# Patient Record
Sex: MALE | Race: WHITE | Employment: OTHER | URBAN - METROPOLITAN AREA
[De-identification: names, ages, dates, MRNs, and addresses within clinical notes are randomized per-mention and may not be internally consistent; named-entity substitution may affect disease eponyms.]

---

## 2017-01-04 ENCOUNTER — OFFICE VISIT (OUTPATIENT)
Dept: CARDIOLOGY CLINIC | Age: 67
End: 2017-01-04

## 2017-01-04 VITALS
HEART RATE: 93 BPM | SYSTOLIC BLOOD PRESSURE: 128 MMHG | HEIGHT: 72 IN | BODY MASS INDEX: 31.4 KG/M2 | WEIGHT: 231.8 LBS | RESPIRATION RATE: 18 BRPM | OXYGEN SATURATION: 98 % | DIASTOLIC BLOOD PRESSURE: 74 MMHG

## 2017-01-04 DIAGNOSIS — I10 ESSENTIAL HYPERTENSION: Primary | ICD-10-CM

## 2017-01-04 DIAGNOSIS — I34.1 MITRAL VALVE PROLAPSE: ICD-10-CM

## 2017-01-04 RX ORDER — BISMUTH SUBSALICYLATE 262 MG
1 TABLET,CHEWABLE ORAL DAILY
COMMUNITY

## 2017-01-04 RX ORDER — LOSARTAN POTASSIUM AND HYDROCHLOROTHIAZIDE 12.5; 5 MG/1; MG/1
TABLET ORAL DAILY
COMMUNITY
Start: 2016-12-22 | End: 2017-01-04 | Stop reason: SDUPTHER

## 2017-01-04 RX ORDER — LOSARTAN POTASSIUM AND HYDROCHLOROTHIAZIDE 12.5; 5 MG/1; MG/1
1 TABLET ORAL DAILY
Qty: 90 TAB | Refills: 3 | Status: SHIPPED | OUTPATIENT
Start: 2017-01-04 | End: 2018-02-06 | Stop reason: SDUPTHER

## 2017-01-04 RX ORDER — ASPIRIN 81 MG/1
TABLET ORAL DAILY
COMMUNITY
End: 2018-05-08

## 2017-01-04 NOTE — MR AVS SNAPSHOT
Visit Information Date & Time Provider Department Dept. Phone Encounter #  
 1/4/2017  2:40 PM Imelda Mckinley MD [facility] [phone] [id number] Follow-up Instructions Return in about 6 weeks (around 2/15/2017). Upcoming Health Maintenance Date Due Hepatitis C Screening 1950 DTaP/Tdap/Td series (1 - Tdap) 6/25/1970 FOBT Q 1 YEAR AGE 50-75 6/25/1999 ZOSTER VACCINE AGE 60> 6/25/2009 GLAUCOMA SCREENING Q2Y 6/25/2014 Pneumococcal 65+ Low/Medium Risk (1 of 2 - PCV13) 6/25/2014 MEDICARE YEARLY EXAM 6/25/2014 Allergies as of 1/4/2017  Review Complete On: 1/4/2017 By: Imelda Mckinley MD  
 No Known Allergies Current Immunizations  Never Reviewed No immunizations on file. Not reviewed this visit You Were Diagnosed With   
  
 Codes Comments Essential hypertension    -  Primary ICD-10-CM: I10 
ICD-9-CM: 401.9 Vitals BP Pulse Resp Height(growth percentile) Weight(growth percentile) SpO2  
 128/74 (BP 1 Location: Right arm, BP Patient Position: Sitting) 93 18 6' (1.829 m) 231 lb 12.8 oz (105.1 kg) 98% BMI Smoking Status 31.44 kg/m2 Former Smoker Vitals History BMI and BSA Data Body Mass Index Body Surface Area  
 31.44 kg/m 2 2.31 m 2 Preferred Pharmacy Pharmacy Name Phone [address] [phone] Your Updated Medication List  
  
   
This list is accurate as of: 1/4/17  3:15 PM.  Always use your most recent med list.  
  
  
  
  
 aspirin delayed-release 81 mg tablet Take  by mouth daily. B COMPLEX PO Take  by mouth daily. FISH OIL PO Take  by mouth two (2) times a day. GLUCOSAMINE PO Take  by mouth two (2) times a day. losartan-hydroCHLOROthiazide 50-12.5 mg per tablet Commonly known as:  HYZAAR  
daily. multivitamin tablet Commonly known as:  ONE A DAY Take 1 Tab by mouth daily. VITAMIN D3 PO Take  by mouth daily. Follow-up Instructions Return in about 6 weeks (around 2/15/2017). Introducing [facility]! Dwayne Le introduces [employer] patient portal. Now you can access parts of your medical record, email your doctor's office, and request medication refills online. 1. In your internet browser, go to [email or web address] 2. Click on the First Time User? Click Here link in the Sign In box. You will see the New Member Sign Up page. 3. Enter your [employer] Access Code exactly as it appears below. You will not need to use this code after youve completed the sign-up process. If you do not sign up before the expiration date, you must request a new code. · [employer] Access Code: [email or web address] Expires: 4/4/2017  2:26 PM 
 
4. Enter the last four digits of your Social Security Number (xxxx) and Date of Birth (mm/dd/yyyy) as indicated and click Submit. You will be taken to the next sign-up page. 5. Create a [employer] ID. This will be your [employer] login ID and cannot be changed, so think of one that is secure and easy to remember. 6. Create a [employer] password. You can change your password at any time. 7. Enter your Password Reset Question and Answer. This can be used at a later time if you forget your password. 8. Enter your e-mail address. You will receive e-mail notification when new information is available in [address] 9. Click Sign Up. You can now view and download portions of your medical record. 10. Click the Download Summary menu link to download a portable copy of your medical information. If you have questions, please visit the Frequently Asked Questions section of the [employer] website. Remember, [employer] is NOT to be used for urgent needs. For medical emergencies, dial 911. Now available from your iPhone and Android! Please provide this summary of care documentation to your next provider. If you have any questions after today's visit, please call [phone].

## 2017-01-04 NOTE — PROGRESS NOTES
LAST OFFICE VISIT : Visit date not found        ICD-10-CM ICD-9-CM   1. Essential hypertension I10 401.9      Leandroy Abhijit is a 78 y.o. male with HTN and mitral valve prolapse presents to the clinic to establish care. Cardiac risk factors: male gender, hypertension, family history (Father with CAD)  I have personally obtained the history from the patient. HISTORY OF PRESENTING ILLNESS     Mr. Aldo Hanna moved from [place] in August of last year. He seeks cardiac care for hypertension, and is on Losartan. He has mitral valve prolapse. He has had an US, stress test and Cardiac MRI conducted in the past that were all negative. His cholesterol were checked by his previous cardiologist, but he does not have records with him today. He is smoke free for 20 years now. He admits that he has gained weight from the holiday season. He remains active with golfing and going to the [employer] couple times a week. The patient denies chest pain/ shortness of breath, orthopnea, PND, LE edema, palpitations, syncope, presyncope or fatigue. His surgical history includes shoulder surgery in his 25s, and hernia repair. He does not have any other significant medical history. ACTIVE PROBLEM LIST     There are no active problems to display for this patient. PAST MEDICAL HISTORY     Past Medical History   Diagnosis Date    Essential hypertension            PAST SURGICAL HISTORY     Past Surgical History   Procedure Laterality Date    Hx other surgical       Repair of the right shoulder.  1977    Hx hernia repair       times 2. 1980's    Hx tonsillectomy       childhood          ALLERGIES     No Known Allergies       FAMILY HISTORY     Family History   Problem Relation Age of Onset    Sudden Death Mother      Car accident    Heart Attack Father 48    Thyroid Disease Sister     negative for cardiac disease       SOCIAL HISTORY     Social History     Social History    Marital status:      Spouse name: N/A   Kyrie Navarro Number of children: N/A    Years of education: N/A     Social History Main Topics    Smoking status: Former Smoker     Packs/day: 1.00     Years: 30.00     Quit date: 2/1/1997    Smokeless tobacco: Never Used    Alcohol use Yes      Comment: occassionally    Drug use: No    Sexual activity: Not Asked     Other Topics Concern    None     Social History Narrative    None         MEDICATIONS     Current Outpatient Prescriptions   Medication Sig    losartan-hydroCHLOROthiazide (HYZAAR) 50-12.5 mg per tablet daily.  multivitamin (ONE A DAY) tablet Take 1 Tab by mouth daily.  VITAMIN B COMPLEX (B COMPLEX PO) Take  by mouth daily.  DOCOSAHEXANOIC ACID/EPA (FISH OIL PO) Take  by mouth two (2) times a day.  GLUCOSAMINE SULFATE (GLUCOSAMINE PO) Take  by mouth two (2) times a day.  CHOLECALCIFEROL, VITAMIN D3, (VITAMIN D3 PO) Take  by mouth daily.  aspirin delayed-release 81 mg tablet Take  by mouth daily. No current facility-administered medications for this visit. I have reviewed the nurses notes, vitals, problem list, allergy list, medical history, family, social history and medications. REVIEW OF SYMPTOMS      General: Pt denies excessive weight gain or loss. Pt is able to conduct ADL's  HEENT: Denies blurred vision, headaches, hearing loss, epistaxis and difficulty swallowing. Respiratory: Denies cough, congestion, shortness of breath, OCONNOR, wheezing or stridor. Cardiovascular: Denies precordial pain, palpitations, edema or PND  Gastrointestinal: Denies poor appetite, indigestion, abdominal pain or blood in stool  Genitourinary: Denies hematuria, dysuria, increased urinary frequency  Musculoskeletal: Denies joint pain or swelling from muscles or joints  Neurologic: Denies tremor, paresthesias, headache, or sensory motor disturbance  Psychiatric: Denies confusion, insomnia, depression  Integumentray: Denies rash, itching or ulcers.   Hematologic: Denies easy bruising, bleeding     PHYSICAL EXAMINATION      Vitals:    01/04/17 1434   BP: 128/74   Pulse: 93   Resp: 18   SpO2: 98%   Weight: 231 lb 12.8 oz (105.1 kg)   Height: 6' (1.829 m)     General: Well developed, in no acute distress. HEENT: No jaundice, oral mucosa moist, no oral ulcers  Neck: Supple, no stiffness, no lymphadenopathy, supple  Heart:  Normal S1/S2 negative S3 or S4. Regular, no murmur, gallop or rub, no jugular venous distention  Respiratory: Clear bilaterally x 4, no wheezing or rales  Abdomen:   Soft, non-tender, bowel sounds are active.   Extremities:  No edema, normal cap refill, no cyanosis. Musculoskeletal: No clubbing, no deformities  Neuro: A&Ox3, speech clear, gait stable, cooperative, no focal neurologic deficits  Skin: Skin color is normal. No rashes or lesions. Non diaphoretic, moist.  Vascular: 2+ pulses symmetric in all extremities      EKG: SR with frequent PVCs. DIAGNOSTIC DATA          LABORATORY DATA      No results found for: WBC, HGBPOC, HGB, HGBP, HCTPOC, HCT, PHCT, RBCH, PLT, MCV, HGBEXT, HCTEXT, PLTEXT   No results found for: NA, K, CL, CO2, AGAP, GLU, BUN, CREA, BUCR, GFRAA, GFRNA, CA, TBIL, TBILI, GPT, SGOT, AP, TP, ALB, GLOB, AGRAT, ALT        ASSESSMENT/RECOMMENDATIONS:.      1. HTN   BP is under good control under current dose of HCTZ    2. Mitral valve prolapse   -Will check echo from cardiologist in [place]. And determine when the next one should be dong. I discussed with him risks of arrythmia. Will get a 24 hour holter monitor for further evaluation. 3. Unknown cholesterol profile  -Will check a fasting lipid profile. Orders Placed This Encounter    AMB POC EKG ROUTINE W/ 12 LEADS, INTER & REP     Order Specific Question:   Reason for Exam:     Answer:   routine    losartan-hydroCHLOROthiazide (HYZAAR) 50-12.5 mg per tablet     Sig: daily.     DISCONTD: FLUZONE HIGH-DOSE 2016-17, PF, syrg injection     Sig: ADM 0.5ML IM UTD     Refill:  0    multivitamin (ONE A DAY) tablet     Sig: Take 1 Tab by mouth daily.  VITAMIN B COMPLEX (B COMPLEX PO)     Sig: Take  by mouth daily.  DOCOSAHEXANOIC ACID/EPA (FISH OIL PO)     Sig: Take  by mouth two (2) times a day.  GLUCOSAMINE SULFATE (GLUCOSAMINE PO)     Sig: Take  by mouth two (2) times a day.  CHOLECALCIFEROL, VITAMIN D3, (VITAMIN D3 PO)     Sig: Take  by mouth daily.  aspirin delayed-release 81 mg tablet     Sig: Take  by mouth daily. Follow-up Disposition: Not on File      I have discussed the diagnosis with  Fe Goetz and the intended plan as seen in the above orders. Questions were answered concerning future plans. I have discussed medication side effects and warnings with the patient as well. Thank you,  No primary care provider on file. for involving me in the care of  Fe Goetz. Please do not hesitate to contact me for further questions/concerns. Written by Rinu Orvel Galeazzi, as dictated by Salena Curling, MD.    Dilip Nina MD, [facility] [address] [place] [address], [address] [place] [address] Drive      [phone] / [phone] Fax

## 2017-01-09 ENCOUNTER — HOSPITAL ENCOUNTER (OUTPATIENT)
Dept: LAB | Age: 67
Discharge: HOME | End: 2017-01-09
Payer: MEDICARE

## 2017-01-09 PROCEDURE — 84443 ASSAY THYROID STIM HORMONE: CPT

## 2017-01-09 PROCEDURE — 36415 COLL VENOUS BLD VENIPUNCTURE: CPT

## 2017-01-09 PROCEDURE — 80061 LIPID PANEL: CPT

## 2017-01-10 LAB
CHOLEST SERPL-MCNC: 201 MG/DL (ref 100–199)
HDLC SERPL-MCNC: 43 MG/DL
INTERPRETATION, 910389: NORMAL
LDLC SERPL CALC-MCNC: 140 MG/DL (ref 0–99)
TRIGL SERPL-MCNC: 91 MG/DL (ref 0–149)
TSH SERPL DL<=0.005 MIU/L-ACNC: 3.25 UIU/ML (ref 0.45–4.5)
VLDLC SERPL CALC-MCNC: 18 MG/DL (ref 5–40)

## 2017-01-17 DIAGNOSIS — E78.00 HYPERCHOLESTEREMIA: Primary | ICD-10-CM

## 2017-01-17 RX ORDER — SIMVASTATIN 10 MG/1
10 TABLET, FILM COATED ORAL
Qty: 30 TAB | Refills: 6 | Status: SHIPPED | OUTPATIENT
Start: 2017-01-17 | End: 2017-01-19

## 2017-01-17 RX ORDER — SIMVASTATIN 10 MG/1
TABLET, FILM COATED ORAL
COMMUNITY
End: 2017-01-17 | Stop reason: SDUPTHER

## 2017-01-19 ENCOUNTER — CLINICAL SUPPORT (OUTPATIENT)
Dept: CARDIOLOGY CLINIC | Age: 67
End: 2017-01-19

## 2017-01-19 ENCOUNTER — OFFICE VISIT (OUTPATIENT)
Dept: INTERNAL MEDICINE CLINIC | Age: 67
End: 2017-01-19

## 2017-01-19 ENCOUNTER — HOSPITAL ENCOUNTER (OUTPATIENT)
Dept: LAB | Age: 67
Discharge: HOME | End: 2017-01-19
Payer: MEDICARE

## 2017-01-19 VITALS
SYSTOLIC BLOOD PRESSURE: 123 MMHG | DIASTOLIC BLOOD PRESSURE: 84 MMHG | RESPIRATION RATE: 16 BRPM | BODY MASS INDEX: 30.96 KG/M2 | WEIGHT: 228.6 LBS | OXYGEN SATURATION: 98 % | HEART RATE: 75 BPM | TEMPERATURE: 98.3 F | HEIGHT: 72 IN

## 2017-01-19 DIAGNOSIS — I49.9 CARDIAC ARRHYTHMIA, UNSPECIFIED CARDIAC ARRHYTHMIA TYPE: ICD-10-CM

## 2017-01-19 DIAGNOSIS — L98.9 SKIN LESION: ICD-10-CM

## 2017-01-19 DIAGNOSIS — I34.1 MVP (MITRAL VALVE PROLAPSE): Primary | ICD-10-CM

## 2017-01-19 DIAGNOSIS — I10 ESSENTIAL HYPERTENSION: Primary | ICD-10-CM

## 2017-01-19 DIAGNOSIS — Z13.9 SCREENING: ICD-10-CM

## 2017-01-19 DIAGNOSIS — K63.5 COLON POLYPS: ICD-10-CM

## 2017-01-19 DIAGNOSIS — Z23 ENCOUNTER FOR IMMUNIZATION: ICD-10-CM

## 2017-01-19 PROCEDURE — 84153 ASSAY OF PSA TOTAL: CPT

## 2017-01-19 PROCEDURE — 85025 COMPLETE CBC W/AUTO DIFF WBC: CPT

## 2017-01-19 PROCEDURE — 36415 COLL VENOUS BLD VENIPUNCTURE: CPT

## 2017-01-19 PROCEDURE — 80053 COMPREHEN METABOLIC PANEL: CPT

## 2017-01-19 PROCEDURE — 81003 URINALYSIS AUTO W/O SCOPE: CPT

## 2017-01-19 NOTE — MR AVS SNAPSHOT
Visit Information Date & Time Provider Department Dept. Phone Encounter #  
 1/19/2017 11:00 AM Jl Beck MD [facility] [address] [id number] Your Appointments 1/19/2017  1:00 PM  
HOLTER MONITOR with HOLTER, STFRANCIS  
[facility] (TAMMY SCHEDULING) Appt Note: Holter- Dr. Linda Mo pt.  
 [address] [facility] [place] [address] [facility]  
[phone]  
  
   
 [address] [facility] [place] [address] [place] [phone]  
  
    
 2/16/2017  9:20 AM  
ESTABLISHED PATIENT with Lorena Yi MD  
[facility] ([address]) Appt Note: [address] [facility] [place] [address] [facility]  
[address] [facility] [place] [address] [address] Upcoming Health Maintenance Date Due Hepatitis C Screening 1950 DTaP/Tdap/Td series (1 - Tdap) 6/25/1970 FOBT Q 1 YEAR AGE 50-75 6/25/1999 ZOSTER VACCINE AGE 60> 6/25/2009 GLAUCOMA SCREENING Q2Y 6/25/2014 Pneumococcal 65+ Low/Medium Risk (1 of 2 - PCV13) 6/25/2014 MEDICARE YEARLY EXAM 6/25/2014 Allergies as of 1/19/2017  Review Complete On: 1/19/2017 By: lJ Beck MD  
 No Known Allergies Current Immunizations  Reviewed on 1/19/2017 Name Date Pneumococcal Polysaccharide (PPSV-23)  Incomplete Reviewed by Jl Beck MD on 1/19/2017 at 11:23 AM  
You Were Diagnosed With   
  
 Codes Comments Essential hypertension    -  Primary ICD-10-CM: I10 
ICD-9-CM: 401.9 Screening     ICD-10-CM: Z13.9 ICD-9-CM: V82.9 Colon polyps     ICD-10-CM: K63.5 ICD-9-CM: 211.3 Encounter for immunization     ICD-10-CM: M28 ICD-9-CM: V03.89 Skin lesion     ICD-10-CM: L98.9 ICD-9-CM: 709.9 Vitals BP Pulse Temp Resp Height(growth percentile) Weight(growth percentile) 123/84 (BP 1 Location: Left arm, BP Patient Position: Sitting) 75 98.3 °F (36.8 °C) (Oral) 16 6' (1.829 m) 228 lb 9.6 oz (103.7 kg) SpO2 BMI Smoking Status 98% 31 kg/m2 Former Smoker Vitals History BMI and BSA Data Body Mass Index Body Surface Area  
 31 kg/m 2 2.3 m 2 Preferred Pharmacy Pharmacy Name Phone [facility] [address] [place] [address] [phone] Your Updated Medication List  
  
   
This list is accurate as of: 1/19/17 11:39 AM.  Always use your most recent med list.  
  
  
  
  
 aspirin delayed-release 81 mg tablet Take  by mouth daily. B COMPLEX PO Take  by mouth daily. FISH OIL PO Take  by mouth two (2) times a day. GLUCOSAMINE PO Take  by mouth two (2) times a day. losartan-hydroCHLOROthiazide 50-12.5 mg per tablet Commonly known as:  HYZAAR Take 1 Tab by mouth daily. Indications: Hypertension  
  
 multivitamin tablet Commonly known as:  ONE A DAY Take 1 Tab by mouth daily. VITAMIN D3 PO Take  by mouth daily. We Performed the Following CBC WITH AUTOMATED DIFF [32663 CPT(R)] METABOLIC PANEL, COMPREHENSIVE [75165 CPT(R)] PNEUMOCOCCAL POLYSACCHARIDE VACCINE, 23-VALENT, ADULT OR IMMUNOSUPPRESSED PT DOSE, [87651 CPT(R)] PSA DIAGNOSTIC (PROSTATIC SPECIFIC AG) [id number] CPT(R)] URINALYSIS W/ RFLX MICROSCOPIC [61040 CPT(R)] Introducing [facility]! Precious Donovan introduces [employer] patient portal. Now you can access parts of your medical record, email your doctor's office, and request medication refills online. 1. In your internet browser, go to [email or web address] 2. Click on the First Time User? Click Here link in the Sign In box. You will see the New Member Sign Up page. 3. Enter your [employer] Access Code exactly as it appears below.  You will not need to use this code after youve completed the sign-up process. If you do not sign up before the expiration date, you must request a new code. · [employer] Access Code: [email or web address] Expires: 4/4/2017  2:26 PM 
 
4. Enter the last four digits of your Social Security Number (xxxx) and Date of Birth (mm/dd/yyyy) as indicated and click Submit. You will be taken to the next sign-up page. 5. Create a [employer] ID. This will be your [employer] login ID and cannot be changed, so think of one that is secure and easy to remember. 6. Create a [employer] password. You can change your password at any time. 7. Enter your Password Reset Question and Answer. This can be used at a later time if you forget your password. 8. Enter your e-mail address. You will receive e-mail notification when new information is available in [address] 9. Click Sign Up. You can now view and download portions of your medical record. 10. Click the Download Summary menu link to download a portable copy of your medical information. If you have questions, please visit the Frequently Asked Questions section of the [employer] website. Remember, [employer] is NOT to be used for urgent needs. For medical emergencies, dial 911. Now available from your iPhone and Android! Please provide this summary of care documentation to your next provider. Your primary care clinician is listed as Claude 68. If you have any questions after today's visit, please call [phone].

## 2017-01-19 NOTE — PROGRESS NOTES
Patient has been scheduled to see David Little, dermatologist, for [facility] January 25th at 8:15 for follow up on his skin lesion. Patient notified & voiced understanding.

## 2017-01-19 NOTE — PROGRESS NOTES
HISTORY OF PRESENT ILLNESS  Claudio Huggins is a 78 y.o. male. HPI  New to me and this practice. Issues:  1. Colon polyps resected he believes three years ago. Due for follow up colonoscopy this year. 2. Hypertension, stable on medication. No recent cardiac symptoms. Has seen Dr. Rosaline Meehan and had lipids ordered. 3. Lesion on left side of his temple, present for at least a year and bleeds intermittently. Also a lesion on his right thigh that has been scaly and present for years. 4. History of trigger finger of left third finger. Social History:  . One daughter works as a cellist in [place]. One daughter is in [place]. One stepson. No tobacco, occasional alcohol. 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 Family History:  Parents both  at young age, mother in an automobile accident. Review of Systems   Constitutional: Negative for chills, fever and weight loss. Respiratory: Negative for cough, shortness of breath and wheezing. Cardiovascular: Negative for chest pain, palpitations, orthopnea, leg swelling and PND. Gastrointestinal: Negative for abdominal pain, constipation, diarrhea, heartburn and nausea. Musculoskeletal: Positive for joint pain (left hand third finger). Negative for myalgias. Skin: Positive for rash. Negative for itching. Neurological: Negative for dizziness and headaches. Psychiatric/Behavioral: Negative for depression. All other systems reviewed and are negative. Physical Exam   Constitutional: He is oriented to person, place, and time. He appears well-developed and well-nourished. HENT:   Head: Normocephalic and atraumatic. Right Ear: External ear normal.   Left Ear: External ear normal.   Mouth/Throat: Oropharynx is clear and moist.   Eyes: Pupils are equal, round, and reactive to light. Neck: Normal range of motion. Neck supple. Carotid bruit is not present. No thyromegaly present.    Cardiovascular: Normal rate, regular rhythm, normal heart sounds and intact distal pulses. Pulmonary/Chest: Effort normal and breath sounds normal. No respiratory distress. He has no wheezes. He has no rales. Abdominal: Soft. Bowel sounds are normal. He exhibits no mass. There is no tenderness. Musculoskeletal: He exhibits no edema. Lymphadenopathy:     He has no cervical adenopathy. Neurological: He is alert and oriented to person, place, and time. Skin: Skin is warm and dry. No rash noted. Raised dime sized lesion shiny on left temple  Right thigh with scaly flat dime sized lesion   Psychiatric: He has a normal mood and affect. His behavior is normal.   Nursing note and vitals reviewed. ASSESSMENT and Sandi Pang was seen today for establish care.     Diagnoses and all orders for this visit:    Essential hypertension  -     CBC WITH AUTOMATED DIFF  -     METABOLIC PANEL, COMPREHENSIVE    Screening  -     PROSTATE SPECIFIC AG  -     URINALYSIS W/ RFLX MICROSCOPIC    Colon polyps  -     CBC WITH AUTOMATED DIFF  -     REFERRAL FOR COLONOSCOPY dr pate    Encounter for immunization  -     Pneumococcal polysaccharide vaccine, 23-valent, adult or immunosuppressed pt dose    Skin lesion-suspicious for skin ca-needs derm appt asap-we will assist

## 2017-01-19 NOTE — PROGRESS NOTES
[facility]  Timeout Progress Note    Chart reviewed for allergies/reaction to any medications. TIMEOUT initiated prior to start of procedure:       Yes No: yes Patient identified by name and date of birth     Yes No: yes Informed consent obtained     Yes No: yes Procedure site marked and verified     Yes No: yes Procedure to be performed verified, patient confirms understanding     Yes No: yes Pain assessment pre-procedure - Pain 0/10     Yes No: yes Pain assessment post-procedure - Pain 0/10     Yes No: yes Patient education provided     Yes No: yes Post-procedure instructions provided to patient     Consent form signed and verified. Patient tolerated procedure well.       Ben Almanza LPN

## 2017-01-19 NOTE — Clinical Note
Can you get h im a derm appt and also give him dr Donnelly Cleverly number so he can schedule for colonoscopy follow up on polyps thanks

## 2017-01-20 LAB
ALBUMIN SERPL-MCNC: 4.4 G/DL (ref 3.6–4.8)
ALBUMIN/GLOB SERPL: 2 {RATIO} (ref 1.1–2.5)
ALP SERPL-CCNC: 71 IU/L (ref 39–117)
ALT SERPL-CCNC: 33 IU/L (ref 0–44)
APPEARANCE UR: CLEAR
AST SERPL-CCNC: 25 IU/L (ref 0–40)
BASOPHILS # BLD AUTO: 0.1 X10E3/UL (ref 0–0.2)
BASOPHILS NFR BLD AUTO: 1 %
BILIRUB SERPL-MCNC: 0.6 MG/DL (ref 0–1.2)
BILIRUB UR QL STRIP: NEGATIVE
BUN SERPL-MCNC: 20 MG/DL (ref 8–27)
BUN/CREAT SERPL: 20 (ref 10–22)
CALCIUM SERPL-MCNC: 9.5 MG/DL (ref 8.6–10.2)
CHLORIDE SERPL-SCNC: 99 MMOL/L (ref 96–106)
CO2 SERPL-SCNC: 26 MMOL/L (ref 18–29)
COLOR UR: YELLOW
CREAT SERPL-MCNC: 0.99 MG/DL (ref 0.76–1.27)
EOSINOPHIL # BLD AUTO: 0.3 X10E3/UL (ref 0–0.4)
EOSINOPHIL NFR BLD AUTO: 4 %
ERYTHROCYTE [DISTWIDTH] IN BLOOD BY AUTOMATED COUNT: 13.3 % (ref 12.3–15.4)
GLOBULIN SER CALC-MCNC: 2.2 G/DL (ref 1.5–4.5)
GLUCOSE SERPL-MCNC: 85 MG/DL (ref 65–99)
GLUCOSE UR QL: NEGATIVE
HCT VFR BLD AUTO: 42 % (ref 37.5–51)
HGB BLD-MCNC: 14.2 G/DL (ref 12.6–17.7)
HGB UR QL STRIP: NEGATIVE
IMM GRANULOCYTES # BLD: 0 X10E3/UL (ref 0–0.1)
IMM GRANULOCYTES NFR BLD: 0 %
KETONES UR QL STRIP: NEGATIVE
LEUKOCYTE ESTERASE UR QL STRIP: NEGATIVE
LYMPHOCYTES # BLD AUTO: 1.8 X10E3/UL (ref 0.7–3.1)
LYMPHOCYTES NFR BLD AUTO: 31 %
MCH RBC QN AUTO: 30 PG (ref 26.6–33)
MCHC RBC AUTO-ENTMCNC: 33.8 G/DL (ref 31.5–35.7)
MCV RBC AUTO: 89 FL (ref 79–97)
MICRO URNS: NORMAL
MONOCYTES # BLD AUTO: 0.7 X10E3/UL (ref 0.1–0.9)
MONOCYTES NFR BLD AUTO: 12 %
NEUTROPHILS # BLD AUTO: 3 X10E3/UL (ref 1.4–7)
NEUTROPHILS NFR BLD AUTO: 52 %
NITRITE UR QL STRIP: NEGATIVE
PH UR STRIP: 6 [PH] (ref 5–7.5)
PLATELET # BLD AUTO: 227 X10E3/UL (ref 150–379)
POTASSIUM SERPL-SCNC: 4.4 MMOL/L (ref 3.5–5.2)
PROT SERPL-MCNC: 6.6 G/DL (ref 6–8.5)
PROT UR QL STRIP: NEGATIVE
PSA SERPL-MCNC: 1.2 NG/ML (ref 0–4)
RBC # BLD AUTO: 4.73 X10E6/UL (ref 4.14–5.8)
SODIUM SERPL-SCNC: 140 MMOL/L (ref 134–144)
SP GR UR: 1.02 (ref 1–1.03)
UROBILINOGEN UR STRIP-MCNC: 0.2 MG/DL (ref 0.2–1)
WBC # BLD AUTO: 5.8 X10E3/UL (ref 3.4–10.8)

## 2017-01-25 RX ORDER — SIMVASTATIN 10 MG/1
TABLET, FILM COATED ORAL
COMMUNITY
End: 2017-01-25 | Stop reason: SDUPTHER

## 2017-01-25 RX ORDER — SIMVASTATIN 10 MG/1
10 TABLET, FILM COATED ORAL
Qty: 90 TAB | Refills: 0 | Status: SHIPPED | OUTPATIENT
Start: 2017-01-25 | End: 2018-01-31 | Stop reason: SDUPTHER

## 2017-02-16 ENCOUNTER — OFFICE VISIT (OUTPATIENT)
Dept: CARDIOLOGY CLINIC | Age: 67
End: 2017-02-16

## 2017-02-16 VITALS
DIASTOLIC BLOOD PRESSURE: 88 MMHG | SYSTOLIC BLOOD PRESSURE: 118 MMHG | WEIGHT: 233.3 LBS | HEIGHT: 72 IN | BODY MASS INDEX: 31.6 KG/M2 | HEART RATE: 56 BPM | OXYGEN SATURATION: 96 %

## 2017-02-16 DIAGNOSIS — I49.3 FREQUENT PVCS: Primary | ICD-10-CM

## 2017-02-16 DIAGNOSIS — I34.1 MITRAL VALVE PROLAPSE: ICD-10-CM

## 2017-02-16 DIAGNOSIS — I10 ESSENTIAL HYPERTENSION: Primary | ICD-10-CM

## 2017-02-16 NOTE — PROGRESS NOTES
LAST OFFICE VISIT : 1/19/2017      ICD-10-CM ICD-9-CM   1. Essential hypertension I10 401.9   2. Mitral valve prolapse I34.1 424.0     Linda Talavera is a 78 y.o. male with HTN and mitral valve prolapse referred for 6 month follow up. Cardiac risk factors: family history (father with CAD), male gender, hypertension   I have personally obtained the history from the patient. HISTORY OF PRESENTING ILLNESS      Mr. Damion Powell was not successful in obtaining echocardiogram results from his previous cardiologist, Dr. Agustina Lr located in [place]. His last stress test was performed many years ago, unsure of results. He denies any sensation of irregularities in his rhythm. His blood pressure seems to be under good control on medication and diet. He started on Zocor 10mg as directed in his last office visit with me. The patient denies chest pain/ shortness of breath, orthopnea, PND, LE edema, palpitations, syncope, presyncope or fatigue. ACTIVE PROBLEM LIST     Patient Active Problem List    Diagnosis Date Noted    Colon polyps 01/19/2017    Essential hypertension 01/19/2017           PAST MEDICAL HISTORY     Past Medical History   Diagnosis Date    Essential hypertension            PAST SURGICAL HISTORY     Past Surgical History   Procedure Laterality Date    Hx other surgical       Repair of the right shoulder.  0    Hx tonsillectomy       childhood    Hx orthopaedic       right shoulder surgery in the 76s    Hx hernia repair       times 2. 1980's inguinal bilateral          ALLERGIES     No Known Allergies       FAMILY HISTORY     Family History   Problem Relation Age of Onset    Sudden Death Mother      Car accident    Heart Attack Father 48    Thyroid Disease Sister     negative for cardiac disease       SOCIAL HISTORY     Social History     Social History    Marital status:      Spouse name: N/A    Number of children: N/A    Years of education: N/A     Social History Main Topics  Smoking status: Former Smoker     Packs/day: 1.00     Years: 30.00     Quit date: 2/1/1997    Smokeless tobacco: Never Used    Alcohol use Yes      Comment: occassionally    Drug use: No    Sexual activity: Not on file     Other Topics Concern    Not on file     Social History Narrative         MEDICATIONS     Current Outpatient Prescriptions   Medication Sig    simvastatin (ZOCOR) 10 mg tablet Take 1 Tab by mouth nightly.  multivitamin (ONE A DAY) tablet Take 1 Tab by mouth daily.  VITAMIN B COMPLEX (B COMPLEX PO) Take  by mouth daily.  DOCOSAHEXANOIC ACID/EPA (FISH OIL PO) Take  by mouth two (2) times a day.  GLUCOSAMINE SULFATE (GLUCOSAMINE PO) Take  by mouth two (2) times a day.  CHOLECALCIFEROL, VITAMIN D3, (VITAMIN D3 PO) Take  by mouth daily.  aspirin delayed-release 81 mg tablet Take  by mouth daily.  losartan-hydroCHLOROthiazide (HYZAAR) 50-12.5 mg per tablet Take 1 Tab by mouth daily. Indications: Hypertension     No current facility-administered medications for this visit. I have reviewed the nurses notes, vitals, problem list, allergy list, medical history, family, social history and medications. REVIEW OF SYMPTOMS      General: Pt denies excessive weight gain or loss. Pt is able to conduct ADL's  HEENT: Denies blurred vision, headaches, hearing loss, epistaxis and difficulty swallowing. Respiratory: Denies cough, congestion, shortness of breath, OCONNOR, wheezing or stridor. Cardiovascular: Denies precordial pain, palpitations, edema or PND  Gastrointestinal: Denies poor appetite, indigestion, abdominal pain or blood in stool  Genitourinary: Denies hematuria, dysuria, increased urinary frequency  Musculoskeletal: Denies joint pain or swelling from muscles or joints  Neurologic: Denies tremor, paresthesias, headache, or sensory motor disturbance  Psychiatric: Denies confusion, insomnia, depression  Integumentray: Denies rash, itching or ulcers.   Hematologic: Denies easy bruising, bleeding     PHYSICAL EXAMINATION      Vitals:    02/16/17 0943   BP: 118/88   Pulse: (!) 56   SpO2: 96%   Weight: 233 lb 4.8 oz (105.8 kg)   Height: 6' (1.829 m)     General: Well developed, in no acute distress. HEENT: No jaundice, oral mucosa moist, no oral ulcers  Neck: Supple, no stiffness, no lymphadenopathy, supple  Heart:  Normal ;with intermittent irregularity. Respiratory: Clear bilaterally x 4, no wheezing or rales  Extremities:  No edema, normal cap refill, no cyanosis. Musculoskeletal: No clubbing, no deformities  Neuro: A&Ox3, speech clear, gait stable, cooperative, no focal neurologic deficits  Skin: Skin color is normal. No rashes or lesions. Non diaphoretic, moist.  Vascular: 2+ pulses symmetric in all extremities       DIAGNOSTIC DATA     1. Lipids  1/9/17- , HDL 43, , TG 91    2. Holter  (1/19/17): sinus rhtyhm with PVCs there were frequent PVCs, rare couplets, triplets and bigeminy with rare episodes of non-sustained VT with rates up to 134. LABORATORY DATA     Lab Results   Component Value Date/Time    WBC 5.8 01/19/2017 12:06 PM    HGB 14.2 01/19/2017 12:06 PM    HCT 42.0 01/19/2017 12:06 PM    PLATELET 852 88/23/9220 12:06 PM    MCV 89 01/19/2017 12:06 PM      Lab Results   Component Value Date/Time    Sodium 140 01/19/2017 12:06 PM    Potassium 4.4 01/19/2017 12:06 PM    Chloride 99 01/19/2017 12:06 PM    CO2 26 01/19/2017 12:06 PM    Glucose 85 01/19/2017 12:06 PM    BUN 20 01/19/2017 12:06 PM    Creatinine 0.99 01/19/2017 12:06 PM    BUN/Creatinine ratio 20 01/19/2017 12:06 PM    GFR est AA 91 01/19/2017 12:06 PM    GFR est non-AA 78 01/19/2017 12:06 PM    Calcium 9.5 01/19/2017 12:06 PM    Bilirubin, total 0.6 01/19/2017 12:06 PM    AST (SGOT) 25 01/19/2017 12:06 PM    Alk.  phosphatase 71 01/19/2017 12:06 PM    Protein, total 6.6 01/19/2017 12:06 PM    Albumin 4.4 01/19/2017 12:06 PM    A-G Ratio 2.0 01/19/2017 12:06 PM    ALT (SGPT) 33 01/19/2017 12:06 PM           ASSESSMENT/RECOMMENDATIONS:.     1. HTN  -BP under good control. Recheck BP was 112/60.      2. Mitral valve prolapse   -We obtained a Holter monitor. He had 13.9% PVCs in a 24 hour period. He had rare ventricular triplets, couplets, occasional bigemine along with rare episodes of non-sustained VT up to 134.    - Still have not received the documentation from [place]. In light of frequent PVCs, I favor doing a cardiac stress MRI to look for ischemia or any scar formation. 3. Unknown cholesterol profile  -lipids checked in 6-8 weeks. He was started on Zocor 10mg. 4. Return in 2 months or PRN     No orders of the defined types were placed in this encounter. Follow-up Disposition: Not on File      I have discussed the diagnosis with  Nikky Gregory and the intended plan as seen in the above orders. Questions were answered concerning future plans. I have discussed medication side effects and warnings with the patient as well. Thank you,  Jl Beck MD for involving me in the care of  Nikky Gregory. Please do not hesitate to contact me for further questions/concerns. Written by José Macario, as dictated by Lorena Yi MD.    Javier Nina MD, [address] [place] [address] Rd[address]      [facility], [address] [place] [address]     Kat Bocanegralouie      [phone] / [phone] Fax

## 2017-02-16 NOTE — MR AVS SNAPSHOT
Visit Information Date & Time Provider Department Dept. Phone Encounter #  
 2/16/2017  9:20 AM Donna Abraham MD [facility] [phone] [id number] Follow-up Instructions Return in about 2 months (around 4/16/2017). Follow-up and Disposition History Your Appointments 3/22/2017  1:40 PM  
New Patient with Suzy Bardales MD  
[facility] ([address]) Appt Note: per dr Kvng pelayo [facility] [place] [address] [address] [facility]  
[address] [place] [address] [facility] [place] 5/18/2017  8:40 AM  
ESTABLISHED PATIENT with Donna Abraham MD  
[facility] Appt Note: 2 mo fup  
 [address] [facility] [place] [address] [facility]  
[address] [place] [address] [facility] [place] Upcoming Health Maintenance Date Due Hepatitis C Screening 1950 DTaP/Tdap/Td series (1 - Tdap) 6/25/1970 FOBT Q 1 YEAR AGE 50-75 6/25/1999 ZOSTER VACCINE AGE 60> 6/25/2009 GLAUCOMA SCREENING Q2Y 6/25/2014 MEDICARE YEARLY EXAM 6/25/2014 Pneumococcal 65+ Low/Medium Risk (2 of 2 - PCV13) 1/19/2018 Allergies as of 2/16/2017  Review Complete On: 2/16/2017 By: Donna Abraham MD  
 No Known Allergies Current Immunizations  Reviewed on 1/19/2017 Name Date Pneumococcal Polysaccharide (PPSV-23) 1/19/2017 Not reviewed this visit You Were Diagnosed With   
  
 Codes Comments Essential hypertension    -  Primary ICD-10-CM: I10 
ICD-9-CM: 401.9 Mitral valve prolapse     ICD-10-CM: I34.1 ICD-9-CM: 424.0 Vitals BP Pulse Height(growth percentile) Weight(growth percentile) SpO2 BMI  
 118/88 (BP 1 Location: Left arm) (!) 56 6' (1.829 m) 233 lb 4.8 oz (105.8 kg) 96% 31.64 kg/m2 Smoking Status Former Smoker Vitals History BMI and BSA Data Body Mass Index Body Surface Area  
 31.64 kg/m 2 2.32 m 2 Preferred Pharmacy Pharmacy Name Phone [facility] [address] [place], [place] [address] [address] [phone] Your Updated Medication List  
  
   
This list is accurate as of: 2/16/17  9:59 AM.  Always use your most recent med list.  
  
  
  
  
 aspirin delayed-release 81 mg tablet Take  by mouth daily. B COMPLEX PO Take  by mouth daily. FISH OIL PO Take  by mouth two (2) times a day. GLUCOSAMINE PO Take  by mouth two (2) times a day. losartan-hydroCHLOROthiazide 50-12.5 mg per tablet Commonly known as:  HYZAAR Take 1 Tab by mouth daily. Indications: Hypertension  
  
 multivitamin tablet Commonly known as:  ONE A DAY Take 1 Tab by mouth daily. simvastatin 10 mg tablet Commonly known as:  ZOCOR Take 1 Tab by mouth nightly. VITAMIN D3 PO Take  by mouth daily. Follow-up Instructions Return in about 2 months (around 4/16/2017). Introducing [facility] & HEALTH [facility]! Dear Jhon Biggs: 
Thank you for requesting a iFollo account. Our records indicate that you already have an active iFollo account. You can access your account anytime at [email or web address] Did you know that you can access your hospital and ER discharge instructions at any time in iFollo? You can also review all of your test results from your hospital stay or ER visit. Additional Information If you have questions, please visit the Frequently Asked Questions section of the iFollo website at [email or web address] Remember, iFollo is NOT to be used for urgent needs. For medical emergencies, dial 911. Now available from your iPhone and Android! Please provide this summary of care documentation to your next provider. Your primary care clinician is listed as Claude Liu. If you have any questions after today's visit, please call [phone].

## 2017-03-20 ENCOUNTER — HOSPITAL ENCOUNTER (OUTPATIENT)
Dept: MRI IMAGING | Age: 67
Discharge: HOME | End: 2017-03-20
Attending: SPECIALIST
Payer: MEDICARE

## 2017-03-20 VITALS — BODY MASS INDEX: 31.19 KG/M2 | WEIGHT: 230 LBS

## 2017-03-20 DIAGNOSIS — I49.3 FREQUENT PVCS: ICD-10-CM

## 2017-03-20 PROCEDURE — 75561 CARDIAC MRI FOR MORPH W/DYE: CPT

## 2017-03-20 PROCEDURE — A9579 GAD-BASE MR CONTRAST NOS,1ML: HCPCS | Performed by: INTERNAL MEDICINE

## 2017-03-20 PROCEDURE — 74011636320 HC RX REV CODE- 636/320: Performed by: INTERNAL MEDICINE

## 2017-03-20 RX ADMIN — GADOPENTETATE DIMEGLUMINE 40 ML: 469.01 INJECTION INTRAVENOUS at 13:04

## 2017-03-22 ENCOUNTER — OFFICE VISIT (OUTPATIENT)
Dept: CARDIOLOGY CLINIC | Age: 67
End: 2017-03-22

## 2017-03-22 VITALS
BODY MASS INDEX: 32.01 KG/M2 | DIASTOLIC BLOOD PRESSURE: 70 MMHG | HEART RATE: 83 BPM | WEIGHT: 236 LBS | OXYGEN SATURATION: 99 % | SYSTOLIC BLOOD PRESSURE: 110 MMHG

## 2017-03-22 DIAGNOSIS — I34.1 MITRAL VALVE PROLAPSE: Primary | ICD-10-CM

## 2017-03-22 DIAGNOSIS — I10 ESSENTIAL HYPERTENSION: ICD-10-CM

## 2017-03-22 NOTE — MR AVS SNAPSHOT
Visit Information Date & Time Provider Department Dept. Phone Encounter #  
 3/22/2017  1:40 PM Grzegorz Vu MD [facility] [phone] [id number] Your Appointments 5/18/2017  8:40 AM  
ESTABLISHED PATIENT with Kelly Garza MD  
[facility] ([facility]) Appt Note: 2 mo fup  
 [address] [facility] [address] [address] [address] Upcoming Health Maintenance Date Due Hepatitis C Screening 1950 DTaP/Tdap/Td series (1 - Tdap) 6/25/1970 FOBT Q 1 YEAR AGE 50-75 6/25/1999 ZOSTER VACCINE AGE 60> 6/25/2009 GLAUCOMA SCREENING Q2Y 6/25/2014 MEDICARE YEARLY EXAM 6/25/2014 Pneumococcal 65+ Low/Medium Risk (2 of 2 - PCV13) 1/19/2018 Allergies as of 3/22/2017  Review Complete On: 3/22/2017 By: Grzegorz Vu MD  
 No Known Allergies Current Immunizations  Reviewed on 1/19/2017 Name Date Pneumococcal Polysaccharide (PPSV-23) 1/19/2017 Not reviewed this visit You Were Diagnosed With   
  
 Codes Comments Mitral valve prolapse    -  Primary ICD-10-CM: I34.1 ICD-9-CM: 424.0 Essential hypertension     ICD-10-CM: I10 
ICD-9-CM: 401.9 Vitals BP Pulse Weight(growth percentile) SpO2 BMI Smoking Status 110/70 (BP 1 Location: Left arm, BP Patient Position: Sitting) 83 236 lb (107 kg) 99% 32.01 kg/m2 Former Smoker Vitals History BMI and BSA Data Body Mass Index Body Surface Area 32.01 kg/m 2 2.33 m 2 Preferred Pharmacy Pharmacy Name Phone [address] 4[address] [facility] [address] [place] [address] [phone] Your Updated Medication List  
  
   
This list is accurate as of: 3/22/17  2:12 PM.  Always use your most recent med list.  
  
  
  
  
 aspirin delayed-release 81 mg tablet Take  by mouth daily.   
  
 B COMPLEX PO  
 Take  by mouth daily. FISH OIL PO Take  by mouth two (2) times a day. GLUCOSAMINE PO Take  by mouth two (2) times a day. losartan-hydroCHLOROthiazide 50-12.5 mg per tablet Commonly known as:  HYZAAR Take 1 Tab by mouth daily. Indications: Hypertension  
  
 multivitamin tablet Commonly known as:  ONE A DAY Take 1 Tab by mouth daily. simvastatin 10 mg tablet Commonly known as:  ZOCOR Take 1 Tab by mouth nightly. VITAMIN D3 PO Take  by mouth daily. We Performed the Following AMB POC EKG ROUTINE W/ 12 LEADS, INTER & REP [38596 CPT(R)] Patient Instructions Follow up in 6 months with a holter monitor and echocardiogram 1 week prior. Holter Monitoring: About This Test 
What is it? A Holter monitor is a small machine that records the electrical activity of your heart. You wear it for 24 to 48 hours while you do all your normal activities. The monitor has wires that attach to small electrode pads. These pads are taped to your chest. 
This kind of machine has many different names. It is sometimes called an ambulatory monitor, an ambulatory electrocardiogram, or an ambulatory EKG. It can also be called a 24-hour EKG or a cardiac event monitor. Why is this test done? You may have this test to find out if you have a problem with your heart. Many heart problems can only be noticed when you are doing something. They may happen when you exercise, eat, have sex, or sleep. Or they may happen when you have a bowel movement or you feel stressed. Your Holter monitor will record the way your heart beats during all of these activities. Holter monitoring also will: 
· Look for what may cause chest pain, dizziness, or fainting. · Check to see if treatment for an irregular heartbeat is working. How can you prepare for the test? 
· Before the test, talk to your doctor about all your health problems. Tell him or her about all the medicines and vitamins you take. · Take a shower or bath before the pads are put onto your chest. You must not get the pads wet during the test. 
· Wear a loose blouse or shirt. · Do not wear jewelry or clothes with metal buttons or tiffani. · If you are a woman, do not wear an underwire bra. What happens before the test? 
· Areas of your chest may be shaved and cleaned. · The electrode pads are attached to your chest with a paste or gel. · You wear the monitor on a strap over your shoulder or around your waist. It uses batteries and does not weigh much. What happens during the test? 
· You need to record your activities and symptoms. You will write down the time your symptoms started. And you will write down what type of activity you were doing. · You can use the clock on the monitor to help you keep track of the time your symptoms started. · When you sleep, try to stay on your back with the monitor at your side. This will prevent the pads from coming off. What else should you know about the test? 
· When you wear the monitor, try to stay away from magnets, metal detectors, high-voltage areas, garage door openers, microwave ovens, and electric blankets. · Do not use an electric toothbrush or shaver. · The pads may make your skin itch a little. And your skin may look or feel irritated after the pads are removed. How long does the test take? · You usually wear the monitor for 24 to 48 hours. What happens after the test? 
· You may return to the doctor's office or hospital to have the pads removed. Or you may be able to take them off yourself. · You will return the Holter monitor to your doctor's office or hospital. 
· You can go back to your usual activities right away. Where can you learn more? Go to http://adina-elisabet.info/. Enter G238 in the search box to learn more about \"Holter Monitoring: About This Test.\" Current as of: January 27, 2016 Content Version: 11.1 © 5204-2203 Tab Solutions. Care instructions adapted under license by [employer] (which disclaims liability or warranty for this information). If you have questions about a medical condition or this instruction, always ask your healthcare professional. [address] any warranty or liability for your use of this information. Transthoracic Echocardiogram: About This Test 
What is it? An echocardiogram (also called an echo) uses sound waves to make an image of your heart. A device called a transducer sends sound waves that echo off your heart and back to the transducer. These echoes are turned into moving pictures of your heart that can be seen on a video screen. In a transthoracic echocardiogram (TTE), the transducer is moved across your chest or belly. A TTE is the most common type of echocardiogram. 
Why is this test done? This test is done to check your heart health. It's used for many reasons. Your doctor may do an echocardiogram to: · Check a heart murmur. · Look for the cause of shortness of breath or unexplained chest pain or pressure. · Check how well your heart is pumping blood. · Check to see how well your heart valves are working. · Look for blood clots inside your heart. What happens during the test? 
· You will remove your clothes above your waist. You may be given a cloth or paper covering to use during the test. 
· You will lie on your back or on your left side on a bed or table. · You may receive medicine through a vein (intravenously, or IV). The IV can be used to give you a contrast material, which helps your doctor get good views of your heart. · Small pads or patches (electrodes) will be taped to your arms and legs to record your heart rate during the test. 
· A small amount of gel will be rubbed on the side of your chest to help  the sound waves. · The transducer will be pressed firmly against your chest and moved slowly back and forth. It is usually moved to different areas on your chest or belly to get specific views of your heart. · You will be asked to do several things, such as hold very still, breathe in and out very slowly, hold your breath, or lie on your left side. This test usually takes 30 to 60 minutes. What else should you know about the test? 
· You will not have any pain from an echocardiogram. You may have a brief, sharp pain if an intravenous (IV) needle is placed in a vein in your arm. · No electricity passes through your body during the test. There is no danger of getting an electrical shock. · You do not receive any radiation. What happens after the test? 
· You will probably be able to go home right away. · You can go back to your usual activities right away. Follow-up care is a key part of your treatment and safety. Be sure to make and go to all appointments, and call your doctor if you are having problems. It's also a good idea to keep a list of the medicines you take. Ask your doctor when you can expect to have your test results. Where can you learn more? Go to http://adina-elisabet.info/. Enter E130 in the search box to learn more about \"Transthoracic Echocardiogram: About This Test.\" Current as of: January 27, 2016 Content Version: 11.1 © 2604-0822 Healthwise, Incorporated. Care instructions adapted under license by [employer] (which disclaims liability or warranty for this information). If you have questions about a medical condition or this instruction, always ask your healthcare professional. [place] [address] any warranty or liability for your use of this information. Introducing [facility]! Dear Romy Manuel: 
Thank you for requesting a HCI account. Our records indicate that you already have an active HCI account.   You can access your account anytime at [email or web address]," Did you know that you can access your hospital and ER discharge instructions at any time in Housebites? You can also review all of your test results from your hospital stay or ER visit. Additional Information If you have questions, please visit the Frequently Asked Questions section of the Housebites website at [email or web address]/. Remember, Housebites is NOT to be used for urgent needs. For medical emergencies, dial 911. Now available from your iPhone and Android! Please provide this summary of care documentation to your next provider. Your primary care clinician is listed as Claude 68. If you have any questions after today's visit, please call [phone].

## 2017-03-22 NOTE — PROGRESS NOTES
Visit Vitals    /70 (BP 1 Location: Left arm, BP Patient Position: Sitting)    Pulse 83    Wt 236 lb (107 kg)    SpO2 99%    BMI 32.01 kg/m2

## 2017-03-22 NOTE — PROGRESS NOTES
HISTORY OF PRESENTING ILLNESS      Bibi Azul is a 78 y.o. male with HTN, MVP and frequent PVCs with a recent holter demonstrating a PVC burden of 13%. He is asymptomatic and cMRI demonstrated normal LV/RV function without scar. ACTIVE PROBLEM LIST     Patient Active Problem List    Diagnosis Date Noted    Mitral valve prolapse 02/16/2017    Colon polyps 01/19/2017    Essential hypertension 01/19/2017           PAST MEDICAL HISTORY     Past Medical History:   Diagnosis Date    Essential hypertension            PAST SURGICAL HISTORY     Past Surgical History:   Procedure Laterality Date    HX HERNIA REPAIR      times 2. 1980's inguinal bilateral    HX ORTHOPAEDIC      right shoulder surgery in the 76s    HX OTHER SURGICAL      Repair of the right shoulder. 1977    HX TONSILLECTOMY      childhood          ALLERGIES     No Known Allergies       FAMILY HISTORY     Family History   Problem Relation Age of Onset   Orma Damme Sudden Death Mother      Car accident    Heart Attack Father 48    Thyroid Disease Sister     negative for cardiac disease       SOCIAL HISTORY     Social History     Social History    Marital status:      Spouse name: N/A    Number of children: N/A    Years of education: N/A     Social History Main Topics    Smoking status: Former Smoker     Packs/day: 1.00     Years: 30.00     Quit date: 2/1/1997    Smokeless tobacco: Never Used    Alcohol use Yes      Comment: occassionally    Drug use: No    Sexual activity: Not Asked     Other Topics Concern    None     Social History Narrative         MEDICATIONS     Current Outpatient Prescriptions   Medication Sig    simvastatin (ZOCOR) 10 mg tablet Take 1 Tab by mouth nightly.  multivitamin (ONE A DAY) tablet Take 1 Tab by mouth daily.  VITAMIN B COMPLEX (B COMPLEX PO) Take  by mouth daily.  DOCOSAHEXANOIC ACID/EPA (FISH OIL PO) Take  by mouth two (2) times a day.     GLUCOSAMINE SULFATE (GLUCOSAMINE PO) Take by mouth two (2) times a day.  CHOLECALCIFEROL, VITAMIN D3, (VITAMIN D3 PO) Take  by mouth daily.  aspirin delayed-release 81 mg tablet Take  by mouth daily.  losartan-hydroCHLOROthiazide (HYZAAR) 50-12.5 mg per tablet Take 1 Tab by mouth daily. Indications: Hypertension     No current facility-administered medications for this visit. I have reviewed the nurses notes, vitals, problem list, allergy list, medical history, family, social history and medications. REVIEW OF SYMPTOMS      General: Pt denies excessive weight gain or loss. Pt is able to conduct ADL's  HEENT: Denies blurred vision, headaches, hearing loss, epistaxis and difficulty swallowing. Respiratory: Denies cough, congestion, shortness of breath, OCONNOR, wheezing or stridor. Cardiovascular: Denies precordial pain, palpitations, edema or PND  Gastrointestinal: Denies poor appetite, indigestion, abdominal pain or blood in stool  Genitourinary: Denies hematuria, dysuria, increased urinary frequency  Musculoskeletal: Denies joint pain or swelling from muscles or joints  Neurologic: Denies tremor, paresthesias, headache, or sensory motor disturbance  Psychiatric: Denies confusion, insomnia, depression  Integumentray: Denies rash, itching or ulcers. Hematologic: Denies easy bruising, bleeding     PHYSICAL EXAMINATION      Vitals:    03/22/17 1333   BP: 110/70   Pulse: 83   SpO2: 99%   Weight: 236 lb (107 kg)     General: Well developed, in no acute distress. HEENT: No jaundice, oral mucosa moist, no oral ulcers  Neck: Supple, no stiffness, no lymphadenopathy, supple  Heart:  Normal S1/S2 negative S3 or S4. Regular, no murmur, gallop or rub, no jugular venous distention  Respiratory: Clear bilaterally x 4, no wheezing or rales  Abdomen:   Soft, non-tender, bowel sounds are active.   Extremities:  No edema, normal cap refill, no cyanosis.   Musculoskeletal: No clubbing, no deformities  Neuro: A&Ox3, speech clear, gait stable, cooperative, no focal neurologic deficits  Skin: Skin color is normal. No rashes or lesions. Non diaphoretic, moist.  Vascular: 2+ pulses symmetric in all extremities       DIAGNOSTIC DATA      EKG: Sinus rhythm with PVCs       LABORATORY DATA      Lab Results   Component Value Date/Time    WBC 5.8 01/19/2017 12:06 PM    HGB 14.2 01/19/2017 12:06 PM    HCT 42.0 01/19/2017 12:06 PM    PLATELET 300 71/40/0307 12:06 PM    MCV 89 01/19/2017 12:06 PM      Lab Results   Component Value Date/Time    Sodium 140 01/19/2017 12:06 PM    Potassium 4.4 01/19/2017 12:06 PM    Chloride 99 01/19/2017 12:06 PM    CO2 26 01/19/2017 12:06 PM    Glucose 85 01/19/2017 12:06 PM    BUN 20 01/19/2017 12:06 PM    Creatinine 0.99 01/19/2017 12:06 PM    BUN/Creatinine ratio 20 01/19/2017 12:06 PM    GFR est AA 91 01/19/2017 12:06 PM    GFR est non-AA 78 01/19/2017 12:06 PM    Calcium 9.5 01/19/2017 12:06 PM    Bilirubin, total 0.6 01/19/2017 12:06 PM    AST (SGOT) 25 01/19/2017 12:06 PM    Alk. phosphatase 71 01/19/2017 12:06 PM    Protein, total 6.6 01/19/2017 12:06 PM    Albumin 4.4 01/19/2017 12:06 PM    A-G Ratio 2.0 01/19/2017 12:06 PM    ALT (SGPT) 33 01/19/2017 12:06 PM           ASSESSMENT      1. PVCs   A. Asymptomatic  2. Hypertension  3. Mitral valve proplapse       PLAN     Repeat echocardiogram and Holter in 6 months. Monitor for signs/symptoms of heart failure. FOLLOW-UP     After testing      Thank you,  Chelle Plaza MD and Dr. Solomon Jimenez for involving me in the care of this extraordinarily pleasant male. Please do not hesitate to contact me for further questions/concerns.          Jazmyn Kilgore MD  Cardiac Electrophysiology / Cardiology    [address] [facility] [address] [facility], [facility], [address] [facility]  [place]  [phone] / [phone] Fax   [phone] / [phone] Fax

## 2017-03-22 NOTE — PATIENT INSTRUCTIONS
Follow up in 6 months with a holter monitor and echocardiogram 1 week prior. Holter Monitoring: About This Test  What is it? A Holter monitor is a small machine that records the electrical activity of your heart. You wear it for 24 to 48 hours while you do all your normal activities. The monitor has wires that attach to small electrode pads. These pads are taped to your chest.  This kind of machine has many different names. It is sometimes called an ambulatory monitor, an ambulatory electrocardiogram, or an ambulatory EKG. It can also be called a 24-hour EKG or a cardiac event monitor. Why is this test done? You may have this test to find out if you have a problem with your heart. Many heart problems can only be noticed when you are doing something. They may happen when you exercise, eat, have sex, or sleep. Or they may happen when you have a bowel movement or you feel stressed. Your Holter monitor will record the way your heart beats during all of these activities. Holter monitoring also will:  · Look for what may cause chest pain, dizziness, or fainting. · Check to see if treatment for an irregular heartbeat is working. How can you prepare for the test?  · Before the test, talk to your doctor about all your health problems. Tell him or her about all the medicines and vitamins you take. · Take a shower or bath before the pads are put onto your chest. You must not get the pads wet during the test.  · Wear a loose blouse or shirt. · Do not wear jewelry or clothes with metal buttons or tiffani. · If you are a woman, do not wear an underwire bra. What happens before the test?  · Areas of your chest may be shaved and cleaned. · The electrode pads are attached to your chest with a paste or gel. · You wear the monitor on a strap over your shoulder or around your waist. It uses batteries and does not weigh much. What happens during the test?  · You need to record your activities and symptoms.  You will write down the time your symptoms started. And you will write down what type of activity you were doing. · You can use the clock on the monitor to help you keep track of the time your symptoms started. · When you sleep, try to stay on your back with the monitor at your side. This will prevent the pads from coming off. What else should you know about the test?  · When you wear the monitor, try to stay away from magnets, metal detectors, high-voltage areas, garage door openers, microwave ovens, and electric blankets. · Do not use an electric toothbrush or shaver. · The pads may make your skin itch a little. And your skin may look or feel irritated after the pads are removed. How long does the test take? · You usually wear the monitor for 24 to 48 hours. What happens after the test?  · You may return to the doctor's office or hospital to have the pads removed. Or you may be able to take them off yourself. · You will return the Holter monitor to your doctor's office or hospital.  · You can go back to your usual activities right away. Where can you learn more? Go to http://adinaVenturepaxelisabet.info/. Enter U430 in the search box to learn more about \"Holter Monitoring: About This Test.\"  Current as of: January 27, 2016  Content Version: 11.1  © 5805-6603 Healthwise, Incorporated. Care instructions adapted under license by [employer] (which disclaims liability or warranty for this information). If you have questions about a medical condition or this instruction, always ask your healthcare professional. [place] [address] any warranty or liability for your use of this information. Transthoracic Echocardiogram: About This Test  What is it? An echocardiogram (also called an echo) uses sound waves to make an image of your heart. A device called a transducer sends sound waves that echo off your heart and back to the transducer.  These echoes are turned into moving pictures of your heart that can be seen on a video screen. In a transthoracic echocardiogram (TTE), the transducer is moved across your chest or belly. A TTE is the most common type of echocardiogram.  Why is this test done? This test is done to check your heart health. It's used for many reasons. Your doctor may do an echocardiogram to:  · Check a heart murmur. · Look for the cause of shortness of breath or unexplained chest pain or pressure. · Check how well your heart is pumping blood. · Check to see how well your heart valves are working. · Look for blood clots inside your heart. What happens during the test?  · You will remove your clothes above your waist. You may be given a cloth or paper covering to use during the test.  · You will lie on your back or on your left side on a bed or table. · You may receive medicine through a vein (intravenously, or IV). The IV can be used to give you a contrast material, which helps your doctor get good views of your heart. · Small pads or patches (electrodes) will be taped to your arms and legs to record your heart rate during the test.  · A small amount of gel will be rubbed on the side of your chest to help  the sound waves. · The transducer will be pressed firmly against your chest and moved slowly back and forth. It is usually moved to different areas on your chest or belly to get specific views of your heart. · You will be asked to do several things, such as hold very still, breathe in and out very slowly, hold your breath, or lie on your left side. This test usually takes 30 to 60 minutes. What else should you know about the test?  · You will not have any pain from an echocardiogram. You may have a brief, sharp pain if an intravenous (IV) needle is placed in a vein in your arm. · No electricity passes through your body during the test. There is no danger of getting an electrical shock. · You do not receive any radiation.   What happens after the test?  · You will probably be able to go home right away. · You can go back to your usual activities right away. Follow-up care is a key part of your treatment and safety. Be sure to make and go to all appointments, and call your doctor if you are having problems. It's also a good idea to keep a list of the medicines you take. Ask your doctor when you can expect to have your test results. Where can you learn more? Go to http://adina-elisabet.info/. Enter E130 in the search box to learn more about \"Transthoracic Echocardiogram: About This Test.\"  Current as of: January 27, 2016  Content Version: 11.1  © 0141-7125 [employer]. Care instructions adapted under license by [employer] (which disclaims liability or warranty for this information). If you have questions about a medical condition or this instruction, always ask your healthcare professional. [address] any warranty or liability for your use of this information.

## 2017-04-25 ENCOUNTER — HOSPITAL ENCOUNTER (OUTPATIENT)
Dept: GENERAL RADIOLOGY | Age: 67
Discharge: HOME | End: 2017-04-25
Payer: MEDICARE

## 2017-04-25 ENCOUNTER — OFFICE VISIT (OUTPATIENT)
Dept: INTERNAL MEDICINE CLINIC | Age: 67
End: 2017-04-25

## 2017-04-25 VITALS
RESPIRATION RATE: 18 BRPM | TEMPERATURE: 98.5 F | HEART RATE: 60 BPM | BODY MASS INDEX: 31.15 KG/M2 | WEIGHT: 230 LBS | SYSTOLIC BLOOD PRESSURE: 106 MMHG | OXYGEN SATURATION: 98 % | HEIGHT: 72 IN | DIASTOLIC BLOOD PRESSURE: 65 MMHG

## 2017-04-25 DIAGNOSIS — R05.9 COUGH: ICD-10-CM

## 2017-04-25 DIAGNOSIS — R05.9 COUGH: Primary | ICD-10-CM

## 2017-04-25 PROCEDURE — 71020 XR CHEST PA LAT: CPT

## 2017-04-25 RX ORDER — AZITHROMYCIN 250 MG/1
TABLET, FILM COATED ORAL
Qty: 6 TAB | Refills: 0 | Status: SHIPPED | OUTPATIENT
Start: 2017-04-25 | End: 2017-05-18 | Stop reason: ALTCHOICE

## 2017-04-25 RX ORDER — BUDESONIDE AND FORMOTEROL FUMARATE DIHYDRATE 160; 4.5 UG/1; UG/1
2 AEROSOL RESPIRATORY (INHALATION) 2 TIMES DAILY
Qty: 1 INHALER | Refills: 0 | Status: SHIPPED | OUTPATIENT
Start: 2017-04-25 | End: 2017-05-19 | Stop reason: ALTCHOICE

## 2017-04-25 NOTE — MR AVS SNAPSHOT
Visit Information Date & Time Provider Department Dept. Phone Encounter #  
 4/25/2017  2:15 PM Alberto Browne MD [facility] [address] [id number] Your Appointments 5/18/2017  8:40 AM  
ESTABLISHED PATIENT with Soham Velasquez MD  
[facility] (TAMMY SCHEDULING) Appt Note: 2 mo fup  
 [address] [place] [address] [address]  
[phone]  
  
   
 [address] [place] [address] [address]  
  
    
 9/28/2017 10:00 AM  
ECHO CARDIOGRAMS 2D with ECHO, [facility]  
[facility] ([facility]) Appt Note: echo at 10am per dr renetta oscarter at [address] [place] [address] [facility]  
[phone]  
  
   
 [address] [place] [address] [facility] [address] [address]  
  
    
 9/28/2017 11:00 AM  
HOLTER MONITOR with HOLTER, [facility]  
[facility] ([facility]) Appt Note: echo at 10am per dr shams holter at [address] [place] [address] [facility] [address]  
[phone]  
  
   
 [address] [place] [address] [address]  
  
    
 10/6/2017  9:20 AM  
ESTABLISHED PATIENT with Yan Lockett MD  
[facility] ([facility]) Appt Note: 6 mo fu w/ test result [address] [place] [address] [facility]  
[address] [place] [address] [address] [facility] Upcoming Health Maintenance Date Due Hepatitis C Screening 1950 DTaP/Tdap/Td series (1 - Tdap) 6/25/1970 FOBT Q 1 YEAR AGE 50-75 6/25/1999 ZOSTER VACCINE AGE 60> 6/25/2009 GLAUCOMA SCREENING Q2Y 6/25/2014 MEDICARE YEARLY EXAM 6/25/2014 Pneumococcal 65+ Low/Medium Risk (2 of 2 - PCV13) 1/19/2018 Allergies as of 4/25/2017  Review Complete On: 4/25/2017 By: Alberto Browne MD  
 No Known Allergies Current Immunizations  Reviewed on 1/19/2017 Name Date Pneumococcal Polysaccharide (PPSV-23) 1/19/2017 Not reviewed this visit You Were Diagnosed With   
  
 Codes Comments Cough    -  Primary ICD-10-CM: E19 ICD-9-CM: 534. 2 Vitals BP Pulse Temp Resp Height(growth percentile) Weight(growth percentile) 106/65 (BP 1 Location: Left arm, BP Patient Position: Sitting) 60 98.5 °F (36.9 °C) (Oral) 18 6' (1.829 m) 230 lb (104.3 kg) SpO2 BMI Smoking Status 98% 31.19 kg/m2 Former Smoker Vitals History BMI and BSA Data Body Mass Index Body Surface Area  
 31.19 kg/m 2 2.3 m 2 Preferred Pharmacy Pharmacy Name Phone [facility] [address] [place] [address] [address] [place] [address] [address] [place] [address] [phone] Your Updated Medication List  
  
   
This list is accurate as of: 4/25/17  2:18 PM.  Always use your most recent med list.  
  
  
  
  
 aspirin delayed-release 81 mg tablet Take  by mouth daily. azithromycin 250 mg tablet Commonly known as:  Michael Knoxbs Per pack B COMPLEX PO Take  by mouth daily. budesonide-formoterol 160-4.5 mcg/actuation HFA inhaler Commonly known as:  SYMBICORT Take 2 Puffs by inhalation two (2) times a day. FISH OIL PO Take  by mouth two (2) times a day. GLUCOSAMINE PO Take  by mouth two (2) times a day. losartan-hydroCHLOROthiazide 50-12.5 mg per tablet Commonly known as:  HYZAAR Take 1 Tab by mouth daily. Indications: Hypertension  
  
 multivitamin tablet Commonly known as:  ONE A DAY Take 1 Tab by mouth daily. simvastatin 10 mg tablet Commonly known as:  ZOCOR Take 1 Tab by mouth nightly. VITAMIN D3 PO Take  by mouth daily. Prescriptions Printed Refills  
 azithromycin (ZITHROMAX) 250 mg tablet 0 Sig: Per pack  Class: Print  
 budesonide-formoterol (SYMBICORT) 160-4.5 mcg/actuation HFA inhaler 0  
 Sig: Take 2 Puffs by inhalation two (2) times a day. Class: Print Route: Inhalation To-Do List   
 04/25/2017 Imaging:  XR CHEST PA LAT Introducing [facility] SERVICES! Dear Grace Said: 
Thank you for requesting a Inktd account. Our records indicate that you already have an active Inktd account. You can access your account anytime at [email or web address] Did you know that you can access your hospital and ER discharge instructions at any time in Inktd? You can also review all of your test results from your hospital stay or ER visit. Additional Information If you have questions, please visit the Frequently Asked Questions section of the Inktd website at [email or web address] Remember, Inktd is NOT to be used for urgent needs. For medical emergencies, dial 911. Now available from your iPhone and Android! Please provide this summary of care documentation to your next provider. Your primary care clinician is listed as Claude Liu. If you have any questions after today's visit, please call [phone].

## 2017-04-25 NOTE — PROGRESS NOTES
HISTORY OF PRESENT ILLNESS  Raza Bermeo is a 78 y.o. male. HPI  He notes about a month of a dry cough. 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 He's not had fevers, chills or purulent phlegm. He's not had dyspnea on exertion. He has seen cardiology and had an echocardiogram and Holter and will have another Holter in six months. He's not having syncopal or presyncopal spells. Review of Systems   Constitutional: Negative. Negative for chills, diaphoresis, fever and malaise/fatigue. HENT: Positive for congestion. Negative for ear discharge, ear pain, nosebleeds and sore throat. Eyes: Negative for pain and discharge. Respiratory: Positive for cough. Negative for hemoptysis, sputum production, shortness of breath and wheezing. Cardiovascular: Positive for chest pain. Negative for leg swelling. Neurological: Negative for headaches. Physical Exam   Constitutional: He is oriented to person, place, and time. He appears well-developed and well-nourished. HENT:   Head: Normocephalic and atraumatic. Right Ear: External ear normal.   Left Ear: External ear normal.   Mouth/Throat: Oropharynx is clear and moist.   Eyes: Conjunctivae are normal. Pupils are equal, round, and reactive to light. Neck: Normal range of motion. Neck supple. Carotid bruit is not present. No thyromegaly present. Cardiovascular: Normal rate, regular rhythm, normal heart sounds and intact distal pulses. Frequent extrasystoles are present. Pulmonary/Chest: Effort normal. No respiratory distress. He has wheezes (few). He has no rales. Musculoskeletal: He exhibits no edema. Lymphadenopathy:     He has no cervical adenopathy. Neurological: He is alert and oriented to person, place, and time.    Psychiatric: He has a normal mood and affect. His behavior is normal.   Nursing note and vitals reviewed. ASSESSMENT and PLAN  Jose Miguel Kemp was seen today for cough and chest pain. Diagnoses and all orders for this visit:    Cough-with chest tightness presumably from rad-if not improved in 2 weeks rto  If chest sxs change seek care sooner  Cont flonase and claritin  -     XR CHEST PA LAT; Future  -     azithromycin (ZITHROMAX) 250 mg tablet; Per pack  -     budesonide-formoterol (SYMBICORT) 160-4.5 mcg/actuation HFA inhaler; Take 2 Puffs by inhalation two (2) times a day.

## 2017-05-18 ENCOUNTER — OFFICE VISIT (OUTPATIENT)
Dept: CARDIOLOGY CLINIC | Age: 67
End: 2017-05-18

## 2017-05-18 VITALS
HEART RATE: 60 BPM | WEIGHT: 232.8 LBS | DIASTOLIC BLOOD PRESSURE: 80 MMHG | BODY MASS INDEX: 31.53 KG/M2 | HEIGHT: 72 IN | SYSTOLIC BLOOD PRESSURE: 110 MMHG | OXYGEN SATURATION: 99 %

## 2017-05-18 DIAGNOSIS — I10 ESSENTIAL HYPERTENSION: Primary | ICD-10-CM

## 2017-05-18 DIAGNOSIS — R07.89 CHEST TIGHTNESS: ICD-10-CM

## 2017-05-18 DIAGNOSIS — I34.1 MITRAL VALVE PROLAPSE: ICD-10-CM

## 2017-05-18 NOTE — PROGRESS NOTES
LAST OFFICE VISIT : 3/22/2017        ICD-10-CM ICD-9-CM   1. Essential hypertension I10 401.9   2. Mitral valve prolapse I34.1 424.0            Jeison Martinez is a 78 y.o. male with HTN and mitral valve prolapse referred for 3 month follow up. Cardiac risk factors: family history (father with CAD), male gender, hypertension  I have personally obtained the history from the patient. HISTORY OF PRESENTING ILLNESS      Mr. Toyin Cui is doing well with no interval issues. We are still awaiting previous cardiology reports from Dr. Naida Herrera in [place]. He hasn't been complaint in getting his cholesterol profile. Denies any anginal chest pain. He reports dry cough over the past 2 months which he suspects may be due to allergies or Lisinopril. He denies any associated chest pain or dyspnea. He reports few spells of dyspnea particularly after eating with associated dysphagia. BP is in the 870-976M systolic range. Adherent with BP medications. Denies any lightheadedness or dizziness. Recent dermatological surgeries has kept him inactive, but he plans to pursue daily exercise at the  in the near future. he patient denies chest pain/ shortness of breath, orthopnea, PND, LE edema, palpitations, syncope, presyncope or fatigue. ACTIVE PROBLEM LIST     Patient Active Problem List    Diagnosis Date Noted    Mitral valve prolapse 02/16/2017    Colon polyps 01/19/2017    Essential hypertension 01/19/2017           PAST MEDICAL HISTORY     Past Medical History:   Diagnosis Date    Essential hypertension            PAST SURGICAL HISTORY     Past Surgical History:   Procedure Laterality Date    HX HERNIA REPAIR      times 2. 1980's inguinal bilateral    HX ORTHOPAEDIC      right shoulder surgery in the 76s    HX OTHER SURGICAL      Repair of the right shoulder.  1977    HX TONSILLECTOMY      childhood          ALLERGIES     No Known Allergies       FAMILY HISTORY     Family History   Problem Relation Age of Onset   Brand Allouez Sudden Death Mother      Car accident    Heart Attack Father 48    Thyroid Disease Sister     negative for cardiac disease       SOCIAL HISTORY     Social History     Social History    Marital status:      Spouse name: N/A    Number of children: N/A    Years of education: N/A     Social History Main Topics    Smoking status: Former Smoker     Packs/day: 1.00     Years: 30.00     Quit date: 2/1/1997    Smokeless tobacco: Never Used    Alcohol use Yes      Comment: occassionally    Drug use: No    Sexual activity: Not Asked     Other Topics Concern    None     Social History Narrative         MEDICATIONS     Current Outpatient Prescriptions   Medication Sig    budesonide-formoterol (SYMBICORT) 160-4.5 mcg/actuation HFA inhaler Take 2 Puffs by inhalation two (2) times a day.  simvastatin (ZOCOR) 10 mg tablet Take 1 Tab by mouth nightly.  multivitamin (ONE A DAY) tablet Take 1 Tab by mouth daily.  VITAMIN B COMPLEX (B COMPLEX PO) Take  by mouth daily.  DOCOSAHEXANOIC ACID/EPA (FISH OIL PO) Take  by mouth two (2) times a day.  GLUCOSAMINE SULFATE (GLUCOSAMINE PO) Take  by mouth two (2) times a day.  CHOLECALCIFEROL, VITAMIN D3, (VITAMIN D3 PO) Take  by mouth daily.  aspirin delayed-release 81 mg tablet Take  by mouth daily.  losartan-hydroCHLOROthiazide (HYZAAR) 50-12.5 mg per tablet Take 1 Tab by mouth daily. Indications: Hypertension     No current facility-administered medications for this visit. I have reviewed the nurses notes, vitals, problem list, allergy list, medical history, family, social history and medications. REVIEW OF SYMPTOMS      General: Pt denies excessive weight gain or loss. Pt is able to conduct ADL's  HEENT: Denies blurred vision, headaches, hearing loss, epistaxis and difficulty swallowing. Respiratory: Denies  congestion, shortness of breath, OCONNOR, wheezing or stridor. Positive for cough.    Cardiovascular: Denies precordial pain, palpitations, edema or PND  Gastrointestinal: Denies poor appetite, indigestion, abdominal pain or blood in stool. Positive for dysphagia. Genitourinary: Denies hematuria, dysuria, increased urinary frequency  Musculoskeletal: Denies joint pain or swelling from muscles or joints  Neurologic: Denies tremor, paresthesias, headache, or sensory motor disturbance  Psychiatric: Denies confusion, insomnia, depression  Integumentray: Denies rash, itching or ulcers. Hematologic: Denies easy bruising, bleeding     PHYSICAL EXAMINATION      Vitals:    05/18/17 0849   SpO2: 99%   Weight: 232 lb 12.8 oz (105.6 kg)   Height: 6' (1.829 m)     General: Well developed, in no acute distress. HEENT: No jaundice, oral mucosa moist, no oral ulcers  Neck: Supple, no stiffness, no lymphadenopathy, supple  Heart: +skipped beats   Respiratory: Clear bilaterally x 4, no wheezing or rales  Extremities:  +varicose veins in right and left foot   Musculoskeletal: No clubbing, no deformities  Neuro: A&Ox3, speech clear, gait stable, cooperative, no focal neurologic deficits  Skin: Skin color is normal. No rashes or lesions. Non diaphoretic, moist.  Vascular: 2+ pulses symmetric in all extremities     DIAGNOSTIC DATA   1. Lipids  1/9/17- , HDL 43, , TG 91    2.  Holter  (1/19/17): sinus rhtyhm with PVCs there were frequent PVCs, rare couplets, triplets and bigeminy with rare episodes of non-sustained VT with rates up to 134.    3. Echo  4/19/16- EF 30-44%, MR mild/mod, AI mild, LAE     LABORATORY DATA            Lab Results   Component Value Date/Time    WBC 5.8 01/19/2017 12:06 PM    HGB 14.2 01/19/2017 12:06 PM    HCT 42.0 01/19/2017 12:06 PM    PLATELET 039 98/06/6408 12:06 PM    MCV 89 01/19/2017 12:06 PM      Lab Results   Component Value Date/Time    Sodium 140 01/19/2017 12:06 PM    Potassium 4.4 01/19/2017 12:06 PM    Chloride 99 01/19/2017 12:06 PM    CO2 26 01/19/2017 12:06 PM    Glucose 85 01/19/2017 12:06 PM    BUN 20 01/19/2017 12:06 PM    Creatinine 0.99 01/19/2017 12:06 PM    BUN/Creatinine ratio 20 01/19/2017 12:06 PM    GFR est AA 91 01/19/2017 12:06 PM    GFR est non-AA 78 01/19/2017 12:06 PM    Calcium 9.5 01/19/2017 12:06 PM    Bilirubin, total 0.6 01/19/2017 12:06 PM    AST (SGOT) 25 01/19/2017 12:06 PM    Alk. phosphatase 71 01/19/2017 12:06 PM    Protein, total 6.6 01/19/2017 12:06 PM    Albumin 4.4 01/19/2017 12:06 PM    A-G Ratio 2.0 01/19/2017 12:06 PM    ALT (SGPT) 33 01/19/2017 12:06 PM         ASSESSMENT/RECOMMENDATIONS:.      1. Cardiomyopathy  - Will perform an exercise cardiolite to determine if he has any ischemia secondary to chest discomfort. I believe this is related to esophageal stricture. Of note, his EF appears normal on his cardiac MRI at 55%. 2. PVCs  -He had an MRI that showed no scar. He was seen by Dr. Flaco De Paz who suggested that we continue to monitor this at this time. Holter monitor in the past showed 13.9% PVCs. 3. HTN  -BP under good control today on current medical regimen. No adjustments to antihypertensives. 4. Mitral valve prolapse    5. Unknown cholesterol profile  -Will  restart him Zocor 10mg. We discontinued this previously due to cough, but I believe his cough is related to Lisinopril.    -Will obtain a fasting lipid profile for an updated profile. 6. Possible esophageal stricture   -Recommend that he pursue GI evaluation for further treatment and diagnosis. 4. Return in 6 months or PRN. 5. Skipped best today and will check ECG; ECG today trigeminy    No orders of the defined types were placed in this encounter. Follow-up Disposition: Not on File      I have discussed the diagnosis with  Raza Bermeo and the intended plan as seen in the above orders. Questions were answered concerning future plans. I have discussed medication side effects and warnings with the patient as well.     Thank you,  Pineda Rangel MD for involving me in the care of  eMenu Smith. Please do not hesitate to contact me for further questions/concerns. Written by José Pablo, as dictated by El Corrales MD.    Lolly Nina MD, [facility] [address]      [facility], [address] [place] [address] Drive      [phone] / [phone] Fax

## 2017-05-18 NOTE — LETTER
5/18/2017 9:13 AM 
 
Patient:  Jeison Martinez YOB: 1950 Date of Visit: 5/18/2017 Dear Tabitha Weinberg MD 
[address] [facility] [address] [place] [address] VIA In Basket 
 : Thank you for referring Mr. Jeison Martinez to me for evaluation/treatment. Below are the relevant portions of my assessment and plan of care. Hi Dr. Ashli Hansen, 
 
I had the opportunity of seeing Mr. Toyin Cui in the office today. He is doing well from a cardiac standpoint. He had 13% PVC's on Holter. He was seen by Dr. Saroj Brown who felt we could just do watchful waiting. His cardiac MRI revealed normal EF and no evidence of MVP. He today describes difficulty swallowing food and will regurgitate to obtain relief. Today he will restart his Zocor 1 mg and recheck FLP in 8 weeks. I have ordered a exercise cardiolite to look for any myocardial ischemia since he describes chest discomfort but may be related to esophageal stricture. All the best, 
 
Timbo Pointer If you have questions, please do not hesitate to call me. I look forward to following Mr. Toyin Cui along with you.  
 
 
 
Sincerely, 
 
 
Lucille Pinzon MD

## 2017-05-18 NOTE — COMMUNICATION BODY
Hi Dr. Robb Barboza,    I had the opportunity of seeing Mr. Miriam Landry in the office today. He is doing well from a cardiac standpoint. He had 13% PVC's on Holter. He was seen by Dr. Napoleon Bernal who felt we could just do watchful waiting. His cardiac MRI revealed normal EF and no evidence of MVP. He today describes difficulty swallowing food and will regurgitate to obtain relief. Today he will restart his Zocor 1 mg and recheck FLP in 8 weeks. I have ordered a exercise cardiolite to look for any myocardial ischemia since he describes chest discomfort but may be related to esophageal stricture.     All the best,    Yolanda Poisson

## 2017-05-18 NOTE — MR AVS SNAPSHOT
Visit Information Date & Time Provider Department Dept. Phone Encounter #  
 5/18/2017  8:40 AM Douglas Eason MD [facility] Menezes [phone] [id number] Follow-up Instructions Return in about 6 months (around 11/18/2017). Your Appointments 5/19/2017 10:30 AM  
ROUTINE CARE with Mary Tian MD  
[facility]) Appt Note: MyChart-  
 [address]  
  
   
 [place] [address] [id number]  
  
    
 9/28/2017 10:00 AM  
ECHO CARDIOGRAMS 2D with ECHO, STFRANCIS  
[facility] Appt Note: echo at 10am per dr shams holter at [facility] [address] [facility]  
[phone]  
  
   
 [address] [address]  
  
    
 9/28/2017 11:00 AM  
HOLTER MONITOR with HOLTER, STFRANCIS  
[facility] ([facility]) Appt Note: echo at 10am per dr shams holter at [facility] [place] [address] [facility]  
[phone]  
  
   
 320 [facility] [place] [address] [place] [address]  
  
    
 10/6/2017  9:20 AM  
ESTABLISHED PATIENT with Alfredo Hurtado MD  
[facility] ([facility]) Appt Note: 6 mo fu w/ test result [address] [facility] [place] [address] [facility]  
[address] [place] [address] [address] Upcoming Health Maintenance Date Due Hepatitis C Screening 1950 DTaP/Tdap/Td series (1 - Tdap) 6/25/1970 FOBT Q 1 YEAR AGE 50-75 6/25/1999 ZOSTER VACCINE AGE 60> 6/25/2009 GLAUCOMA SCREENING Q2Y 6/25/2014 MEDICARE YEARLY EXAM 6/25/2014 INFLUENZA AGE 9 TO ADULT 8/1/2017 Pneumococcal 65+ Low/Medium Risk (2 of 2 - PCV13) 1/19/2018 Allergies as of 5/18/2017  Review Complete On: 5/18/2017 By: Xena Guzman MD  
 No Known Allergies Current Immunizations  Reviewed on 1/19/2017 Name Date Pneumococcal Polysaccharide (PPSV-23) 1/19/2017 Not reviewed this visit You Were Diagnosed With   
  
 Codes Comments Essential hypertension    -  Primary ICD-10-CM: I10 
ICD-9-CM: 401.9 Mitral valve prolapse     ICD-10-CM: I34.1 ICD-9-CM: 424.0 Chest tightness     ICD-10-CM: R07.89 ICD-9-CM: 786.59 Vitals BP Pulse Height(growth percentile) Weight(growth percentile) SpO2 BMI  
 110/80 (BP 1 Location: Left arm, BP Patient Position: Sitting) 60 6' (1.829 m) 232 lb 12.8 oz (105.6 kg) 99% 31.57 kg/m2 Smoking Status Former Smoker Vitals History BMI and BSA Data Body Mass Index Body Surface Area  
 31.57 kg/m 2 2.32 m 2 Preferred Pharmacy Pharmacy Name Phone [place] [address] [facility] [address] [phone] Your Updated Medication List  
  
   
This list is accurate as of: 5/18/17  9:17 AM.  Always use your most recent med list.  
  
  
  
  
 aspirin delayed-release 81 mg tablet Take  by mouth daily. B COMPLEX PO Take  by mouth daily. budesonide-formoterol 160-4.5 mcg/actuation HFA inhaler Commonly known as:  SYMBICORT Take 2 Puffs by inhalation two (2) times a day. FISH OIL PO Take  by mouth two (2) times a day. GLUCOSAMINE PO Take  by mouth two (2) times a day. losartan-hydroCHLOROthiazide 50-12.5 mg per tablet Commonly known as:  HYZAAR Take 1 Tab by mouth daily. Indications: Hypertension  
  
 multivitamin tablet Commonly known as:  ONE A DAY Take 1 Tab by mouth daily. simvastatin 10 mg tablet Commonly known as:  ZOCOR Take 1 Tab by mouth nightly. VITAMIN D3 PO Take  by mouth daily. We Performed the Following AMB POC EKG ROUTINE W/ 12 LEADS, INTER & REP [17864 CPT(R)] HEPATIC FUNCTION PANEL [07498 CPT(R)] LIPID PANEL [05557 CPT(R)] Follow-up Instructions Return in about 6 months (around 11/18/2017). To-Do List   
 05/31/2017 ECG:  STRESS TEST LEXISCAN/CARDIOLITE Introducing [facility]! Dear Adrien Clayton: 
Thank you for requesting a [email or web address] account. Our records indicate that you already have an active [email or web address] account. You can access your account anytime at [email or web address] Did you know that you can access your hospital and ER discharge instructions at any time in [email or web address]? You can also review all of your test results from your hospital stay or ER visit. Additional Information If you have questions, please visit the Frequently Asked Questions section of the [email or web address] website at [email or web address] Remember, [email or web address] is NOT to be used for urgent needs. For medical emergencies, dial 911. Now available from your iPhone and Android! Please provide this summary of care documentation to your next provider. Your primary care clinician is listed as Claude 68. If you have any questions after today's visit, please call [phone].

## 2017-05-18 NOTE — PROGRESS NOTES
Chest tightness behind chest bone  Cough  STOP SIMVASTATIN FOR THE TIME BEING.     Visit Vitals    /80 (BP 1 Location: Left arm, BP Patient Position: Sitting)    Pulse 60    Ht 6' (1.829 m)    Wt 232 lb 12.8 oz (105.6 kg)    SpO2 99%    BMI 31.57 kg/m2

## 2017-05-19 ENCOUNTER — OFFICE VISIT (OUTPATIENT)
Dept: INTERNAL MEDICINE CLINIC | Age: 67
End: 2017-05-19

## 2017-05-19 VITALS
HEIGHT: 72 IN | RESPIRATION RATE: 18 BRPM | DIASTOLIC BLOOD PRESSURE: 74 MMHG | OXYGEN SATURATION: 98 % | TEMPERATURE: 99.2 F | HEART RATE: 68 BPM | WEIGHT: 231 LBS | BODY MASS INDEX: 31.29 KG/M2 | SYSTOLIC BLOOD PRESSURE: 124 MMHG

## 2017-05-19 DIAGNOSIS — I10 ESSENTIAL HYPERTENSION: ICD-10-CM

## 2017-05-19 DIAGNOSIS — M25.562 LEFT LATERAL KNEE PAIN: ICD-10-CM

## 2017-05-19 DIAGNOSIS — K21.9 GASTROESOPHAGEAL REFLUX DISEASE WITHOUT ESOPHAGITIS: Primary | ICD-10-CM

## 2017-05-19 DIAGNOSIS — J30.1 SEASONAL ALLERGIC RHINITIS DUE TO POLLEN: ICD-10-CM

## 2017-05-19 NOTE — PROGRESS NOTES
HISTORY OF PRESENT ILLNESS  Jatinder Hayes is a 78 y.o. male. HPI  Seen at follow up. Issues:  1. Cough and congestion. Chest xray was clear. Symbicort did not make much difference in his cough. He has seen cardiology. He is now saying he's also having some trouble with food getting stuck and the question of esophageal spasm and stricture has been brought up and I'm going to send him to GI.    2. He uses Aleve periodically for arthritis. He notes in [place] he had pain in his left knee. No recent injury, no falls. 3. He's not currently using anything for drainage and dose note chronic mucus and postnasal drainage. Review of Systems   Constitutional: Negative for chills, fever, malaise/fatigue and weight loss. Respiratory: Positive for cough. Negative for shortness of breath and wheezing. Cardiovascular: Negative for chest pain, palpitations, orthopnea, leg swelling and PND. Gastrointestinal: Positive for heartburn. Negative for nausea. Musculoskeletal: Positive for joint pain. Negative for falls and myalgias. Neurological: Negative for dizziness and headaches. Endo/Heme/Allergies: Positive for environmental allergies. Physical Exam   Constitutional: He is oriented to person, place, and time. He appears well-developed and well-nourished. HENT:   Head: Normocephalic and atraumatic. Neck: Normal range of motion. Neck supple. Carotid bruit is not present. No thyromegaly present. Cardiovascular: Normal rate, regular rhythm, normal heart sounds and intact distal pulses. Pulmonary/Chest: Effort normal and breath sounds normal. No respiratory distress. He has no wheezes. He has no rales. Musculoskeletal: Normal range of motion. He exhibits no edema. No knee effusion some  Mild discomfort left lateral knee   Neurological: He is alert and oriented to person, place, and time. Psychiatric: He has a normal mood and affect.  His behavior is normal.   Nursing note and vitals reviewed. ASSESSMENT and Alves Anderson was seen today for epigastric pain and knee pain. Diagnoses and all orders for this visit:    Gastroesophageal reflux disease without esophagitis-refer to gi for ?  Stricture  Elevate head of bed and avoid late eating    Seasonal allergic rhinitis due to pollen-claritin 10 mg every day advised  And stop symbicort as no help    Essential hypertension  Cont meds  Left lateral knee pain-refer to ortho

## 2017-05-19 NOTE — MR AVS SNAPSHOT
Visit Information Date & Time Provider Department Dept. Phone Encounter #  
 5/19/2017 10:30 AM Rasheed Smith MD [facility] [address] [id number] Your Appointments 6/13/2017 11:00 AM  
NUCLEAR MEDICINE with NUCLEAR, GHAZAL  
[facility] ([facility]) Appt Note: lexiscan card dx chest tightness ht 6 wt 232 per dr Yovany Harris [address] [place] [address] [facility]  
[phone]  
  
   
 [address] [place] [address] [address]  
  
    
 9/28/2017 10:00 AM  
ECHO CARDIOGRAMS 2D with ECHO [facility]  
[facility] ([facility]) Appt Note: echo at 10am per dr shams holter at [facility] [place] [address] [facility]  
[phone]  
  
   
 [address] [place] [address] [address]  
  
    
 9/28/2017 11:00 AM  
HOLTER MONITOR with HOLNICHOLE [facility]  
[facility] ([facility]) Appt Note: echo at 10am per dr shams holter at [facility] [place] [address] [facility]  
[phone]  
  
   
 [address] [place] [address] [address]  
  
    
 10/6/2017  9:20 AM  
ESTABLISHED PATIENT with Sarah Mooney MD  
[facility] ([facility]) Appt Note: 6 mo fu w/ test result [address] [place] [address] [facility] [address]  
[phone]  
  
   
 [address] [place] [address] [address]  
  
    
 11/16/2017  8:40 AM  
ESTABLISHED PATIENT with Jacki Javier MD  
[facility] ([place] [facility]EDUL[facility]) Appt Note: 6 mo fup  
 [address] [place] [address] [facility]  
[address] [place] [address] [address] Upcoming Health Maintenance Date Due Hepatitis C Screening 1950 DTaP/Tdap/Td series (1 - Tdap) 6/25/1970 FOBT Q 1 YEAR AGE 50-75 6/25/1999 ZOSTER VACCINE AGE 60> 6/25/2009 GLAUCOMA SCREENING Q2Y 6/25/2014 MEDICARE YEARLY EXAM 6/25/2014 INFLUENZA AGE 9 TO ADULT 8/1/2017 Pneumococcal 65+ Low/Medium Risk (2 of 2 - PCV13) 1/19/2018 Allergies as of 5/19/2017  Review Complete On: 5/19/2017 By: Naa Shahid MD  
 No Known Allergies Current Immunizations  Reviewed on 1/19/2017 Name Date Pneumococcal Polysaccharide (PPSV-23) 1/19/2017 Not reviewed this visit You Were Diagnosed With   
  
 Codes Comments Gastroesophageal reflux disease without esophagitis    -  Primary ICD-10-CM: K21.9 ICD-9-CM: 530.81 Seasonal allergic rhinitis due to pollen     ICD-10-CM: J30.1 ICD-9-CM: 477.0 Essential hypertension     ICD-10-CM: I10 
ICD-9-CM: 401.9 Left lateral knee pain     ICD-10-CM: M25.562 ICD-9-CM: 719.46 Vitals BP Pulse Temp Resp Height(growth percentile) Weight(growth percentile) 124/74 (BP 1 Location: Left arm, BP Patient Position: Sitting) 68 99.2 °F (37.3 °C) (Oral) 18 6' (1.829 m) 231 lb (104.8 kg) SpO2 BMI Smoking Status 98% 31.33 kg/m2 Former Smoker Vitals History BMI and BSA Data Body Mass Index Body Surface Area  
 31.33 kg/m 2 2.31 m 2 Preferred Pharmacy Pharmacy Name Phone [facility] [address] [place] [address], [address] [place] [address] [address] [phone] Your Updated Medication List  
  
   
This list is accurate as of: 5/19/17 11:11 AM.  Always use your most recent med list.  
  
  
  
  
 aspirin delayed-release 81 mg tablet Take  by mouth daily. B COMPLEX PO Take  by mouth daily. FISH OIL PO Take  by mouth two (2) times a day. GLUCOSAMINE PO Take  by mouth two (2) times a day. losartan-hydroCHLOROthiazide 50-12.5 mg per tablet Commonly known as:  HYZAAR Take 1 Tab by mouth daily. Indications: Hypertension multivitamin tablet Commonly known as:  ONE A DAY Take 1 Tab by mouth daily. simvastatin 10 mg tablet Commonly known as:  ZOCOR Take 1 Tab by mouth nightly. VITAMIN D3 PO Take  by mouth daily. Introducing [facility]! Dear Rambo Goldstein: 
Thank you for requesting a O[employer] account. Our records indicate that you already have an active O[employer]ica account. You can access your account anytime at [email or web address] Did you know that you can access your hospital and ER discharge instructions at any time in Opathica? You can also review all of your test results from your hospital stay or ER visit. Additional Information If you have questions, please visit the Frequently Asked Questions section of the Opathica website at [email or web address] Remember, Opathica is NOT to be used for urgent needs. For medical emergencies, dial 911. Now available from your iPhone and Android! Please provide this summary of care documentation to your next provider. Your primary care clinician is listed as Claude Liu. If you have any questions after today's visit, please call [phone].

## 2017-06-02 ENCOUNTER — ANESTHESIA EVENT (OUTPATIENT)
Dept: ENDOSCOPY | Age: 67
End: 2017-06-02
Payer: MEDICARE

## 2017-06-02 ENCOUNTER — ANESTHESIA (OUTPATIENT)
Dept: ENDOSCOPY | Age: 67
End: 2017-06-02
Payer: MEDICARE

## 2017-06-02 ENCOUNTER — HOSPITAL ENCOUNTER (OUTPATIENT)
Age: 67
Setting detail: OUTPATIENT SURGERY
Discharge: HOME | End: 2017-06-02
Attending: INTERNAL MEDICINE | Admitting: INTERNAL MEDICINE
Payer: MEDICARE

## 2017-06-02 VITALS
WEIGHT: 231 LBS | BODY MASS INDEX: 30.62 KG/M2 | OXYGEN SATURATION: 99 % | HEIGHT: 73 IN | HEART RATE: 64 BPM | TEMPERATURE: 97.9 F | SYSTOLIC BLOOD PRESSURE: 114 MMHG | DIASTOLIC BLOOD PRESSURE: 67 MMHG | RESPIRATION RATE: 17 BRPM

## 2017-06-02 LAB
H PYLORI FROM TISSUE: NEGATIVE
KIT LOT NO., HCLOLOT: NORMAL
NEGATIVE CONTROL: NEGATIVE
POSITIVE CONTROL: POSITIVE

## 2017-06-02 PROCEDURE — 74011250636 HC RX REV CODE- 250/636

## 2017-06-02 PROCEDURE — 87077 CULTURE AEROBIC IDENTIFY: CPT | Performed by: INTERNAL MEDICINE

## 2017-06-02 PROCEDURE — 74011250636 HC RX REV CODE- 250/636: Performed by: INTERNAL MEDICINE

## 2017-06-02 PROCEDURE — 74011000250 HC RX REV CODE- 250

## 2017-06-02 PROCEDURE — 76060000032 HC ANESTHESIA 0.5 TO 1 HR: Performed by: INTERNAL MEDICINE

## 2017-06-02 PROCEDURE — 76040000007: Performed by: INTERNAL MEDICINE

## 2017-06-02 PROCEDURE — 77030009426 HC FCPS BIOP ENDOSC BSC -B: Performed by: INTERNAL MEDICINE

## 2017-06-02 PROCEDURE — 77030013992 HC SNR POLYP ENDOSC BSC -B: Performed by: INTERNAL MEDICINE

## 2017-06-02 PROCEDURE — 77030011640 HC PAD GRND REM COVD -A: Performed by: INTERNAL MEDICINE

## 2017-06-02 PROCEDURE — 88305 TISSUE EXAM BY PATHOLOGIST: CPT | Performed by: INTERNAL MEDICINE

## 2017-06-02 RX ORDER — GLYCOPYRROLATE 0.2 MG/ML
INJECTION INTRAMUSCULAR; INTRAVENOUS AS NEEDED
Status: DISCONTINUED | OUTPATIENT
Start: 2017-06-02 | End: 2017-06-02 | Stop reason: HOSPADM

## 2017-06-02 RX ORDER — PROPOFOL 10 MG/ML
INJECTION, EMULSION INTRAVENOUS
Status: DISCONTINUED | OUTPATIENT
Start: 2017-06-02 | End: 2017-06-02 | Stop reason: HOSPADM

## 2017-06-02 RX ORDER — SUCRALFATE 1 G/10ML
1 SUSPENSION ORAL 4 TIMES DAILY
Qty: 560 ML | Refills: 0 | Status: SHIPPED | OUTPATIENT
Start: 2017-06-02 | End: 2017-06-16

## 2017-06-02 RX ORDER — EPINEPHRINE 0.1 MG/ML
1 INJECTION INTRACARDIAC; INTRAVENOUS
Status: ACTIVE | OUTPATIENT
Start: 2017-06-02 | End: 2017-06-02

## 2017-06-02 RX ORDER — ATROPINE SULFATE 0.1 MG/ML
0.5 INJECTION INTRAVENOUS
Status: ACTIVE | OUTPATIENT
Start: 2017-06-02 | End: 2017-06-02

## 2017-06-02 RX ORDER — LIDOCAINE HYDROCHLORIDE 20 MG/ML
INJECTION, SOLUTION EPIDURAL; INFILTRATION; INTRACAUDAL; PERINEURAL AS NEEDED
Status: DISCONTINUED | OUTPATIENT
Start: 2017-06-02 | End: 2017-06-02 | Stop reason: HOSPADM

## 2017-06-02 RX ORDER — FLUMAZENIL 0.1 MG/ML
0.2 INJECTION INTRAVENOUS
Status: ACTIVE | OUTPATIENT
Start: 2017-06-02 | End: 2017-06-02

## 2017-06-02 RX ORDER — PANTOPRAZOLE SODIUM 40 MG/1
40 TABLET, DELAYED RELEASE ORAL DAILY
Qty: 30 TAB | Refills: 2 | Status: SHIPPED | OUTPATIENT
Start: 2017-06-02 | End: 2017-08-31

## 2017-06-02 RX ORDER — PROPOFOL 10 MG/ML
INJECTION, EMULSION INTRAVENOUS AS NEEDED
Status: DISCONTINUED | OUTPATIENT
Start: 2017-06-02 | End: 2017-06-02 | Stop reason: HOSPADM

## 2017-06-02 RX ORDER — NALOXONE HYDROCHLORIDE 0.4 MG/ML
0.4 INJECTION, SOLUTION INTRAMUSCULAR; INTRAVENOUS; SUBCUTANEOUS
Status: ACTIVE | OUTPATIENT
Start: 2017-06-02 | End: 2017-06-02

## 2017-06-02 RX ORDER — MIDAZOLAM HYDROCHLORIDE 1 MG/ML
.25-5 INJECTION, SOLUTION INTRAMUSCULAR; INTRAVENOUS
Status: ACTIVE | OUTPATIENT
Start: 2017-06-02 | End: 2017-06-02

## 2017-06-02 RX ORDER — SODIUM CHLORIDE 9 MG/ML
50 INJECTION, SOLUTION INTRAVENOUS CONTINUOUS
Status: DISPENSED | OUTPATIENT
Start: 2017-06-02 | End: 2017-06-02

## 2017-06-02 RX ORDER — DEXTROMETHORPHAN/PSEUDOEPHED 2.5-7.5/.8
1.2 DROPS ORAL
Status: DISCONTINUED | OUTPATIENT
Start: 2017-06-02 | End: 2017-06-06 | Stop reason: HOSPADM

## 2017-06-02 RX ADMIN — SODIUM CHLORIDE: 900 INJECTION, SOLUTION INTRAVENOUS at 12:52

## 2017-06-02 RX ADMIN — LIDOCAINE HYDROCHLORIDE 40 MG: 20 INJECTION, SOLUTION EPIDURAL; INFILTRATION; INTRACAUDAL; PERINEURAL at 13:12

## 2017-06-02 RX ADMIN — PROPOFOL 20 MG: 10 INJECTION, EMULSION INTRAVENOUS at 13:20

## 2017-06-02 RX ADMIN — PROPOFOL 120 MCG/KG/MIN: 10 INJECTION, EMULSION INTRAVENOUS at 13:12

## 2017-06-02 RX ADMIN — PROPOFOL 100 MG: 10 INJECTION, EMULSION INTRAVENOUS at 13:12

## 2017-06-02 RX ADMIN — GLYCOPYRROLATE 0.1 MG: 0.2 INJECTION INTRAMUSCULAR; INTRAVENOUS at 13:06

## 2017-06-02 NOTE — PROCEDURES
[address] MD  [phone]           2017                EGD Operative Report  Francia Alfaro  :  1950  [employer] Medical Record Number:  [id number]      Indication:  Dyphagia/odynophagia, GERD     : Yi Lisa MD    Referring Provider:  Nasra Ga MD      Anesthesia/Sedation:  MAC anesthesia Propofol    Airway assessment: No airway problems anticipated    Pre-Procedural Exam:      Airway: clear, no airway problems anticipated  Heart: RRR, without gallops or rubs  Lungs: clear bilaterally without wheezes, crackles, or rhonchi  Abdomen: soft, nontender, nondistended, bowel sounds present  Mental Status: awake, alert and oriented to person, place and time       Procedure Details     After infomed consent was obtained for the procedure, with all risks and benefits of procedure explained the patient was taken to the endoscopy suite and placed in the left lateral decubitus position. Following sequential administration of sedation as per above, the endoscope was inserted into the mouth and advanced under direct vision to second portion of the duodenum. A careful inspection was made as the gastroscope was withdrawn, including a retroflexed view of the proximal stomach; findings and interventions are described below. Findings:   Esophagus: LA Grade  Esophagitis B. Non obstructive schatzk'is ring  Stomach: normal mucosa. Small sliding hiatal hernia. Janet test performed  Duodenum/jejunum: normal    Therapies:  biopsy of esophagus  biopsy of stomach antrum, for JANET  biopsy of duodenal random    Specimens: as above           Complications:   None; patient tolerated the procedure well. EBL:  None. Impression:    esophagitis, hiatal hernia    Recommendations:    -Acid suppression with a proton pump inhibitor. ,   -Await JANET test result and treat for Helicobacter pylori if positive. ,   -GERD diet: avoid fried and fatty foods. peppermint, chocolate, alcohol, coffee, citrus fruits and juices, tomoato products; avoid lying down for 2 to 3 hours after eating.,   -No NSAIDS  -Repeat endoscopy in 6 weeks    Walter Tenorio MD

## 2017-06-02 NOTE — ROUTINE PROCESS
[facility]  1950  [id number]    Situation:  Verbal report received from: Liseth Graff RN  Procedure: Procedure(s):  COLONOSCOPY  ESOPHAGOGASTRODUODENOSCOPY (EGD)  ESOPHAGOGASTRODUODENAL (EGD) BIOPSY  ENDOSCOPIC POLYPECTOMY    Background:    Preoperative diagnosis: ABD PAIN, HX COLON POLYPS  Postoperative diagnosis: esophagitis, hiatal hernia, polyp and hemorrhoids    :  Dr. Preston Hamilton  Assistant(s): Endoscopy Technician-1: Jamie Farah  Endoscopy RN-1: Sara Miranda RN    Specimens:   ID Type Source Tests Collected by Time Destination   1 : biopsy GE junction Preservative   Gisella Mora MD 6/2/2017 1319 Pathology   2 : polyp Preservative Colon, Transverse  Gisella Mora MD 6/2/2017 1335 Pathology     H. Pylori  no    Assessment:  Intra-procedure medications   Anesthesia gave intra-procedure sedation and medications, see anesthesia flow sheet yes    Intravenous fluids: NS@ KVO     Vital signs stable     Abdominal assessment: round and soft     Recommendation:  Discharge patient per MD order.   Family or Friend   Permission to share finding with family or friend yes

## 2017-06-02 NOTE — ANESTHESIA POSTPROCEDURE EVALUATION
Post-Anesthesia Evaluation and Assessment    Patient: Cecil King MRN: [id number]  SSN: [id number]    YOB: 1950  Age: 78 y.o. Sex: male       Cardiovascular Function/Vital Signs  Visit Vitals    BP (!) 83/51    Pulse 93    Temp 36.7 °C (98 °F)    Resp 15    Ht 6' 1\" (1.854 m)    Wt 104.8 kg (231 lb)    SpO2 97%    BMI 30.48 kg/m2       Patient is status post MAC anesthesia for Procedure(s):  COLONOSCOPY  ESOPHAGOGASTRODUODENOSCOPY (EGD)  ESOPHAGOGASTRODUODENAL (EGD) BIOPSY  ENDOSCOPIC POLYPECTOMY. Nausea/Vomiting: None    Postoperative hydration reviewed and adequate. Pain:  Pain Scale 1: Numeric (0 - 10) (06/02/17 1226)  Pain Intensity 1: 0 (06/02/17 1226)   Managed    Neurological Status: At baseline    Mental Status and Level of Consciousness: Arousable    Pulmonary Status:   O2 Device: Nasal cannula (06/02/17 1340)   Adequate oxygenation and airway patent    Complications related to anesthesia: None    Post-anesthesia assessment completed.  No concerns    Signed By: Jeovanny Garza DO     June 2, 2017

## 2017-06-02 NOTE — H&P
[address]  [address] [facility], [address] [place] [address]  [phone]                 History and Physical     NAME: Meenu Smith   :  1950   MRN:  [id number]     HPI:  78year old male who presents with a complaint of Choking. The patients presents for consultation at the request of Dr. Agustin Chou). Note for \"Choking\": Pt is a 77 yo male w/ a hx of GERD at night, HTN, hypercholesterolemia, and presents to the office w/ dysphagia and choking sxs that have gradually worsened over the last year.  Aggravated by solids.  HIs chronic nightitme GERD was relieved by elevating his bed x6-8 months.  Denies any black stools, hematemesis, bloody stools, weight loss. Past Surgical History:   Procedure Laterality Date    HX HERNIA REPAIR      times 2.  inguinal bilateral    HX ORTHOPAEDIC      right shoulder surgery in the s    HX OTHER SURGICAL      Repair of the right shoulder.     HX TONSILLECTOMY      childhood     Past Medical History:   Diagnosis Date    Essential hypertension      Social History   Substance Use Topics    Smoking status: Former Smoker     Packs/day: 1.00     Years: 30.00     Quit date: 1997    Smokeless tobacco: Never Used    Alcohol use Yes      Comment: occassionally     No Known Allergies  Family History   Problem Relation Age of Onset    Sudden Death Mother      Car accident    Heart Attack Father 48    Thyroid Disease Sister      No current facility-administered medications for this encounter. Current Outpatient Prescriptions   Medication Sig    simvastatin (ZOCOR) 10 mg tablet Take 1 Tab by mouth nightly.  multivitamin (ONE A DAY) tablet Take 1 Tab by mouth daily.  VITAMIN B COMPLEX (B COMPLEX PO) Take  by mouth daily.  DOCOSAHEXANOIC ACID/EPA (FISH OIL PO) Take  by mouth two (2) times a day.  GLUCOSAMINE SULFATE (GLUCOSAMINE PO) Take  by mouth two (2) times a day.     CHOLECALCIFEROL, VITAMIN D3, (VITAMIN D3 PO) Take  by mouth daily.  aspirin delayed-release 81 mg tablet Take  by mouth daily.  losartan-hydroCHLOROthiazide (HYZAAR) 50-12.5 mg per tablet Take 1 Tab by mouth daily. Indications: Hypertension         PHYSICAL EXAM:  General: WD, WN. Alert, cooperative, no acute distress    HEENT: NC, Atraumatic. PERRLA, EOMI. Anicteric sclerae. Lungs:  CTA Bilaterally. No Wheezing/Rhonchi/Rales. Heart:  Regular  rhythm,  No murmur, No Rubs, No Gallops  Abdomen: Soft, Non distended, Non tender.  +Bowel sounds, no HSM  Extremities: No c/c/e  Neurologic:  CN 2-12 gi, Alert and oriented X 3. No acute neurological distress   Psych:   Good insight. Not anxious nor agitated. Assessment:   I have reviewed with the patient +/- family alternatives,benefits and risks for the procedure, as well as potential complications(with emphasis on, but not limited to, bleeding, perforation, cardiovascular/cerebrovascular/pulmonary events, reactions to the medications, infection, risk of missing a lesions/a cancer, and the imponderables including death), alternative options, and patient/family voices understanding.       Plan:   · Endoscopic procedure  · Conscious sedation or MAC

## 2017-06-02 NOTE — ANESTHESIA PREPROCEDURE EVALUATION
Anesthetic History   No history of anesthetic complications            Review of Systems / Medical History  Patient summary reviewed and nursing notes reviewed    Pulmonary  Within defined limits                 Neuro/Psych   Within defined limits           Cardiovascular    Hypertension: well controlled                   GI/Hepatic/Renal     GERD          Comments: Dysphagia Endo/Other  Within defined limits           Other Findings              Physical Exam    Airway             Cardiovascular    Rhythm: regular  Rate: normal         Dental    Dentition: Caps/crowns and Implants     Pulmonary  Breath sounds clear to auscultation               Abdominal         Other Findings            Anesthetic Plan    ASA: 2  Anesthesia type: MAC          Induction: Intravenous  Anesthetic plan and risks discussed with: Patient      Informed consent obtained.

## 2017-06-02 NOTE — DISCHARGE INSTRUCTIONS
[address] [facility]. Jennifer David M.D.  [phone]                 COLON and EGD DISCHARGE INSTRUCTIONS    2017    Francia Alfaro  :  1950  [facility] Medical Record Number:  [id number]      Discomfort:  Sore throat- throat lozenges or warm salt water gargle  Redness at IV site- apply warm compress to area; if redness or soreness persist- contact your physician  There may be a slight amount of blood passed from the rectum  Gaseous discomfort- walking, belching will help relieve any discomfort  You may not operate a vehicle for 12 hours  You may not engage in an occupation involving machinery or appliances for rest of today  You may not drink alcoholic beverages for at least 12 hours  Avoid making any critical decisions for at least 24 hour  DIET:   High fiber diet. - however -  remember your colon is empty and a heavy meal will produce gas. Avoid these foods:  vegetables, fried / greasy foods, carbonated drinks for today     ACTIVITY:  You may  resume your normal daily activities it is recommended that you spend the remainder of the day resting -  avoid any strenuous activity and driving. CALL M.D. ANY SIGN OF:   Increasing pain, nausea, vomiting  Abdominal distension (swelling)  New increased bleeding (oral or rectal)  Fever (chills)  Pain in chest area  Bloody discharge from nose or mouth  Shortness of breath        Francia Angelina  [id number]  1950      Follow-up Instructions:   Call Dr. Jailene Wagner if any questions at [phone]. Results of procedure / biopsy in 7 to 10 days, we will call you with these results.   Your endoscopy showed Grade B esophagitis and hiatal hernia   Your colonoscopy showed small colon polyp, diverticulosis and hemorrhoids      DISCHARGE SUMMARY from Nurse    The following personal items collected during your admission are returned to you:   Dental Appliance: Dental Appliances: None  Vision: Visual Aid: Glasses  Hearing Aid:    Kalyan Cox: Clothing:    Other Valuables:    Valuables sent to safe:      PATIENT INSTRUCTIONS:    Take Home Medications:

## 2017-06-02 NOTE — IP AVS SNAPSHOT
[place] 
 
 
 [address] 
[phone] Patient: Ivana Araiza MRN: [id number] AAJ:[id number]/[id number] You are allergic to the following No active allergies Recent Documentation Height Weight BMI Smoking Status 1.854 m 104.8 kg 30.48 kg/m2 Former Smoker Emergency Contacts Name Discharge Info Relation Home Work Mobile Van,June DISCHARGE CAREGIVER [3] Spouse [3] [phone] About your hospitalization You were admitted on:  June 2, 2017 You last received care in the:  [facility] ENDOSCOPY You were discharged on:  June 2, 2017 Unit phone number:  [phone] Why you were hospitalized Your primary diagnosis was:  Not on File Providers Seen During Your Hospitalizations Provider Role Specialty Primary office phone Cameron Goodman MD Attending Provider Gastroenterology [phone] Your Primary Care Physician (PCP) Primary Care Physician Office Phone Office Fax Kaila Hare [phone] Follow-up Information None Your Appointments Thursday Es 15, 2017 10:00 AM EDT  
NUCLEAR MEDICINE with NUCLEARGHAZAL  
[facility] (TAMMY SCHEDULING) [address] [place] [address]  
[phone] Current Discharge Medication List  
  
START taking these medications Dose & Instructions Dispensing Information Comments Morning Noon Evening Bedtime  
 pantoprazole 40 mg tablet Commonly known as:  PROTONIX Your last dose was: Your next dose is:    
   
   
 Dose:  40 mg Take 1 Tab by mouth daily for 90 days. Indications: EROSIVE ESOPHAGITIS Quantity:  30 Tab Refills:  2  
     
   
   
   
  
 sucralfate 100 mg/mL suspension Commonly known as:  Oralia Ling Your last dose was: Your next dose is:    
   
   
 Dose:  1 g Take 10 mL by mouth four (4) times daily for 14 days. Quantity:  560 mL Refills:  0 CONTINUE these medications which have NOT CHANGED Dose & Instructions Dispensing Information Comments Morning Noon Evening Bedtime  
 aspirin delayed-release 81 mg tablet Your last dose was: Your next dose is: Take  by mouth daily. Refills:  0  
     
   
   
   
  
 B COMPLEX PO Your last dose was: Your next dose is: Take  by mouth daily. Refills:  0  
     
   
   
   
  
 FISH OIL PO Your last dose was: Your next dose is: Take  by mouth two (2) times a day. Refills:  0 GLUCOSAMINE PO Your last dose was: Your next dose is: Take  by mouth two (2) times a day. Refills:  0  
     
   
   
   
  
 losartan-hydroCHLOROthiazide 50-12.5 mg per tablet Commonly known as:  HYZAAR Your last dose was: Your next dose is:    
   
   
 Dose:  1 Tab Take 1 Tab by mouth daily. Indications: Hypertension Quantity:  90 Tab Refills:  3  
     
   
   
   
  
 multivitamin tablet Commonly known as:  ONE A DAY Your last dose was: Your next dose is:    
   
   
 Dose:  1 Tab Take 1 Tab by mouth daily. Refills:  0  
     
   
   
   
  
 simvastatin 10 mg tablet Commonly known as:  ZOCOR Your last dose was: Your next dose is:    
   
   
 Dose:  10 mg Take 1 Tab by mouth nightly. Quantity:  90 Tab Refills:  0  
     
   
   
   
  
 VITAMIN D3 PO Your last dose was: Your next dose is: Take  by mouth daily. Refills:  0 Where to Get Your Medications Information on where to get these meds will be given to you by the nurse or doctor. ! Ask your nurse or doctor about these medications pantoprazole 40 mg tablet  
 sucralfate 100 mg/mL suspension Discharge Instructions [facility]. [facility] Jose Miguel Caldwell M.D. 
[phone] COLON and EGD DISCHARGE INSTRUCTIONS 
 
2017 Raza Abebelarry :  1950 [place] Medical Record Number:  [id number] Discomfort: 
Sore throat- throat lozenges or warm salt water gargle Redness at IV site- apply warm compress to area; if redness or soreness persist- contact your physician There may be a slight amount of blood passed from the rectum Gaseous discomfort- walking, belching will help relieve any discomfort You may not operate a vehicle for 12 hours You may not engage in an occupation involving machinery or appliances for rest of today You may not drink alcoholic beverages for at least 12 hours Avoid making any critical decisions for at least 24 hour DIET: 
 High fiber diet.  however -  remember your colon is empty and a heavy meal will produce gas. Avoid these foods:  vegetables, fried / greasy foods, carbonated drinks for today ACTIVITY: 
You may  resume your normal daily activities it is recommended that you spend the remainder of the day resting -  avoid any strenuous activity and driving. CALL M.D. ANY SIGN OF: Increasing pain, nausea, vomiting Abdominal distension (swelling) New increased bleeding (oral or rectal) Fever (chills) Pain in chest area Bloody discharge from nose or mouth Shortness of breath Raza Bermeo [id number] 
1950 Follow-up Instructions: 
 Call Dr. Mitzy Bruce if any questions at [phone]. Results of procedure / biopsy in 7 to 10 days, we will call you with these results. Your endoscopy showed Grade B esophagitis and hiatal hernia Your colonoscopy showed small colon polyp, diverticulosis and hemorrhoids DISCHARGE SUMMARY from Nurse The following personal items collected during your admission are returned to you:  
Dental Appliance: Dental Appliances: None Vision: Visual Aid: Glasses Hearing Aid:   
Jewelry:   
Clothing:   
Other Valuables:   
Valuables sent to safe:   
 
PATIENT INSTRUCTIONS: 
 
Take Home Medications: 
 
 
 
Discharge Orders None Introducing [facility]! Dear Fozia Piedra: 
Thank you for requesting a [employer] account. Our records indicate that you already have an active [employer] account. You can access your account anytime at [email or web address] Did you know that you can access your hospital and ER discharge instructions at any time in [employer]? You can also review all of your test results from your hospital stay or ER visit. Additional Information If you have questions, please visit the Frequently Asked Questions section of the [employer] website at [email or web address] Remember, [employer] is NOT to be used for urgent needs. For medical emergencies, dial 911. Now available from your iPhone and Android! General Information Please provide this summary of care documentation to your next provider. Patient Signature:  ____________________________________________________________ Date:  ____________________________________________________________  
  
[facility] Provider Signature:  ____________________________________________________________ Date:  ____________________________________________________________

## 2017-06-02 NOTE — PROCEDURES
[address] [facility] [address]  [address] [facility], [address] [facility]  [phone]               Colonoscopy Operative Report      Indications:    Family history of coloretal adenoma  (screening only)     :  Joby Douglas MD    Referring Provider: Michelle Peñaloza MD    Sedation:  MAC anesthesia Propofol    Procedure Details:  After informed consent was obtained with all risks and benefits of procedure explained and preoperative exam completed, the patient was taken to the endoscopy suite and placed in the left lateral decubitus position. Upon sequential sedation as per above, a digital rectal exam was performed  And was normal.  The Olympus videocolonoscope  was inserted in the rectum and carefully advanced to the cecum, which was identified by the ileocecal valve and appendiceal orifice. The quality of preparation was good. The colonoscope was slowly withdrawn with careful evaluation between folds. Retroflexion in the rectum was performed and was normal..     Findings:   Rectum: Grade 1 internal hemorrhoid(s); Sigmoid:     - Diverticulosis  Descending Colon: normal  Transverse Colon: 1  Sessile polyp(s), the largest 6 mm in size;  Ascending Colon: normal  Cecum: normal  Terminal Ileum: normal    Interventions:  1 complete polypectomy were performed using cold biopsy forceps and the polyps were  retrieved    Specimen Removed:  specimen #1, 4 mm in size, located in the transverse colon removed by cold biopsy and sent for pathology    Complications: None. EBL:  None. Recommendations:     -Await pathology. -Repeat colonoscopy in 5 years.  -High fiber diet. -GERD diet: avoid fried and fatty foods. peppermint, chocolate, alcohol, coffee, citrus fruits and juices, tomoato products; avoid lying down for 2 to 3 hours after eating. Resume normal medication(s). Discharge Disposition:  Home in the company of a  when able to ambulate.     Joby Douglas MD  6/2/2017 1:47 PM

## 2017-06-14 ENCOUNTER — TELEPHONE (OUTPATIENT)
Dept: CARDIOLOGY CLINIC | Age: 67
End: 2017-06-14

## 2017-06-14 NOTE — TELEPHONE ENCOUNTER
Two pt identifiers used. Confirmed apt for 10am on 6/15/17 Nuclear stress test. Informed pt no caffiene after 10pm on 6/14/17, NPO after 8am on 6/15/17. Encouraged pt to wear comfortable clothes. Tests last 4-5 hours. Pt verbalized understanding.

## 2017-06-15 ENCOUNTER — CLINICAL SUPPORT (OUTPATIENT)
Dept: CARDIOLOGY CLINIC | Age: 67
End: 2017-06-15

## 2017-06-15 DIAGNOSIS — R07.89 CHEST TIGHTNESS: Primary | ICD-10-CM

## 2017-06-15 DIAGNOSIS — I34.1 MITRAL VALVE PROLAPSE: ICD-10-CM

## 2017-06-15 DIAGNOSIS — I10 ESSENTIAL HYPERTENSION: ICD-10-CM

## 2017-06-15 DIAGNOSIS — R53.83 FATIGUE, UNSPECIFIED TYPE: ICD-10-CM

## 2017-06-15 DIAGNOSIS — R06.02 SOB (SHORTNESS OF BREATH): ICD-10-CM

## 2017-06-15 RX ORDER — METOPROLOL SUCCINATE 25 MG/1
12.5 TABLET, EXTENDED RELEASE ORAL DAILY
COMMUNITY
End: 2017-06-15 | Stop reason: SDUPTHER

## 2017-06-15 RX ORDER — METOPROLOL SUCCINATE 25 MG/1
12.5 TABLET, EXTENDED RELEASE ORAL DAILY
Qty: 30 TAB | Refills: 2 | Status: SHIPPED | OUTPATIENT
Start: 2017-06-15 | End: 2018-02-12 | Stop reason: SDUPTHER

## 2017-06-15 NOTE — PROGRESS NOTES
Explained procedure to patient, Obtaining IV access, radiation exposure, risks and discomforts (for lexiscan stress test), waiting between injections and obtaining images. All concerns and questions addressed, prior to obtaining consent. See scanned report. Dr. Bailee Maya ordered and Dr. Rafa Angeles read study. ID verified per protocol. Pt reported no symptoms after completion of protocol.

## 2017-06-27 ENCOUNTER — CLINICAL SUPPORT (OUTPATIENT)
Dept: CARDIOLOGY CLINIC | Age: 67
End: 2017-06-27

## 2017-06-27 VITALS — HEART RATE: 62 BPM | SYSTOLIC BLOOD PRESSURE: 132 MMHG | DIASTOLIC BLOOD PRESSURE: 79 MMHG

## 2017-06-27 DIAGNOSIS — I10 ESSENTIAL HYPERTENSION: Primary | ICD-10-CM

## 2017-07-13 NOTE — PERIOP NOTES
[address]  Endoscopy Preprocedure Instructions      1. On the day of your surgery, please report to registration located on the 2nd floor of the  [facility]. yes    2. You must have a responsible adult to drive you to the hospital, stay at the hospital during your procedure and drive you home. If they leave your procedure will not be started (no exceptions). yes    3. Do not have anything to eat or drink (including water, gum, mints, coffee, and juice) after midnight. This does not apply to the medications you were instructed to take by your physician. yesIf you are currently taking Plavix, Coumadin, Aspirin, or other blood-thinning agents, contact your physician for special instructions. yes,ASA    4. If you are having a procedure that requires bowel prep: We recommend that you have only clear liquids the day before your procedure and begin your bowel prep by 5:00 pm.  You may continue to drink clear liquids until midnight. If for any reason you are not able to complete your prep please notify your physician immediately. yes    5. Have a list of all current medications, including vitamins, herbal supplements and any other over the counter medications. yes    6. If you wear glasses, contacts, dentures and/or hearing aids, they may be removed prior to procedure, please bring a case to store them in. yes    7. You should understand that if you do not follow these instructions your procedure may be cancelled. If your physical condition changes (I.e. fever, cold or flu) please contact your doctor as soon as possible. 8. It is important that you be on time.   If for any reason you are unable to keep your appointment please call your physicians office prior to your scheduled procedure

## 2017-07-14 ENCOUNTER — ANESTHESIA EVENT (OUTPATIENT)
Dept: ENDOSCOPY | Age: 67
End: 2017-07-14
Payer: MEDICARE

## 2017-07-14 ENCOUNTER — HOSPITAL ENCOUNTER (OUTPATIENT)
Age: 67
Setting detail: OUTPATIENT SURGERY
Discharge: HOME | End: 2017-07-14
Attending: INTERNAL MEDICINE | Admitting: INTERNAL MEDICINE
Payer: MEDICARE

## 2017-07-14 ENCOUNTER — ANESTHESIA (OUTPATIENT)
Dept: ENDOSCOPY | Age: 67
End: 2017-07-14
Payer: MEDICARE

## 2017-07-14 VITALS
SYSTOLIC BLOOD PRESSURE: 121 MMHG | OXYGEN SATURATION: 97 % | BODY MASS INDEX: 30.48 KG/M2 | HEART RATE: 54 BPM | RESPIRATION RATE: 20 BRPM | DIASTOLIC BLOOD PRESSURE: 76 MMHG | WEIGHT: 230 LBS | HEIGHT: 73 IN | TEMPERATURE: 98 F

## 2017-07-14 PROCEDURE — 76040000019: Performed by: INTERNAL MEDICINE

## 2017-07-14 PROCEDURE — 77030018712 HC DEV BLLN INFL BSC -B: Performed by: INTERNAL MEDICINE

## 2017-07-14 PROCEDURE — 77030009426 HC FCPS BIOP ENDOSC BSC -B: Performed by: INTERNAL MEDICINE

## 2017-07-14 PROCEDURE — 74011250636 HC RX REV CODE- 250/636

## 2017-07-14 PROCEDURE — 76060000031 HC ANESTHESIA FIRST 0.5 HR: Performed by: INTERNAL MEDICINE

## 2017-07-14 PROCEDURE — 74011000250 HC RX REV CODE- 250

## 2017-07-14 PROCEDURE — 88305 TISSUE EXAM BY PATHOLOGIST: CPT | Performed by: INTERNAL MEDICINE

## 2017-07-14 PROCEDURE — C1726 CATH, BAL DIL, NON-VASCULAR: HCPCS | Performed by: INTERNAL MEDICINE

## 2017-07-14 RX ORDER — SODIUM CHLORIDE 9 MG/ML
50 INJECTION, SOLUTION INTRAVENOUS CONTINUOUS
Status: CANCELLED | OUTPATIENT
Start: 2017-07-14 | End: 2017-07-14

## 2017-07-14 RX ORDER — DEXTROMETHORPHAN/PSEUDOEPHED 2.5-7.5/.8
1.2 DROPS ORAL
Status: DISCONTINUED | OUTPATIENT
Start: 2017-07-14 | End: 2017-07-14 | Stop reason: HOSPADM

## 2017-07-14 RX ORDER — FLUMAZENIL 0.1 MG/ML
0.2 INJECTION INTRAVENOUS
Status: DISCONTINUED | OUTPATIENT
Start: 2017-07-14 | End: 2017-07-14 | Stop reason: HOSPADM

## 2017-07-14 RX ORDER — ATROPINE SULFATE 0.1 MG/ML
0.5 INJECTION INTRAVENOUS
Status: DISCONTINUED | OUTPATIENT
Start: 2017-07-14 | End: 2017-07-14 | Stop reason: HOSPADM

## 2017-07-14 RX ORDER — NALOXONE HYDROCHLORIDE 0.4 MG/ML
0.4 INJECTION, SOLUTION INTRAMUSCULAR; INTRAVENOUS; SUBCUTANEOUS
Status: DISCONTINUED | OUTPATIENT
Start: 2017-07-14 | End: 2017-07-14 | Stop reason: HOSPADM

## 2017-07-14 RX ORDER — LIDOCAINE HYDROCHLORIDE 20 MG/ML
INJECTION, SOLUTION EPIDURAL; INFILTRATION; INTRACAUDAL; PERINEURAL AS NEEDED
Status: DISCONTINUED | OUTPATIENT
Start: 2017-07-14 | End: 2017-07-14 | Stop reason: HOSPADM

## 2017-07-14 RX ORDER — SODIUM CHLORIDE 9 MG/ML
INJECTION, SOLUTION INTRAVENOUS
Status: DISCONTINUED | OUTPATIENT
Start: 2017-07-14 | End: 2017-07-14 | Stop reason: HOSPADM

## 2017-07-14 RX ORDER — EPINEPHRINE 0.1 MG/ML
1 INJECTION INTRACARDIAC; INTRAVENOUS
Status: DISCONTINUED | OUTPATIENT
Start: 2017-07-14 | End: 2017-07-14 | Stop reason: HOSPADM

## 2017-07-14 RX ORDER — PROPOFOL 10 MG/ML
INJECTION, EMULSION INTRAVENOUS AS NEEDED
Status: DISCONTINUED | OUTPATIENT
Start: 2017-07-14 | End: 2017-07-14 | Stop reason: HOSPADM

## 2017-07-14 RX ORDER — MIDAZOLAM HYDROCHLORIDE 1 MG/ML
.25-5 INJECTION, SOLUTION INTRAMUSCULAR; INTRAVENOUS
Status: DISCONTINUED | OUTPATIENT
Start: 2017-07-14 | End: 2017-07-14 | Stop reason: HOSPADM

## 2017-07-14 RX ADMIN — PROPOFOL 20 MG: 10 INJECTION, EMULSION INTRAVENOUS at 12:37

## 2017-07-14 RX ADMIN — SODIUM CHLORIDE: 9 INJECTION, SOLUTION INTRAVENOUS at 12:21

## 2017-07-14 RX ADMIN — PROPOFOL 10 MG: 10 INJECTION, EMULSION INTRAVENOUS at 12:35

## 2017-07-14 RX ADMIN — LIDOCAINE HYDROCHLORIDE 100 MG: 20 INJECTION, SOLUTION EPIDURAL; INFILTRATION; INTRACAUDAL; PERINEURAL at 12:30

## 2017-07-14 RX ADMIN — PROPOFOL 20 MG: 10 INJECTION, EMULSION INTRAVENOUS at 12:34

## 2017-07-14 RX ADMIN — PROPOFOL 100 MG: 10 INJECTION, EMULSION INTRAVENOUS at 12:30

## 2017-07-14 NOTE — PROCEDURES
[address] MD  [phone]           2017                EGD Operative Report  Luke Ahumada  :  1950  [facility] Medical Record Number:  [id number]      Indication:  Dyphagia/odynophagia, GERD     : Hue Leach MD    Referring Provider:  Michelle Murray MD      Anesthesia/Sedation:  MAC anesthesia Propofol    Airway assessment: No airway problems anticipated    Pre-Procedural Exam:      Airway: clear, no airway problems anticipated  Heart: RRR, without gallops or rubs  Lungs: clear bilaterally without wheezes, crackles, or rhonchi  Abdomen: soft, nontender, nondistended, bowel sounds present  Mental Status: awake, alert and oriented to person, place and time       Procedure Details     After infomed consent was obtained for the procedure, with all risks and benefits of procedure explained the patient was taken to the endoscopy suite and placed in the left lateral decubitus position. Following sequential administration of sedation as per above, the endoscope was inserted into the mouth and advanced under direct vision to second portion of the duodenum. A careful inspection was made as the gastroscope was withdrawn, including a retroflexed view of the proximal stomach; findings and interventions are described below. Findings:   Esophagus: Healed esophagitis with distal ring ( moderate). Dilation performed 15-18 mm with TTS CRE . Superficial mucosal tear noted with dilation. No significant bleeding noted at the end of dilation  Stomach: normal mucosa . 8 mm stomach nodule ( submucosal) in antrum  bx taken for histology                   Moderate size hiatal hernia noted  Duodenum/jejunum: normal    Therapies:  esophageal dilation with 18 mm CRE sized balloon    Specimens: stomach-histology           Complications:   None; patient tolerated the procedure well. EBL:  None.            Impression:   Small submucosal nodule of antrum    Recommendations:    -Continue acid suppression. ,   -GERD diet: avoid fried and fatty foods.  peppermint, chocolate, alcohol, coffee, citrus fruits and juices, tomoato products; avoid lying down for 2 to 3 hours after eating.,   -F/u in 1 yr EGD  -Dilation PRN  -Cont acid suppression for 6 more weeks    Feliciano Bennett MD

## 2017-07-14 NOTE — IP AVS SNAPSHOT
[address] 
[phone] Patient: Ky Blizzard MRN: [id number] NZI:[id number] You are allergic to the following No active allergies Recent Documentation Height Weight BMI Smoking Status 1.854 m 104.3 kg 30.34 kg/m2 Former Smoker Emergency Contacts Name Discharge Info Relation Home Work Mobile Van,June DISCHARGE CAREGIVER [3] Spouse [3] [phone] About your hospitalization You were admitted on:  July 14, 2017 You last received care in the:  [facility] ENDOSCOPY You were discharged on:  July 14, 2017 Unit phone number:  [phone] Why you were hospitalized Your primary diagnosis was:  Not on File Providers Seen During Your Hospitalizations Provider Role Specialty Primary office phone Pari Botello MD Attending Provider Gastroenterology [phone] Your Primary Care Physician (PCP) Primary Care Physician Office Phone Office Fax Monica Nixon [phone] Follow-up Information None Current Discharge Medication List  
  
CONTINUE these medications which have NOT CHANGED Dose & Instructions Dispensing Information Comments Morning Noon Evening Bedtime  
 aspirin delayed-release 81 mg tablet Your last dose was: Your next dose is: Take  by mouth daily. Refills:  0  
     
   
   
   
  
 B COMPLEX PO Your last dose was: Your next dose is: Take  by mouth daily. Refills:  0  
     
   
   
   
  
 FISH OIL PO Your last dose was: Your next dose is: Take  by mouth two (2) times a day. Refills:  0 GLUCOSAMINE PO Your last dose was: Your next dose is: Take  by mouth two (2) times a day. Refills:  0 losartan-hydroCHLOROthiazide 50-12.5 mg per tablet Commonly known as:  HYZAAR Your last dose was: Your next dose is:    
   
   
 Dose:  1 Tab Take 1 Tab by mouth daily. Indications: Hypertension Quantity:  90 Tab Refills:  3  
     
   
   
   
  
 metoprolol succinate 25 mg XL tablet Commonly known as:  TOPROL-XL Your last dose was: Your next dose is:    
   
   
 Dose:  12.5 mg Take 0.5 Tabs by mouth daily. Quantity:  30 Tab Refills:  2  
     
   
   
   
  
 multivitamin tablet Commonly known as:  ONE A DAY Your last dose was: Your next dose is:    
   
   
 Dose:  1 Tab Take 1 Tab by mouth daily. Refills:  0  
     
   
   
   
  
 pantoprazole 40 mg tablet Commonly known as:  PROTONIX Your last dose was: Your next dose is:    
   
   
 Dose:  40 mg Take 1 Tab by mouth daily for 90 days. Indications: EROSIVE ESOPHAGITIS Quantity:  30 Tab Refills:  2  
     
   
   
   
  
 simvastatin 10 mg tablet Commonly known as:  ZOCOR Your last dose was: Your next dose is:    
   
   
 Dose:  10 mg Take 1 Tab by mouth nightly. Quantity:  90 Tab Refills:  0  
     
   
   
   
  
 VITAMIN D3 PO Your last dose was: Your next dose is: Take  by mouth daily. Refills:  0 Discharge Instructions [address] [place] [address] Nw 
[phone] EGD  DISCHARGE INSTRUCTIONS Radha Pineda [id number] 
1950 DISCOMFORT: 
Sore throat- throat lozenges or warm salt water gargle 
redness at IV site- apply warm compress to area; if redness or soreness persist- contact your physician Gaseous discomfort- walking, belching will help relieve any discomfort You may not operate a vehicle for 12 hours You may not engage in an occupation involving machinery or appliances for rest of today You may not drink alcoholic beverages for at least 12 hours Avoid making any critical decisions for at least 24 hour DIET You may eat and drink after you leave. You may resume your regular diet  however -  remember your colon is empty and a heavy meal will produce gas. Avoid these foods:  vegetables, fried / greasy foods, carbonated drinks ACTIVITY You may resume your normal daily activities Spend the remainder of the day resting -  avoid any strenuous activity. CALL M.D. ANY SIGN OF Increasing pain, nausea, vomiting Abdominal distension (swelling) New increased bleeding (oral or rectal) Fever (chills) Pain in chest area Bloody discharge from nose or mouth Shortness of breath Follow-up Instructions: 
 Call Dr. Natasha Martini for any questions or problems. If we took a biopsy please call the office within 2 weeks to discuss your                             pathology results. Telephone # [phone] Continue same medications. ENDOSCOPY FINDINGS: 
 Your endoscopy showed hiatal hernia and esophageal ring Discharge Orders None Introducing hospitals & [facility] SERVICES! Dear Katt Gamble: 
Thank you for requesting a [employer] account. Our records indicate that you already have an active [employer] account. You can access your account anytime at [email or web address] [employer] Did you know that you can access your hospital and ER discharge instructions at any time in [employer]? You can also review all of your test results from your hospital stay or ER visit. Additional Information If you have questions, please visit the Frequently Asked Questions section of the [employer] website at [email or web address] Remember, [employer] is NOT to be used for urgent needs. For medical emergencies, dial 911. Now available from your iPhone and Android! General Information Please provide this summary of care documentation to your next provider. Patient Signature:  ____________________________________________________________ Date:  ____________________________________________________________  
  
Jerral Lansing Provider Signature:  ____________________________________________________________ Date:  ____________________________________________________________

## 2017-07-14 NOTE — ANESTHESIA POSTPROCEDURE EVALUATION
Post-Anesthesia Evaluation and Assessment    Patient: Laura Villalba MRN: [id number]  SSN: [id number]    YOB: 1950  Age: 75 y.o. Sex: male       Cardiovascular Function/Vital Signs  Visit Vitals    /76    Pulse 68    Temp 36.7 °C (98 °F)    Resp 14    Ht 6' 1\" (1.854 m)    Wt 104.3 kg (230 lb)    SpO2 99%    BMI 30.34 kg/m2       Patient is status post MAC anesthesia for Procedure(s):  ESOPHAGOGASTRODUODENOSCOPY (EGD)  ESOPHAGOGASTRODUODENAL (EGD) BIOPSY  ESOPHAGEAL DILATION. Nausea/Vomiting: None    Postoperative hydration reviewed and adequate. Pain:  Pain Scale 1: Numeric (0 - 10) (07/14/17 1154)  Pain Intensity 1: 0 (07/14/17 1154)   Managed    Neurological Status: At baseline    Mental Status and Level of Consciousness: Arousable    Pulmonary Status:   O2 Device: Nasal cannula (07/14/17 1240)   Adequate oxygenation and airway patent    Complications related to anesthesia: None    Post-anesthesia assessment completed.  No concerns    Signed By: Nena Johnson MD     July 14, 2017

## 2017-07-14 NOTE — PERIOP NOTES
CRE balloon dilatation of the esophagus   12 mm Balloon inflated to 3 ATMs and held for 10 seconds. 15 mm Balloon inflated to 8 ATMs and held for 10 seconds. 16.5 mm Balloon inflated to 16.5 ATMs and held for 4.5 seconds. 18 mm Balloon inflated to 7 ATMs and held for 12 sec    No subcutaneous crepitus of the chest or cervical region was noted post dilatation.

## 2017-07-14 NOTE — PERIOP NOTES
Received report from Sanbornben Conner. See anesthesia record. ABD remains soft and non-tender post procedure. Pt has no complaints at this time and tolerated the procedure well.

## 2017-07-14 NOTE — DISCHARGE INSTRUCTIONS
[address] [facility]  [address], [address] [facility]  [phone]                EGD  DISCHARGE INSTRUCTIONS    Jacqui Covington  [id number]  1950    DISCOMFORT:  Sore throat- throat lozenges or warm salt water gargle  redness at IV site- apply warm compress to area; if redness or soreness persist- contact your physician  Gaseous discomfort- walking, belching will help relieve any discomfort  You may not operate a vehicle for 12 hours  You may not engage in an occupation involving machinery or appliances for rest of today  You may not drink alcoholic beverages for at least 12 hours  Avoid making any critical decisions for at least 24 hour  DIET  You may eat and drink after you leave. You may resume your regular diet - however -  remember your colon is empty and a heavy meal will produce gas. Avoid these foods:  vegetables, fried / greasy foods, carbonated drinks        ACTIVITY  You may resume your normal daily activities   Spend the remainder of the day resting -  avoid any strenuous activity. CALL M.D. ANY SIGN OF   Increasing pain, nausea, vomiting  Abdominal distension (swelling)  New increased bleeding (oral or rectal)  Fever (chills)  Pain in chest area  Bloody discharge from nose or mouth  Shortness of breath    Follow-up Instructions:   Call Dr. Jolynn Ellis for any questions or problems. If we took a biopsy please call the office within 2 weeks to discuss your                             pathology results. Telephone # [phone]   Continue same medications.     ENDOSCOPY FINDINGS:   Your endoscopy showed hiatal hernia and esophageal ring

## 2017-07-14 NOTE — H&P
[address] [facility] [address]  [address] [facility], [address] [facility]  [phone]                 History and Physical     NAME: Laura Villalba   :  1950   MRN:  [id number]     HPI:   Pt with h/o dysphagia and recent scope revealing Grade B esophagitis. Small nonobstructive Schatzki's ring noted. Pt is scheduled for repeat endoscopy and possible dilation. Past Surgical History:   Procedure Laterality Date    COLONOSCOPY N/A 2017    COLONOSCOPY performed by Tootie Maradiaga MD at [address]  2017         EGD  2017         HX HERNIA REPAIR      times 2. 's inguinal bilateral    HX ORTHOPAEDIC      right shoulder surgery in the 76s    HX OTHER SURGICAL      Repair of the right shoulder.     HX TONSILLECTOMY      childhood     Past Medical History:   Diagnosis Date    Essential hypertension      Social History   Substance Use Topics    Smoking status: Former Smoker     Packs/day: 1.00     Years: 30.00     Quit date: 1997    Smokeless tobacco: Never Used    Alcohol use Yes      Comment: occassionally 2 beers/week     No Known Allergies  Family History   Problem Relation Age of Onset    Sudden Death Mother      Car accident    Heart Attack Father 48    Thyroid Disease Sister      No current facility-administered medications for this encounter. PHYSICAL EXAM:  General: WD, WN. Alert, cooperative, no acute distress    HEENT: NC, Atraumatic. PERRLA, EOMI. Anicteric sclerae. Lungs:  CTA Bilaterally. No Wheezing/Rhonchi/Rales. Heart:  Regular  rhythm,  No murmur, No Rubs, No Gallops  Abdomen: Soft, Non distended, Non tender.  +Bowel sounds, no HSM  Extremities: No c/c/e  Neurologic:  CN 2-12 gi, Alert and oriented X 3. No acute neurological distress   Psych:   Good insight. Not anxious nor agitated.            Assessment:   I have reviewed with the patient +/- family alternatives,benefits and risks for the procedure, as well as potential complications(with emphasis on, but not limited to, bleeding, perforation, cardiovascular/cerebrovascular/pulmonary events, reactions to the medications, infection, risk of missing a lesions/a cancer, and the imponderables including death), alternative options, and patient/family voices understanding.       Plan:   · Endoscopic procedure  · Conscious sedation or MAC

## 2017-07-14 NOTE — ROUTINE PROCESS
[facility]  1950  [id number]    Situation:  Verbal report received from: Umer Borwn RN  Procedure: Procedure(s):  ESOPHAGOGASTRODUODENOSCOPY (EGD)  ESOPHAGOGASTRODUODENAL (EGD) BIOPSY  ESOPHAGEAL DILATION    Background:    Preoperative diagnosis: ACID REFLUX   DYSPHAGIA   Postoperative diagnosis: * No post-op diagnosis entered *    :  Dr. Kat Aly  Assistant(s): Endoscopy Technician-1: Patricio Orosco  Endoscopy RN-1: Umer Brown RN    Specimens:   ID Type Source Tests Collected by Time Destination   1 : Biopsy Preservative   Munir Chavez MD 7/14/2017 1234 Pathology     H. Pylori  no    Assessment:  Intra-procedure medications   Anesthesia gave intra-procedure sedation and medications, see anesthesia flow sheet yes    Intravenous fluids: NS@ KVO     Vital signs stable     Abdominal assessment: round and soft     Recommendation:  Discharge patient per MD order.   Family or Friend   Permission to share finding with family or friend yes

## 2017-09-28 ENCOUNTER — CLINICAL SUPPORT (OUTPATIENT)
Dept: CARDIOLOGY CLINIC | Age: 67
End: 2017-09-28

## 2017-09-28 DIAGNOSIS — R07.89 CHEST TIGHTNESS: ICD-10-CM

## 2017-09-28 DIAGNOSIS — I10 ESSENTIAL HYPERTENSION: ICD-10-CM

## 2017-09-28 DIAGNOSIS — I34.1 MITRAL VALVE PROLAPSE: Primary | ICD-10-CM

## 2017-09-28 DIAGNOSIS — R07.89 CHEST TIGHTNESS: Primary | ICD-10-CM

## 2017-10-06 ENCOUNTER — OFFICE VISIT (OUTPATIENT)
Dept: CARDIOLOGY CLINIC | Age: 67
End: 2017-10-06

## 2017-10-06 VITALS
HEIGHT: 73 IN | HEART RATE: 71 BPM | BODY MASS INDEX: 31.23 KG/M2 | DIASTOLIC BLOOD PRESSURE: 80 MMHG | OXYGEN SATURATION: 98 % | WEIGHT: 235.6 LBS | RESPIRATION RATE: 18 BRPM | SYSTOLIC BLOOD PRESSURE: 106 MMHG

## 2017-10-06 DIAGNOSIS — I49.3 FREQUENT PVCS: Primary | ICD-10-CM

## 2017-10-06 RX ORDER — PHENOL/SODIUM PHENOLATE
20 AEROSOL, SPRAY (ML) MUCOUS MEMBRANE
COMMUNITY
End: 2019-10-02 | Stop reason: ALTCHOICE

## 2017-10-06 NOTE — PROGRESS NOTES
HISTORY OF PRESENTING ILLNESS      Denisa Turcios is a 75 y.o. male with  HTN, MVP and frequent PVCs with a recent holter demonstrating a PVC burden of 13%. He is asymptomatic and cMRI demonstrated normal LV/RV function without scar. Last visit, we planned for repeat Holter monitor and echocardiogram at 6 month follow up. Echocardiogram demonstrated preserved LV function and Holter demonstrated 13% PVC burden once again. Patient Active Problem List    Diagnosis Date Noted    Seasonal allergic rhinitis due to pollen 05/19/2017    Chest tightness 05/18/2017    Mitral valve prolapse 02/16/2017    Colon polyps 01/19/2017    Essential hypertension 01/19/2017           PAST MEDICAL HISTORY     Past Medical History:   Diagnosis Date    Essential hypertension            PAST SURGICAL HISTORY     Past Surgical History:   Procedure Laterality Date    COLONOSCOPY N/A 6/2/2017    COLONOSCOPY performed by Dominique Dalton MD at [address]  6/2/2017         EGD  6/2/2017         HX HERNIA REPAIR      times 2. 1980's inguinal bilateral    HX ORTHOPAEDIC      right shoulder surgery in the 76s    HX OTHER SURGICAL      Repair of the right shoulder.  1977    HX TONSILLECTOMY      childhood    UPPER GI ENDOSCOPY,DILATN W GUIDE  7/14/2017               ALLERGIES     No Known Allergies       FAMILY HISTORY     Family History   Problem Relation Age of Onset   [de-identified] Sudden Death Mother      Car accident    Heart Attack Father 48    Thyroid Disease Sister     negative for cardiac disease       SOCIAL HISTORY     Social History     Social History    Marital status:      Spouse name: N/A    Number of children: N/A    Years of education: N/A     Social History Main Topics    Smoking status: Former Smoker     Packs/day: 1.00     Years: 30.00     Quit date: 2/1/1997    Smokeless tobacco: Never Used    Alcohol use Yes      Comment: occassionally 2 beers/week    Drug use: No    Sexual activity: Not Asked     Other Topics Concern    None     Social History Narrative         MEDICATIONS     Current Outpatient Prescriptions   Medication Sig    metoprolol succinate (TOPROL-XL) 25 mg XL tablet Take 0.5 Tabs by mouth daily.  simvastatin (ZOCOR) 10 mg tablet Take 1 Tab by mouth nightly.  multivitamin (ONE A DAY) tablet Take 1 Tab by mouth daily.  VITAMIN B COMPLEX (B COMPLEX PO) Take  by mouth daily.  DOCOSAHEXANOIC ACID/EPA (FISH OIL PO) Take  by mouth two (2) times a day.  GLUCOSAMINE SULFATE (GLUCOSAMINE PO) Take  by mouth two (2) times a day.  CHOLECALCIFEROL, VITAMIN D3, (VITAMIN D3 PO) Take  by mouth daily.  aspirin delayed-release 81 mg tablet Take  by mouth daily.  losartan-hydroCHLOROthiazide (HYZAAR) 50-12.5 mg per tablet Take 1 Tab by mouth daily. Indications: Hypertension     No current facility-administered medications for this visit. I have reviewed the nurses notes, vitals, problem list, allergy list, medical history, family, social history and medications. REVIEW OF SYMPTOMS      General: Pt denies excessive weight gain or loss. Pt is able to conduct ADL's  HEENT: Denies blurred vision, headaches, hearing loss, epistaxis and difficulty swallowing. Respiratory: Denies cough, congestion, shortness of breath, OCONNOR, wheezing or stridor. Cardiovascular: Denies precordial pain, palpitations, edema or PND  Gastrointestinal: Denies poor appetite, indigestion, abdominal pain or blood in stool  Genitourinary: Denies hematuria, dysuria, increased urinary frequency  Musculoskeletal: Denies joint pain or swelling from muscles or joints  Neurologic: Denies tremor, paresthesias, headache, or sensory motor disturbance  Psychiatric: Denies confusion, insomnia, depression  Integumentray: Denies rash, itching or ulcers.   Hematologic: Denies easy bruising, bleeding       PHYSICAL EXAMINATION      Vitals:    10/06/17 0912   Resp: 18   Weight: 235 lb 9.6 oz (106.9 kg)   Height: 6' 1\" (1.854 m)     General: Well developed, in no acute distress. HEENT: No jaundice, oral mucosa moist, no oral ulcers  Neck: Supple, no stiffness, no lymphadenopathy, supple  Heart:  Normal S1/S2 negative S3 or S4. Regular, no murmur, gallop or rub, no jugular venous distention  Respiratory: Clear bilaterally x 4, no wheezing or rales  Abdomen:   Soft, non-tender, bowel sounds are active.   Extremities:  No edema, normal cap refill, no cyanosis. Musculoskeletal: No clubbing, no deformities  Neuro: A&Ox3, speech clear, gait stable, cooperative, no focal neurologic deficits  Skin: Skin color is normal. No rashes or lesions. Non diaphoretic, moist.  Vascular: 2+ pulses symmetric in all extremities       DIAGNOSTIC DATA      EKG:        LABORATORY DATA      Lab Results   Component Value Date/Time    WBC 5.8 01/19/2017 12:06 PM    HGB 14.2 01/19/2017 12:06 PM    HCT 42.0 01/19/2017 12:06 PM    PLATELET 931 49/95/7902 12:06 PM    MCV 89 01/19/2017 12:06 PM      Lab Results   Component Value Date/Time    Sodium 140 01/19/2017 12:06 PM    Potassium 4.4 01/19/2017 12:06 PM    Chloride 99 01/19/2017 12:06 PM    CO2 26 01/19/2017 12:06 PM    Glucose 85 01/19/2017 12:06 PM    BUN 20 01/19/2017 12:06 PM    Creatinine 0.99 01/19/2017 12:06 PM    BUN/Creatinine ratio 20 01/19/2017 12:06 PM    GFR est AA 91 01/19/2017 12:06 PM    GFR est non-AA 78 01/19/2017 12:06 PM    Calcium 9.5 01/19/2017 12:06 PM    Bilirubin, total 0.6 01/19/2017 12:06 PM    AST (SGOT) 25 01/19/2017 12:06 PM    Alk. phosphatase 71 01/19/2017 12:06 PM    Protein, total 6.6 01/19/2017 12:06 PM    Albumin 4.4 01/19/2017 12:06 PM    A-G Ratio 2.0 01/19/2017 12:06 PM    ALT (SGPT) 33 01/19/2017 12:06 PM           ASSESSMENT      1. PVCs                        A. Asymptomatic  2. Hypertension  3.  Mitral valve proplapse     PLAN     Continue monitoring for now and avoid further escalation of drug therapy at this time given borderline blood pressure and heart rate. Should patient develop symptoms/signs consistent with deterioration of LV function, will consider alternate options for PVC suppression such as an antiarrhythmic drug or PVC ablation. Recommend yearly echocardiograms to monitor LV function and for LV dilatation. FOLLOW-UP     1 year       Thank you, Lalito Pinedo MD and  [facility] for allowing me to participate in the care of this extraordinarily pleasant male. Please do not hesitate to contact me for further questions/concerns.          Pino Calero MD  Cardiac Electrophysiology / Cardiology    [place] [address].  [address] [place] [address], [address], [facility] [address]  [phone] / [phone] Fax   [phone] / [phone] Fax

## 2017-10-06 NOTE — PROGRESS NOTES
Correction: We previously reported an incorrect PVC burden. The patient's repeat Holter monitor actually demonstrated a PVC burden of 30%. Given this finding we will notify the patient of the correct results and I will recommend flecainide 50 mg twice daily with a repeat Holter monitor in 2 weeks.       Caleen Meigs, MD

## 2017-10-09 ENCOUNTER — TELEPHONE (OUTPATIENT)
Dept: CARDIOLOGY CLINIC | Age: 67
End: 2017-10-09

## 2017-10-09 DIAGNOSIS — I49.3 PVC (PREMATURE VENTRICULAR CONTRACTION): Primary | ICD-10-CM

## 2017-10-09 RX ORDER — FLECAINIDE ACETATE 50 MG/1
50 TABLET ORAL 2 TIMES DAILY
Qty: 180 TAB | Refills: 3 | Status: SHIPPED | OUTPATIENT
Start: 2017-10-09 | End: 2018-11-16 | Stop reason: SDUPTHER

## 2017-11-08 ENCOUNTER — CLINICAL SUPPORT (OUTPATIENT)
Dept: CARDIOLOGY CLINIC | Age: 67
End: 2017-11-08

## 2017-11-08 DIAGNOSIS — I49.3 PVC'S (PREMATURE VENTRICULAR CONTRACTIONS): Primary | ICD-10-CM

## 2017-11-08 NOTE — PROGRESS NOTES
Applied holter monitor for pt per Dr Kamilah Mcfarland  Dx: PVC. Pt has # & is due back on [facility] 11/9/17.

## 2017-11-09 ENCOUNTER — TELEPHONE (OUTPATIENT)
Dept: CARDIOLOGY CLINIC | Age: 67
End: 2017-11-09

## 2017-11-09 NOTE — TELEPHONE ENCOUNTER
Patient is returning your call to reschedule his appointment with dr Leandra Hernandez, I attempted to reschedule but he would like something sooner

## 2017-11-20 ENCOUNTER — TELEPHONE (OUTPATIENT)
Dept: CARDIOLOGY CLINIC | Age: 67
End: 2017-11-20

## 2018-01-31 NOTE — TELEPHONE ENCOUNTER
Refill is per verbal order of Dr. Vladimir Flores.    Requested Prescriptions     Pending Prescriptions Disp Refills    simvastatin (ZOCOR) 10 mg tablet 90 Tab 0     Sig: Take 1 Tab by mouth nightly.

## 2018-02-01 RX ORDER — SIMVASTATIN 10 MG/1
10 TABLET, FILM COATED ORAL
Qty: 90 TAB | Refills: 0 | Status: SHIPPED | OUTPATIENT
Start: 2018-02-01 | End: 2018-04-26 | Stop reason: SDUPTHER

## 2018-02-06 RX ORDER — LOSARTAN POTASSIUM AND HYDROCHLOROTHIAZIDE 12.5; 5 MG/1; MG/1
1 TABLET ORAL DAILY
Qty: 90 TAB | Refills: 3 | Status: SHIPPED | OUTPATIENT
Start: 2018-02-06 | End: 2019-02-13 | Stop reason: SDUPTHER

## 2018-02-06 NOTE — TELEPHONE ENCOUNTER
Verbal order for refill per Dr. James Robbins  Requested Prescriptions     Signed Prescriptions Disp Refills    losartan-hydroCHLOROthiazide (HYZAAR) 50-12.5 mg per tablet 90 Tab 3     Sig: Take 1 Tab by mouth daily.  Indications: hypertension     Authorizing Provider: Chuckie Osorio     Ordering User: Ramos Pittman

## 2018-02-13 RX ORDER — METOPROLOL SUCCINATE 25 MG/1
12.5 TABLET, EXTENDED RELEASE ORAL DAILY
Qty: 45 TAB | Refills: 0 | Status: SHIPPED | OUTPATIENT
Start: 2018-02-13 | End: 2018-04-26 | Stop reason: SDUPTHER

## 2018-03-20 ENCOUNTER — HOSPITAL ENCOUNTER (OUTPATIENT)
Dept: GENERAL RADIOLOGY | Age: 68
Discharge: HOME | End: 2018-03-20
Attending: INTERNAL MEDICINE
Payer: MEDICARE

## 2018-03-20 ENCOUNTER — OFFICE VISIT (OUTPATIENT)
Dept: INTERNAL MEDICINE CLINIC | Age: 68
End: 2018-03-20

## 2018-03-20 VITALS
RESPIRATION RATE: 16 BRPM | TEMPERATURE: 98.7 F | WEIGHT: 243 LBS | HEIGHT: 73 IN | OXYGEN SATURATION: 96 % | BODY MASS INDEX: 32.2 KG/M2 | SYSTOLIC BLOOD PRESSURE: 128 MMHG | DIASTOLIC BLOOD PRESSURE: 83 MMHG | HEART RATE: 62 BPM

## 2018-03-20 DIAGNOSIS — K40.90 LEFT GROIN HERNIA: ICD-10-CM

## 2018-03-20 DIAGNOSIS — G89.29 CHRONIC PAIN OF LEFT KNEE: Primary | ICD-10-CM

## 2018-03-20 DIAGNOSIS — M25.562 CHRONIC PAIN OF LEFT KNEE: ICD-10-CM

## 2018-03-20 DIAGNOSIS — M25.562 CHRONIC PAIN OF LEFT KNEE: Primary | ICD-10-CM

## 2018-03-20 DIAGNOSIS — G89.29 CHRONIC PAIN OF LEFT KNEE: ICD-10-CM

## 2018-03-20 PROCEDURE — 72100 X-RAY EXAM L-S SPINE 2/3 VWS: CPT

## 2018-03-20 PROCEDURE — 73502 X-RAY EXAM HIP UNI 2-3 VIEWS: CPT

## 2018-03-20 RX ORDER — MELOXICAM 15 MG/1
15 TABLET ORAL DAILY
Qty: 30 TAB | Refills: 0 | Status: SHIPPED | OUTPATIENT
Start: 2018-03-20 | End: 2018-04-09 | Stop reason: SDUPTHER

## 2018-03-20 NOTE — MR AVS SNAPSHOT
[address] 
 
 
 [address] [place] [address] [facility] [address] 
[phone] Patient: Sierra Petit MRN: [id number] GENEVIEVE:[id number] Visit Information Date & Time Provider Department Dept. Phone Encounter #  
 3/20/2018  9:00 AM Eder Florentino MD [facility] [address] [id number] Your Appointments 4/12/2018  9:40 AM  
ESTABLISHED PATIENT with Will Montes MD  
[facility] Appt Note: 6 mo fup; 11/10/17 spoke to pt of appt time and day change from 11/16/17  sll; 11/28/17 lm for pt c/b to r/s appt from 12/19/17  sll; 6 mo fup pt r/s from 12/19 to 4/12  
 [address] [facility] [address] [facility] [address]  
[phone]  
  
   
 [address] [facility] [address] [place] [address]  
  
    
 10/17/2018  9:00 AM  
ESTABLISHED PATIENT with Ruben Ohara MD  
[facility] ([address]) Appt Note: annual; 2/20/18 lm for pt of appt day and time change from 10/19/18  sll [address] [facility] [address] [facility] [address]  
54 [facility] [address] [address] Upcoming Health Maintenance Date Due Hepatitis C Screening 1950 DTaP/Tdap/Td series (1 - Tdap) 6/25/1970 FOBT Q 1 YEAR AGE 50-75 6/25/1999 ZOSTER VACCINE AGE 60> 4/25/2009 GLAUCOMA SCREENING Q2Y 6/25/2014 Influenza Age 5 to Adult 8/1/2017 Pneumococcal 65+ Low/Medium Risk (2 of 2 - PCV13) 1/19/2018 MEDICARE YEARLY EXAM 3/14/2018 Allergies as of 3/20/2018  Review Complete On: 3/20/2018 By: Eder Florentino MD  
 No Known Allergies Current Immunizations  Reviewed on 1/19/2017 Name Date Pneumococcal Polysaccharide (PPSV-23) 1/19/2017 Not reviewed this visit You Were Diagnosed With   
  
 Codes Comments  Chronic pain of left knee    -  Primary ICD-10-CM: M25.562, B99.65 
 ICD-9-CM: 719.46, 338.29 Left groin hernia     ICD-10-CM: K40.90 ICD-9-CM: 550.90 Vitals BP Pulse Temp Resp Height(growth percentile) Weight(growth percentile) 128/83 (BP 1 Location: Left arm, BP Patient Position: Sitting) 62 98.7 °F (37.1 °C) (Oral) 16 6' 1\" (1.854 m) 243 lb (110.2 kg) SpO2 BMI Smoking Status 96% 32.06 kg/m2 Former Smoker Vitals History BMI and BSA Data Body Mass Index Body Surface Area 32.06 kg/m 2 2.38 m 2 Preferred Pharmacy Pharmacy Name Phone [address], [address] [phone] Your Updated Medication List  
  
   
This list is accurate as of 3/20/18  9:31 AM.  Always use your most recent med list.  
  
  
  
  
 aspirin delayed-release 81 mg tablet Take  by mouth daily. B COMPLEX PO Take  by mouth daily. CLARINEX PO Take  by mouth. FISH OIL PO Take  by mouth two (2) times a day. flecainide 50 mg tablet Commonly known as:  TAMBOCOR Take 1 Tab by mouth two (2) times a day. GLUCOSAMINE PO Take  by mouth two (2) times a day. losartan-hydroCHLOROthiazide 50-12.5 mg per tablet Commonly known as:  HYZAAR Take 1 Tab by mouth daily. Indications: hypertension  
  
 meloxicam 15 mg tablet Commonly known as:  MOBIC Take 1 Tab by mouth daily. metoprolol succinate 25 mg XL tablet Commonly known as:  TOPROL-XL Take 0.5 Tabs by mouth daily. multivitamin tablet Commonly known as:  ONE A DAY Take 1 Tab by mouth daily. Omeprazole delayed release 20 mg tablet Commonly known as:  PRILOSEC D/R Take 20 mg by mouth daily. simvastatin 10 mg tablet Commonly known as:  ZOCOR Take 1 Tab by mouth nightly. VITAMIN D3 PO Take  by mouth daily. Prescriptions Sent to Pharmacy Refills  
 meloxicam (MOBIC) 15 mg tablet 0 Sig: Take 1 Tab by mouth daily.   
 Class: Normal  
 Pharmacy: [facility] DR SATHISH CASAREZ [address] [place] [address] Drive, [address] [facility] [address]  #: [phone] Route: Oral  
  
We Performed the Following REFERRAL TO SURGERY [CRB047 Custom] To-Do List   
 03/20/2018 Imaging:  XR HIP LT W OR WO PELV 2-3 VWS   
  
 03/20/2018 Imaging:  XR SPINE LUMB 2 OR 3 V Referral Information Referral ID Referred By Referred To  
  
 [id number] Jaimie Willams MD   
   [address] [facility] [address] Phone: [phone] Fax: [phone] Visits Status Start Date End Date 1 New Request 3/20/18 3/20/19 If your referral has a status of pending review or denied, additional information will be sent to support the outcome of this decision. Introducing [facility]! Dear Melissa Franklin: 
Thank you for requesting a Tapit account. Our records indicate that you already have an active Tapit account. You can access your account anytime at [email or web address] Did you know that you can access your hospital and ER discharge instructions at any time in Tapit? You can also review all of your test results from your hospital stay or ER visit. Additional Information If you have questions, please visit the Frequently Asked Questions section of the Tapit website at [email or web address] Remember, Tapit is NOT to be used for urgent needs. For medical emergencies, dial 911. Now available from your iPhone and Android! Please provide this summary of care documentation to your next provider. Your primary care clinician is listed as Claude 68. If you have any questions after today's visit, please call [phone].

## 2018-03-20 NOTE — PROGRESS NOTES
HISTORY OF PRESENT ILLNESS  Tavia Novoa is a 75 y.o. male. HPI  Several concerns:  1. Ongoing intermittent sharp pain in left knee. Has been present for months. No recent injuries. Non radiating. 2. Left hip discomfort, bothers him when he first steps, lasts for about 15 seconds. Wonders if it's coming from his known left groin hernia. It hurts at times when he sits, he feels there is something to adjust and then feels better. He uses Advil with some relief, not long lasting. He denies back pain, weakness or numbness in foot. His wife wonders if he might be having sciatic issues. Review of Systems   Constitutional: Negative for chills, fever and malaise/fatigue. Gastrointestinal: Negative for abdominal pain, constipation, diarrhea, nausea and vomiting. Musculoskeletal: Positive for joint pain. Negative for back pain and falls. Neurological: Negative for sensory change and focal weakness. Physical Exam   Constitutional: He is oriented to person, place, and time. He appears well-developed and well-nourished. HENT:   Head: Normocephalic and atraumatic. Neck: Normal range of motion. Neck supple. Carotid bruit is not present. No thyromegaly present. Cardiovascular: Normal rate, regular rhythm, normal heart sounds and intact distal pulses. Pulmonary/Chest: Effort normal and breath sounds normal. No respiratory distress. He has no wheezes. He has no rales. Abdominal: Soft. Bowel sounds are normal.   Genitourinary:   Genitourinary Comments: Left inguinal bulge non tender   Musculoskeletal: Normal range of motion. He exhibits no edema. No pain in left hip or knee and good rom and slr is neg  No pain low back or vertebral point pain   Neurological: He is alert and oriented to person, place, and time. Skin: No rash noted. Psychiatric: He has a normal mood and affect. His behavior is normal.   Nursing note and vitals reviewed.       ASSESSMENT and PLAN  Diagnoses and all orders for this visit:    1. Chronic pain of left knee-suspect from djd knee and has seen dr Kathleen Sanchez xr hip and back to be sure not radiating  -     meloxicam (MOBIC) 15 mg tablet; Take 1 Tab by mouth daily. -     XR HIP LT W OR WO PELV 2-3 VWS; Future  -     XR SPINE LUMB 2 OR 3 V; Future    2.  Left groin hernia-he wonders if this is adding to his hip sxs-I am not sure and will refer to surgery for opinion  appt here in 1mo on mobic  Side effects possible reviewed in detail    -     REFERRAL TO SURGERY

## 2018-03-22 ENCOUNTER — OFFICE VISIT (OUTPATIENT)
Dept: SURGERY | Age: 68
End: 2018-03-22

## 2018-03-22 VITALS
SYSTOLIC BLOOD PRESSURE: 137 MMHG | HEIGHT: 73 IN | RESPIRATION RATE: 14 BRPM | HEART RATE: 56 BPM | WEIGHT: 246 LBS | TEMPERATURE: 97.9 F | BODY MASS INDEX: 32.6 KG/M2 | DIASTOLIC BLOOD PRESSURE: 78 MMHG | OXYGEN SATURATION: 98 %

## 2018-03-22 DIAGNOSIS — K40.91 RECURRENT LEFT INGUINAL HERNIA: Primary | ICD-10-CM

## 2018-03-22 NOTE — PROGRESS NOTES
1. Have you been to the ER, urgent care clinic since your last visit? Hospitalized since your last visit?no    2. Have you seen or consulted any other health care providers outside of the [address] [place] [address] since your last visit? Include any pap smears or colon screening.  no

## 2018-03-22 NOTE — PROGRESS NOTES
Surgery Consult    Subjective: Alfred Gonzalez is a 75 y.o.  male who was referred for evaluation of  Recurrent left inguinal hernia. Symptoms were first noted a few weeks ago. He complains of pain in his left hip and groin. pain is a dull ache and rated as moderate. Extending his lege makes it better. Lump is reducible. Patient does not have symptoms of chronic constipation, chronic cough, difficulty urinating. Patient has a history of previous left inguinal hernia surgery twice in the past..    Patient Active Problem List    Diagnosis Date Noted    Seasonal allergic rhinitis due to pollen 05/19/2017    Chest tightness 05/18/2017    Mitral valve prolapse 02/16/2017    Colon polyps 01/19/2017    Essential hypertension 01/19/2017     Past Medical History:   Diagnosis Date    Essential hypertension      Past Surgical History:   Procedure Laterality Date    COLONOSCOPY N/A 6/2/2017    COLONOSCOPY performed by Suresh Swan MD at [address]  6/2/2017         EGD  6/2/2017         HX HERNIA REPAIR      times 2. 1980's inguinal bilateral    HX ORTHOPAEDIC      right shoulder surgery in the 76s    HX OTHER SURGICAL      Repair of the right shoulder. 1977    HX TONSILLECTOMY      childhood    UPPER GI ENDOSCOPY,DILATN W GUIDE  7/14/2017           Family History   Problem Relation Age of Onset    Sudden Death Mother      Car accident    Heart Attack Father 48    Thyroid Disease Sister       Social History   Substance Use Topics    Smoking status: Former Smoker     Packs/day: 1.00     Years: 30.00     Quit date: 2/1/1997    Smokeless tobacco: Never Used    Alcohol use Yes      Comment: occassionally 2 beers/week      Current Outpatient Prescriptions   Medication Sig    meloxicam (MOBIC) 15 mg tablet Take 1 Tab by mouth daily.  metoprolol succinate (TOPROL-XL) 25 mg XL tablet Take 0.5 Tabs by mouth daily.     losartan-hydroCHLOROthiazide (HYZAAR) 50-12.5 mg per tablet Take 1 Tab by mouth daily. Indications: hypertension    simvastatin (ZOCOR) 10 mg tablet Take 1 Tab by mouth nightly.  flecainide (TAMBOCOR) 50 mg tablet Take 1 Tab by mouth two (2) times a day.  DESLORATADINE (CLARINEX PO) Take  by mouth.  Omeprazole delayed release (PRILOSEC D/R) 20 mg tablet Take 20 mg by mouth daily.  multivitamin (ONE A DAY) tablet Take 1 Tab by mouth daily.  VITAMIN B COMPLEX (B COMPLEX PO) Take  by mouth daily.  DOCOSAHEXANOIC ACID/EPA (FISH OIL PO) Take  by mouth two (2) times a day.  GLUCOSAMINE SULFATE (GLUCOSAMINE PO) Take  by mouth two (2) times a day.  CHOLECALCIFEROL, VITAMIN D3, (VITAMIN D3 PO) Take  by mouth daily.  aspirin delayed-release 81 mg tablet Take  by mouth daily. No current facility-administered medications for this visit. No Known Allergies     Review of Systems:  A comprehensive review of systems was negative except for that written in the History of Present Illness. Objective:   Blood pressure 137/78, pulse (!) 56, temperature 97.9 °F (36.6 °C), temperature source Oral, resp. rate 14, height 6' 1\" (1.854 m), weight 246 lb (111.6 kg), SpO2 98 %. Physical Exam:  General:  Alert, cooperative, no distress, appears stated age. Eyes:  Conjunctivae/corneas clear. , EOMs intact. Lungs:   Clear to auscultation bilaterally. Heart:  Regular rate and rhythm, S1, S2 normal, no murmur, click, rub or gallop. Abdomen:   Soft, non-tender. Bowel sounds normal. No masses,  No organomegaly. : Well healed left inguinal scar. Reducible LIH   Neurologic: AOX3. Normal strength, sensation  throughout. Assessment:     Second time recurrent left inguinal hernia. Recommend repair. Discussed robotic assisted, laparoscopic repair. Also discussed that hernia repair may not resolve all his symptoms      Plan:     1.  Discussed possibility of incarceration, strangulation, enlargement in size over time, and the risk of emergency surgery in the face of strangulation. Also discussed the risk of surgery including recurrence which can be up to 50% in the case of incisional or complex hernias, use of prosthetic materials (mesh) and the increased risk of infection, postoperative infection and the possible need for reoperation and removal of mesh if used, possibility of postoperative small bowel obstruction or ileus, and the risks of general anesthetic. The patient understands the risks; any and all questions were answered to the patient's satisfaction. 2. Patient has elected to proceed with surgical treatment. Procedure will be scheduled. Signed By: Leah Aguirre MD     March 26, 2018                     Surgery : robotic assisted, laparoscopic repair of recurrent left inguinal hernia, possible right, possible open     Length:  1.5 hours    Diagnosis :     ICD-10-CM ICD-9-CM   1.  Recurrent left inguinal hernia K40.91 550.91       Anesthesia : general anesthesia    Surgery Type: Outpatient    Position:  supine    Hospital : [facility]    Blood thinner NO

## 2018-03-27 ENCOUNTER — HOSPITAL ENCOUNTER (OUTPATIENT)
Dept: CT IMAGING | Age: 68
Discharge: HOME | End: 2018-03-27
Attending: ORTHOPAEDIC SURGERY
Payer: MEDICARE

## 2018-03-27 DIAGNOSIS — M25.562 LEFT KNEE PAIN: ICD-10-CM

## 2018-03-27 PROCEDURE — 72192 CT PELVIS W/O DYE: CPT

## 2018-03-27 PROCEDURE — 73700 CT LOWER EXTREMITY W/O DYE: CPT

## 2018-04-09 DIAGNOSIS — M25.562 CHRONIC PAIN OF LEFT KNEE: ICD-10-CM

## 2018-04-09 DIAGNOSIS — G89.29 CHRONIC PAIN OF LEFT KNEE: ICD-10-CM

## 2018-04-09 RX ORDER — MELOXICAM 15 MG/1
15 TABLET ORAL DAILY
Qty: 30 TAB | Refills: 0 | Status: ON HOLD | OUTPATIENT
Start: 2018-04-09 | End: 2018-09-26

## 2018-04-12 ENCOUNTER — OFFICE VISIT (OUTPATIENT)
Dept: CARDIOLOGY CLINIC | Age: 68
End: 2018-04-12

## 2018-04-12 VITALS
HEIGHT: 73 IN | SYSTOLIC BLOOD PRESSURE: 120 MMHG | BODY MASS INDEX: 33.13 KG/M2 | WEIGHT: 250 LBS | DIASTOLIC BLOOD PRESSURE: 80 MMHG | HEART RATE: 60 BPM

## 2018-04-12 DIAGNOSIS — I34.1 MITRAL VALVE PROLAPSE: Primary | ICD-10-CM

## 2018-04-12 DIAGNOSIS — I10 ESSENTIAL HYPERTENSION: ICD-10-CM

## 2018-04-12 NOTE — PROGRESS NOTES
Visit Vitals    /80    Pulse 60    Ht 6' 1\" (1.854 m)    Wt 250 lb (113.4 kg)    BMI 32.98 kg/m2

## 2018-04-12 NOTE — MR AVS SNAPSHOT
[address] [facility] 600 [address] [facility] 
[phone] Patient: Reynaldo Lisa MRN: [id number] BCB:[birth year] Visit Information Date & Time Provider Department Dept. Phone Encounter #  
 4/12/2018  9:40 AM Margaret Peres MD [facility] Rachna Prather [phone] [id number] Your Appointments 4/24/2018  9:15 AM  
ROUTINE CARE with Jose Sanchez MD  
[facility]) Appt Note: 1 mo f/u  
 [place] [address]  
  
   
 [address] [place] [address]  
  
    
 10/9/2018  1:40 PM  
ESTABLISHED PATIENT with Margaret Peres MD  
[facility] ([facility]) Appt Note: 6 mo fup  
 [address] [facility] [address] [place] [address]  
[phone]  
  
   
 [address] [address]  
  
    
 10/17/2018  9:00 AM  
ESTABLISHED PATIENT with Sarthak Pablo MD  
[facility] [place] ([facility]) Appt Note: annual; 2/20/18 lm for pt of appt day and time change from 10/19/18  sll 320 [facility] 600 [address] [facility]  
[address] [facility] [address] [address] Upcoming Health Maintenance Date Due Hepatitis C Screening 1950 DTaP/Tdap/Td series (1 - Tdap) 6/25/1970 FOBT Q 1 YEAR AGE 50-75 6/25/1999 ZOSTER VACCINE AGE 60> 4/25/2009 GLAUCOMA SCREENING Q2Y 6/25/2014 Influenza Age 5 to Adult 8/1/2017 Pneumococcal 65+ Low/Medium Risk (2 of 2 - PCV13) 1/19/2018 MEDICARE YEARLY EXAM 3/14/2018 Allergies as of 4/12/2018  Review Complete On: 4/12/2018 By: Dutch Fernandez LPN No Known Allergies Current Immunizations  Reviewed on 1/19/2017 Name Date Pneumococcal Polysaccharide (PPSV-23) 1/19/2017 Not reviewed this visit You Were Diagnosed With   
  
 Codes Comments Mitral valve prolapse    -  Primary ICD-10-CM: I34.1 ICD-9-CM: 424.0 Essential hypertension     ICD-10-CM: I10 
ICD-9-CM: 401.9 Vitals BP Pulse Height(growth percentile) Weight(growth percentile) BMI Smoking Status 120/80 60 6' 1\" (1.854 m) 250 lb (113.4 kg) 32.98 kg/m2 Former Smoker Vitals History BMI and BSA Data Body Mass Index Body Surface Area 32.98 kg/m 2 2.42 m 2 Preferred Pharmacy Pharmacy Name Phone [address], [address] [phone] Your Updated Medication List  
  
   
This list is accurate as of 4/12/18 10:24 AM.  Always use your most recent med list.  
  
  
  
  
 aspirin delayed-release 81 mg tablet Take  by mouth daily. B COMPLEX PO Take  by mouth daily. CLARINEX PO Take  by mouth. FISH OIL PO Take  by mouth two (2) times a day. flecainide 50 mg tablet Commonly known as:  TAMBOCOR Take 1 Tab by mouth two (2) times a day. GLUCOSAMINE PO Take  by mouth two (2) times a day. losartan-hydroCHLOROthiazide 50-12.5 mg per tablet Commonly known as:  HYZAAR Take 1 Tab by mouth daily. Indications: hypertension  
  
 meloxicam 15 mg tablet Commonly known as:  MOBIC Take 1 Tab by mouth daily. metoprolol succinate 25 mg XL tablet Commonly known as:  TOPROL-XL Take 0.5 Tabs by mouth daily. multivitamin tablet Commonly known as:  ONE A DAY Take 1 Tab by mouth daily. Omeprazole delayed release 20 mg tablet Commonly known as:  PRILOSEC D/R Take 20 mg by mouth daily. simvastatin 10 mg tablet Commonly known as:  ZOCOR Take 1 Tab by mouth nightly. VITAMIN D3 PO Take  by mouth daily. We Performed the Following AMB POC EKG ROUTINE W/ 12 LEADS, INTER & REP [88810 CPT(R)] HEPATIC FUNCTION PANEL [51245 CPT(R)] LIPID PANEL [69685 CPT(R)] Introducing [facility] [place] [facility]! Dear Pao Benson: 
Thank you for requesting a Pressy account. Our records indicate that you already have an active Pressy account. You can access your account anytime at [email or web address] Did you know that you can access your hospital and ER discharge instructions at any time in Pressy? You can also review all of your test results from your hospital stay or ER visit. Additional Information If you have questions, please visit the Frequently Asked Questions section of the Pressy website at [email or web address] Remember, Pressy is NOT to be used for urgent needs. For medical emergencies, dial 911. Now available from your iPhone and Android! Please provide this summary of care documentation to your next provider. Your primary care clinician is listed as Claude Liu. If you have any questions after today's visit, please call [phone].

## 2018-04-12 NOTE — PROGRESS NOTES
LAST OFFICE VISIT : 11/8/2017        ICD-10-CM ICD-9-CM   1. Mitral valve prolapse I34.1 424.0   2. Essential hypertension I10 401.9        Reynaldo Lisa is a 75 y.o. male with HTN and mitral valve prolapse referred for preoperative cardiac risk stratification.      Cardiac risk factors: family history (father with CAD), male gender, hypertension  I have personally obtained the history from the patient. HISTORY OF PRESENTING ILLNESS      Mr. Cheko Peres is scheduled for hip surgery in the near future. He denies any interval cardiac issues and denies any known palpitations. He has not had any blood work in [facility]. He notes he forgot to take his medication today. The patient denies chest pain/ shortness of breath, orthopnea, PND, LE edema, palpitations, syncope, presyncope or fatigue. ACTIVE PROBLEM LIST     Patient Active Problem List    Diagnosis Date Noted    Seasonal allergic rhinitis due to pollen 05/19/2017    Chest tightness 05/18/2017    Mitral valve prolapse 02/16/2017    Colon polyps 01/19/2017    Essential hypertension 01/19/2017           PAST MEDICAL HISTORY     Past Medical History:   Diagnosis Date    Essential hypertension            PAST SURGICAL HISTORY     Past Surgical History:   Procedure Laterality Date    COLONOSCOPY N/A 6/2/2017    COL[facility] at [facility]  6/2/2017         EGD  6/2/2017         HX HERNIA REPAIR      times 2. 1980's inguinal bilateral    HX ORTHOPAEDIC      right shoulder surgery in the 76s    HX OTHER SURGICAL      Repair of the right shoulder.  1977    HX TONSILLECTOMY      childhood    UPPER GI ENDOSCOPY,DILATN W GUIDE  7/14/2017               ALLERGIES     No Known Allergies       FAMILY HISTORY     Family History   Problem Relation Age of Onset    Sudden Death Mother      Car accident    Heart Attack Father 48    Thyroid Disease Sister     negative for cardiac disease SOCIAL HISTORY     Social History     Social History    Marital status:      Spouse name: N/A    Number of children: N/A    Years of education: N/A     Social History Main Topics    Smoking status: Former Smoker     Packs/day: 1.00     Years: 30.00     Quit date: 2/1/1997    Smokeless tobacco: Never Used    Alcohol use Yes      Comment: occassionally 2 beers/week    Drug use: No    Sexual activity: Not Asked     Other Topics Concern    None     Social History Narrative         MEDICATIONS     Current Outpatient Prescriptions   Medication Sig    meloxicam (MOBIC) 15 mg tablet Take 1 Tab by mouth daily.  metoprolol succinate (TOPROL-XL) 25 mg XL tablet Take 0.5 Tabs by mouth daily.  losartan-hydroCHLOROthiazide (HYZAAR) 50-12.5 mg per tablet Take 1 Tab by mouth daily. Indications: hypertension    simvastatin (ZOCOR) 10 mg tablet Take 1 Tab by mouth nightly.  flecainide (TAMBOCOR) 50 mg tablet Take 1 Tab by mouth two (2) times a day.  DESLORATADINE (CLARINEX PO) Take  by mouth.  Omeprazole delayed release (PRILOSEC D/R) 20 mg tablet Take 20 mg by mouth daily.  multivitamin (ONE A DAY) tablet Take 1 Tab by mouth daily.  VITAMIN B COMPLEX (B COMPLEX PO) Take  by mouth daily.  DOCOSAHEXANOIC ACID/EPA (FISH OIL PO) Take  by mouth two (2) times a day.  GLUCOSAMINE SULFATE (GLUCOSAMINE PO) Take  by mouth two (2) times a day.  CHOLECALCIFEROL, VITAMIN D3, (VITAMIN D3 PO) Take  by mouth daily.  aspirin delayed-release 81 mg tablet Take  by mouth daily. No current facility-administered medications for this visit. I have reviewed the nurses notes, vitals, problem list, allergy list, medical history, family, social history and medications. REVIEW OF SYMPTOMS      General: Pt denies excessive weight gain or loss. Pt is able to conduct ADL's  HEENT: Denies blurred vision, headaches, hearing loss, epistaxis and difficulty swallowing.   Respiratory: Denies cough, congestion, shortness of breath, OCONNOR, wheezing or stridor. Cardiovascular: Denies precordial pain, palpitations, edema or PND  Gastrointestinal: Denies poor appetite, indigestion, abdominal pain or blood in stool  Genitourinary: Denies hematuria, dysuria, increased urinary frequency  Musculoskeletal: Denies joint pain or swelling from muscles or joints  Neurologic: Denies tremor, paresthesias, headache, or sensory motor disturbance  Psychiatric: Denies confusion, insomnia, depression  Integumentray: Denies rash, itching or ulcers. Hematologic: Denies easy bruising, bleeding     PHYSICAL EXAMINATION      Vitals:    04/12/18 0946   BP: 120/80   Pulse: 60   Weight: 250 lb (113.4 kg)   Height: 6' 1\" (1.854 m)     General: Well developed, in no acute distress. HEENT: No jaundice, oral mucosa moist, no oral ulcers  Neck: Supple, no stiffness, no lymphadenopathy, supple  Heart: Regularly irregular  Respiratory: Clear bilaterally x 4, no wheezing or rales  Extremities:  No edema, normal cap refill, no cyanosis. Musculoskeletal: No clubbing, no deformities  Neuro: A&Ox3, speech clear, gait stable, cooperative, no focal neurologic deficits  Skin: Skin color is normal. No rashes or lesions. Non diaphoretic, moist.          EKG: NSR     DIAGNOSTIC DATA     1. Lipids  1/9/17- , HDL 43, , TG 91    2. Holter  (1/19/17): sinus rhtyhm with PVCs there were frequent PVCs, rare couplets, triplets and bigeminy with rare episodes of non-sustained VT with rates up to 134.  9/28/17- SB with PVCs, HR 43-91, PVC burden 30.48%  11/8/17- SR HR , PVC burden 5 %  3. Echo  4/19/16- EF 30-44%, MR mild/mod, AI mild, LAE  9/28/17- EF 50-55%, PI mild/mod    4.  Stress Test  Lexiscan- 6/15/17-no ischemia, EF 39%           LABORATORY DATA            Lab Results   Component Value Date/Time    WBC 5.8 01/19/2017 12:06 PM    HGB 14.2 01/19/2017 12:06 PM    HCT 42.0 01/19/2017 12:06 PM    PLATELET 636 73/32/1549 12:06 PM    MCV 89 01/19/2017 12:06 PM      Lab Results   Component Value Date/Time    Sodium 140 01/19/2017 12:06 PM    Potassium 4.4 01/19/2017 12:06 PM    Chloride 99 01/19/2017 12:06 PM    CO2 26 01/19/2017 12:06 PM    Glucose 85 01/19/2017 12:06 PM    BUN 20 01/19/2017 12:06 PM    Creatinine 0.99 01/19/2017 12:06 PM    BUN/Creatinine ratio 20 01/19/2017 12:06 PM    GFR est AA 91 01/19/2017 12:06 PM    GFR est non-AA 78 01/19/2017 12:06 PM    Calcium 9.5 01/19/2017 12:06 PM    Bilirubin, total 0.6 01/19/2017 12:06 PM    AST (SGOT) 25 01/19/2017 12:06 PM    Alk. phosphatase 71 01/19/2017 12:06 PM    Protein, total 6.6 01/19/2017 12:06 PM    Albumin 4.4 01/19/2017 12:06 PM    A-G Ratio 2.0 01/19/2017 12:06 PM    ALT (SGPT) 33 01/19/2017 12:06 PM           ASSESSMENT/RECOMMENDATIONS:.      1. Preoperative cardiac risk stratification  - he is low operative risk. Cle[place]ne Milder keeping him on all current medicines including ASA as long as okay with surgery. No further cardiac testing needed. 2. Cardiomyopathy  - based on echo there was no evidence of reduced EF and cardiac MRI indicated EF to be 50-55%.      3. PVCs  -Will recheck monitor to see if Tambocor has helped in reducing his ventricular ectopy    4. HTN  -BP under good control today on current medical regimen. No adjustments to antihypertensives.      5. Mitral valve prolapse     6. Dyslipidemia  -he has not rechecked his cholesterol. Last LDL was elevated. Will check and if LDL is not at goal, will increase Zocor to 20 mg     7. Possible esophageal stricture   -Recommend that he pursue GI evaluation for further treatment and diagnosis.       8. Return in 6 months or PRN. Orders Placed This Encounter    AMB POC EKG ROUTINE W/ 12 LEADS, INTER & REP     Order Specific Question:   Reason for Exam:     Answer:   preop        Follow-up Disposition: Not on File      I have discussed the diagnosis with  Lalitha Vo and the intended plan as seen in the above orders.   Questions were answered concerning future plans. I have discussed medication side effects and warnings with the patient as well. Thank you,  Michelle Murray MD for involving me in the care of  Luke Ahumada. Please do not hesitate to contact me for further questions/concerns. Written by Lexie Lui, dictated by Nikhil Lund MD.    Bruce Nina MD, [address], [address] [place] [address], [facility]. Tony Guzman.      [phone] / [phone] Fax

## 2018-04-24 ENCOUNTER — OFFICE VISIT (OUTPATIENT)
Dept: INTERNAL MEDICINE CLINIC | Age: 68
End: 2018-04-24

## 2018-04-24 VITALS
SYSTOLIC BLOOD PRESSURE: 128 MMHG | HEIGHT: 73 IN | OXYGEN SATURATION: 98 % | HEART RATE: 64 BPM | DIASTOLIC BLOOD PRESSURE: 78 MMHG | RESPIRATION RATE: 16 BRPM | TEMPERATURE: 98 F | BODY MASS INDEX: 32.2 KG/M2 | WEIGHT: 243 LBS

## 2018-04-24 DIAGNOSIS — I10 ESSENTIAL HYPERTENSION: ICD-10-CM

## 2018-04-24 DIAGNOSIS — M16.12 PRIMARY OSTEOARTHRITIS OF LEFT HIP: Primary | ICD-10-CM

## 2018-04-24 NOTE — PROGRESS NOTES
HISTORY OF PRESENT ILLNESS  Rambo Oliveira is a 75 y.o. male. HPI  Seen to discuss upcoming left hip surgery. Has has seen Dr. Arthur Rogers and has hip replacement scheduled for May. He is using Mobic, which does seem to help. He is unable to cross legs. He notes he can play golf. He asks about driving to [address] [place] [address] ten days postop and I've encouraged against this because concern about risk of clot. He reports an episode of feeling lightheaded and blood pressure was 90/60 at home. He's on half of a Toprol and I suggested that if he continues to have hypotension he could stop the Toprol. He has recently seen cardiology and has cardiac clearance for upcoming surgery. Review of Systems   Constitutional: Negative for chills, fever and weight loss. Respiratory: Negative for cough, shortness of breath and wheezing. Cardiovascular: Negative for chest pain, palpitations, orthopnea, leg swelling and PND. Gastrointestinal: Negative for abdominal pain, heartburn, nausea and vomiting. Musculoskeletal: Positive for joint pain. Negative for myalgias. Neurological: Positive for dizziness. Negative for headaches. Physical Exam   Constitutional: He is oriented to person, place, and time. He appears well-developed and well-nourished. HENT:   Head: Normocephalic and atraumatic. Neck: Normal range of motion. Neck supple. Carotid bruit is not present. No thyromegaly present. Cardiovascular: Normal rate, regular rhythm, normal heart sounds and intact distal pulses. Pulmonary/Chest: Effort normal and breath sounds normal. No respiratory distress. He has no wheezes. He has no rales. Musculoskeletal: He exhibits no edema. Neurological: He is alert and oriented to person, place, and time. Psychiatric: He has a normal mood and affect. His behavior is normal.   Nursing note and vitals reviewed. ASSESSMENT and PLAN  Diagnoses and all orders for this visit:    1.  Primary osteoarthritis of left hip-discussed plans for thr and I would advise vs travel 10 days post op  Cont mobic for now    2.  Essential hypertension-bp good here but if cont to have low bp at home could hold the toprol

## 2018-04-26 RX ORDER — SIMVASTATIN 10 MG/1
10 TABLET, FILM COATED ORAL
Qty: 90 TAB | Refills: 3 | Status: SHIPPED | OUTPATIENT
Start: 2018-04-26 | End: 2018-05-08 | Stop reason: SDUPTHER

## 2018-04-26 RX ORDER — METOPROLOL SUCCINATE 25 MG/1
12.5 TABLET, EXTENDED RELEASE ORAL DAILY
Qty: 45 TAB | Refills: 3 | Status: SHIPPED | OUTPATIENT
Start: 2018-04-26 | End: 2018-05-08 | Stop reason: SDUPTHER

## 2018-04-30 ENCOUNTER — HOSPITAL ENCOUNTER (OUTPATIENT)
Dept: CT IMAGING | Age: 68
Discharge: HOME | End: 2018-04-30
Attending: ORTHOPAEDIC SURGERY
Payer: MEDICARE

## 2018-04-30 DIAGNOSIS — M25.552 LEFT HIP PAIN: ICD-10-CM

## 2018-04-30 PROCEDURE — 73700 CT LOWER EXTREMITY W/O DYE: CPT

## 2018-04-30 NOTE — PERIOP NOTES
Patient called in to [facility] to review meds prior to PAT appt on Wednesday, 5/2/18. Instructed to stop OTC vitamins and herbal meds, and Mobic. To call cardiologist regarding ASA 81 mg. To continue all other meds and will review at [facility] appt.

## 2018-05-02 ENCOUNTER — HOSPITAL ENCOUNTER (OUTPATIENT)
Dept: PREADMISSION TESTING | Age: 68
Discharge: HOME | End: 2018-05-02
Payer: MEDICARE

## 2018-05-02 VITALS
OXYGEN SATURATION: 94 % | RESPIRATION RATE: 20 BRPM | SYSTOLIC BLOOD PRESSURE: 129 MMHG | HEIGHT: 73 IN | HEART RATE: 66 BPM | WEIGHT: 246.69 LBS | BODY MASS INDEX: 32.7 KG/M2 | TEMPERATURE: 97.6 F | DIASTOLIC BLOOD PRESSURE: 79 MMHG

## 2018-05-02 LAB
ABO + RH BLD: NORMAL
ALBUMIN SERPL-MCNC: 4 G/DL (ref 3.5–5)
ALBUMIN/GLOB SERPL: 1.3 {RATIO} (ref 1.1–2.2)
ALP SERPL-CCNC: 74 U/L (ref 45–117)
ALT SERPL-CCNC: 51 U/L (ref 12–78)
ANION GAP SERPL CALC-SCNC: 11 MMOL/L (ref 5–15)
APPEARANCE UR: CLEAR
AST SERPL-CCNC: 21 U/L (ref 15–37)
BACTERIA URNS QL MICRO: NEGATIVE /HPF
BASOPHILS # BLD: 0 K/UL (ref 0–0.1)
BASOPHILS NFR BLD: 1 % (ref 0–1)
BILIRUB SERPL-MCNC: 0.4 MG/DL (ref 0.2–1)
BILIRUB UR QL: NEGATIVE
BLOOD GROUP ANTIBODIES SERPL: NORMAL
BUN SERPL-MCNC: 25 MG/DL (ref 6–20)
BUN/CREAT SERPL: 20 (ref 12–20)
CALCIUM SERPL-MCNC: 9 MG/DL (ref 8.5–10.1)
CHLORIDE SERPL-SCNC: 105 MMOL/L (ref 97–108)
CO2 SERPL-SCNC: 26 MMOL/L (ref 21–32)
COLOR UR: ABNORMAL
CREAT SERPL-MCNC: 1.28 MG/DL (ref 0.7–1.3)
CRP SERPL-MCNC: <0.29 MG/DL
DIFFERENTIAL METHOD BLD: ABNORMAL
EOSINOPHIL # BLD: 0.4 K/UL (ref 0–0.4)
EOSINOPHIL NFR BLD: 6 % (ref 0–7)
EPITH CASTS URNS QL MICRO: ABNORMAL /LPF
ERYTHROCYTE [DISTWIDTH] IN BLOOD BY AUTOMATED COUNT: 12 % (ref 11.5–14.5)
ERYTHROCYTE [SEDIMENTATION RATE] IN BLOOD: 9 MM/HR (ref 0–20)
EST. AVERAGE GLUCOSE BLD GHB EST-MCNC: 108 MG/DL
GLOBULIN SER CALC-MCNC: 3.1 G/DL (ref 2–4)
GLUCOSE SERPL-MCNC: 106 MG/DL (ref 65–100)
GLUCOSE UR STRIP.AUTO-MCNC: NEGATIVE MG/DL
HBA1C MFR BLD: 5.4 % (ref 4.2–6.3)
HCT VFR BLD AUTO: 42 % (ref 36.6–50.3)
HGB BLD-MCNC: 14.1 G/DL (ref 12.1–17)
HGB UR QL STRIP: ABNORMAL
IMM GRANULOCYTES # BLD: 0 K/UL (ref 0–0.04)
IMM GRANULOCYTES NFR BLD AUTO: 1 % (ref 0–0.5)
KETONES UR QL STRIP.AUTO: NEGATIVE MG/DL
LEUKOCYTE ESTERASE UR QL STRIP.AUTO: NEGATIVE
LYMPHOCYTES # BLD: 1.4 K/UL (ref 0.8–3.5)
LYMPHOCYTES NFR BLD: 23 % (ref 12–49)
MCH RBC QN AUTO: 30.5 PG (ref 26–34)
MCHC RBC AUTO-ENTMCNC: 33.6 G/DL (ref 30–36.5)
MCV RBC AUTO: 90.7 FL (ref 80–99)
MONOCYTES # BLD: 0.6 K/UL (ref 0–1)
MONOCYTES NFR BLD: 10 % (ref 5–13)
NEUTS SEG # BLD: 3.6 K/UL (ref 1.8–8)
NEUTS SEG NFR BLD: 60 % (ref 32–75)
NITRITE UR QL STRIP.AUTO: NEGATIVE
NRBC # BLD: 0 K/UL (ref 0–0.01)
NRBC BLD-RTO: 0 PER 100 WBC
PH UR STRIP: 5.5 [PH] (ref 5–8)
PLATELET # BLD AUTO: 209 K/UL (ref 150–400)
PMV BLD AUTO: 9.3 FL (ref 8.9–12.9)
POTASSIUM SERPL-SCNC: 4.5 MMOL/L (ref 3.5–5.1)
PROT SERPL-MCNC: 7.1 G/DL (ref 6.4–8.2)
PROT UR STRIP-MCNC: NEGATIVE MG/DL
RBC # BLD AUTO: 4.63 M/UL (ref 4.1–5.7)
RBC #/AREA URNS HPF: ABNORMAL /HPF (ref 0–5)
SODIUM SERPL-SCNC: 142 MMOL/L (ref 136–145)
SP GR UR REFRACTOMETRY: 1.01 (ref 1–1.03)
SPECIMEN EXP DATE BLD: NORMAL
UA: UC IF INDICATED,UAUC: ABNORMAL
UROBILINOGEN UR QL STRIP.AUTO: 0.2 EU/DL (ref 0.2–1)
WBC # BLD AUTO: 6 K/UL (ref 4.1–11.1)
WBC URNS QL MICRO: ABNORMAL /HPF (ref 0–4)

## 2018-05-02 PROCEDURE — 84466 ASSAY OF TRANSFERRIN: CPT | Performed by: ORTHOPAEDIC SURGERY

## 2018-05-02 PROCEDURE — 86140 C-REACTIVE PROTEIN: CPT | Performed by: ORTHOPAEDIC SURGERY

## 2018-05-02 PROCEDURE — 86900 BLOOD TYPING SEROLOGIC ABO: CPT | Performed by: ORTHOPAEDIC SURGERY

## 2018-05-02 PROCEDURE — 85652 RBC SED RATE AUTOMATED: CPT | Performed by: ORTHOPAEDIC SURGERY

## 2018-05-02 PROCEDURE — 81001 URINALYSIS AUTO W/SCOPE: CPT | Performed by: ORTHOPAEDIC SURGERY

## 2018-05-02 PROCEDURE — 36415 COLL VENOUS BLD VENIPUNCTURE: CPT | Performed by: ORTHOPAEDIC SURGERY

## 2018-05-02 PROCEDURE — 83036 HEMOGLOBIN GLYCOSYLATED A1C: CPT | Performed by: ORTHOPAEDIC SURGERY

## 2018-05-02 PROCEDURE — 80053 COMPREHEN METABOLIC PANEL: CPT | Performed by: ORTHOPAEDIC SURGERY

## 2018-05-02 PROCEDURE — 85025 COMPLETE CBC W/AUTO DIFF WBC: CPT | Performed by: ORTHOPAEDIC SURGERY

## 2018-05-02 NOTE — H&P
PAT Pre-Op History & Physical    Patient: Ervin Mnedez                  MRN: [id number]          SSN: [id number]  YOB: 1950          Age: 75 y.o. Sex: male                Subjective:   Patient is a 75 y.o.  male who presents with history of chronic left hip pain that he states began 4-5 months ago. Rates his pain as high as 8/10 at times. Describes his left hip pain as an intermittent aching pain that can be sharp at times. Getting in/out of a car or a chair exacerbates his hip pain. States he cannot sleep lying on his left side. Has failed NSAIDs, Tylenol, and application of heat. The patient was evaluated in the surgeon's office and it was determined that the most appropriate plan of care is to proceed with surgical intervention. Patient's PCP Kain Rockwell MD            Past Medical History:   Diagnosis Date    Arthritis     Dyslipidemia     Essential hypertension     Frequent PVCs     GERD (gastroesophageal reflux disease)     H/O seasonal allergies     Ill-defined condition 05/02/2018    Obesity  BMI= 32.6    Mitral valve prolapse     pt states Dr. Juan French is not sure if this is the correct diagnosis      Past Surgical History:   Procedure Laterality Date    COLONOSCOPY N/A 6/2/2017    [facility]  6/2/2017         EGD  6/2/2017         HX HERNIA REPAIR      times 2. 1980's inguinal bilateral    HX OTHER SURGICAL  1977    Repair of the right shoulder.  HX TONSILLECTOMY      childhood    UPPER GI ENDOSCOPY,PABLO W GUIDE  7/14/2017           Prior to Admission medications    Medication Sig Start Date End Date Taking? Authorizing Provider   simvastatin (ZOCOR) 10 mg tablet Take 1 Tab by mouth nightly. 4/26/18  Yes Hortencia Gordon MD   metoprolol succinate (TOPROL-XL) 25 mg XL tablet Take 0.5 Tabs by mouth daily.   Patient taking differently: Take 12.5 mg by mouth nightly. 4/26/18  Yes Gumaro Benz MD   meloxicam (MOBIC) 15 mg tablet Take 1 Tab by mouth daily. 4/9/18  Yes Latosha Gonzalez MD   losartan-hydroCHLOROthiazide [facility]) 50-12.5 mg per tablet Take 1 Tab by mouth daily. Indications: hypertension 2/6/18  Yes Gumaro Benz MD   flecainide (TAMBOCOR) 50 mg tablet Take 1 Tab by mouth two (2) times a day. 10/9/17  Yes Rc Humphrey MD   DESLORATADINE (CLARINEX PO) Take  by mouth. Yes Historical Provider   Omeprazole delayed release (PRILOSEC D/R) 20 mg tablet Take 20 mg by mouth every morning. Yes Historical Provider   multivitamin (ONE A DAY) tablet Take 1 Tab by mouth daily. Yes Historical Provider   VITAMIN B COMPLEX (B COMPLEX PO) Take  by mouth daily. Yes Historical Provider   DOCOSAHEXANOIC ACID/EPA (FISH OIL PO) Take 1,200 mg by mouth two (2) times a day. Yes Historical Provider   GLUCOSAMINE SULFATE (GLUCOSAMINE PO) Take 1,500 mg by mouth two (2) times a day. Yes Historical Provider   CHOLECALCIFEROL, VITAMIN D3, (VITAMIN D3 PO) Take 1 Tab by mouth daily. Yes Historical Provider   aspirin delayed-release 81 mg tablet Take  by mouth daily. Yes Historical Provider     Current Outpatient Prescriptions   Medication Sig    simvastatin (ZOCOR) 10 mg tablet Take 1 Tab by mouth nightly.  metoprolol succinate (TOPROL-XL) 25 mg XL tablet Take 0.5 Tabs by mouth daily. (Patient taking differently: Take 12.5 mg by mouth nightly.)    meloxicam (MOBIC) 15 mg tablet Take 1 Tab by mouth daily.  losartan-hydroCHLOROthiazide (HYZAAR) 50-12.5 mg per tablet Take 1 Tab by mouth daily. Indications: hypertension    flecainide (TAMBOCOR) 50 mg tablet Take 1 Tab by mouth two (2) times a day.  DESLORATADINE (CLARINEX PO) Take  by mouth.  Omeprazole delayed release (PRILOSEC D/R) 20 mg tablet Take 20 mg by mouth every morning.  multivitamin (ONE A DAY) tablet Take 1 Tab by mouth daily.  VITAMIN B COMPLEX (B COMPLEX PO) Take  by mouth daily.  DOCOSAHEXANOIC ACID/EPA (FISH OIL PO) Take 1,200 mg by mouth two (2) times a day.  GLUCOSAMINE SULFATE (GLUCOSAMINE PO) Take 1,500 mg by mouth two (2) times a day.  CHOLECALCIFEROL, VITAMIN D3, (VITAMIN D3 PO) Take 1 Tab by mouth daily.  aspirin delayed-release 81 mg tablet Take  by mouth daily. No current facility-administered medications for this encounter. No Known Allergies   Social History   Substance Use Topics    Smoking status: Former Smoker     Packs/day: 1.00     Years: 30.00     Quit date: 1997    Smokeless tobacco: Never Used    Alcohol use 3.6 oz/week     3 Glasses of wine, 3 Cans of beer per week      History   Drug Use No     Family History   Problem Relation Age of Onset    Sudden Death Mother      Car accident    Heart Attack Father 48    Heart Disease Father     Thyroid Disease Sister     No Known Problems Brother     No Known Problems Sister     No Known Problems Sister     No Known Problems Brother     No Known Problems Brother          Review of Systems    Patient denies difficulty swallowing, mouth sores, or loose teeth. Patient denies any recent dental procedures or any planned prior to surgery. Patient denies chest pain, tightness, pain radiating down left arm, palpitations. Denies dizziness, visual disturbances, or lightheadedness. Patient denies shortness of breath, wheezing, cough, fever, or chills. Patient denies diarrhea, constipation, or abdominal pain. Patient denies urinary problems including dysuria, hesitancy, urgency, or incontinence. Denies skin breakdown, rashes, insect bites or open area. C/o left hip pain. Objective:   Patient Vitals for the past 24 hrs:   Temp Pulse Resp BP SpO2   18 0828 97.6 °F (36.4 °C) 66 20 129/79 94 %     Temp (24hrs), Av.6 °F (36.4 °C), Min:97.6 °F (36.4 °C), Max:97.6 °F (36.4 °C)    Body mass index is 32.55 kg/(m^2).   Wt Readings from Last 1 Encounters:   18 111.9 kg (246 lb 11.1 oz) Physical Exam:     General: Pleasant,  cooperative, no apparent distress, appears stated age. Eyes: Conjunctivae/corneas clear. EOMs intact. Nose: Nares normal.   Mouth/Throat: Lips, mucosa, and tongue normal. Teeth and gums normal.   Neck: Supple, symmetrical, trachea midline. Back: Symmetric   Lungs: Clear to auscultation bilaterally. Heart: Regular rate and rhythm, S1, S2 normal. No murmur, click, rub or gallop. Abdomen: Soft, non-tender. Bowel sounds normal. No distention. Musculoskeletal:  Gait is antalgic. Extremities:  Extremities normal, atraumatic, no cyanosis or edema. Calves                                 supple, non tender to palpation. Pulses: 2+ and symmetric bilateral upper extremities. Cap. refill <2 seconds   Skin: Skin color, texture, turgor normal.  No rashes or lesions. Neurologic: CN II-XII grossly intact. Alert and oriented x3. Labs:   Recent Results (from the past 72 hour(s))   HEPATIC FUNCTION PANEL    Collection Time: 05/02/18  8:45 AM   Result Value Ref Range    Protein, total 6.8 6.0 - 8.5 g/dL    Albumin 4.5 3.6 - 4.8 g/dL    Bilirubin, total 0.4 0.0 - 1.2 mg/dL    Bilirubin, direct 0.11 0.00 - 0.40 mg/dL    Alk.  phosphatase 70 39 - 117 IU/L    AST (SGOT) 20 0 - 40 IU/L    ALT (SGPT) 37 0 - 44 IU/L   LIPID PANEL    Collection Time: 05/02/18  8:45 AM   Result Value Ref Range    Cholesterol, total 144 100 - 199 mg/dL    Triglyceride 113 0 - 149 mg/dL    HDL Cholesterol 37 (L) >39 mg/dL    VLDL, calculated 23 5 - 40 mg/dL    LDL, calculated 84 0 - 99 mg/dL   CVD REPORT    Collection Time: 05/02/18  8:45 AM   Result Value Ref Range    INTERPRETATION Note    CULTURE, MRSA    Collection Time: 05/02/18  8:49 AM   Result Value Ref Range    Special Requests: NO SPECIAL REQUESTS      Culture result: MRSA NOT PRESENT      Culture result:            Screening of patient nares for MRSA is for surveillance purposes and, if positive, to facilitate isolation considerations in high risk settings. It is not intended for automatic decolonization interventions per se as regimens are not sufficiently effective to warrant routine use. TYPE & SCREEN    Collection Time: 05/02/18  9:27 AM   Result Value Ref Range    Crossmatch Expiration 05/10/2018     ABO/Rh(D) A POSITIVE     Antibody screen NEG    C REACTIVE PROTEIN, QT    Collection Time: 05/02/18  9:27 AM   Result Value Ref Range    C-Reactive protein <0.29 <0.60 mg/dL   CBC WITH AUTOMATED DIFF    Collection Time: 05/02/18  9:27 AM   Result Value Ref Range    WBC 6.0 4.1 - 11.1 K/uL    RBC 4.63 4.10 - 5.70 M/uL    HGB 14.1 12.1 - 17.0 g/dL    HCT 42.0 36.6 - 50.3 %    MCV 90.7 80.0 - 99.0 FL    MCH 30.5 26.0 - 34.0 PG    MCHC 33.6 30.0 - 36.5 g/dL    RDW 12.0 11.5 - 14.5 %    PLATELET 891 688 - 772 K/uL    MPV 9.3 8.9 - 12.9 FL    NRBC 0.0 0  WBC    ABSOLUTE NRBC 0.00 0.00 - 0.01 K/uL    NEUTROPHILS 60 32 - 75 %    LYMPHOCYTES 23 12 - 49 %    MONOCYTES 10 5 - 13 %    EOSINOPHILS 6 0 - 7 %    BASOPHILS 1 0 - 1 %    IMMATURE GRANULOCYTES 1 (H) 0.0 - 0.5 %    ABS. NEUTROPHILS 3.6 1.8 - 8.0 K/UL    ABS. LYMPHOCYTES 1.4 0.8 - 3.5 K/UL    ABS. MONOCYTES 0.6 0.0 - 1.0 K/UL    ABS. EOSINOPHILS 0.4 0.0 - 0.4 K/UL    ABS. BASOPHILS 0.0 0.0 - 0.1 K/UL    ABS. IMM. GRANS. 0.0 0.00 - 0.04 K/UL    DF AUTOMATED     METABOLIC PANEL, COMPREHENSIVE    Collection Time: 05/02/18  9:27 AM   Result Value Ref Range    Sodium 142 136 - 145 mmol/L    Potassium 4.5 3.5 - 5.1 mmol/L    Chloride 105 97 - 108 mmol/L    CO2 26 21 - 32 mmol/L    Anion gap 11 5 - 15 mmol/L    Glucose 106 (H) 65 - 100 mg/dL    BUN 25 (H) 6 - 20 MG/DL    Creatinine 1.28 0.70 - 1.30 MG/DL    BUN/Creatinine ratio 20 12 - 20      GFR est AA >60 >60 ml/min/1.73m2    GFR est non-AA 56 (L) >60 ml/min/1.73m2    Calcium 9.0 8.5 - 10.1 MG/DL    Bilirubin, total 0.4 0.2 - 1.0 MG/DL    ALT (SGPT) 51 12 - 78 U/L    AST (SGOT) 21 15 - 37 U/L    Alk.  phosphatase 74 45 - 117 U/L    Protein, total 7.1 6.4 - 8.2 g/dL    Albumin 4.0 3.5 - 5.0 g/dL    Globulin 3.1 2.0 - 4.0 g/dL    A-G Ratio 1.3 1.1 - 2.2     HEMOGLOBIN A1C WITH EAG    Collection Time: 05/02/18  9:27 AM   Result Value Ref Range    Hemoglobin A1c 5.4 4.2 - 6.3 %    Est. average glucose 108 mg/dL   SED RATE (ESR)    Collection Time: 05/02/18  9:27 AM   Result Value Ref Range    Sed rate, automated 9 0 - 20 mm/hr   TRANSFERRIN    Collection Time: 05/02/18  9:27 AM   Result Value Ref Range    Transferrin 285 200 - 370 mg/dL   URINALYSIS W/ REFLEX CULTURE    Collection Time: 05/02/18  9:27 AM   Result Value Ref Range    Color YELLOW/STRAW      Appearance CLEAR CLEAR      Specific gravity 1.012 1.003 - 1.030      pH (UA) 5.5 5.0 - 8.0      Protein NEGATIVE  NEG mg/dL    Glucose NEGATIVE  NEG mg/dL    Ketone NEGATIVE  NEG mg/dL    Bilirubin NEGATIVE  NEG      Blood LARGE (A) NEG      Urobilinogen 0.2 0.2 - 1.0 EU/dL    Nitrites NEGATIVE  NEG      Leukocyte Esterase NEGATIVE  NEG      WBC 0-4 0 - 4 /hpf    RBC 0-5 0 - 5 /hpf    Epithelial cells FEW FEW /lpf    Bacteria NEGATIVE  NEG /hpf    UA:UC IF INDICATED CULTURE NOT INDICATED BY UA RESULT CNI         Assessment:     Primary osteoarthritis of left hip. Plan:     Scheduled for left total hip replacement- direct anterior. Labs done per surgeon's orders. Lab results reviewed- unremarkable except UA showed large amount of blood. Also creatinine= 1.28 and GFR= 56- renal insufficiency present on admission per [facility] joint committee parameters. MRSA negative. EKG and cardiology note/ clearance from Dr. Emerald Sutton dated 04/12/2018 reviewed in [facility]. Attended joint class 05/02/2018.         Ulysses Colorado NP

## 2018-05-02 NOTE — PERIOP NOTES
[facility] [address]    MAIN OR [phone]    MAIN PRE OP [phone]    AMBULATORY PRE OP [phone]    PRE-ADMISSION TESTING [phone]       Surgery Date:   Monday 5/7/18        Is surgery arrival time given by surgeon? NO  If NO, [facility] staff will call you between 3 and 7pm the day before your surgery with your arrival time. (If your surgery is on a Monday, we will call you the Friday before.)    Call [phone] after 7pm Monday-Friday if you did not receive your arrival time.     Answers to Common Questions   When You  Arrive   Arrive at the [address] [facility] on the day of your surgery  Have your insurance card, photo ID, and any copayment (if needed)     Food   and   Drink   NO food or drink after midnight the night before surgery    This means NO water, gum, mints, coffee, juice, etc.  No alcohol (beer, wine, liquor) 24 hours before and after surgery     Medicine to   TAKE   Morning of Surgery   MEDICATIONS TO TAKE THE MORNING OF SURGERY WITH A SIP OF WATER:    Flecainide, omeprazole    DO NOT TAKE losartan-hydrochlorothiazide on the morning of surgery     Medicine  To  STOP   FOR PAIN   NO Aspirin for pain    NO Non-Steroidal Anti-Inflammatory Drugs (NSAIDs:   for example, Ibuprofen (Advil, Motrin), Naproxen (Aleve), mobic,   STOP herbal supplements and vitamins 1 week before surgery, fish oil   You can take Tylenol  follow instructions on the bottle     Blood  Thinners    If you take Aspirin, Plavix, Coumadin, blood-thinning or anti-clot medicine, talk to your surgeon and/or follow the instructions from the doctor who told you to take that medicine - as directed by Dr. Kiara Crowell Wear loose, comfortable clothes   Wear glasses instead of contacts  [employer] money, jewelry and valuables at home   No make-up, particularly mascara, the day of surgery   REMOVE ALL piercings, rings, and jewelry - leave at home   Wear hair loose or down; no pony-tails, buns, or metal hair clips    BATHING   Follow all special bath instructions (for total joint replacement, spine and bowel surgeries.)   If you shower the morning of surgery, please do not apply any lotions, powders, or deodorants afterwards. Do not shave or trim anywhere 24 hours before surgery. Going Home  or  Spending the Night    SAME-DAY SURGERY: You must have a responsible adult drive you home and stay with you 24 hours after surgery   ADMITS: If your doctor is keeping you into the hospital after surgery, leave personal belongings/luggage in your car until you have a hospital room number. Hospital discharge time is 12 noon  Drivers must be here before 12 noon unless you are told differently         Follow all instructions so your surgery wont be cancelled. Please, be on time. If a situation occurs and you are delayed the day of surgery, call [phone] or [phone]. If your physical condition changes (like a fever, cold, flu, etc.) call your surgeon as soon as possible. The Preadmission Testing staff can be reached at [phone]. OTHER SPECIAL INSTRUCTIONS:  16.  Special Instructions:  · Use Chlorhexidine Care Fusion wash and sponges 3 days prior to surgery as instructed. · Incentive spirometer given with instructions to practice at home and bring back to the hospital on the day of surgery. · [facility] will contact you if your Hemoglobin A1C is greater than 7.5. · Ensure/Glucerna  sample, nutritional information, and Ensure/Glucerna coupon given. · Pain pamphlet and Call Don't Fall reminder reviewed with patient.   ·  parking is complimentary Monday - Friday 7 am - 5 pm  · Bring PTA Medication list day of surgery with the last doses taken documented   Do not bring medication bottles the day of surgery    The patient was contacted  in person. He  verbalize  understanding of all instructions and does not  need reinforcement.

## 2018-05-03 LAB
ALBUMIN SERPL-MCNC: 4.5 G/DL (ref 3.6–4.8)
ALP SERPL-CCNC: 70 IU/L (ref 39–117)
ALT SERPL-CCNC: 37 IU/L (ref 0–44)
AST SERPL-CCNC: 20 IU/L (ref 0–40)
BACTERIA SPEC CULT: NORMAL
BACTERIA SPEC CULT: NORMAL
BILIRUB DIRECT SERPL-MCNC: 0.11 MG/DL (ref 0–0.4)
BILIRUB SERPL-MCNC: 0.4 MG/DL (ref 0–1.2)
CHOLEST SERPL-MCNC: 144 MG/DL (ref 100–199)
HDLC SERPL-MCNC: 37 MG/DL
INTERPRETATION, 910389: NORMAL
LDLC SERPL CALC-MCNC: 84 MG/DL (ref 0–99)
PROT SERPL-MCNC: 6.8 G/DL (ref 6–8.5)
SERVICE CMNT-IMP: NORMAL
TRANSFERRIN SERPL-MCNC: 285 MG/DL (ref 200–370)
TRIGL SERPL-MCNC: 113 MG/DL (ref 0–149)
VLDLC SERPL CALC-MCNC: 23 MG/DL (ref 5–40)

## 2018-05-03 NOTE — PROGRESS NOTES
Problem: Patient Education: Go to Patient Education Activity  Goal: Patient/Family Education  Outcome: Resolved/Met Date Met: 05/03/18  The patient attended the pre-operative joint replacement class. The content of the class, using a power-point presentation as well as visual demonstration was specific for patients undergoing total knee and total hip replacements. A pre-operative education booklet was provided to all patients. At the conclusion of class an opportunity was offered for any question or concerns the patient or family may have regarding their upcoming scheduled procedure. Patient and  attended class on 5/2/18.

## 2018-05-04 ENCOUNTER — ANESTHESIA EVENT (OUTPATIENT)
Dept: SURGERY | Age: 68
DRG: 470 | End: 2018-05-04
Payer: MEDICARE

## 2018-05-06 PROBLEM — M16.12 PRIMARY OSTEOARTHRITIS OF LEFT HIP: Status: ACTIVE | Noted: 2018-05-06

## 2018-05-07 ENCOUNTER — HOSPITAL ENCOUNTER (INPATIENT)
Age: 68
LOS: 1 days | Discharge: HOME | DRG: 470 | End: 2018-05-08
Attending: ORTHOPAEDIC SURGERY | Admitting: ORTHOPAEDIC SURGERY
Payer: MEDICARE

## 2018-05-07 ENCOUNTER — APPOINTMENT (OUTPATIENT)
Dept: GENERAL RADIOLOGY | Age: 68
DRG: 470 | End: 2018-05-07
Attending: PHYSICIAN ASSISTANT
Payer: MEDICARE

## 2018-05-07 ENCOUNTER — ANESTHESIA (OUTPATIENT)
Dept: SURGERY | Age: 68
DRG: 470 | End: 2018-05-07
Payer: MEDICARE

## 2018-05-07 DIAGNOSIS — M16.12 PRIMARY OSTEOARTHRITIS OF LEFT HIP: Primary | ICD-10-CM

## 2018-05-07 LAB
GLUCOSE BLD STRIP.AUTO-MCNC: 126 MG/DL (ref 65–100)
GLUCOSE BLD STRIP.AUTO-MCNC: 130 MG/DL (ref 65–100)
SERVICE CMNT-IMP: ABNORMAL
SERVICE CMNT-IMP: ABNORMAL

## 2018-05-07 PROCEDURE — 97161 PT EVAL LOW COMPLEX 20 MIN: CPT

## 2018-05-07 PROCEDURE — 77030020263 HC SOL INJ SOD CL0.9% LFCR 1000ML: Performed by: ORTHOPAEDIC SURGERY

## 2018-05-07 PROCEDURE — 82962 GLUCOSE BLOOD TEST: CPT

## 2018-05-07 PROCEDURE — 74011250636 HC RX REV CODE- 250/636: Performed by: PHYSICIAN ASSISTANT

## 2018-05-07 PROCEDURE — 77030013708 HC HNDPC SUC IRR PULS STRY –B: Performed by: ORTHOPAEDIC SURGERY

## 2018-05-07 PROCEDURE — 77030020782 HC GWN BAIR PAWS FLX 3M -B

## 2018-05-07 PROCEDURE — 0SRB0JZ REPLACEMENT OF LEFT HIP JOINT WITH SYNTHETIC SUBSTITUTE, OPEN APPROACH: ICD-10-PCS | Performed by: ORTHOPAEDIC SURGERY

## 2018-05-07 PROCEDURE — 74011250636 HC RX REV CODE- 250/636

## 2018-05-07 PROCEDURE — C1713 ANCHOR/SCREW BN/BN,TIS/BN: HCPCS | Performed by: ORTHOPAEDIC SURGERY

## 2018-05-07 PROCEDURE — 76210000035 HC AMBSU PH I REC 1 TO 1.5 HR: Performed by: ORTHOPAEDIC SURGERY

## 2018-05-07 PROCEDURE — 77030031139 HC SUT VCRL2 J&J -A: Performed by: ORTHOPAEDIC SURGERY

## 2018-05-07 PROCEDURE — 77030039266 HC ADH SKN EXOFIN S2SG -A: Performed by: ORTHOPAEDIC SURGERY

## 2018-05-07 PROCEDURE — 77030018673: Performed by: ORTHOPAEDIC SURGERY

## 2018-05-07 PROCEDURE — 74011000250 HC RX REV CODE- 250: Performed by: ORTHOPAEDIC SURGERY

## 2018-05-07 PROCEDURE — C1776 JOINT DEVICE (IMPLANTABLE): HCPCS | Performed by: ORTHOPAEDIC SURGERY

## 2018-05-07 PROCEDURE — 77030012935 HC DRSG AQUACEL BMS -B: Performed by: ORTHOPAEDIC SURGERY

## 2018-05-07 PROCEDURE — 74011000250 HC RX REV CODE- 250

## 2018-05-07 PROCEDURE — 74011000272 HC RX REV CODE- 272: Performed by: ORTHOPAEDIC SURGERY

## 2018-05-07 PROCEDURE — 77030032490 HC SLV COMPR SCD KNE COVD -B

## 2018-05-07 PROCEDURE — 74011000258 HC RX REV CODE- 258

## 2018-05-07 PROCEDURE — 74011250636 HC RX REV CODE- 250/636: Performed by: ANESTHESIOLOGY

## 2018-05-07 PROCEDURE — 77030018836 HC SOL IRR NACL ICUM -A: Performed by: ORTHOPAEDIC SURGERY

## 2018-05-07 PROCEDURE — 77030029829 HC TIB INST CKPNT DISP STRY -B: Performed by: ORTHOPAEDIC SURGERY

## 2018-05-07 PROCEDURE — 77030020262 HC SOL INJ SOD CL 0.9% 100ML: Performed by: ORTHOPAEDIC SURGERY

## 2018-05-07 PROCEDURE — 77030020788: Performed by: ORTHOPAEDIC SURGERY

## 2018-05-07 PROCEDURE — 77030002933 HC SUT MCRYL J&J -A: Performed by: ORTHOPAEDIC SURGERY

## 2018-05-07 PROCEDURE — 72170 X-RAY EXAM OF PELVIS: CPT

## 2018-05-07 PROCEDURE — 65270000029 HC RM PRIVATE

## 2018-05-07 PROCEDURE — 77030007866 HC KT SPN ANES BBMI -B

## 2018-05-07 PROCEDURE — 74011250637 HC RX REV CODE- 250/637: Performed by: ANESTHESIOLOGY

## 2018-05-07 PROCEDURE — 76030000021 HC AMB SURG 2 TO 2.5 HR INTENSV-TIER 1: Performed by: ORTHOPAEDIC SURGERY

## 2018-05-07 PROCEDURE — 77030011640 HC PAD GRND REM COVD -A: Performed by: ORTHOPAEDIC SURGERY

## 2018-05-07 PROCEDURE — 76060000064 HC AMB SURG ANES 2 TO 2.5 HR: Performed by: ORTHOPAEDIC SURGERY

## 2018-05-07 PROCEDURE — 77030035236 HC SUT PDS STRATFX BARB J&J -B: Performed by: ORTHOPAEDIC SURGERY

## 2018-05-07 PROCEDURE — 74011250637 HC RX REV CODE- 250/637: Performed by: PHYSICIAN ASSISTANT

## 2018-05-07 PROCEDURE — 77030018883 HC BLD SAW SAG4 STRY -B: Performed by: ORTHOPAEDIC SURGERY

## 2018-05-07 PROCEDURE — 97116 GAIT TRAINING THERAPY: CPT

## 2018-05-07 PROCEDURE — 77030038587 HC LNR BOOT DISP ALLN -B: Performed by: ORTHOPAEDIC SURGERY

## 2018-05-07 PROCEDURE — 77030029821: Performed by: ORTHOPAEDIC SURGERY

## 2018-05-07 DEVICE — HEMISPHERICAL CLUSTER HOLE SHELL
Type: IMPLANTABLE DEVICE | Site: HIP | Status: FUNCTIONAL
Brand: TRITANIUM

## 2018-05-07 DEVICE — CERAMIC V40 FEMORAL HEAD
Type: IMPLANTABLE DEVICE | Site: HIP | Status: FUNCTIONAL
Brand: BIOLOX

## 2018-05-07 DEVICE — 0 DEGREE POLYETHYLENE INSERT
Type: IMPLANTABLE DEVICE | Site: HIP | Status: FUNCTIONAL
Brand: TRIDENT

## 2018-05-07 DEVICE — COMPONENT HIP PRSS FT MTL ON CERM POLYETH X3: Type: IMPLANTABLE DEVICE | Status: FUNCTIONAL

## 2018-05-07 DEVICE — 127 DEGREE NECK ANGLE HIP STEM
Type: IMPLANTABLE DEVICE | Site: HIP | Status: FUNCTIONAL
Brand: ACCOLADE

## 2018-05-07 RX ORDER — FLECAINIDE ACETATE 50 MG/1
50 TABLET ORAL 2 TIMES DAILY
Status: DISCONTINUED | OUTPATIENT
Start: 2018-05-07 | End: 2018-05-08 | Stop reason: HOSPADM

## 2018-05-07 RX ORDER — BUPIVACAINE HYDROCHLORIDE 5 MG/ML
INJECTION, SOLUTION EPIDURAL; INTRACAUDAL AS NEEDED
Status: DISCONTINUED | OUTPATIENT
Start: 2018-05-07 | End: 2018-05-07 | Stop reason: HOSPADM

## 2018-05-07 RX ORDER — PANTOPRAZOLE SODIUM 40 MG/1
40 TABLET, DELAYED RELEASE ORAL
Status: DISCONTINUED | OUTPATIENT
Start: 2018-05-08 | End: 2018-05-08 | Stop reason: HOSPADM

## 2018-05-07 RX ORDER — DIPHENHYDRAMINE HYDROCHLORIDE 50 MG/ML
12.5 INJECTION, SOLUTION INTRAMUSCULAR; INTRAVENOUS
Status: ACTIVE | OUTPATIENT
Start: 2018-05-07 | End: 2018-05-08

## 2018-05-07 RX ORDER — ONDANSETRON 2 MG/ML
4 INJECTION INTRAMUSCULAR; INTRAVENOUS
Status: ACTIVE | OUTPATIENT
Start: 2018-05-07 | End: 2018-05-08

## 2018-05-07 RX ORDER — HYDROMORPHONE HYDROCHLORIDE 2 MG/ML
0.5 INJECTION, SOLUTION INTRAMUSCULAR; INTRAVENOUS; SUBCUTANEOUS
Status: DISCONTINUED | OUTPATIENT
Start: 2018-05-07 | End: 2018-05-07

## 2018-05-07 RX ORDER — FENTANYL CITRATE 50 UG/ML
25 INJECTION, SOLUTION INTRAMUSCULAR; INTRAVENOUS
Status: DISCONTINUED | OUTPATIENT
Start: 2018-05-07 | End: 2018-05-07 | Stop reason: HOSPADM

## 2018-05-07 RX ORDER — AMOXICILLIN 250 MG
1 CAPSULE ORAL 2 TIMES DAILY
Status: DISCONTINUED | OUTPATIENT
Start: 2018-05-07 | End: 2018-05-08 | Stop reason: HOSPADM

## 2018-05-07 RX ORDER — HYDROMORPHONE HYDROCHLORIDE 2 MG/ML
0.5 INJECTION, SOLUTION INTRAMUSCULAR; INTRAVENOUS; SUBCUTANEOUS
Status: ACTIVE | OUTPATIENT
Start: 2018-05-07 | End: 2018-05-08

## 2018-05-07 RX ORDER — PREGABALIN 75 MG/1
75 CAPSULE ORAL ONCE
Status: COMPLETED | OUTPATIENT
Start: 2018-05-07 | End: 2018-05-07

## 2018-05-07 RX ORDER — CELECOXIB 100 MG/1
200 CAPSULE ORAL 2 TIMES DAILY
Status: DISCONTINUED | OUTPATIENT
Start: 2018-05-07 | End: 2018-05-08 | Stop reason: HOSPADM

## 2018-05-07 RX ORDER — OXYCODONE HCL 10 MG/1
10 TABLET, FILM COATED, EXTENDED RELEASE ORAL ONCE
Status: COMPLETED | OUTPATIENT
Start: 2018-05-07 | End: 2018-05-07

## 2018-05-07 RX ORDER — ACETAMINOPHEN 500 MG
500-1000 TABLET ORAL
Qty: 60 TAB | Refills: 0 | Status: SHIPPED | OUTPATIENT
Start: 2018-05-07 | End: 2019-03-26

## 2018-05-07 RX ORDER — OXYCODONE HYDROCHLORIDE 5 MG/1
5-10 TABLET ORAL
Qty: 70 TAB | Refills: 0 | Status: ON HOLD | OUTPATIENT
Start: 2018-05-07 | End: 2018-09-26

## 2018-05-07 RX ORDER — ONDANSETRON 8 MG/1
4 TABLET, ORALLY DISINTEGRATING ORAL
Qty: 30 TAB | Refills: 0 | Status: ON HOLD | OUTPATIENT
Start: 2018-05-07 | End: 2018-09-26

## 2018-05-07 RX ORDER — DIPHENHYDRAMINE HYDROCHLORIDE 50 MG/ML
12.5 INJECTION, SOLUTION INTRAMUSCULAR; INTRAVENOUS AS NEEDED
Status: DISCONTINUED | OUTPATIENT
Start: 2018-05-07 | End: 2018-05-07 | Stop reason: HOSPADM

## 2018-05-07 RX ORDER — NALOXONE HYDROCHLORIDE 0.4 MG/ML
0.4 INJECTION, SOLUTION INTRAMUSCULAR; INTRAVENOUS; SUBCUTANEOUS AS NEEDED
Status: DISCONTINUED | OUTPATIENT
Start: 2018-05-07 | End: 2018-05-08 | Stop reason: HOSPADM

## 2018-05-07 RX ORDER — POVIDONE-IODINE 10 %
SOLUTION, NON-ORAL TOPICAL AS NEEDED
Status: DISCONTINUED | OUTPATIENT
Start: 2018-05-07 | End: 2018-05-07 | Stop reason: HOSPADM

## 2018-05-07 RX ORDER — MIDAZOLAM HYDROCHLORIDE 1 MG/ML
INJECTION, SOLUTION INTRAMUSCULAR; INTRAVENOUS AS NEEDED
Status: DISCONTINUED | OUTPATIENT
Start: 2018-05-07 | End: 2018-05-07 | Stop reason: HOSPADM

## 2018-05-07 RX ORDER — CEFAZOLIN SODIUM/WATER 2 G/20 ML
2 SYRINGE (ML) INTRAVENOUS EVERY 8 HOURS
Status: COMPLETED | OUTPATIENT
Start: 2018-05-07 | End: 2018-05-08

## 2018-05-07 RX ORDER — ASPIRIN 325 MG
325 TABLET, DELAYED RELEASE (ENTERIC COATED) ORAL 2 TIMES DAILY
Status: DISCONTINUED | OUTPATIENT
Start: 2018-05-07 | End: 2018-05-08 | Stop reason: HOSPADM

## 2018-05-07 RX ORDER — SODIUM CHLORIDE 0.9 % (FLUSH) 0.9 %
5-10 SYRINGE (ML) INJECTION EVERY 8 HOURS
Status: DISCONTINUED | OUTPATIENT
Start: 2018-05-08 | End: 2018-05-08 | Stop reason: HOSPADM

## 2018-05-07 RX ORDER — OXYCODONE HYDROCHLORIDE 5 MG/1
5 TABLET ORAL
Status: DISCONTINUED | OUTPATIENT
Start: 2018-05-07 | End: 2018-05-08 | Stop reason: HOSPADM

## 2018-05-07 RX ORDER — DESLORATADINE 5 MG/1
5 TABLET ORAL DAILY PRN
Status: DISCONTINUED | OUTPATIENT
Start: 2018-05-07 | End: 2018-05-08 | Stop reason: HOSPADM

## 2018-05-07 RX ORDER — OXYCODONE HYDROCHLORIDE 5 MG/1
10 TABLET ORAL
Status: DISCONTINUED | OUTPATIENT
Start: 2018-05-07 | End: 2018-05-08 | Stop reason: HOSPADM

## 2018-05-07 RX ORDER — SIMVASTATIN 20 MG/1
10 TABLET, FILM COATED ORAL
Status: DISCONTINUED | OUTPATIENT
Start: 2018-05-07 | End: 2018-05-08 | Stop reason: HOSPADM

## 2018-05-07 RX ORDER — TRAMADOL HYDROCHLORIDE 50 MG/1
50 TABLET ORAL
Qty: 60 TAB | Refills: 0 | Status: SHIPPED | OUTPATIENT
Start: 2018-05-07 | End: 2018-12-10

## 2018-05-07 RX ORDER — MIDAZOLAM HYDROCHLORIDE 1 MG/ML
2 INJECTION, SOLUTION INTRAMUSCULAR; INTRAVENOUS
Status: DISCONTINUED | OUTPATIENT
Start: 2018-05-07 | End: 2018-05-07 | Stop reason: HOSPADM

## 2018-05-07 RX ORDER — FLUMAZENIL 0.1 MG/ML
0.2 INJECTION INTRAVENOUS
Status: DISCONTINUED | OUTPATIENT
Start: 2018-05-07 | End: 2018-05-07 | Stop reason: HOSPADM

## 2018-05-07 RX ORDER — HYDROMORPHONE HYDROCHLORIDE 2 MG/ML
.25-1 INJECTION, SOLUTION INTRAMUSCULAR; INTRAVENOUS; SUBCUTANEOUS
Status: DISCONTINUED | OUTPATIENT
Start: 2018-05-07 | End: 2018-05-07 | Stop reason: HOSPADM

## 2018-05-07 RX ORDER — EPHEDRINE SULFATE 50 MG/ML
INJECTION, SOLUTION INTRAVENOUS AS NEEDED
Status: DISCONTINUED | OUTPATIENT
Start: 2018-05-07 | End: 2018-05-07 | Stop reason: HOSPADM

## 2018-05-07 RX ORDER — SODIUM CHLORIDE, SODIUM LACTATE, POTASSIUM CHLORIDE, CALCIUM CHLORIDE 600; 310; 30; 20 MG/100ML; MG/100ML; MG/100ML; MG/100ML
125 INJECTION, SOLUTION INTRAVENOUS CONTINUOUS
Status: DISCONTINUED | OUTPATIENT
Start: 2018-05-07 | End: 2018-05-07 | Stop reason: HOSPADM

## 2018-05-07 RX ORDER — FENTANYL CITRATE 50 UG/ML
INJECTION, SOLUTION INTRAMUSCULAR; INTRAVENOUS AS NEEDED
Status: DISCONTINUED | OUTPATIENT
Start: 2018-05-07 | End: 2018-05-07 | Stop reason: HOSPADM

## 2018-05-07 RX ORDER — FAMOTIDINE 20 MG/1
20 TABLET, FILM COATED ORAL 2 TIMES DAILY
Status: DISCONTINUED | OUTPATIENT
Start: 2018-05-07 | End: 2018-05-08 | Stop reason: HOSPADM

## 2018-05-07 RX ORDER — FACIAL-BODY WIPES
10 EACH TOPICAL DAILY PRN
Status: DISCONTINUED | OUTPATIENT
Start: 2018-05-09 | End: 2018-05-08 | Stop reason: HOSPADM

## 2018-05-07 RX ORDER — PROPOFOL 10 MG/ML
INJECTION, EMULSION INTRAVENOUS
Status: DISCONTINUED | OUTPATIENT
Start: 2018-05-07 | End: 2018-05-07 | Stop reason: HOSPADM

## 2018-05-07 RX ORDER — LIDOCAINE HYDROCHLORIDE 10 MG/ML
0.1 INJECTION, SOLUTION EPIDURAL; INFILTRATION; INTRACAUDAL; PERINEURAL AS NEEDED
Status: DISCONTINUED | OUTPATIENT
Start: 2018-05-07 | End: 2018-05-07 | Stop reason: HOSPADM

## 2018-05-07 RX ORDER — OXYCODONE HYDROCHLORIDE 5 MG/1
10 TABLET ORAL
Status: DISCONTINUED | OUTPATIENT
Start: 2018-05-07 | End: 2018-05-07 | Stop reason: HOSPADM

## 2018-05-07 RX ORDER — SODIUM CHLORIDE 9 MG/ML
125 INJECTION, SOLUTION INTRAVENOUS CONTINUOUS
Status: DISCONTINUED | OUTPATIENT
Start: 2018-05-07 | End: 2018-05-08 | Stop reason: HOSPADM

## 2018-05-07 RX ORDER — POLYETHYLENE GLYCOL 3350 17 G/17G
17 POWDER, FOR SOLUTION ORAL DAILY
Status: DISCONTINUED | OUTPATIENT
Start: 2018-05-07 | End: 2018-05-08 | Stop reason: HOSPADM

## 2018-05-07 RX ORDER — ASPIRIN 325 MG
325 TABLET, DELAYED RELEASE (ENTERIC COATED) ORAL 2 TIMES DAILY
Qty: 60 TAB | Refills: 0 | Status: SHIPPED | OUTPATIENT
Start: 2018-05-07 | End: 2018-12-10

## 2018-05-07 RX ORDER — DEXAMETHASONE SODIUM PHOSPHATE 4 MG/ML
INJECTION, SOLUTION INTRA-ARTICULAR; INTRALESIONAL; INTRAMUSCULAR; INTRAVENOUS; SOFT TISSUE AS NEEDED
Status: DISCONTINUED | OUTPATIENT
Start: 2018-05-07 | End: 2018-05-07 | Stop reason: HOSPADM

## 2018-05-07 RX ORDER — METOPROLOL SUCCINATE 25 MG/1
12.5 TABLET, EXTENDED RELEASE ORAL
Status: DISCONTINUED | OUTPATIENT
Start: 2018-05-07 | End: 2018-05-08 | Stop reason: HOSPADM

## 2018-05-07 RX ORDER — NALOXONE HYDROCHLORIDE 0.4 MG/ML
0.2 INJECTION, SOLUTION INTRAMUSCULAR; INTRAVENOUS; SUBCUTANEOUS
Status: DISCONTINUED | OUTPATIENT
Start: 2018-05-07 | End: 2018-05-07 | Stop reason: HOSPADM

## 2018-05-07 RX ORDER — SODIUM CHLORIDE 0.9 % (FLUSH) 0.9 %
5-10 SYRINGE (ML) INJECTION AS NEEDED
Status: DISCONTINUED | OUTPATIENT
Start: 2018-05-07 | End: 2018-05-08 | Stop reason: HOSPADM

## 2018-05-07 RX ORDER — ACETAMINOPHEN 325 MG/1
650 TABLET ORAL EVERY 6 HOURS
Status: DISCONTINUED | OUTPATIENT
Start: 2018-05-07 | End: 2018-05-08 | Stop reason: HOSPADM

## 2018-05-07 RX ORDER — CEFAZOLIN SODIUM/WATER 2 G/20 ML
2 SYRINGE (ML) INTRAVENOUS ONCE
Status: COMPLETED | OUTPATIENT
Start: 2018-05-07 | End: 2018-05-07

## 2018-05-07 RX ORDER — ACETAMINOPHEN 325 MG/1
975 TABLET ORAL ONCE
Status: COMPLETED | OUTPATIENT
Start: 2018-05-07 | End: 2018-05-07

## 2018-05-07 RX ADMIN — PREGABALIN 75 MG: 75 CAPSULE ORAL at 06:41

## 2018-05-07 RX ADMIN — FLECAINIDE ACETATE 50 MG: 50 TABLET ORAL at 18:32

## 2018-05-07 RX ADMIN — ASPIRIN 325 MG: 325 TABLET, DELAYED RELEASE ORAL at 18:32

## 2018-05-07 RX ADMIN — BUPIVACAINE HYDROCHLORIDE 1.7 ML: 5 INJECTION, SOLUTION EPIDURAL; INTRACAUDAL at 07:34

## 2018-05-07 RX ADMIN — ACETAMINOPHEN 975 MG: 325 TABLET ORAL at 06:41

## 2018-05-07 RX ADMIN — SIMVASTATIN 10 MG: 20 TABLET, FILM COATED ORAL at 21:33

## 2018-05-07 RX ADMIN — SODIUM CHLORIDE, POTASSIUM CHLORIDE, SODIUM LACTATE AND CALCIUM CHLORIDE: 600; 310; 30; 20 INJECTION, SOLUTION INTRAVENOUS at 08:38

## 2018-05-07 RX ADMIN — MIDAZOLAM HYDROCHLORIDE 2 MG: 1 INJECTION, SOLUTION INTRAMUSCULAR; INTRAVENOUS at 07:28

## 2018-05-07 RX ADMIN — PROPOFOL 50 MCG/KG/MIN: 10 INJECTION, EMULSION INTRAVENOUS at 08:08

## 2018-05-07 RX ADMIN — Medication 2 G: at 21:33

## 2018-05-07 RX ADMIN — SODIUM CHLORIDE 125 ML/HR: 900 INJECTION, SOLUTION INTRAVENOUS at 19:19

## 2018-05-07 RX ADMIN — SODIUM CHLORIDE, POTASSIUM CHLORIDE, SODIUM LACTATE AND CALCIUM CHLORIDE: 600; 310; 30; 20 INJECTION, SOLUTION INTRAVENOUS at 07:15

## 2018-05-07 RX ADMIN — MIDAZOLAM HYDROCHLORIDE 1 MG: 1 INJECTION, SOLUTION INTRAMUSCULAR; INTRAVENOUS at 08:11

## 2018-05-07 RX ADMIN — EPHEDRINE SULFATE 12.5 MG: 50 INJECTION, SOLUTION INTRAVENOUS at 07:39

## 2018-05-07 RX ADMIN — EPHEDRINE SULFATE 12.5 MG: 50 INJECTION, SOLUTION INTRAVENOUS at 07:46

## 2018-05-07 RX ADMIN — OXYCODONE HYDROCHLORIDE 10 MG: 10 TABLET, FILM COATED, EXTENDED RELEASE ORAL at 06:41

## 2018-05-07 RX ADMIN — ACETAMINOPHEN 650 MG: 325 TABLET ORAL at 13:29

## 2018-05-07 RX ADMIN — POLYETHYLENE GLYCOL (3350) 17 G: 17 POWDER, FOR SOLUTION ORAL at 13:29

## 2018-05-07 RX ADMIN — DEXAMETHASONE SODIUM PHOSPHATE 8 MG: 4 INJECTION, SOLUTION INTRA-ARTICULAR; INTRALESIONAL; INTRAMUSCULAR; INTRAVENOUS; SOFT TISSUE at 09:43

## 2018-05-07 RX ADMIN — STANDARDIZED SENNA CONCENTRATE AND DOCUSATE SODIUM 1 TABLET: 8.6; 5 TABLET, FILM COATED ORAL at 13:29

## 2018-05-07 RX ADMIN — STANDARDIZED SENNA CONCENTRATE AND DOCUSATE SODIUM 1 TABLET: 8.6; 5 TABLET, FILM COATED ORAL at 18:36

## 2018-05-07 RX ADMIN — FENTANYL CITRATE 50 MCG: 50 INJECTION, SOLUTION INTRAMUSCULAR; INTRAVENOUS at 07:28

## 2018-05-07 RX ADMIN — FAMOTIDINE 20 MG: 20 TABLET ORAL at 13:29

## 2018-05-07 RX ADMIN — METOPROLOL SUCCINATE 12.5 MG: 25 TABLET, EXTENDED RELEASE ORAL at 21:32

## 2018-05-07 RX ADMIN — EPHEDRINE SULFATE 12.5 MG: 50 INJECTION, SOLUTION INTRAVENOUS at 08:23

## 2018-05-07 RX ADMIN — Medication 2 G: at 08:00

## 2018-05-07 RX ADMIN — Medication 2 G: at 14:30

## 2018-05-07 RX ADMIN — ACETAMINOPHEN 650 MG: 325 TABLET ORAL at 23:35

## 2018-05-07 RX ADMIN — EPHEDRINE SULFATE 12.5 MG: 50 INJECTION, SOLUTION INTRAVENOUS at 07:59

## 2018-05-07 RX ADMIN — Medication 10 ML: at 21:33

## 2018-05-07 RX ADMIN — CELECOXIB 200 MG: 100 CAPSULE ORAL at 13:29

## 2018-05-07 RX ADMIN — ACETAMINOPHEN 650 MG: 325 TABLET ORAL at 18:32

## 2018-05-07 RX ADMIN — FAMOTIDINE 20 MG: 20 TABLET ORAL at 18:32

## 2018-05-07 RX ADMIN — MIDAZOLAM HYDROCHLORIDE 1 MG: 1 INJECTION, SOLUTION INTRAMUSCULAR; INTRAVENOUS at 07:31

## 2018-05-07 RX ADMIN — CELECOXIB 200 MG: 100 CAPSULE ORAL at 21:32

## 2018-05-07 RX ADMIN — SODIUM CHLORIDE 125 ML/HR: 900 INJECTION, SOLUTION INTRAVENOUS at 10:59

## 2018-05-07 RX ADMIN — ASPIRIN 325 MG: 325 TABLET, DELAYED RELEASE ORAL at 13:28

## 2018-05-07 NOTE — DISCHARGE INSTRUCTIONS
Nutrition Recommendations for Discharge:           Increase your protein intake for 30 days to promote healing. Common protein rich foods include: meat, fish, yogurt, milk, cheese, eggs, soy, and nut products. If you are having difficulty consuming adequate protein, you may want to consider a protein supplement. One option for is Ensure Active High Protein for Muscle Health 1-2 bottles per day for 30 days unless otherwise directed by your Primary Care Physician. This product can be purchased at your local grocery store or drug store and online. Kuldip Riddle RD           TOTAL HIP DISCHARGE INSTRUCTIONS    Patient: Alejandro Vasquez MRN: [id number]  SSN: [id number]              Please take the time to review the following instructions before you leave the hospital and use them as guidelines during your recovery from surgery. If you have any questions you may contact my office at [phone]. After hours or during the weekend you may reach me personally at [phone] if there is an emergency. SPECIAL INSTRUCTIONS :   1. Do not bend greater than 90 degrees at the hip for 4 weeks following your discharge  2. Avoid exercises or activities which bring the leg out or away from the mid-line of the body. The surgical repair involves this muscle and it will require 4 weeks to heal. You may disregard these instructions for a direct anterior approach. 3. You may walk as tolerated and are encouraged to work daily on progressing your activities with a walker initially. 4. You may transition to a cane for walking 5-7 days from surgery once you feel safe. You may use a walker for longer periods if you feel unstable. Wound Care/ Dressing Changes:      DRESSING :   Aquacel Dressing : This will be removed in the office 10 days after the date of your surgery. If there is no drainage, then a simple dressing may be used or no dressing at all.  Other dressing options can be purchased over the counter at a local pharmacy or medical supply vendor. A porous adhesive dressing such as pictured above can be purchased at a local [facility] or [employer]. You only need to keep the incision covered for 7 days after showers. A dressing may be used for longer if there are issues with clothing clinging to the incision. Showering/ Bathing: You may shower with the aquacel dressing in place. This is left in place for 7-10 days following discharge from the hospital. If your incision is dry without drainage you may shower following your discharge home. After 7-10 days your aquacel dressing should be removed for showering. It is fine to have water run over the incision. Do not vigorously scrub your incision. Apply a clean, dry dressing after you have dried your incision. Do not take a bath or get into a swimming pool / [employer] until you follow up with Dr. Marjorie Camarillo. Do not soak your incision under water. If there is continued drainage or you are concerned contact Dr Vero Marrero office prior to showering [phone] . Diet:  You may advance to your regular diet as tolerated. Increase your clear liquid intake for the next 2-3 days. Medication:      1. You will be given prescriptions for pain medication when you are discharged from the hospital. The side effects of these medications can be substantial and the narcotic medications are not mandatory. You may substitute these medications with Tylenol or Alleve / Motrin. 2.  Please use the medications as prescribed. Pain medications may cause constipation- Colace twice daily and Miralax one scoop daily while taking the narcotic medication should help prevent constipation. Please discuss with your local pharmacist regarding increasing this dosage if constipation persists. Other possible side effects of pain medication are dizziness, headache, nausea, vomiting, and urinary retention.   Discontinue the pain medication if you develop itching, rash, shortness of breath, or difficulties swallowing. If these symptoms become severe or are not relieved by discontinuing the medication, you should seek immediate medical attention. 3. Refills of pain medication are authorized during office hours only (8 AM- 5 PM  Monday thru Friday). Many of these medication will require you or a family member to pick-up a physical prescription at the office. 4. Medications other than antiinflammatories will not be called into the pharmacy after business hours. 5. You may resume the medication(s) you were taking prior to your surgery. Narcotics may change the effects of some antidepressant medication(s). If you have any questions about possible interactions between your regular medications and the pain medication, you should ask the pharmacist or contact the prescribing physician. 6. If you have constipation which is not improved by oral stool softeners then a Ducolax suppository should be purchased over the counter. 7. Continue the blood thinner (Aspirin or Lovenox) for a total of 30 days following surgery. Follow up appointment:    Please call our office at [phone] for your follow up appointment. This should be scheduled 10 days following the date of surgery. Physical Therapy / Nursing:    Physical Therapy following surgery will be arranged at home along with at home nursing care. They have specific instructions for rehab and wound care. .     Returning to work:    Normal return to work is 3-12 weeks following total hip replacement. Depending on your progression following surgery and specific job duties you may take longer for a full return to work. DRIVING    You should not return to driving until you are off all narcotic pain medications and able to safely and quickly apply the brakes. This is normally 3-6 weeks for left hips and 4-8 weeks for right hip.      Important Signs and Symptoms:    If any of the following signs or symptoms occur, you should contact Dr. Naeem Alanis office. Please be advised if a problem arises which you feel requires immediate medical attention or you are unable to contact Dr. Naeem Alanis office you should seek immediate medical attention at the ER or other health care facility you have access to.    1. A sudden increase in swelling and/or redness or warmth at the area your surgery was performed which isnt relieved by rest, ice, and elevation. 2. Oral temperature greater than 101 degrees for 12 hours or more which isnt relieved by an increase in fluid intake and taking 2 Tylenol every 4-6 hours. 3. Excessive drainage from your incisions, or drainage which hasnt stopped by 72 hours after your surgery. 4. Fever, chills, shortness of breath, chest pain, nausea, vomiting or other signs and symptoms which are of concern to you. frequently asked questions   What should I take for pain?  o In general you will be discharged with three medications for pain (Extra Strength Tylenol, Oxycodone 5mg and Tramadol). This may vary slightly depending on what you were taking in the hospital. USE ICE over the incision consistently to help with swelling. This is the most valuable modality to help with pain control. - 1st Line - Extra Strength Tylenol 1-2 tablets (500-1000mg) every 4-6 hrs.  After 2 days this dose should not exceed 8 tablets per day   This is the first and only medication you need to take. Initially you may need 2 tablets every 4 hours, but as your pain subsides, this will taper to 1 tablet every 6 hours. - 2nd Line - Tramadol 50mg (1 tablet) every 8 hours  - 3rd Line - Oxycodone 1 (5mg) - 2 (10mg) every 4 hours (Or as directed), take these between Percocet doses if your pain is not below 4 / 10. This may be needed only for several days following your discharge.    - 4th Line - Add Alleve 220mg every 12 hours or Motrin 400mg (200mg x 2) every 8 hours   When should I call for advice regarding my pain?  o After 12 hours on the above regimen, if nothing is working call the office ([phone] or [phone]) or call my cell after hours [phone].  Can I get refills?  o Narcotic refills are provided for the first 6 weeks following surgery. - I will generally try to taper down to a single narcotic medication by your two week appointment. o Try Tylenol 650mg along with Alleve 220mg or Motrin 200mg during the majority of the day. - Save the narcotic pain medications for physical therapy (1 hour prior) and before sleeping at night. - Keep in mind you need to discontinue these medications prior to returning to driving.  Is swelling normal?  o Almost everyone has some degree of swelling following surgery. o Following hip and knee replacement surgery, swelling can be normal below the incision for the first 1-2 weeks. - This swelling peaks around 5-7 days after surgery.   - You may have some bruising around the back of the thigh, calf and even into the foot.  What should I do for the swelling?  o Keep the limb elevated. o Apply compression socks (knee high for total knees and up to the mid-thigh for total hips.   o Heat or ice may be applied, choose the modality that makes you the most comfortable.  How long should I remain on blood thinners following surgery?  o Thirty days   When can I drive?  o Once you have stopped using regular narcotic pain medications (Percocet, Lortab, etc.) and can safely apply the brakes without hesitation.  When can I shower? o 72hours following surgery if the incision is dry.  o No submersion of the incision, bathing or swimming for 14 days following surgery or until cleared by Dr Lake Polanco.  What do I do with the dressing when I shower?  o The dressing can be removed. o The incision is sealed with Dermabond (Biologic glue) and except for wounds which are draining should be watertight.  How active should I be following surgery?   o Progress activities in moderation at your own pace.   o Walk each day and set progressive goals with small increments (1st week - ½ block of walking, 2nd week - 1 block, 3rd week - 2 blocks, etc.)    Please do not hesitate to call me at [phone] (cell phone) for questions following surgery - Carlus Duffel, MD Veta Holter, MD  Cell [phone]  Joaquin Aguilera PA-C  Cell [phone]  Medical Staff : Jones Amador  Office : [phone]

## 2018-05-07 NOTE — PROGRESS NOTES
Problem: Mobility Impaired (Adult and Pediatric)  Goal: *Acute Goals and Plan of Care (Insert Text)  Physical Therapy Goals  Initiated 5/7/2018    1. Patient will move from supine to sit and sit to supine  in bed with independence within 4 days. 2. Patient will perform sit to stand with modified independence within 4 days. 3. Patient will ambulate with modified independence for 100 feet with the least restrictive device within 4 days. 4. Patient will ascend/descend 4 stairs with 1 handrail(s) with minimal assistance/contact guard assist within 4 days. 5. Patient will verbalize and demonstrate understanding of anterior precautions per protocol within 4 days. 6. Patient will perform total hip home exercise program per protocol with independence within 4 days. physical Therapy EVALUATION  Patient: Magy Humphrey (76 y.o. male)  Date: 5/7/2018  Primary Diagnosis: DJD LEFT HIP   Primary osteoarthritis of left hip  Procedure(s) (LRB):  LEFT TOTAL HIP ARTHROPLASTY DIRECT ANTERIOR MAKOPLASTY (Left) Day of Surgery   Precautions:   WBAT, Fall, Total hip (anterior approach)    ASSESSMENT :  Based on the objective data described below, the patient presents with decreased ROM and strength to LLE, decreased bed mobility, transfers and functional ambulation following admission for left THR, anterior approach via makoplasty. PT educated patient and his wife regarding anterior hip precautions, a handout provided, and they expressed understanding. Patient was able to do bed exercises, then stood and ambulated 20 feet in room with rolling walker and min assist of 2. Patient is steady on his feet and vitals stable. He lives with his wife in a two story home. He will need a rolling walker and OP PT upon discharge. Anticipate that he will clear for discharge from a PT standpoint tomorrow after AM session. Patient will benefit from skilled intervention to address the above impairments.   Patients rehabilitation potential is considered to be Good  Factors which may influence rehabilitation potential include:   [x]         None noted  []         Mental ability/status  []         Medical condition  []         Home/family situation and support systems  []         Safety awareness  []         Pain tolerance/management  []         Other:      PLAN :  Recommendations and Planned Interventions:  [x]           Bed Mobility Training             []    Neuromuscular Re-Education  [x]           Transfer Training                   []    Orthotic/Prosthetic Training  [x]           Gait Training                         []    Modalities  [x]           Therapeutic Exercises           []    Edema Management/Control  []           Therapeutic Activities            []    Patient and Family Training/Education  []           Other (comment):    Frequency/Duration: Patient will be followed by physical therapy  twice daily to address goals. Discharge Recommendations: Outpatient  Further Equipment Recommendations for Discharge: rolling walker     SUBJECTIVE:   Patient stated I need to get back out on the golf course.     OBJECTIVE DATA SUMMARY:   HISTORY:    Past Medical History:   Diagnosis Date    Arthritis     Dyslipidemia     Essential hypertension     Frequent PVCs     GERD (gastroesophageal reflux disease)     H/O seasonal allergies     Mitral valve prolapse     pt states Dr. Tyrese Bui is not sure if this is the correct diagnosis    Obesity 05/02/2018    Obesity  BMI= 32.6     Past Surgical History:   Procedure Laterality Date    COLONOSCOPY N/A 6/2/2017    COLONOSCOPY performed by Lashawn Paniagua MD at [facility]  6/2/2017         EGD  6/2/2017         HX HERNIA REPAIR      times 2. 1980's inguinal bilateral    HX OTHER SURGICAL  1977    Repair of the right shoulder.      HX TONSILLECTOMY      childhood    UPPER GI ENDOSCOPY,PABLO ROLAND  7/14/2017          Prior Level of Function/Home Situation: independent  Personal factors and/or comorbidities impacting plan of care: none    Home Situation  Home Environment: Private residence  # Steps to Enter: 3  Rails to Enter: Yes  One/Two Story Residence: Two story  # of Interior Steps: 15  Interior Rails: Both  Lift Chair Available: No  Living Alone: No  Support Systems: Spouse/Significant Other/Partner  Patient Expects to be Discharged to[de-identified] Private residence  Current DME Used/Available at Home: Jasiel Fuad, straight, Grab bars, Raised toilet seat, Shower chair    EXAMINATION/PRESENTATION/DECISION MAKING:   Critical Behavior:  Neurologic State: Alert  Orientation Level: Oriented X4  Cognition: Appropriate for age attention/concentration, Appropriate safety awareness, Follows commands  Safety/Judgement: Good awareness of safety precautions  Hearing:     Skin:  Not fully observed    Range Of Motion:  AROM: Within functional limits (except LLE due to surgery)                       Strength:    Strength: Within functional limits (except LLE due to surgery)                    Tone & Sensation:   Tone: Normal              Sensation: Intact                     Functional Mobility:  Bed Mobility:     Supine to Sit: Additional time;Contact guard assistance  Sit to Supine: Additional time;Contact guard assistance  Scooting: Modified independent  Transfers:  Sit to Stand: Assist x2;Minimum assistance  Stand to Sit: Assist x2;Contact guard assistance                       Balance:   Sitting: Intact  Standing: Intact; With support  Ambulation/Gait Training:  Distance (ft): 20 Feet (ft)  Assistive Device: Gait belt;Walker, rolling  Ambulation - Level of Assistance: Assist x2;Contact guard assistance        Gait Abnormalities: Step to gait     Left Side Weight Bearing: As tolerated                                           Therapeutic Exercises:    Ankle pumps, quad sets, heel sets, gluteal sets, heel sides, internal rotation, ab/adduction    Functional Measure:  Barthel Index:    Bathing: 0  Bladder: 10  Bowels: 10  Groomin  Dressin  Feeding: 10  Mobility: 0  Stairs: 0  Toilet Use: 5  Transfer (Bed to Chair and Back): 5  Total: 50       Barthel and G-code impairment scale:  Percentage of impairment CH  0% CI  1-19% CJ  20-39% CK  40-59% CL  60-79% CM  80-99% CN  100%   Barthel Score 0-100 100 99-80 79-60 59-40 20-39 1-19   0   Barthel Score 0-20 20 17-19 13-16 9-12 5-8 1-4 0      The Barthel ADL Index: Guidelines  1. The index should be used as a record of what a patient does, not as a record of what a patient could do. 2. The main aim is to establish degree of independence from any help, physical or verbal, however minor and for whatever reason. 3. The need for supervision renders the patient not independent. 4. A patient's performance should be established using the best available evidence. Asking the patient, friends/relatives and nurses are the usual sources, but direct observation and common sense are also important. However direct testing is not needed. 5. Usually the patient's performance over the preceding 24-48 hours is important, but occasionally longer periods will be relevant. 6. Middle categories imply that the patient supplies over 50 per cent of the effort. 7. Use of aids to be independent is allowed. Chi Quinteros., Barthel, D.W. (0036). Functional evaluation: the Barthel Index. [facility] (14)2. Lizzy Malone, ALYSSA.F, Jonelle Christianson., Charis Apley., Emma, [address] [place] [address] (). Measuring the change indisability after inpatient rehabilitation; comparison of the responsiveness of the Barthel Index and Functional Bradford Measure. Journal of Neurology, Neurosurgery, and Psychiatry, 66(4), 614-816. Leland Lennox, N.J.A, RADHA Esteves, & Heidy Sung M.A. (2004.) Assessment of post-stroke quality of life in cost-effectiveness studies: The usefulness of the Barthel Index and the EuroQoL-5D.  Quality of Life Research, 15, 227-89            Physical Therapy Evaluation Charge Determination   History Examination Presentation Decision-Making   LOW Complexity : Zero comorbidities / personal factors that will impact the outcome / POC LOW Complexity : 1-2 Standardized tests and measures addressing body structure, function, activity limitation and / or participation in recreation  LOW Complexity : Stable, uncomplicated  Other outcome measures Barthel  LOW       Based on the above components, the patient evaluation is determined to be of the following complexity level: LOW     Pain:  Pain Scale 1: Numeric (0 - 10)  Pain Intensity 1: 0  Pain Location 1: Hip  Pain Orientation 1: Left     Activity Tolerance:   good  Please refer to the flowsheet for vital signs taken during this treatment. After treatment:   []         Patient left in no apparent distress sitting up in chair  [x]         Patient left in no apparent distress in bed  [x]         Call bell left within reach  [x]         Nursing notified  [x]         Caregiver present  [x]         Bed alarm activated    COMMUNICATION/EDUCATION:   The patients plan of care was discussed with: Registered Nurse. [x]         Fall prevention education was provided and the patient/caregiver indicated understanding. []         Patient/family have participated as able in goal setting and plan of care. [x]         Patient/family agree to work toward stated goals and plan of care. []         Patient understands intent and goals of therapy, but is neutral about his/her participation. []         Patient is unable to participate in goal setting and plan of care.     Thank you for this referral.  Penny Zavala, PT   Time Calculation: 25 mins

## 2018-05-07 NOTE — ANESTHESIA PREPROCEDURE EVALUATION
Anesthetic History   No history of anesthetic complications            Review of Systems / Medical History  Patient summary reviewed, nursing notes reviewed and pertinent labs reviewed    Pulmonary  Within defined limits                 Neuro/Psych   Within defined limits           Cardiovascular    Hypertension        Dysrhythmias : PVC           GI/Hepatic/Renal  Within defined limits              Endo/Other        Obesity and arthritis     Other Findings              Physical Exam    Airway  Mallampati: II  TM Distance: 4 - 6 cm  Neck ROM: normal range of motion   Mouth opening: Normal     Cardiovascular    Rhythm: regular  Rate: normal         Dental  No notable dental hx       Pulmonary  Breath sounds clear to auscultation               Abdominal         Other Findings            Anesthetic Plan    ASA: 2  Anesthesia type: spinal            Anesthetic plan and risks discussed with: Patient           Anesthetic History   No history of anesthetic complications            Review of Systems / Medical History  Patient summary reviewed, nursing notes reviewed and pertinent labs reviewed    Pulmonary  Within defined limits                 Neuro/Psych   Within defined limits           Cardiovascular  Within defined limits  Hypertension              Exercise tolerance: >4 METS     GI/Hepatic/Renal  Within defined limits              Endo/Other  Within defined limits           Other Findings              Physical Exam    Airway  Mallampati: II    Neck ROM: normal range of motion   Mouth opening: Normal     Cardiovascular  Regular rate and rhythm,  S1 and S2 normal,  no murmur, click, rub, or gallop  Rhythm: regular  Rate: normal         Dental  No notable dental hx       Pulmonary  Breath sounds clear to auscultation               Abdominal  GI exam deferred       Other Findings            Anesthetic Plan    ASA: 2  Anesthesia type: MAC          Induction: Intravenous  Anesthetic plan and risks discussed with: Patient          Anesthetic History   No history of anesthetic complications            Review of Systems / Medical History  Patient summary reviewed, nursing notes reviewed and pertinent labs reviewed    Pulmonary  Within defined limits                 Neuro/Psych   Within defined limits           Cardiovascular  Within defined limits  Hypertension              Exercise tolerance: >4 METS     GI/Hepatic/Renal  Within defined limits              Endo/Other  Within defined limits           Other Findings              Physical Exam    Airway  Mallampati: II    Neck ROM: normal range of motion   Mouth opening: Normal     Cardiovascular  Regular rate and rhythm,  S1 and S2 normal,  no murmur, click, rub, or gallop  Rhythm: regular  Rate: normal         Dental  No notable dental hx       Pulmonary  Breath sounds clear to auscultation               Abdominal  GI exam deferred       Other Findings            Anesthetic Plan    ASA: 2  Anesthesia type: MAC          Induction: Intravenous  Anesthetic plan and risks discussed with: Patient

## 2018-05-07 NOTE — IP AVS SNAPSHOT
[facility] 
 
 
 [address] 
[phone] Patient: Shonda Moncada MRN: [id number] PNX:[id number] A check malathi indicates which time of day the medication should be taken. My Medications START taking these medications Instructions Each Dose to Equal  
 Morning Noon Evening Bedtime  
 acetaminophen 500 mg tablet Commonly known as:  [address] [facility] Your last dose was: Your next dose is: Take 1-2 Tabs by mouth every six (6) hours as needed for Pain. Not to exceed 4,000mg in any 24 hour period  Indications: Pain 500-1000 mg  
    
   
   
   
  
 ondansetron 8 mg disintegrating tablet Commonly known as:  ZOFRAN ODT Your last dose was: Your next dose is: Take 0.5 Tabs by mouth every eight (8) hours as needed for Nausea. 4 mg  
    
   
   
   
  
 oxyCODONE IR 5 mg immediate release tablet Commonly known as:  Buren [place] Your last dose was: Your next dose is: Take 1-2 Tabs by mouth every four (4) hours as needed for Pain. Max Daily Amount: 60 mg. Indications: Pain 5-10 mg  
    
   
   
   
  
 traMADol 50 mg tablet Commonly known as:  ULTRAM  
   
Your last dose was: Your next dose is: Take 1 Tab by mouth every six (6) hours as needed for Pain (Take for breakthrough pain if Oxycodone is not working). Max Daily Amount: 200 mg. Indications: Pain, Post-op Pain, Diagnosis Hip and Knee Arthritis ICD 10 - M16.9  
 50 mg CHANGE how you take these medications Instructions Each Dose to Equal  
 Morning Noon Evening Bedtime  
 aspirin delayed-release 325 mg tablet What changed:   
- medication strength 
- how much to take - when to take this Your last dose was: Your next dose is: Take 1 Tab by mouth two (2) times a day.   
 325 mg  
    
   
   
   
  
 metoprolol succinate 25 mg XL tablet Commonly known as:  TOPROL-XL What changed:  when to take this Your last dose was: Your next dose is: Take 0.5 Tabs by mouth daily. 12.5 mg  
    
   
   
   
  
  
CONTINUE taking these medications Instructions Each Dose to Equal  
 Morning Noon Evening Bedtime B COMPLEX PO Your last dose was: Your next dose is: Take  by mouth daily. CLARINEX PO Your last dose was: Your next dose is: Take  by mouth. FISH OIL PO Your last dose was: Your next dose is: Take 1,200 mg by mouth two (2) times a day. 1200 mg  
    
   
   
   
  
 flecainide 50 mg tablet Commonly known as:  TAMBOCOR Your last dose was: Your next dose is: Take 1 Tab by mouth two (2) times a day. 50 mg  
    
   
   
   
  
 GLUCOSAMINE PO Your last dose was: Your next dose is: Take 1,500 mg by mouth two (2) times a day. 1500 mg  
    
   
   
   
  
 losartan-hydroCHLOROthiazide 50-12.5 mg per tablet Commonly known as:  HYZAAR Your last dose was: Your next dose is: Take 1 Tab by mouth daily. Indications: hypertension 1 Tab  
    
   
   
   
  
 meloxicam 15 mg tablet Commonly known as:  MOBIC Your last dose was: Your next dose is: Take 1 Tab by mouth daily. 15 mg  
    
   
   
   
  
 multivitamin tablet Commonly known as:  ONE A DAY Your last dose was: Your next dose is: Take 1 Tab by mouth daily. 1 Tab Omeprazole delayed release 20 mg tablet Commonly known as:  PRILOSEC D/R Your last dose was: Your next dose is: Take 20 mg by mouth every morning. 20 mg  
    
   
   
   
  
 simvastatin 10 mg tablet Commonly known as:  ZOCOR  
   
 Your last dose was: Your next dose is: Take 1 Tab by mouth nightly. 10 mg  
    
   
   
   
  
 VITAMIN D3 PO Your last dose was: Your next dose is: Take 1 Tab by mouth daily. 1 Tab Where to Get Your Medications Information on where to get these meds will be given to you by the nurse or doctor. ! Ask your nurse or doctor about these medications  
  acetaminophen 500 mg tablet  
 aspirin delayed-release 325 mg tablet  
 ondansetron 8 mg disintegrating tablet  
 oxyCODONE IR 5 mg immediate release tablet  
 traMADol 50 mg tablet

## 2018-05-07 NOTE — PROGRESS NOTES
Problem: Hip Replacement: Day of Surgery/Unit  Goal: *Initiate mobility  Outcome: Progressing Towards Goal  PT consult ordered, patient OOB and beginning ambulation today.   Goal: *Optimal pain control at patient's stated goal  Outcome: Progressing Towards Goal  Tolerating prn analgesia regime, currently pain score = 0.

## 2018-05-07 NOTE — ANESTHESIA POSTPROCEDURE EVALUATION
Post-Anesthesia Evaluation and Assessment    Patient: Babak Carpio MRN: [id number]  SSN: [id number]    YOB: 1950  Age: 75 y.o. Sex: male       Cardiovascular Function/Vital Signs  Visit Vitals    /79    Pulse 66    Temp 36.5 °C (97.7 °F)    Resp 12    Ht 6' 1\" (1.854 m)    Wt 111.1 kg (244 lb 14.9 oz)    SpO2 92%    BMI 32.31 kg/m2       Patient is status post No value filed. anesthesia for Procedure(s):  LEFT TOTAL HIP ARTHROPLASTY DIRECT ANTERIOR MAKOPLASTY. Nausea/Vomiting: None    Postoperative hydration reviewed and adequate. Pain:  Pain Scale 1: Numeric (0 - 10) (05/07/18 1018)  Pain Intensity 1: 0 (05/07/18 1018)   Managed    Neurological Status:   Neuro (WDL): Exceptions to WDL (05/07/18 1018)  Neuro  Neurologic State: Alert (05/07/18 1018)  LUE Motor Response: Purposeful (05/07/18 1018)  LLE Motor Response: Numbness;Weak (05/07/18 1018)  RUE Motor Response: Purposeful (05/07/18 1018)  RLE Motor Response: Numbness;Weak (05/07/18 1018)   At baseline    Mental Status and Level of Consciousness: Arousable    Pulmonary Status:   O2 Device: CO2 nasal cannula (05/07/18 1018)   Adequate oxygenation and airway patent    Complications related to anesthesia: None    Post-anesthesia assessment completed.  No concerns    Signed By: Tessa Crum MD     May 7, 2018

## 2018-05-07 NOTE — PROGRESS NOTES
5/7/2018  3:03 PM  Reason for Admission:   Lt Hip OA/Arthroplasty                   RRAT Score:          7           Plan for utilizing home health:      None; Pt  is set up w/ Outpatient PT at [facility] Friday 5/11/19 at 8:00 AM                    Likelihood of Readmission:  Green/Low                         Transition of Care Plan:       Pt will d/c to home when stable w/ Outpatient PT beginning 5/11/18 [facility]. Pt will have family assistance from wife at home. CM met w/ Pt to begin discharge planning, charted demographics verified, lives w/ wife June (C) [phone] in 2 story home w/ 4 entry steps and 13 steps with railings  to bed/bath . PTA independent w/ ADL's and drives. PPC is MD SESAR Malone notified of admission; Rx prefers [facility] or [employer] short term. Pt has not had HH; DME owns SP cane. Current plan is d/c to home when stable and cleared by PT; Pt will begin Outpatient PT at [facility] 5/11/18 @ 8:00 AM.  Pt';s wife will transport at d/c.   CM will follow and assist .  Christin Ott

## 2018-05-07 NOTE — ROUTINE PROCESS
TRANSFER - OUT REPORT:    Verbal report given to Glenroy Butler RN on Jacelyn Pallas  being transferred to [address] [place] [address] for routine post - op       Report consisted of patients Situation, Background, Assessment and   Recommendations(SBAR). Information from the following report(s) SBAR was reviewed with the receiving nurse. Lines:   Peripheral IV 05/07/18 Left Hand (Active)   Site Assessment Clean, dry, & intact 5/7/2018 11:29 AM   Phlebitis Assessment 0 5/7/2018 11:29 AM   Infiltration Assessment 0 5/7/2018 11:29 AM   Dressing Status Clean, dry, & intact 5/7/2018 11:29 AM   Dressing Type Transparent 5/7/2018 11:29 AM   Hub Color/Line Status Pink; Infusing 5/7/2018 11:29 AM        Opportunity for questions and clarification was provided.       Patient transported with:   Registered Nurse

## 2018-05-07 NOTE — ANESTHESIA PROCEDURE NOTES
Spinal Block    Start time: 5/7/2018 7:28 AM  End time: 5/7/2018 7:34 AM  Performed by: Jose Luis Dhaliwal  Authorized by: Adrianne Rose     Pre-procedure:   Indications: at surgeon's request and primary anesthetic  Preanesthetic Checklist: patient identified, risks and benefits discussed, anesthesia consent and timeout performed    Timeout Time: 07:28          Spinal Block:   Patient Position:  Seated  Prep Region:  Lumbar  Prep: chlorhexidine      Location:  L3-4  Technique:  Single shot        Needle:   Needle Type:  Pencan  Needle Gauge:  25 G  Attempts:  1      Events: CSF confirmed, no blood with aspiration and no paresthesia        Assessment:  Insertion:  Uncomplicated  Patient tolerance:  Patient tolerated the procedure well with no immediate complications

## 2018-05-07 NOTE — IP AVS SNAPSHOT
Jose Casanova 
 
 
 [address] [facility] 
[phone] Patient: Jacelyn Pallas MRN: [id number] UOP:[id number] About your hospitalization You were admitted on: May 7, 2018 You last received care in the:  SF 4M POST SURG ORT 2 You were discharged on: May 8, 2018 Why you were hospitalized Your primary diagnosis was:  Primary Osteoarthritis Of Left Hip Follow-up Information Follow up With Details Comments Contact Info MD Tomy Bowers [facility] [address] [place] [address] 
[phone] Discharge Orders None A check malathi indicates which time of day the medication should be taken. My Medications START taking these medications Instructions Each Dose to Equal  
 Morning Noon Evening Bedtime  
 acetaminophen 500 mg tablet Commonly known as:  Jr Michael Geiger  Your last dose was:  11:45 am  
Your next dose is:  6 pm tonight Take 1-2 Tabs by mouth every six (6) hours as needed for Pain. Not to exceed 4,000mg in any 24 hour period  Indications: Pain 500-1000 mg  
    
   
   
   
  
 ondansetron 8 mg disintegrating tablet Commonly known as:  ZOFRAN ODT Notes to Patient:  As needed Take 0.5 Tabs by mouth every eight (8) hours as needed for Nausea. 4 mg  
    
   
   
   
  
 oxyCODONE IR 5 mg immediate release tablet Commonly known as:  Twdavid Irvin Notes to Patient:  As needed Take 1-2 Tabs by mouth every four (4) hours as needed for Pain. Max Daily Amount: 60 mg. Indications: Pain 5-10 mg  
    
   
   
   
  
 traMADol 50 mg tablet Commonly known as:  ULTRAM  
Notes to Patient:  As needed Take 1 Tab by mouth every six (6) hours as needed for Pain (Take for breakthrough pain if Oxycodone is not working). Max Daily Amount: 200 mg. Indications: Pain, Post-op Pain, Diagnosis Hip and Knee Arthritis ICD 10 - M16.9 50 mg CHANGE how you take these medications Instructions Each Dose to Equal  
 Morning Noon Evening Bedtime  
 aspirin delayed-release 325 mg tablet What changed:   
- medication strength 
- how much to take - when to take this Your last dose was:  11:40 am  
Your next dose is:  6pm  
   
 Take 1 Tab by mouth two (2) times a day. 325 mg  
    
   
   
   
  
 metoprolol succinate 25 mg XL tablet Commonly known as:  TOPROL-XL What changed:  when to take this Your next dose is:  9 pm today Take 0.5 Tabs by mouth daily. 12.5 mg  
    
   
   
   
  
  
CONTINUE taking these medications Instructions Each Dose to Equal  
 Morning Noon Evening Bedtime B COMPLEX PO Notes to Patient:  Your home schedule Take  by mouth daily. CLARINEX PO Notes to Patient:  Your home schedule Take  by mouth. FISH OIL PO Notes to Patient:  Your home schedule Take 1,200 mg by mouth two (2) times a day. 1200 mg  
    
   
   
   
  
 flecainide 50 mg tablet Commonly known as:  TAMBOCOR Take 1 Tab by mouth two (2) times a day. 50 mg  
    
   
   
   
  
 GLUCOSAMINE PO Notes to Patient:  Your home schedule Take 1,500 mg by mouth two (2) times a day. 1500 mg  
    
   
   
   
  
 losartan-hydroCHLOROthiazide 50-12.5 mg per tablet Commonly known as:  HYZAAR Take 1 Tab by mouth daily. Indications: hypertension 1 Tab  
    
   
   
   
  
 meloxicam 15 mg tablet Commonly known as:  MOBIC Notes to Patient:  Your home schedule Take 1 Tab by mouth daily. 15 mg  
    
   
   
   
  
 multivitamin tablet Commonly known as:  ONE A DAY Notes to Patient:  Your home schedule Take 1 Tab by mouth daily. 1 Tab Omeprazole delayed release 20 mg tablet Commonly known as:  PRILOSEC D/R Notes to Patient:  Your home schedule Take 20 mg by mouth every morning. 20 mg  
    
   
   
   
  
 simvastatin 10 mg tablet Commonly known as:  ZOCOR Notes to Patient:  9 pm tonight Take 1 Tab by mouth nightly. 10 mg  
    
   
   
   
  
 VITAMIN D3 PO Take 1 Tab by mouth daily. 1 Tab Where to Get Your Medications Information on where to get these meds will be given to you by the nurse or doctor. ! Ask your nurse or doctor about these medications  
  acetaminophen 500 mg tablet  
 aspirin delayed-release 325 mg tablet  
 ondansetron 8 mg disintegrating tablet  
 oxyCODONE IR 5 mg immediate release tablet  
 traMADol 50 mg tablet Opioid Education Prescription Opioids: What You Need to Know: 
 
Prescription opioids can be used to help relieve moderate-to-severe pain and are often prescribed following a surgery or injury, or for certain health conditions. These medications can be an important part of treatment but also come with serious risks. Opioids are strong pain medicines. Examples include hydrocodone, oxycodone, fentanyl, and morphine. Heroin is an example of an illegal opioid. It is important to work with your health care provider to make sure you are getting the safest, most effective care. WHAT ARE THE RISKS AND SIDE EFFECTS OF OPIOID USE? Prescription opioids carry serious risks of addiction and overdose, especially with prolonged use. An opioid overdose, often marked by slow breathing, can cause sudden death. The use of prescription opioids can have a number of side effects as well, even when taken as directed. · Tolerance-meaning you might need to take more of a medication for the same pain relief · Physical dependence-meaning you have symptoms of withdrawal when the medication is stopped. Withdrawal symptoms can include nausea, sweating, chills, diarrhea, stomach cramps, and muscle aches.   Withdrawal can last up to several weeks, depending on which drug you took and how long you took it. · Increased sensitivity to pain · Constipation · Nausea, vomiting, and dry mouth · Sleepiness and dizziness · Confusion · Depression · Low levels of testosterone that can result in lower sex drive, energy, and strength · Itching and sweating RISKS ARE GREATER WITH:      
· History of drug misuse, substance use disorder, or overdose · Mental health conditions (such as depression or anxiety) · Sleep apnea · Older age (72 years or older) · Pregnancy Avoid alcohol while taking prescription opioids. Also, unless specifically advised by your health care provider, medications to avoid include: · Benzodiazepines (such as Xanax or Valium) · Muscle relaxants (such as Soma or Flexeril) · Hypnotics (such as Ambien or Lunesta) · Other prescription opioids KNOW YOUR OPTIONS Talk to your health care provider about ways to manage your pain that don't involve prescription opioids. Some of these options may actually work better and have fewer risks and side effects. Options may include: 
· Pain relievers such as acetaminophen, ibuprofen, and naproxen · Some medications that are also used for depression or seizures · Physical therapy and exercise · Counseling to help patients learn how to cope better with triggers of pain and stress. · Application of heat or cold compress · Massage therapy · Relaxation techniques Be Informed Make sure you know the name of your medication, how much and how often to take it, and its potential risks & side effects. IF YOU ARE PRESCRIBED OPIOIDS FOR PAIN: 
· Never take opioids in greater amounts or more often than prescribed. Remember the goal is not to be pain-free but to manage your pain at a tolerable level. · Follow up with your primary care provider to: · Work together to create a plan on how to manage your pain. · Talk about ways to help manage your pain that don't involve prescription opioids. · Talk about any and all concerns and side effects. · Help prevent misuse and abuse. · Never sell or share prescription opioids · Help prevent misuse and abuse. · Store prescription opioids in a secure place and out of reach of others (this may include visitors, children, friends, and family). · Safely dispose of unused/unwanted prescription opioids: Find your community drug take-back program or your pharmacy mail-back program, or flush them down the toilet, following guidance from the Food and Drug Administration (www.fda.gov/Drugs/ResourcesForYou). · Visit www.cdc.gov/drugoverdose to learn about the risks of opioid abuse and overdose. · If you believe you may be struggling with addiction, tell your health care provider and ask for guidance or call [employer] at 3-419-659-UBPW. Discharge Instructions Nutrition Recommendations for Discharge:          
Increase your protein intake for 30 days to promote healing. Common protein rich foods include: meat, fish, yogurt, milk, cheese, eggs, soy, and nut products. If you are having difficulty consuming adequate protein, you may want to consider a protein supplement. One option for is Ensure Active High Protein for Muscle Health 1-2 bottles per day for 30 days unless otherwise directed by your Primary Care Physician. This product can be purchased at your local grocery store or drug store and online. Magalie Hahn RD 
  
 
 
 
TOTAL HIP DISCHARGE INSTRUCTIONS Patient: Brigitte Hein MRN: [id number]  SSN: [id number] Please take the time to review the following instructions before you leave the hospital and use them as guidelines during your recovery from surgery. If you have any questions you may contact my office at [phone]. After hours or during the weekend you may reach me personally at [phone] if there is an emergency. SPECIAL INSTRUCTIONS :  
1. Do not bend greater than 90 degrees at the hip for 4 weeks following your discharge 2. Avoid exercises or activities which bring the leg out or away from the mid-line of the body. The surgical repair involves this muscle and it will require 4 weeks to heal. You may disregard these instructions for a direct anterior approach. 3. You may walk as tolerated and are encouraged to work daily on progressing your activities with a walker initially. 4. You may transition to a cane for walking 5-7 days from surgery once you feel safe. You may use a walker for longer periods if you feel unstable. Wound Care/ Dressing Changes: DRESSING :  
Aquacel Dressing : This will be removed in the office 10 days after the date of your surgery. If there is no drainage, then a simple dressing may be used or no dressing at all. Other dressing options can be purchased over the counter at a local pharmacy or medical supply vendor. A porous adhesive dressing such as pictured above can be purchased at a local [employer] or [employer]. You only need to keep the incision covered for 7 days after showers. A dressing may be used for longer if there are issues with clothing clinging to the incision. Showering/ Bathing: You may shower with the aquacel dressing in place. This is left in place for 7-10 days following discharge from the hospital. If your incision is dry without drainage you may shower following your discharge home. After 7-10 days your aquacel dressing should be removed for showering. It is fine to have water run over the incision. Do not vigorously scrub your incision. Apply a clean, dry dressing after you have dried your incision. Do not take a bath or get into a swimming pool / [employer] [place] until you follow up with Dr. Lake Polanco. Do not soak your incision under water. If there is continued drainage or you are concerned contact Dr Kristie May office prior to showering [phone] . Diet: 
You may advance to your regular diet as tolerated. Increase your clear liquid intake for the next 2-3 days. Medication: 
 
 
1. You will be given prescriptions for pain medication when you are discharged from the hospital. The side effects of these medications can be substantial and the narcotic medications are not mandatory. You may substitute these medications with Tylenol or Alleve / Motrin. 2.  Please use the medications as prescribed. Pain medications may cause constipation- Colace twice daily and Miralax one scoop daily while taking the narcotic medication should help prevent constipation. Please discuss with your local pharmacist regarding increasing this dosage if constipation persists. Other possible side effects of pain medication are dizziness, headache, nausea, vomiting, and urinary retention. Discontinue the pain medication if you develop itching, rash, shortness of breath, or difficulties swallowing. If these symptoms become severe or are not relieved by discontinuing the medication, you should seek immediate medical attention. 3. Refills of pain medication are authorized during office hours only (8 AM- 5 PM  Monday thru Friday). Many of these medication will require you or a family member to pick-up a physical prescription at the office. 4. Medications other than antiinflammatories will not be called into the pharmacy after business hours. 5. You may resume the medication(s) you were taking prior to your surgery. Narcotics may change the effects of some antidepressant medication(s).   If you have any questions about possible interactions between your regular medications and the pain medication, you should ask the pharmacist or contact the prescribing physician. 6. If you have constipation which is not improved by oral stool softeners then a Ducolax suppository should be purchased over the counter. 7. Continue the blood thinner (Aspirin or Lovenox) for a total of 30 days following surgery. Follow up appointment: 
 
Please call our office at [phone] for your follow up appointment. This should be scheduled 10 days following the date of surgery. Physical Therapy / Nursing: 
 
Physical Therapy following surgery will be arranged at home along with at home nursing care. They have specific instructions for rehab and wound care. .  
 
Returning to work: 
 
Normal return to work is 3-12 weeks following total hip replacement. Depending on your progression following surgery and specific job duties you may take longer for a full return to work. DRIVING You should not return to driving until you are off all narcotic pain medications and able to safely and quickly apply the brakes. This is normally 3-6 weeks for left hips and 4-8 weeks for right hip. Important Signs and Symptoms: 
 
If any of the following signs or symptoms occur, you should contact Dr. Obed Kilgore office. Please be advised if a problem arises which you feel requires immediate medical attention or you are unable to contact Dr. Obed Kilgore office you should seek immediate medical attention at the ER or other health care facility you have access to. 
 
1. A sudden increase in swelling and/or redness or warmth at the area your surgery was performed which isnt relieved by rest, ice, and elevation. 2. Oral temperature greater than 101 degrees for 12 hours or more which isnt relieved by an increase in fluid intake and taking 2 Tylenol every 4-6 hours. 3. Excessive drainage from your incisions, or drainage which hasnt stopped by 72 hours after your surgery.  
4. Fever, chills, shortness of breath, chest pain, nausea, vomiting or other signs and symptoms which are of concern to you. frequently asked questions ? What should I take for pain? 
o In general you will be discharged with three medications for pain (Extra Strength Tylenol, Oxycodone 5mg and Tramadol). This may vary slightly depending on what you were taking in the hospital. USE ICE over the incision consistently to help with swelling. This is the most valuable modality to help with pain control. ? 1st Line  Extra Strength Tylenol 1-2 tablets (500-1000mg) every 4-6 hrs. 
? After 2 days this dose should not exceed 8 tablets per day ? This is the first and only medication you need to take. Initially you may need 2 tablets every 4 hours, but as your pain subsides, this will taper to 1 tablet every 6 hours. ? 2nd Line - Tramadol 50mg (1 tablet) every 8 hours ? 3rd Line  Oxycodone 1 (5mg) - 2 (10mg) every 4 hours (Or as directed), take these between Percocet doses if your pain is not below 4 / 10. This may be needed only for several days following your discharge. ? 4th Line  Add Alleve 220mg every 12 hours or Motrin 400mg (200mg x 2) every 8 hours ? When should I call for advice regarding my pain? 
o After 12 hours on the above regimen, if nothing is working call the office [phone] or [phone]) or call my cell after hours [phone]. 
? Can I get refills? 
o Narcotic refills are provided for the first 6 weeks following surgery. ? I will generally try to taper down to a single narcotic medication by your two week appointment. o Try Tylenol 650mg along with Alleve 220mg or Motrin 200mg during the majority of the day. ? Save the narcotic pain medications for physical therapy (1 hour prior) and before sleeping at night. ? Keep in mind you need to discontinue these medications prior to returning to driving. ? Is swelling normal? 
o Almost everyone has some degree of swelling following surgery. o Following hip and knee replacement surgery, swelling can be normal below the incision for the first 1-2 weeks. ? This swelling peaks around 5-7 days after surgery. ? You may have some bruising around the back of the thigh, calf and even into the foot. ? What should I do for the swelling? 
o Keep the limb elevated. o Apply compression socks (knee high for total knees and up to the mid-thigh for total hips.  
o Heat or ice may be applied, choose the modality that makes you the most comfortable. ? How long should I remain on blood thinners following surgery? 
o Thirty days ? When can I drive? 
o Once you have stopped using regular narcotic pain medications (Percocet, Lortab, etc.) and can safely apply the brakes without hesitation. ? When can I shower? o 72hours following surgery if the incision is dry. 
o No submersion of the incision, bathing or swimming for 14 days following surgery or until cleared by Dr Troy Sanz. ? What do I do with the dressing when I shower? 
o The dressing can be removed. o The incision is sealed with Dermabond (Biologic glue) and except for wounds which are draining should be watertight. ? How active should I be following surgery? o Progress activities in moderation at your own pace.  
o Walk each day and set progressive goals with small increments (1st week  ½ block of walking, 2nd week  1 block, 3rd week  2 blocks, etc.) Please do not hesitate to call me at [phone] (cell phone) for questions following surgery - MD Geraldo Lazo MD 
Cell [phone] Caryl Pedroza PA-C Cell [phone] Medical Staff : Felipe Hammond Office : [phone] ACO Transitions of Care Introducing [facility] offers a voluntary care coordination program to provide high quality service and care to [facility] fee-for-service beneficiaries. Johnnie Laguna was designed to help you enhance your health and well-being through the following services: ? Transitions of Care  support for individuals who are transitioning from one care setting to another (example: Hospital to home). ? Chronic and Complex Care Coordination  support for individuals and caregivers of those with serious or chronic illnesses or with more than one chronic (ongoing) condition and those who take a number of different medications. If you meet specific medical criteria, a [address] [place] [address] Rd may call you directly to coordinate your care with your primary care physician and your other care providers. For questions about the [facility] programs, please, contact your physicians office. For general questions or additional information about Accountable Care Organizations: 
Please visit www.medicare.gov/acos. html or call 1-800-MEDICARE (0-770.561.3742) TTY users should call 1-612.461.3717. Introducing [facility]! Dear Melida Rangel: 
Thank you for requesting a [employer] account. Our records indicate that you already have an active [employer] account. You can access your account anytime at [email or web address] Did you know that you can access your hospital and ER discharge instructions at any time in [employer]? You can also review all of your test results from your hospital stay or ER visit. Additional Information If you have questions, please visit the Frequently Asked Questions section of the [employer] website at [email or web address] Remember, [employer] is NOT to be used for urgent needs. For medical emergencies, dial 911. Now available from your iPhone and Android! Introducing Blayne Haynes As a Marvindon Hoff patient, I wanted to make you aware of our electronic visit tool called Blayne Haynes. Megan Hoff 24/7 allows you to connect within minutes with a medical provider 24 hours a day, seven days a week via a mobile device or tablet or logging into a secure website from your computer. You can access [employer] from anywhere in the [place]. A virtual visit might be right for you when you have a simple condition and feel like you just dont want to get out of bed, or cant get away from work for an appointment, when your regular [facility] provider is not available (evenings, weekends or holidays), or when youre out of town and need minor care. Electronic visits cost only $49 and if the [employer] 24/7 provider determines a prescription is needed to treat your condition, one can be electronically transmitted to a nearby pharmacy*. Please take a moment to enroll today if you have not already done so. The enrollment process is free and takes just a few minutes. To enroll, please download the [employer] 24/7 berny to your tablet or phone, or visit [email or web address] to enroll on your computer. And, as an [address] [place] [address] patient with a [employer] account, the results of your visits will be scanned into your electronic medical record and your primary care provider will be able to view the scanned results. We urge you to continue to see your regular [facility] provider for your ongoing medical care. And while your primary care provider may not be the one available when you seek a [employer] virtual visit, the peace of mind you get from getting a real diagnosis real time can be priceless. For more information on [employer], view our Frequently Asked Questions (FAQs) at [email or web address]. Sincerely, 
 
Rachna Le MD 
Chief Medical Officer Cristina Duncan *:  certain medications cannot be prescribed via [employer] Providers Seen During Your Hospitalization Provider Specialty Primary office phone David Apodaca MD Orthopedic Surgery [phone] Your Primary Care Physician (PCP) Primary Care Physician Office Phone Office Fax Cass Clancy [phone] You are allergic to the following No active allergies Recent Documentation Height Weight BMI Smoking Status 1.854 m 111.1 kg 32.31 kg/m2 Former Smoker Emergency Contacts Name Discharge Info Relation Home Work Mobile Van,June DISCHARGE CAREGIVER [3] Spouse [3] [phone] Patient Belongings The following personal items are in your possession at time of discharge: 
  Dental Appliances: None  Visual Aid: Glasses, With patient      Home Medications: None   Jewelry: Ring  Clothing: Footwear, Shirt, Pants, Belt, Undergarments    Other Valuables: None Please provide this summary of care documentation to your next provider. Signatures-by signing, you are acknowledging that this After Visit Summary has been reviewed with you and you have received a copy. Patient Signature:  ____________________________________________________________ Date:  ____________________________________________________________  
  
[facility] Provider Signature:  ____________________________________________________________ Date:  ____________________________________________________________

## 2018-05-07 NOTE — OP NOTES
OPERATIVE REPORT     Admit Date: 5/7/2018  Admit Diagnosis: DJD LEFT HIP   Primary osteoarthritis of left hip  Preoperative Diagnosis: DJD LEFT HIP   Postoperative Diagnosis: DJD LEFT HIP     Procedure: Procedure(s):  LEFT TOTAL HIP ARTHROPLASTY DIRECT ANTERIOR MAKOPLASTY  Surgeon: Isabelle Maza MD  Assistant(s): Pat Schmitz PA-C  Anesthesia: Spinal   Estimated Blood Loss: 200cc  Specimens: * No specimens in log *   Complications: None        INDICATIONS:    The patient is a 75 y.o., male who has complained of a long history of left hip pain / groin pain. The patient has failed conservative treatment to include medical management / therapy and presents for definitive operative care. Informed consent was obtained including a discussion of the risks and benefits, which include, but are not limited to, bleeding, infection, neurovascular damage, wound complications, pain and stiffness in the hip, periprosthetic loosening, fracture, dislocation and venous thrombo-embolic disease, the patient consented for the procedure. DESCRIPTION OF PROCEDURE:       The proper side and hip were identified and signed in the preoperative holding area. All questions were answered. After adequate anesthesia, the hip was prepped and draped in sterile fashion. The contralateral tracking array was placed. The patient was positioned supine on the operating room table. A direct anterior approach was used through skin and the tensor fascia. The interval between the Tensor Fascia and Sartorius was used and retractors were placed in an atraumatic fashion. The lateral femoral circumflex artery and vein were identified and coagulated. The proximal and distal capsule was identified and the anterior capsule excised. A tracking array was placed in the proximal greater Trochanter and just superior to the acetabulum. The leg length and offset were verified with the MAKOplasty.  A standard neck cut was made according to the implant geometry. The femoral head was removed. Further soft tissue was debrided. Acetabular exposure was then obtained and the labrum was excised along with the foveal contents. Tracking points were established and acetabular mapping was performed. Once registration was complete and all soft tissues were removed the planned acetabular reamer was used. Adequate bone was resected according to the preop template. The final cup was then impacted in place with haptic guidance. The femur was then exposed, residual soft tissue was removed and the box osteotome was used along with the entry reamer to open the canal. Sequential broaching was started with the zero broach and broached up to the final implant size. The implant to the final implant size was placed and a trial reduction performed. Leg lengths and offset were verified. Fluoroscopy was used briefly to verify cup placement and stem size. The wound was copiously irrigated and the final head placed. Stability and leg lengths were assessed again and verified fluroscopically. The final implants were irrigated. The interval between the tensor and Sartorius was closed with 0-Vicryl, the sub-Q with 2-0 Vicryl, 4-0 monocryl and dermabond. The pin sites were closed with 4-0 Monocryl. A sterile dressing was applied. The patient was awoken from anesthesia and taken to the recovery room in a stable condiition. OPERATIVE FINDINGS : Severe left hip arthritis    IMPLANTS :     Implant Name Type Inv.  Item Serial No.  Lot No. LRB No. Used Action   SHELL HLE DARCI CLSTR F 60MM -- TRITANIUM - SN/A  SHELL HLE DARCI CLSTR F 60MM -- TRITANIUM N/A [employer] HOW [id number] Left 1 Implanted   INSERT ACET 0DEG 36MM F X3 -- TRIDENT - SN/A  INSERT ACET 0DEG 36MM F X3 -- TRIDENT N/A [employer] [facility] [id number] Left 1 Implanted   STEM FEM SZ 7 [id number] -- ACCOLADE II V40 - SN/A  STEM FEM SZ 7 127D [id number] -- ACCOLADE II V40 N/A [employer] HOW [id number] Left 1 Implanted   HEAD FEM DELT V40 +7MM NK 36MM -- V40 BIOLOX - SN/A   HEAD FEM DELT V40 +7MM NK 36MM -- V40 BIOLOX N/A [employer] [facility] [id number] Left 1 Implanted       JUSTIFICATION FOR SURGICAL ASSISTANT:   Surgical Assistant, was requried and necessary in this case, to help with soft tissue retraction, extremity positioning, equiment management, implant management, and wound closure.       Malorie Lee MD

## 2018-05-08 VITALS
WEIGHT: 244.93 LBS | SYSTOLIC BLOOD PRESSURE: 124 MMHG | HEART RATE: 66 BPM | BODY MASS INDEX: 32.46 KG/M2 | TEMPERATURE: 97.8 F | RESPIRATION RATE: 16 BRPM | DIASTOLIC BLOOD PRESSURE: 79 MMHG | OXYGEN SATURATION: 97 % | HEIGHT: 73 IN

## 2018-05-08 LAB
ANION GAP SERPL CALC-SCNC: 8 MMOL/L (ref 5–15)
BUN SERPL-MCNC: 24 MG/DL (ref 6–20)
BUN/CREAT SERPL: 21 (ref 12–20)
CALCIUM SERPL-MCNC: 8.6 MG/DL (ref 8.5–10.1)
CHLORIDE SERPL-SCNC: 105 MMOL/L (ref 97–108)
CO2 SERPL-SCNC: 25 MMOL/L (ref 21–32)
CREAT SERPL-MCNC: 1.12 MG/DL (ref 0.7–1.3)
GLUCOSE SERPL-MCNC: 129 MG/DL (ref 65–100)
HGB BLD-MCNC: 10.7 G/DL (ref 12.1–17)
POTASSIUM SERPL-SCNC: 4.7 MMOL/L (ref 3.5–5.1)
SODIUM SERPL-SCNC: 138 MMOL/L (ref 136–145)

## 2018-05-08 PROCEDURE — 74011250636 HC RX REV CODE- 250/636: Performed by: PHYSICIAN ASSISTANT

## 2018-05-08 PROCEDURE — 97165 OT EVAL LOW COMPLEX 30 MIN: CPT

## 2018-05-08 PROCEDURE — 97530 THERAPEUTIC ACTIVITIES: CPT

## 2018-05-08 PROCEDURE — 85018 HEMOGLOBIN: CPT | Performed by: PHYSICIAN ASSISTANT

## 2018-05-08 PROCEDURE — 80048 BASIC METABOLIC PNL TOTAL CA: CPT | Performed by: PHYSICIAN ASSISTANT

## 2018-05-08 PROCEDURE — 97535 SELF CARE MNGMENT TRAINING: CPT

## 2018-05-08 PROCEDURE — 74011250637 HC RX REV CODE- 250/637: Performed by: PHYSICIAN ASSISTANT

## 2018-05-08 PROCEDURE — 36415 COLL VENOUS BLD VENIPUNCTURE: CPT | Performed by: PHYSICIAN ASSISTANT

## 2018-05-08 PROCEDURE — 97116 GAIT TRAINING THERAPY: CPT

## 2018-05-08 RX ADMIN — FAMOTIDINE 20 MG: 20 TABLET ORAL at 09:41

## 2018-05-08 RX ADMIN — ACETAMINOPHEN 650 MG: 325 TABLET ORAL at 05:53

## 2018-05-08 RX ADMIN — SODIUM CHLORIDE 125 ML/HR: 900 INJECTION, SOLUTION INTRAVENOUS at 06:48

## 2018-05-08 RX ADMIN — ASPIRIN 325 MG: 325 TABLET, DELAYED RELEASE ORAL at 09:41

## 2018-05-08 RX ADMIN — PANTOPRAZOLE SODIUM 40 MG: 40 TABLET, DELAYED RELEASE ORAL at 05:53

## 2018-05-08 RX ADMIN — CELECOXIB 200 MG: 100 CAPSULE ORAL at 09:41

## 2018-05-08 RX ADMIN — FLECAINIDE ACETATE 50 MG: 50 TABLET ORAL at 09:41

## 2018-05-08 RX ADMIN — STANDARDIZED SENNA CONCENTRATE AND DOCUSATE SODIUM 1 TABLET: 8.6; 5 TABLET, FILM COATED ORAL at 09:41

## 2018-05-08 RX ADMIN — POLYETHYLENE GLYCOL (3350) 17 G: 17 POWDER, FOR SOLUTION ORAL at 09:43

## 2018-05-08 RX ADMIN — Medication 10 ML: at 05:54

## 2018-05-08 RX ADMIN — Medication 2 G: at 05:54

## 2018-05-08 RX ADMIN — ACETAMINOPHEN 650 MG: 325 TABLET ORAL at 11:47

## 2018-05-08 NOTE — PROGRESS NOTES
Discussed discharge instructions with patient and his wife. They verbalized understanding. Copy given. Five prescriptions given to patient. JOAQUÍN LABOY'd. Discharged via wheelchair.

## 2018-05-08 NOTE — PROGRESS NOTES
Problem: Falls - Risk of  Goal: *Absence of Falls  Document Yocasta Fall Risk and appropriate interventions in the flowsheet.   Outcome: Progressing Towards Goal  Fall Risk Interventions:  Mobility Interventions: Patient to call before getting OOB         Medication Interventions: Patient to call before getting OOB    Elimination Interventions: Call light in reach

## 2018-05-08 NOTE — PROGRESS NOTES
Nutrition Assessment:    RECOMMENDATIONS/INTERVENTION(S):   1. Continue regular diet, encouraging protein intake and balanced meals. 2. If intake declines, may need to add 1-2 Ensure High Protein per day. 3 RD will continue to monitor patient and be available if needs arise prior to discharge. ASSESSMENT:   5/8: Received MD consult for general nutrition management and supplements in patient s/p left hip arthroplasty. PMHx of GERD, arthritis and dyslipidemia. BG controlled and A1c 5.4. Patient reports good appetite and intake both pre and post surgery. Patient denied any GI distress or weight changes. Nursing also reports a good appetite. At home, patient states he and his wife have chicken, tilapia, rice and vegetables such as broccoli for meals. Author and patient discussed nutrition needs for wound healing, including protein intake, balanced meals, and supplement options. At this time, it appears the patient is able to meet his nutritional needs without the support of supplements. SUBJECTIVE/OBJECTIVE:   Diet Order: Regular  % Eaten:  No data found.     Pertinent Medications: [x] Reviewed-125 ml/hr NS IVF, bowel regimen   Past Medical History:   Diagnosis Date    Arthritis     Dyslipidemia     Essential hypertension     Frequent PVCs     GERD (gastroesophageal reflux disease)     H/O seasonal allergies     Mitral valve prolapse     pt states Dr. Christine Marrufo is not sure if this is the correct diagnosis    Obesity 05/02/2018    Obesity  BMI= 32.6       Chemistries:  Lab Results   Component Value Date/Time    Sodium 138 05/08/2018 02:19 AM    Potassium 4.7 05/08/2018 02:19 AM    Chloride 105 05/08/2018 02:19 AM    CO2 25 05/08/2018 02:19 AM    Anion gap 8 05/08/2018 02:19 AM    Glucose 129 (H) 05/08/2018 02:19 AM    BUN 24 (H) 05/08/2018 02:19 AM    Creatinine 1.12 05/08/2018 02:19 AM    BUN/Creatinine ratio 21 (H) 05/08/2018 02:19 AM    GFR est AA >60 05/08/2018 02:19 AM    GFR est non-AA >60 05/08/2018 02:19 AM    Calcium 8.6 05/08/2018 02:19 AM    AST (SGOT) 21 05/02/2018 09:27 AM    Alk. phosphatase 74 05/02/2018 09:27 AM    Protein, total 7.1 05/02/2018 09:27 AM    Albumin 4.0 05/02/2018 09:27 AM    Globulin 3.1 05/02/2018 09:27 AM    A-G Ratio 1.3 05/02/2018 09:27 AM    ALT (SGPT) 51 05/02/2018 09:27 AM      Anthropometrics: Height: 6' 1\" (185.4 cm) Weight: 111.1 kg (244 lb 14.9 oz)    IBW (%IBW): 83.6 kg (184 lb 4.9 oz) (132.89 %) UBW (%UBW): 111.1 kg (245 lb) (99.97 %)    BMI: Body mass index is 32.31 kg/(m^2). This BMI is indicative of:   [] Underweight    [] Normal    [] Overweight    [x]  Obesity    []  Extreme Obesity (BMI>40)  Estimated Nutrition Needs (Based on): 2516 Kcals/day (BMR 1935 x 1.3 AF) , 111 g (-133g (1.0-1.2g/kg actual BW)) Protein  Carbohydrate: At Least 130 g/day  Fluids: 2500 mL/day (1 ml/kcal)    Last BM: 5/6   [x]Active     []Hyperactive  []Hypoactive       [] Absent   BS  Skin:    [] Intact   [x] Incision-left hip  [] Breakdown   [] DTI   [] Tears/Excoriation/Abrasion  []Edema [] Other:    Wt Readings from Last 30 Encounters:   05/08/18 111.1 kg (244 lb 14.9 oz)   05/02/18 111.9 kg (246 lb 11.1 oz)   04/24/18 110.2 kg (243 lb)   04/12/18 113.4 kg (250 lb)   03/22/18 111.6 kg (246 lb)   03/20/18 110.2 kg (243 lb)   10/06/17 106.9 kg (235 lb 9.6 oz)   07/14/17 104.3 kg (230 lb)   06/02/17 104.8 kg (231 lb)   05/19/17 104.8 kg (231 lb)   05/18/17 105.6 kg (232 lb 12.8 oz)   04/25/17 104.3 kg (230 lb)   03/22/17 107 kg (236 lb)   03/20/17 104.3 kg (230 lb)   02/16/17 105.8 kg (233 lb 4.8 oz)   01/19/17 103.7 kg (228 lb 9.6 oz)   01/04/17 105.1 kg (231 lb 12.8 oz)      NUTRITION DIAGNOSES:   Problem:  Increased nutrient needs     Etiology: related to increased protein needs with wound healing     Signs/Symptoms: as evidenced by impaired skin integrity with left hip incision      NUTRITION INTERVENTIONS:  Meals/Snacks: General/healthful diet, educated on nutrient needs for wound healing                  GOAL:   Patient to continue to consume 1 protein item with meals and >/= 75% of meals and snack in the next 5-7 days    Cultural, Restorationism, or Ethnic Dietary Needs: None     EDUCATION & DISCHARGE NEEDS:    [] None Identified   [x] Identified and Education Provided/Documented   [] Identified and Pt declined/was not appropriate      [x] Interdisciplinary Care Plan Reviewed/Documented    [x] Discharge Needs: See discharge instructions   [] No Nutrition Related Discharge Needs    NUTRITION RISK:   Pt Is At Nutrition Risk  [x]     No Nutrition Risk Identified  []       PT SEEN FOR:    [x]  MD Consult: []Calorie Count      []Diabetic Diet Education        []Diet Education     []Electrolyte Management     [x]General Nutrition Management and Supplements     []Management of Tube Feeding     []TPN Recommendations    []  RN Referral:  []MST score >=2     []Enteral/Parenteral Nutrition PTA     []Pregnant: Gestational DM or Multigestation                 [] Pressure Ulcer    []  Low BMI      []  Length of Stay       [] Dysphagia Diet         [] Ventilator  []  Follow-up     Previous Recommendations:   [] Implemented          [] Not Implemented          [x] Not Applicable    Previous Goal:   [] Met              [] Progressing Towards Goal              [] Not Progressing Towards Goal   [x] Not Applicable             Vicente Reynoso, [address] [place] [address]  Pager [phone]   Office [phone]

## 2018-05-08 NOTE — PROGRESS NOTES
Problem: Hip Replacement: Post Op Day 1  Goal: *Demonstrates progressive activity  Outcome: Progressing Towards Goal  OOBC, ambulating well with Rehab. Will clear PT today. Goal: *Optimal pain control at patient's stated goal  Outcome: Progressing Towards Goal  Tolerating prn analgesic regime, current pain = 2. Goal: *Discharge plan identified  Outcome: Progressing Towards Goal  Plan for discharge to home later today when clears Rehab, pt will have OP PT.

## 2018-05-08 NOTE — PROGRESS NOTES
Occupational Therapy EVALUATION/discharge  Patient: Adonis Mendoza (47 y.o. male)  Date: 5/8/2018  Primary Diagnosis: DJD LEFT HIP   Primary osteoarthritis of left hip  Procedure(s) (LRB):  LEFT TOTAL HIP ARTHROPLASTY DIRECT ANTERIOR MAKOPLASTY (Left) 1 Day Post-Op   Precautions:  Fall, WBAT    ASSESSMENT:   Cleared by RN to see pt for therapy session. Based on the objective data described below, the patient presents with mildly decreased balance and endurance, and decreased LLE ROM and strength following admission for L HAWA (anterior, makoplasty). Pt lives with his wife, previously I with ADLs and functional mobility, enjoys playing sports. Received returning to room after walking with PT, agreeable to participate, wife present. Reviewed anterior hip precautions and pt recalled 3/3 without prompting. Performed functional transfers with supervision using RW, no unsteadiness noted and pt with good understanding of step sequence. Performed LB dressing with supervision-mod A, pt's wife assisting with doffing socks. Educated pt on use of LB AE to assist with dressing tasks and he demonstrated understanding, already has some AE at home. Pt performed toilet hygiene with supervision and standing grooming ADL with supervision and education on safe use of RW during standing ADLs. Discussed car transfers, shower transfers, and appropriate sitting surfaces at home. Pt in chair at end of session with wife present, and they had no further questions or concerns for OT. Wife will be available 24/7 to assist as needed. Further skilled acute occupational therapy is not indicated at this time. Recommend outpatient OT at discharge. Discharge Recommendations: Outpatient  Further Equipment Recommendations for Discharge: none      SUBJECTIVE:   Patient stated I used to ski a lot, that's probably how I wore out my hip .     OBJECTIVE DATA SUMMARY:   HISTORY:   Past Medical History:   Diagnosis Date    Arthritis     Dyslipidemia  Essential hypertension     Frequent PVCs     GERD (gastroesophageal reflux disease)     H/O seasonal allergies     Mitral valve prolapse     pt states Dr. Alee Joseph is not sure if this is the correct diagnosis    Obesity 05/02/2018    Obesity  BMI= 32.6     Past Surgical History:   Procedure Laterality Date    COLONOSCOPY N/A 6/2/2017    [facility]  6/2/2017         EGD  6/2/2017         HX HERNIA REPAIR      times 2. 1980's inguinal bilateral    HX OTHER SURGICAL  1977    Repair of the right shoulder.  HX TONSILLECTOMY      childhood    UPPER GI ENDOSCOPY,PABLO LUND GUIDE  7/14/2017            Prior Level of Function/Environment/Context: Pt lives with his wife, previously I with ADLs and functional mobility, enjoys playing sports. Home Situation  Home Environment: Private residence  # Steps to Enter: 4  Rails to Enter: Yes  Hand Rails : Bilateral  One/Two Story Residence: Two story  # of Interior Steps: 15  Interior Rails: Both  Lift Chair Available: No  Living Alone: No (lives with wife)  Support Systems: Spouse/Significant Other/Partner  Patient Expects to be Discharged to[de-identified] Private residence  Current DME Used/Available at Home: Cane, straight, Grab bars, Raised toilet seat, Shower chair  Tub or Shower Type: Shower  [x]  Right hand dominant   []  Left hand dominant    EXAMINATION OF PERFORMANCE DEFICITS:  Cognitive/Behavioral Status:  Neurologic State: Alert  Orientation Level: Oriented X4  Cognition: Follows commands  Perception: Appears intact  Perseveration: No perseveration noted  Safety/Judgement: Awareness of environment; Fall prevention;Home safety    Hearing:   Auditory  Auditory Impairment: None    Vision/Perceptual:         Acuity: Able to read clock/calendar on wall without difficulty    Corrective Lenses: Glasses    Range of Motion:  AROM: Within functional limits  PROM: Within functional limits Strength:  Strength: Within functional limits    Coordination:  Coordination: Within functional limits  Fine Motor Skills-Upper: Left Intact; Right Intact    Gross Motor Skills-Upper: Left Intact; Right Intact    Tone & Sensation:  Tone: Normal  Sensation: Intact       Balance:  Sitting: Intact  Standing: Intact; With support    Functional Mobility and Transfers for ADLs:  Bed Mobility:   Received in chair and returned to chair. Transfers:  Sit to Stand: Contact guard assistance  Stand to Sit: Contact guard assistance  Toilet Transfer : Supervision; Adaptive equipment; Additional time    ADL Assessment:  Feeding: Independent    Oral Facial Hygiene/Grooming: Independent (supervision in standing)    Bathing: Supervision    Upper Body Dressing: Independent    Lower Body Dressing: Minimum assistance; Adaptive equipment (assistance from wife)    Toileting: Supervision          ADL Intervention and task modifications:    Reviewed anterior hip precautions and pt recalled 3/3 without prompting. Performed LB dressing with supervision-mod A, pt's wife assisting with doffing socks. Educated pt on use of LB AE to assist with dressing tasks and he demonstrated understanding, already has some AE at home. Pt performed toilet hygiene with supervision and standing grooming ADL with supervision and education on safe use of RW during standing ADLs. Discussed car transfers, shower transfers, and appropriate sitting surfaces at home. Grooming  Washing Hands: Supervision/set-up (standing at sink)      Upper Body Dressing Assistance  Pullover Shirt: Independent    Lower Body Dressing Assistance  Protective Undergarmet: Supervision/set-up (using reacher to thread over LEs)  Pants With Elastic Waist: Supervision/set-up (using reacher to thread over LEs)  Socks:  Moderate assistance (wife doffed socks; pt donned with sock aid)  Cues: Verbal cues provided  Adaptive Equipment Used: Reacher;Sock aid    Toileting  Bladder Hygiene: Supervision/set-up    Cognitive Retraining  Safety/Judgement: Awareness of environment; Fall prevention;Home safety      Bathing: Patient instructed when bathing to not submerge wound in water, stand to shower or sponge bathe, cover wound with plastic and tape to ensure no water reaches bandage/wound without cues. Patient indicated understanding. Dressing joint: Patient instructed and demonstrated to don/doff LLE first/last with minimal cues. Patient instructed and demonstrated to don all clothing while sitting prior to standing, doff all clothing to knees while standing, then sit to doff clothing off from knees to feet in order to facilitate fall prevention, pain management, and energy conservation with Supervision. Home safety: Patient instructed on home modifications and safety (raise height of ADL objects, appropriate height of chair surfaces, recliner safety, change of floor surfaces, clear pathways) to increase independence and fall prevention. Patient indicated understanding. Standing: Patient instructed and demonstrated to walk up to sink/counter top/surfaces, step into walker to increase safety of joint and fall prevention with Supervision. Patient educated about hip anatomy verbally and with pictures and educated to avoid internal rotation of LLE. Instructed to apply concept to ADLs within the home (no reaching across body to L side, square off while using objects, slide objects along surfaces). Patient instructed to increase amount of time standing, observe standing position during ADLs in order to increase even weight bearing through bilateral LEs in order to increase independence with ADLs. Goal to be reached 30 days post - op, per orthopedic surgeon or per PT. Patient indicated understanding.     Functional Measure:  Barthel Index:    Bathin  Bladder: 10  Bowels: 10  Groomin  Dressin  Feeding: 10  Mobility: 10  Stairs: 5  Toilet Use: 10  Transfer (Bed to Chair and Back): 10  Total: 75       Barthel and G-code impairment scale:  Percentage of impairment CH  0% CI  1-19% CJ  20-39% CK  40-59% CL  60-79% CM  80-99% CN  100%   Barthel Score 0-100 100 99-80 79-60 59-40 20-39 1-19   0   Barthel Score 0-20 20 17-19 13-16 9-12 5-8 1-4 0      The Barthel ADL Index: Guidelines  1. The index should be used as a record of what a patient does, not as a record of what a patient could do. 2. The main aim is to establish degree of independence from any help, physical or verbal, however minor and for whatever reason. 3. The need for supervision renders the patient not independent. 4. A patient's performance should be established using the best available evidence. Asking the patient, friends/relatives and nurses are the usual sources, but direct observation and common sense are also important. However direct testing is not needed. 5. Usually the patient's performance over the preceding 24-48 hours is important, but occasionally longer periods will be relevant. 6. Middle categories imply that the patient supplies over 50 per cent of the effort. 7. Use of aids to be independent is allowed. Sigrid Godoy., Barthel, D.W. (0536). Functional evaluation: the Barthel Index. [facility] (14)2. ALEXI Vela, Jason Mckeon., Lenord Shone., [address] (1999). Measuring the change indisability after inpatient rehabilitation; comparison of the responsiveness of the Barthel Index and Functional [place] Measure. Journal of Neurology, Neurosurgery, and Psychiatry, 66(4), 212-323. Kathleen Fournier, N.J.A, RADHA Esteves, & Ashly Wiseman MJoseA. (2004.) Assessment of post-stroke quality of life in cost-effectiveness studies: The usefulness of the Barthel Index and the EuroQoL-5D. Quality of Life Research, 13, 494-04       G codes:   In compliance with CMSs Claims Based Outcome Reporting, the following G-code set was chosen for this patient based on their primary functional limitation being treated: The outcome measure chosen to determine the severity of the functional limitation was the Barthel Index with a score of 75/100 which was correlated with the impairment scale. ? Self Care:     - CURRENT STATUS: CJ - 20%-39% impaired, limited or restricted    - GOAL STATUS: CJ - 20%-39% impaired, limited or restricted    - D/C STATUS:  CJ - 20%-39% impaired, limited or restricted     Occupational Therapy Evaluation Charge Determination   History Examination Decision-Making   LOW Complexity : Brief history review  LOW Complexity : 1-3 performance deficits relating to physical, cognitive , or psychosocial skils that result in activity limitations and / or participation restrictions  LOW Complexity : No comorbidities that affect functional and no verbal or physical assistance needed to complete eval tasks       Based on the above components, the patient evaluation is determined to be of the following complexity level: LOW   Pain:  Pain Scale 1: Numeric (0 - 10)  Pain Intensity 1: 2              Activity Tolerance:   Good  Please refer to the flowsheet for vital signs taken during this treatment. After treatment:   [x]  Patient left in no apparent distress sitting up in chair  []  Patient left in no apparent distress in bed  [x]  Call bell left within reach  [x]  Nursing notified  [x]  Caregiver present  []  Bed alarm activated    COMMUNICATION/EDUCATION:   Communication/Collaboration:  [x]      Home safety education was provided and the patient/caregiver indicated understanding. [x]      Patient/family have participated as able and agree with findings and recommendations. []      Patient is unable to participate in plan of care at this time.   Findings and recommendations were discussed with: Physical Therapy Assistant and Registered Nurse    Tracy Hopkins, OT  Time Calculation: 33 mins

## 2018-05-08 NOTE — PROGRESS NOTES
Bedside and Verbal shift change report given to Madeleine Schaefer (oncoming nurse) by Jacob Bruce (offgoing nurse). Report included the following information SBAR, Kardex and Intake/Output.

## 2018-05-08 NOTE — PROGRESS NOTES
Order for rolling walker received. Freedom of choice offered. Pt selected [facility]. RW has been delivered to pt's room. Confirmed with pt his outpatient appointment on 5/11/18 [facility]. Pt's nurse navigator, Darrian Boothe, was notified that pt is being discharged from [facility] today.   Margaret Lindquist LCSW

## 2018-05-08 NOTE — PROGRESS NOTES
Spiritual Care Partner Volunteer visited patient on [address] [facility] on 5/8/18. Documented by:    Óscar Brown M.S.   Spiritual Care Department  If needs arise please call MELISSA ([phone])

## 2018-05-08 NOTE — PROGRESS NOTES
Problem: Mobility Impaired (Adult and Pediatric)  Goal: *Acute Goals and Plan of Care (Insert Text)  Physical Therapy Goals  Initiated 5/7/2018    1. Patient will move from supine to sit and sit to supine  in bed with independence within 4 days. 2. Patient will perform sit to stand with modified independence within 4 days. 3. Patient will ambulate with modified independence for 100 feet with the least restrictive device within 4 days. 4. Patient will ascend/descend 4 stairs with 1 handrail(s) with minimal assistance/contact guard assist within 4 days. 5. Patient will verbalize and demonstrate understanding of anterior precautions per protocol within 4 days. 6. Patient will perform total hip home exercise program per protocol with independence within 4 days. physical Therapy TREATMENT  Patient: Ervin Mendez (69 y.o. male)  Date: 5/8/2018  Diagnosis: DJD LEFT HIP   Primary osteoarthritis of left hip Primary osteoarthritis of left hip  Procedure(s) (LRB):  LEFT TOTAL HIP ARTHROPLASTY DIRECT ANTERIOR MAKOPLASTY (Left) 1 Day Post-Op  Precautions: (P) Fall, WBAT  Chart, physical therapy assessment, plan of care and goals were reviewed. ASSESSMENT:  Pt denies pain, CGA for function gait and tranfers using RW for steadying. Ascend/descend 12 steps using single handrail on R and SPC on L with CGA. Gait training x 120' using RW with CGA. Reviewed hip precautions, HEP and proper car transfers. Wife present throughout session. Pt and spouse verbalized understanding to use RW for all gait and transfers. Pt is cleared for discharge from hospital from PT perspective. RN notified. Progression toward goals:  [x]      Improving appropriately and progressing toward goals  []      Improving slowly and progressing toward goals  []      Not making progress toward goals and plan of care will be adjusted     PLAN:  Patient continues to benefit from skilled intervention to address the above impairments.   Continue treatment per established plan of care. Discharge Recommendations:  Outpatient  Further Equipment Recommendations for Discharge:  rolling walker     SUBJECTIVE:   Patient stated I am doing well, no pain.     OBJECTIVE DATA SUMMARY:   Critical Behavior:  Neurologic State: (P) Alert  Orientation Level: (P) Oriented X4  Cognition: (P) Follows commands  Safety/Judgement: (P) Awareness of environment, Fall prevention, Home safety  Functional Mobility Training:  Bed Mobility:                    Transfers:  Sit to Stand: Contact guard assistance  Stand to Sit: Contact guard assistance                             Balance:  Sitting: Intact  Standing: Intact; With support  Ambulation/Gait Training:  Distance (ft): 120 Feet (ft)  Assistive Device: Walker, rolling;Gait belt                                                  Stairs:  Number of Stairs Trained: 12  Stairs - Level of Assistance: Contact guard assistance  Rail Use: Right  (SPC on L)    Therapeutic Exercises:   SUPINE  EXERCISES   Sets   Reps   Active Active Assist   Passive Self ROM   Comments   Ankle Pumps   [x]                                        []                                        []                                        []                                           Quad Sets   [x]                                        []                                        []                                        []                                           Heel Slides   [x]                                        []                                        []                                        []                                           Hip Abduction   []                                        []                                        []                                        []                                           Glut Sets   []                                        []                                        []                                        [] []                                        []                                        []                                        []                                              []                                        []                                        []                                        []                                             STANDING  EXERCISES   Sets   Reps   Active Active Assist   Passive Self ROM   Comments   Heel Raises   [x]                                        []                                        []                                        []                                           Hip Abduction   [x]                                        []                                        []                                        []                                              []                                        []                                        []                                        []                                              []                                        []                                        []                                        []                                             Pain:  Pain Scale 1: Numeric (0 - 10)  Pain Intensity 1: 2              Activity Tolerance:   Good  Please refer to the flowsheet for vital signs taken during this treatment.   After treatment:   [x] Patient left in no apparent distress sitting up in chair  [] Patient left in no apparent distress in bed  [x] Call bell left within reach  [x] Nursing notified  [x] Caregiver present  [] Bed alarm activated    COMMUNICATION/COLLABORATION:   The patients plan of care was discussed with: Registered Nurse    Kaylie Frausto   Time Calculation: 26 mins

## 2018-05-09 ENCOUNTER — PATIENT OUTREACH (OUTPATIENT)
Dept: INTERNAL MEDICINE CLINIC | Age: 68
End: 2018-05-09

## 2018-05-09 NOTE — PROGRESS NOTES
Hospital Discharge Follow-Up      Date/Time:  2018 11:55 AM    Patient was admitted to [address] [place] [address] on 18 and discharged on 18 for scheduled surgery LEFT TOTAL HIP ARTHROPLASTY DIRECT ANTERIOR MAKOPLASTY. The physician discharge summary was available at the time of outreach. Patient was contacted within 1 business days of discharge. Top Challenges reviewed with the provider   Home Health not ordered, going to outpatient rehab at [facility] of communication with provider :face to face, chart routing, staff message, phone, none    Inpatient RRAT score: 10  Was this a readmission? no   Patient stated reason for the readmission: n/a    Nurse Navigator (NN) contacted the patient by telephone to perform post hospital discharge assessment. Verified name and  with patient as identifiers. Provided introduction to self, and explanation of the Nurse Navigator role. Reviewed discharge instructions and red flags with patient who verbalized understanding. Patient given an opportunity to ask questions and does not have any further questions or concerns at this time. The patient agrees to contact the PCP office for questions related to their healthcare. NN provided contact information for future reference. Summary of patients top problems:  1. Scheduled procedure - LEFT TOTAL HIP ARTHROPLASTY DIRECT ANTERIOR MAKOPLASTY. Pt reports he is doing well except for thigh pain with position change. Pt reports minimal pain otherwise, none at this time. Did not shannon Oxycodone. Denies s/s of infection with surgical site. Ambulating with walker. Patient denies C/P, SOB, cough, wheezing, fever, pain/swelling of legs or feet, N/V, diarrhea, difficulty urinating or constipation BM 18. Appetite/hydration good. Pt using incentive spirometer throughout the day, no issues or concerns. 2. Risk for infection - denies fever or s/s of infection with surgical site.  Will continue to monitor fever daily & site for drainage or foul odor. Home Health orders at discharge: none - will be attending outpatient rehab, declined [facility] services. Home Health company: n/a  Date of initial visit: n/a    Durable Medical Equipment ordered/company: walker  Durable Medical Equipment received: walker    Barriers to care? none    Advance Care Planning:   Does patient have an Advance Directive:  not on file       Medication:     New Medications at Discharge: oxyCODONE IR 5 mg immediate release tablet, traMADol 50 mg tablet,   Changed Medications at Discharge: none  Discontinued Medications at Discharge: none    Medication reconciliation was performed with patient, who verbalizes understanding of administration of home medications. There were no barriers to obtaining medications identified at this time. Referral to Pharm D needed: no     Current Outpatient Prescriptions   Medication Sig    aspirin delayed-release 325 mg tablet Take 1 Tab by mouth two (2) times a day.  traMADol (ULTRAM) 50 mg tablet Take 1 Tab by mouth every six (6) hours as needed for Pain (Take for breakthrough pain if Oxycodone is not working). Max Daily Amount: 200 mg. Indications: Pain, Post-op Pain, Diagnosis Hip and Knee Arthritis ICD 10 - M16.9    acetaminophen (TYLENOL EXTRA STRENGTH) 500 mg tablet Take 1-2 Tabs by mouth every six (6) hours as needed for Pain. Not to exceed 4,000mg in any 24 hour period  Indications: Pain    simvastatin (ZOCOR) 10 mg tablet Take 1 Tab by mouth nightly.  metoprolol succinate (TOPROL-XL) 25 mg XL tablet Take 0.5 Tabs by mouth daily. (Patient taking differently: Take 12.5 mg by mouth nightly.)    losartan-hydroCHLOROthiazide (HYZAAR) 50-12.5 mg per tablet Take 1 Tab by mouth daily. Indications: hypertension    flecainide (TAMBOCOR) 50 mg tablet Take 1 Tab by mouth two (2) times a day.  DESLORATADINE (CLARINEX PO) Take  by mouth.     Omeprazole delayed release (PRILOSEC D/R) 20 mg tablet Take 20 mg by mouth every morning.  multivitamin (ONE A DAY) tablet Take 1 Tab by mouth daily.  VITAMIN B COMPLEX (B COMPLEX PO) Take  by mouth daily.  DOCOSAHEXANOIC ACID/EPA (FISH OIL PO) Take 1,200 mg by mouth two (2) times a day.  GLUCOSAMINE SULFATE (GLUCOSAMINE PO) Take 1,500 mg by mouth two (2) times a day.  CHOLECALCIFEROL, VITAMIN D3, (VITAMIN D3 PO) Take 1 Tab by mouth daily.  oxyCODONE IR (ROXICODONE) 5 mg immediate release tablet Take 1-2 Tabs by mouth every four (4) hours as needed for Pain. Max Daily Amount: 60 mg. Indications: Pain (Patient taking differently: Take 5-10 mg by mouth every four (4) hours as needed for Pain. Indications: DID NOT FILL)    ondansetron (ZOFRAN ODT) 8 mg disintegrating tablet Take 0.5 Tabs by mouth every eight (8) hours as needed for Nausea.  meloxicam (MOBIC) 15 mg tablet Take 1 Tab by mouth daily. (Patient taking differently: Take 15 mg by mouth daily. Indications: D/C)     No current facility-administered medications for this visit. There are no discontinued medications.     PCP/Specialist follow up: Future Appointments  Date Time Provider Kwesi Jessica   10/9/2018 1:40 PM MD KARTHIK Christianson TAMMY SCHED   10/17/2018 9:00 AM MD KARTHIK Real TAMMY SCHED   10/24/2018 9:15 AM Umm Onofre MD [address] [address]      Surgeon - 10 days ( doesn't not know the exact date )  [address] - 5/11/18       Goals      Attends follow-up appointments with surgeon      Understands red flags post discharge related to s/s of infection            Provided [facility] contact information

## 2018-05-10 RX ORDER — METOPROLOL SUCCINATE 25 MG/1
12.5 TABLET, EXTENDED RELEASE ORAL DAILY
Qty: 45 TAB | Refills: 1 | Status: SHIPPED | OUTPATIENT
Start: 2018-05-10 | End: 2019-07-09 | Stop reason: SDUPTHER

## 2018-05-10 RX ORDER — SIMVASTATIN 10 MG/1
10 TABLET, FILM COATED ORAL
Qty: 90 TAB | Refills: 1 | Status: SHIPPED | OUTPATIENT
Start: 2018-05-10 | End: 2019-02-13 | Stop reason: SDUPTHER

## 2018-05-21 NOTE — DISCHARGE SUMMARY
@0VCCQ@ [address] [place] [id number]    DISCHARGE SUMMARY     Patient: Aracelis Corea                             Medical Record Number: [id number]                : 1950  Age: 75 y.o. Admit Date: 2018  Discharge Date: 2018    Admission Diagnosis: DJD LEFT HIP   Primary osteoarthritis of left hip  Discharge Diagnosis: DJD LEFT HIP     Procedures: Procedure(s):  LEFT TOTAL HIP ARTHROPLASTY DIRECT ANTERIOR MAKOPLASTY    Surgeon: Isabelle Maza MD  Assistants: Pat Schmitz PA-C    Anesthesia: spinal  Complications: None     History of Present Illness:  Aracelis Corea is a 75 y.o. male with a history of Left hip pain, swelling, and marked loss of function. Despite conservative management and after clinical and radiographic evaluation, it was determined that he suffered from end-stage osteoarthritis and would benefit from Procedure(s):  [facility], which he consented to undergo after a discussion of the risks, benefits, alternatives, rehab concerns, and potential complications of surgery. Hospital Course:  Leland Keep Thoen tolerated the procedure well. He was transferred  to the recovery room in stable condition. After a brief stay the patient was then transferred to the Joint Replacement Unit at [address] [place] [address]. On postoperative day #1, the dressing was clean and dry, he was neurovascularly intact. The patient was afebrile and vital signs were stable. Calves were soft and non-tender bilaterally. On postoperative day  # 1, the patient was tolerating a regular diet and making satisfactory progress with physical therapy.       Hemoglobin and INR prior to discharge were   Lab Results   Component Value Date/Time    HGB 10.7 (L) 2018 02:19 AM       Aracelis Corea was cleared by physical therapy and was discharged to Home in stable condition on postoperative day 1. He was provided with routine postoperative instructions and advised to follow up in my office in 2 weeks following discharge from the hospital.  He was prescribed aspirin for DVT prophylaxis, tramadol and oxycodone for post-operative pain, dulcolax suppository for constipation, and zofran for nausea. Discharge Medications:  Cannot display discharge medications since this patient is not currently admitted.       Signed by: Kristen Sorensen MD  5/20/2018

## 2018-06-06 ENCOUNTER — PATIENT OUTREACH (OUTPATIENT)
Dept: INTERNAL MEDICINE CLINIC | Age: 68
End: 2018-06-06

## 2018-06-06 NOTE — PROGRESS NOTES
Call placed to pt, ID X 2. Reported doing well, no questions or concerns. Goals      Attends follow-up appointments with surgeon            Attended appt 10 days after d/c & will f/u again 7/5/18      [facility]            Attending outpatient PT rehab at [address] [place] [address] 2 times a week.        Understands red flags post discharge related to s/s of infection            Provided [facility] contact information

## 2018-09-25 ENCOUNTER — OFFICE VISIT (OUTPATIENT)
Dept: CARDIOLOGY CLINIC | Age: 68
End: 2018-09-25

## 2018-09-25 VITALS
WEIGHT: 241 LBS | DIASTOLIC BLOOD PRESSURE: 78 MMHG | HEART RATE: 64 BPM | SYSTOLIC BLOOD PRESSURE: 138 MMHG | BODY MASS INDEX: 31.94 KG/M2 | HEIGHT: 73 IN

## 2018-09-25 DIAGNOSIS — I10 ESSENTIAL HYPERTENSION: Primary | ICD-10-CM

## 2018-09-25 DIAGNOSIS — E78.5 DYSLIPIDEMIA: ICD-10-CM

## 2018-09-25 DIAGNOSIS — I34.1 MITRAL VALVE PROLAPSE: ICD-10-CM

## 2018-09-25 RX ORDER — ASPIRIN 81 MG/1
TABLET ORAL DAILY
COMMUNITY
End: 2021-02-02 | Stop reason: ALTCHOICE

## 2018-09-25 NOTE — PROGRESS NOTES
Visit Vitals    /78    Pulse 64    Ht 6' 1\" (1.854 m)    Wt 241 lb (109.3 kg)    BMI 31.8 kg/m2

## 2018-09-25 NOTE — LETTER
11/6/2018 5:39 PM 
 
Mr. Francy Davis [address] [address] Dear Francy Davis: Please find your most recent results below. Resulted Orders HEPATIC FUNCTION PANEL Result Value Ref Range Protein, total 6.9 6.0 - 8.5 g/dL Albumin 4.5 3.6 - 4.8 g/dL Bilirubin, total 0.6 0.0 - 1.2 mg/dL Bilirubin, direct 0.14 0.00 - 0.40 mg/dL Alk. phosphatase 80 39 - 117 IU/L  
 AST (SGOT) 18 0 - 40 IU/L  
 ALT (SGPT) 20 0 - 44 IU/L Narrative Performed at:  [address]  [id number] : Camila Oconnor MD, Phone:  [phone] LIPID PANEL Result Value Ref Range Cholesterol, total 150 100 - 199 mg/dL Triglyceride 119 0 - 149 mg/dL HDL Cholesterol 38 (L) >39 mg/dL VLDL, calculated 24 5 - 40 mg/dL LDL, calculated 88 0 - 99 mg/dL Narrative Performed at:  [address]  [id number] : Camila Oconnor MD, Phone:  [phone] CVD REPORT Result Value Ref Range INTERPRETATION Note Comment:  
   Supplemental report is available. Narrative Performed at:  [address] [place] [address]  [id number] : Bg Gottlieb MD, Phone:  [phone] RECOMMENDATIONS: 
None. Keep up the good work! Your cholesterol values are good. I would like for you to have your cholesterol checked again in 6 mo. before your next office appointment. Please call me if you have any questions: [phone] Sincerely, 
 
 
Ray Alcala MD

## 2018-09-25 NOTE — Clinical Note
Anne Hawkins, Please send this to the pateint with a copy of test . Also send a copy to the primary care. PLEASE CALL THEM WITH THE REPORT AS WELL AS SENDING THE LETTER. Thanks Jose Raul Wheeler

## 2018-09-25 NOTE — PROGRESS NOTES
LAST OFFICE VISIT : 4/12/2018        ICD-10-CM ICD-9-CM   1. Essential hypertension I10 401.9   2. Mitral valve prolapse I34.1 424.0   3. Dyslipidemia E78.5 272.4            Kristi Pool is a 70 y.o. male with HTN and mitral valve prolapse referred for preoperative cardiac risk stratification. Cardiac risk factors: family history, male gender, hypertension  I have personally obtained the history from the patient. HISTORY OF PRESENTING ILLNESS     Overall the pt states he is doing well. Pt's hip surgery went well and he has been doing well since then. Pt has lost 3 lbs. Pt complains stiffness on his leg, and this has been getting better since hip surgery. Pt's initial knee pain is also ceased after his hip surgery. Pt states that he plays a couple times a week, but would like to exercise more. Pt has EGG tomorrow. The patient denies chest pain/ shortness of breath, orthopnea, PND, LE edema, palpitations, syncope, presyncope or fatigue.          ACTIVE PROBLEM LIST     Patient Active Problem List    Diagnosis Date Noted    Primary osteoarthritis of left hip 05/06/2018    Seasonal allergic rhinitis due to pollen 05/19/2017    Chest tightness 05/18/2017    Mitral valve prolapse 02/16/2017    Colon polyps 01/19/2017    Essential hypertension 01/19/2017           PAST MEDICAL HISTORY     Past Medical History:   Diagnosis Date    Arthritis     Dyslipidemia     Essential hypertension     Frequent PVCs     GERD (gastroesophageal reflux disease)     H/O seasonal allergies     Mitral valve prolapse     pt states Dr. Pierre Evans is not sure if this is the correct diagnosis    Obesity 05/02/2018    Obesity  BMI= 32.6           PAST SURGICAL HISTORY     Past Surgical History:   Procedure Laterality Date    COLONOSCOPY N/A 6/2/2017    COLONOSCOPY performed by Shantel Vang MD at [address]  6/2/2017         EGD  6/2/2017         HX HERNIA REPAIR times 2. 1980's inguinal bilateral    HX OTHER SURGICAL  1977    Repair of the right shoulder.  HX TONSILLECTOMY      childhood    UPPER GI ENDOSCOPY,PABLO LUND GUIDE  7/14/2017               ALLERGIES     No Known Allergies       FAMILY HISTORY     Family History   Problem Relation Age of Onset    Sudden Death Mother      Car accident    Heart Attack Father 48    Heart Disease Father     Thyroid Disease Sister     No Known Problems Brother     No Known Problems Sister     No Known Problems Sister     No Known Problems Brother     No Known Problems Brother     negative for cardiac disease       SOCIAL HISTORY     Social History     Social History    Marital status:      Spouse name: N/A    Number of children: N/A    Years of education: N/A     Social History Main Topics    Smoking status: Former Smoker     Packs/day: 1.00     Years: 30.00     Quit date: 2/1/1997    Smokeless tobacco: Never Used    Alcohol use 3.6 oz/week     3 Glasses of wine, 3 Cans of beer per week    Drug use: No    Sexual activity: Not Asked     Other Topics Concern    None     Social History Narrative         MEDICATIONS     Current Outpatient Prescriptions   Medication Sig    aspirin delayed-release 81 mg tablet Take  by mouth daily.  simvastatin (ZOCOR) 10 mg tablet Take 1 Tab by mouth nightly.  metoprolol succinate (TOPROL-XL) 25 mg XL tablet Take 0.5 Tabs by mouth daily.  acetaminophen (TYLENOL EXTRA STRENGTH) 500 mg tablet Take 1-2 Tabs by mouth every six (6) hours as needed for Pain. Not to exceed 4,000mg in any 24 hour period  Indications: Pain    losartan-hydroCHLOROthiazide (HYZAAR) 50-12.5 mg per tablet Take 1 Tab by mouth daily. Indications: hypertension    flecainide (TAMBOCOR) 50 mg tablet Take 1 Tab by mouth two (2) times a day.  DESLORATADINE (CLARINEX PO) Take  by mouth.  Omeprazole delayed release (PRILOSEC D/R) 20 mg tablet Take 20 mg by mouth every morning.     multivitamin (ONE A DAY) tablet Take 1 Tab by mouth daily.  VITAMIN B COMPLEX (B COMPLEX PO) Take  by mouth daily.  DOCOSAHEXANOIC ACID/EPA (FISH OIL PO) Take 1,200 mg by mouth two (2) times a day.  GLUCOSAMINE SULFATE (GLUCOSAMINE PO) Take 1,500 mg by mouth two (2) times a day.  CHOLECALCIFEROL, VITAMIN D3, (VITAMIN D3 PO) Take 1 Tab by mouth daily.  aspirin delayed-release 325 mg tablet Take 1 Tab by mouth two (2) times a day.  oxyCODONE IR (ROXICODONE) 5 mg immediate release tablet Take 1-2 Tabs by mouth every four (4) hours as needed for Pain. Max Daily Amount: 60 mg. Indications: Pain (Patient taking differently: Take 5-10 mg by mouth every four (4) hours as needed for Pain. Indications: DID NOT FILL)    traMADol (ULTRAM) 50 mg tablet Take 1 Tab by mouth every six (6) hours as needed for Pain (Take for breakthrough pain if Oxycodone is not working). Max Daily Amount: 200 mg. Indications: Pain, Post-op Pain, Diagnosis Hip and Knee Arthritis ICD 10 - M16.9    ondansetron (ZOFRAN ODT) 8 mg disintegrating tablet Take 0.5 Tabs by mouth every eight (8) hours as needed for Nausea.  meloxicam (MOBIC) 15 mg tablet Take 1 Tab by mouth daily. (Patient taking differently: Take 15 mg by mouth daily. Indications: D/C)     No current facility-administered medications for this visit. I have reviewed the nurses notes, vitals, problem list, allergy list, medical history, family, social history and medications. REVIEW OF SYMPTOMS      General: Pt denies excessive weight gain or loss. Pt is able to conduct ADL's  HEENT: Denies blurred vision, headaches, hearing loss, epistaxis and difficulty swallowing. Respiratory: Denies cough, congestion, shortness of breath, OCONNOR, wheezing or stridor.   Cardiovascular: Denies precordial pain, palpitations, edema or PND  Gastrointestinal: Denies poor appetite, indigestion, abdominal pain or blood in stool  Genitourinary: Denies hematuria, dysuria, increased urinary frequency  Musculoskeletal: Denies joint pain or swelling from muscles or joints  Neurologic: Denies tremor, paresthesias, headache, or sensory motor disturbance  Psychiatric: Denies confusion, insomnia, depression  Integumentray: Denies rash, itching or ulcers. Hematologic: Denies easy bruising, bleeding     PHYSICAL EXAMINATION      Vitals:    09/25/18 1203   BP: 138/78   Pulse: 64   Weight: 241 lb (109.3 kg)   Height: 6' 1\" (1.854 m)     General: Well developed, in no acute distress. HEENT: No jaundice, oral mucosa moist, no oral ulcers  Neck: Supple, no stiffness, no lymphadenopathy, supple  Heart:  Normal S1/S2 negative S3 or S4. Regular, no murmur, gallop or rub, no jugular venous distention  Respiratory: Clear bilaterally x 4, no wheezing or rales   Extremities:  No edema, normal cap refill, no cyanosis. Musculoskeletal: No clubbing, no deformities  Neuro: A&Ox3, speech clear, gait stable, cooperative, no focal neurologic deficits  Skin: Skin color is normal. No rashes or lesions. Non diaphoretic, moist.       DIAGNOSTIC DATA     1. Lipids  1/9/17- , HDL 43, , TG 91  5/2/18- , HDL 37, LDL 84,     2. Holter  (1/19/17): sinus rhtyhm with PVCs there were frequent PVCs, rare couplets, triplets and bigeminy with rare episodes of non-sustained VT with rates up to 134.  9/28/17- SB with PVCs, HR 43-91, PVC burden 30.48%  11/8/17- SR HR , PVC burden 5 %    3. Echo  4/19/16- EF 30-44%, MR mild/mod, AI mild, LAE  9/28/17- EF 50-55%, PI mild/mod    4.  Stress Test  Lexiscan- 6/15/17-no ischemia, EF 39%         LABORATORY DATA            Lab Results   Component Value Date/Time    WBC 6.0 05/02/2018 09:27 AM    HGB 10.7 (L) 05/08/2018 02:19 AM    HCT 42.0 05/02/2018 09:27 AM    PLATELET 857 27/63/0056 09:27 AM    MCV 90.7 05/02/2018 09:27 AM      Lab Results   Component Value Date/Time    Sodium 138 05/08/2018 02:19 AM    Potassium 4.7 05/08/2018 02:19 AM    Chloride 105 05/08/2018 02:19 AM    CO2 25 05/08/2018 02:19 AM    Anion gap 8 05/08/2018 02:19 AM    Glucose 129 (H) 05/08/2018 02:19 AM    BUN 24 (H) 05/08/2018 02:19 AM    Creatinine 1.12 05/08/2018 02:19 AM    BUN/Creatinine ratio 21 (H) 05/08/2018 02:19 AM    GFR est AA >60 05/08/2018 02:19 AM    GFR est non-AA >60 05/08/2018 02:19 AM    Calcium 8.6 05/08/2018 02:19 AM    Bilirubin, total 0.4 05/02/2018 09:27 AM    AST (SGOT) 21 05/02/2018 09:27 AM    Alk. phosphatase 74 05/02/2018 09:27 AM    Protein, total 7.1 05/02/2018 09:27 AM    Albumin 4.0 05/02/2018 09:27 AM    Globulin 3.1 05/02/2018 09:27 AM    A-G Ratio 1.3 05/02/2018 09:27 AM    ALT (SGPT) 51 05/02/2018 09:27 AM           ASSESSMENT/RECOMMENDATIONS:.        1. Cardiomyopathy  - EF is within normal limits. He is NYHA Class I  2. PVCs  - No complains of irregularity in rhythm. 3. HTN  - Blood pressure is under reasonably good control, no adjustments in antihypertensives. Continue low sodium diet. 4. Dyslipidemia   - Lipids are at goal on current medical regimen. Will give him a lab slip to recheck his cholesterol in 6 months. 5. Possible esophageal stricture   - Pt has stricture and is having it dilated tomorrow. 6. Return in 6 months or PRN. Orders Placed This Encounter    HEPATIC FUNCTION PANEL    LIPID PANEL    aspirin delayed-release 81 mg tablet     Sig: Take  by mouth daily. Follow-up Disposition: Not on File      I have discussed the diagnosis with  Elenita Tong and the intended plan as seen in the above orders. Questions were answered concerning future plans. I have discussed medication side effects and warnings with the patient as well. Thank you,  Jacques Woodward MD for involving me in the care of  Elenita Tong. Please do not hesitate to contact me for further questions/concerns. Written by Pau Perez, as dictated by Monserrat Brannon MD.     Teagan Lomeli MD, [facility] [place]    Patient Care Team:  Yaya Benson Nelson Law MD as PCP - General (Internal Medicine)  Janett Meehan MD (General Surgery)  Mohan Givens MD (Orthopedic Surgery)  Jessy Best RN as Ambulatory Care Navigator (Internal Medicine)    [address]     [address]      [phone] / [phone] Fax

## 2018-09-26 ENCOUNTER — ANESTHESIA EVENT (OUTPATIENT)
Dept: ENDOSCOPY | Age: 68
End: 2018-09-26
Payer: MEDICARE

## 2018-09-26 ENCOUNTER — ANESTHESIA (OUTPATIENT)
Dept: ENDOSCOPY | Age: 68
End: 2018-09-26
Payer: MEDICARE

## 2018-09-26 ENCOUNTER — HOSPITAL ENCOUNTER (OUTPATIENT)
Age: 68
Setting detail: OUTPATIENT SURGERY
Discharge: HOME | End: 2018-09-26
Attending: INTERNAL MEDICINE | Admitting: INTERNAL MEDICINE
Payer: MEDICARE

## 2018-09-26 VITALS
RESPIRATION RATE: 15 BRPM | HEIGHT: 72 IN | DIASTOLIC BLOOD PRESSURE: 89 MMHG | HEART RATE: 77 BPM | WEIGHT: 235 LBS | TEMPERATURE: 97.6 F | SYSTOLIC BLOOD PRESSURE: 133 MMHG | BODY MASS INDEX: 31.83 KG/M2 | OXYGEN SATURATION: 97 %

## 2018-09-26 PROCEDURE — 76040000019: Performed by: INTERNAL MEDICINE

## 2018-09-26 PROCEDURE — C1726 CATH, BAL DIL, NON-VASCULAR: HCPCS | Performed by: INTERNAL MEDICINE

## 2018-09-26 PROCEDURE — 77030018712 HC DEV BLLN INFL BSC -B: Performed by: INTERNAL MEDICINE

## 2018-09-26 PROCEDURE — 74011250636 HC RX REV CODE- 250/636: Performed by: INTERNAL MEDICINE

## 2018-09-26 PROCEDURE — 76060000031 HC ANESTHESIA FIRST 0.5 HR: Performed by: INTERNAL MEDICINE

## 2018-09-26 PROCEDURE — 74011250636 HC RX REV CODE- 250/636

## 2018-09-26 RX ORDER — SODIUM CHLORIDE 9 MG/ML
50 INJECTION, SOLUTION INTRAVENOUS CONTINUOUS
Status: DISCONTINUED | OUTPATIENT
Start: 2018-09-26 | End: 2018-09-26 | Stop reason: HOSPADM

## 2018-09-26 RX ORDER — MIDAZOLAM HYDROCHLORIDE 1 MG/ML
.25-5 INJECTION, SOLUTION INTRAMUSCULAR; INTRAVENOUS
Status: DISCONTINUED | OUTPATIENT
Start: 2018-09-26 | End: 2018-09-26 | Stop reason: HOSPADM

## 2018-09-26 RX ORDER — EPINEPHRINE 0.1 MG/ML
1 INJECTION INTRACARDIAC; INTRAVENOUS
Status: DISCONTINUED | OUTPATIENT
Start: 2018-09-26 | End: 2018-09-26 | Stop reason: HOSPADM

## 2018-09-26 RX ORDER — ATROPINE SULFATE 0.1 MG/ML
0.5 INJECTION INTRAVENOUS
Status: DISCONTINUED | OUTPATIENT
Start: 2018-09-26 | End: 2018-09-26 | Stop reason: HOSPADM

## 2018-09-26 RX ORDER — SODIUM CHLORIDE 9 MG/ML
INJECTION, SOLUTION INTRAVENOUS
Status: DISCONTINUED | OUTPATIENT
Start: 2018-09-26 | End: 2018-09-26 | Stop reason: HOSPADM

## 2018-09-26 RX ORDER — DEXTROMETHORPHAN/PSEUDOEPHED 2.5-7.5/.8
1.2 DROPS ORAL
Status: DISCONTINUED | OUTPATIENT
Start: 2018-09-26 | End: 2018-09-26 | Stop reason: HOSPADM

## 2018-09-26 RX ORDER — LIDOCAINE HYDROCHLORIDE 20 MG/ML
INJECTION, SOLUTION EPIDURAL; INFILTRATION; INTRACAUDAL; PERINEURAL AS NEEDED
Status: DISCONTINUED | OUTPATIENT
Start: 2018-09-26 | End: 2018-09-26 | Stop reason: HOSPADM

## 2018-09-26 RX ORDER — NALOXONE HYDROCHLORIDE 0.4 MG/ML
0.4 INJECTION, SOLUTION INTRAMUSCULAR; INTRAVENOUS; SUBCUTANEOUS
Status: DISCONTINUED | OUTPATIENT
Start: 2018-09-26 | End: 2018-09-26 | Stop reason: HOSPADM

## 2018-09-26 RX ORDER — IBUPROFEN 400 MG/1
400 TABLET ORAL
COMMUNITY
End: 2018-12-10

## 2018-09-26 RX ORDER — FLUMAZENIL 0.1 MG/ML
0.2 INJECTION INTRAVENOUS
Status: DISCONTINUED | OUTPATIENT
Start: 2018-09-26 | End: 2018-09-26 | Stop reason: HOSPADM

## 2018-09-26 RX ORDER — PROPOFOL 10 MG/ML
INJECTION, EMULSION INTRAVENOUS AS NEEDED
Status: DISCONTINUED | OUTPATIENT
Start: 2018-09-26 | End: 2018-09-26 | Stop reason: HOSPADM

## 2018-09-26 RX ADMIN — PROPOFOL 50 MG: 10 INJECTION, EMULSION INTRAVENOUS at 09:01

## 2018-09-26 RX ADMIN — PROPOFOL 50 MG: 10 INJECTION, EMULSION INTRAVENOUS at 09:03

## 2018-09-26 RX ADMIN — SODIUM CHLORIDE: 9 INJECTION, SOLUTION INTRAVENOUS at 08:30

## 2018-09-26 RX ADMIN — LIDOCAINE HYDROCHLORIDE 60 MG: 20 INJECTION, SOLUTION EPIDURAL; INFILTRATION; INTRACAUDAL; PERINEURAL at 09:01

## 2018-09-26 RX ADMIN — SODIUM CHLORIDE 50 ML/HR: 900 INJECTION, SOLUTION INTRAVENOUS at 08:00

## 2018-09-26 NOTE — ROUTINE PROCESS
Enoch Arias 1950 
[id number] Situation: 
Verbal report received from: [facility] Procedure: Procedure(s): ESOPHAGOGASTRODUODENOSCOPY (EGD) ESOPHAGEAL DILATION Background: 
 
Preoperative diagnosis: DYSPHAGIA, ACID REFLUX Postoperative diagnosis: Esophageal Stricture :  Dr. Rafy Michel Assistant(s): Endoscopy Technician-1: Carlos Huang Endoscopy RN-1: Marianne Crawley RN Specimens: * No specimens in log * H. Pylori  no Assessment: 
Intra-procedure medications Anesthesia gave intra-procedure sedation and medications, see anesthesia flow sheet yes Intravenous fluids: NS@ Taylor Galea Vital signs stable Abdominal assessment: round and soft Recommendation: 
Discharge patient per MD order. Family or Friend Permission to share finding with family or friend yes

## 2018-09-26 NOTE — ANESTHESIA PREPROCEDURE EVALUATION
Anesthetic History No history of anesthetic complications Review of Systems / Medical History Patient summary reviewed, nursing notes reviewed and pertinent labs reviewed Pulmonary Within defined limits Neuro/Psych Within defined limits Cardiovascular Hypertension Dysrhythmias (pvcs) Hyperlipidemia Exercise tolerance: >4 METS Comments:    
metoprolol succinate (TOPROL-XL) 25 mg XL tablet 9/25/2018 at 2200 
 
mvp? GI/Hepatic/Renal 
  
GERD Endo/Other Arthritis Other Findings Physical Exam 
 
Airway Mallampati: I 
TM Distance: < 4 cm Neck ROM: normal range of motion Mouth opening: Normal 
 
 Cardiovascular Regular rate and rhythm,  S1 and S2 normal,  no murmur, click, rub, or gallop Rhythm: regular Rate: normal 
 
 
 
 Dental 
 
Dentition: Caps/crowns Pulmonary Breath sounds clear to auscultation Abdominal 
GI exam deferred Other Findings Anesthetic Plan ASA: 2 Anesthesia type: MAC Anesthetic plan and risks discussed with: Patient

## 2018-09-26 NOTE — ANESTHESIA POSTPROCEDURE EVALUATION
Post-Anesthesia Evaluation and Assessment Patient: Gerda Duran MRN: [id number]  SSN: [id number] YOB: 1950  Age: 70 y.o. Sex: male Cardiovascular Function/Vital Signs Visit Vitals  BP 92/71  Pulse 86  Temp 36.4 °C (97.6 °F)  Resp 13  Ht 6' (1.829 m)  Wt 106.6 kg (235 lb)  SpO2 96%  BMI 31.87 kg/m2 Patient is status post MAC anesthesia for Procedure(s): ESOPHAGOGASTRODUODENOSCOPY (EGD) ESOPHAGEAL DILATION. Nausea/Vomiting: None Postoperative hydration reviewed and adequate. Pain: 
Pain Scale 1: Numeric (0 - 10) (09/26/18 3689) Pain Intensity 1: 0 (09/26/18 8632) Managed Neurological Status: At baseline Mental Status and Level of Consciousness: Arousable Pulmonary Status:  
O2 Device: Room air (09/26/18 0663) Adequate oxygenation and airway patent Complications related to anesthesia: None Post-anesthesia assessment completed. No concerns Signed By: Baudilio Phan MD   
 September 26, 2018

## 2018-09-26 NOTE — PROCEDURES
[address]  [address] [facility], [address] [facility]       [phone]                   2018                EGD Operative Report  Mireille Wood  :  1950  [employer] Medical Record Number:  [id number]      Indication:  Dysphagia/odynophagia, GERD     : Ubaldo Guzman MD    Referring Provider:  Sherry Sung MD      Anesthesia/Sedation:  MAC anesthesia Propofol    Airway assessment: No airway problems anticipated    Pre-Procedural Exam:      Airway: clear, no airway problems anticipated  Heart: RRR, without gallops or rubs  Lungs: clear bilaterally without wheezes, crackles, or rhonchi  Abdomen: soft, nontender, nondistended, bowel sounds present  Mental Status: awake, alert and oriented to person, place and time       Procedure Details     After infomed consent was obtained for the procedure, with all risks and benefits of procedure explained the patient was taken to the endoscopy suite and placed in the left lateral decubitus position. Following sequential administration of sedation as per above, the endoscope was inserted into the mouth and advanced under direct vision to second portion of the duodenum. A careful inspection was made as the gastroscope was withdrawn, including a retroflexed view of the proximal stomach; findings and interventions are described below. Findings:   Esophagus:normal mucosa. Distal GEJ stricture noted  ( mild) Dilation performed with TTS dilator 15-18 mm diameter  Stomach: normal . 5 cm sliding hiatal hernia noted. Duodenum/jejunum: normal    Therapies:  esophageal dilation with 15-18 mm  sized balloon    Specimens: as above           Complications:   None; patient tolerated the procedure well. EBL:  None. Impression:   Large hiatal hernia                          Distal esophageal stricture    Recommendations:    -Acid suppression with a proton pump inhibitor. ,   -GERD diet  -No NSAIDS  -Follow up for dilation as needed    Nabil Weir MD

## 2018-09-26 NOTE — PROGRESS NOTES
Assumed care of patient from anesthesia. Endoscope was pre-cleaned at bedside immediately following procedure by Arabella Mcdonald. Report received from [address] [facility] See anesthesia flow-sheet for procedural sedation and vital signs. No respiratory distress. Abdomen without distention. Stable for transfer to recovery per anesthesia. CRE balloon dilatation of the esophagus 15 mm Balloon inflated to 3 ATMs and held for 15 seconds. 16.5 mm Balloon inflated to 4.5 ATMs and held for 15 seconds. 18 mm Balloon inflated to 7 ATMs and held for 30 seconds. No subcutaneous crepitus of the chest or cervical region was noted post dilatation.

## 2018-09-26 NOTE — H&P
[address] [facility] [address] [facility], [address] [facility] 
[phone] History and Physical  
 
NAME: Maida Marti :  1950 MRN:  [id number] HPI:   Pt scheduled for f/u of grade B esophagitis and Schatzki's ring. Pt needs f/u egd for possible dilation and check healing Past Surgical History:  
Procedure Laterality Date  COLONOSCOPY N/A 2017 COLONOSCOPY performed by Leonora Cornell MD at [address] [place] [address] 10  COLONOSCOPY,DIAGNOSTIC  2017  
    
 EGD  2017  HX HERNIA REPAIR    
 times 2. 1980's inguinal bilateral  
 HX OTHER SURGICAL  1977 Repair of the right shoulder.  HX TONSILLECTOMY childhood  UPPER GI ENDOSCOPY,TREATELVI W GUIDE  2017 Past Medical History:  
Diagnosis Date  Arthritis  Dyslipidemia  Essential hypertension  Frequent PVCs  GERD (gastroesophageal reflux disease)  H/O seasonal allergies  Mitral valve prolapse   
 pt states Dr. Sandria Moritz is not sure if this is the correct diagnosis  Obesity 2018 Obesity  BMI= 32.6 Social History Substance Use Topics  Smoking status: Former Smoker Packs/day: 1.00 Years: 30.00 Quit date: 1997  Smokeless tobacco: Never Used  Alcohol use 3.6 oz/week 3 Glasses of wine, 3 Cans of beer per week No Known Allergies Family History Problem Relation Age of Onset  Sudden Death Mother Car accident  Heart Attack Father 48  
 Heart Disease Father  Thyroid Disease Sister  No Known Problems Brother  No Known Problems Sister  No Known Problems Sister  No Known Problems Brother  No Known Problems Brother No current facility-administered medications for this encounter. Current Outpatient Prescriptions Medication Sig  
 aspirin delayed-release 81 mg tablet Take  by mouth daily.  simvastatin (ZOCOR) 10 mg tablet Take 1 Tab by mouth nightly.  metoprolol succinate (TOPROL-XL) 25 mg XL tablet Take 0.5 Tabs by mouth daily.  aspirin delayed-release 325 mg tablet Take 1 Tab by mouth two (2) times a day.  oxyCODONE IR (ROXICODONE) 5 mg immediate release tablet Take 1-2 Tabs by mouth every four (4) hours as needed for Pain. Max Daily Amount: 60 mg. Indications: Pain (Patient taking differently: Take 5-10 mg by mouth every four (4) hours as needed for Pain. Indications: DID NOT FILL)  traMADol (ULTRAM) 50 mg tablet Take 1 Tab by mouth every six (6) hours as needed for Pain (Take for breakthrough pain if Oxycodone is not working). Max Daily Amount: 200 mg. Indications: Pain, Post-op Pain, Diagnosis Hip and Knee Arthritis ICD 10 - M16.9  
 acetaminophen (TYLENOL EXTRA STRENGTH) 500 mg tablet Take 1-2 Tabs by mouth every six (6) hours as needed for Pain. Not to exceed 4,000mg in any 24 hour period  Indications: Pain  ondansetron (ZOFRAN ODT) 8 mg disintegrating tablet Take 0.5 Tabs by mouth every eight (8) hours as needed for Nausea.  meloxicam (MOBIC) 15 mg tablet Take 1 Tab by mouth daily. (Patient taking differently: Take 15 mg by mouth daily. Indications: D/C)  losartan-hydroCHLOROthiazide (HYZAAR) 50-12.5 mg per tablet Take 1 Tab by mouth daily. Indications: hypertension  flecainide (TAMBOCOR) 50 mg tablet Take 1 Tab by mouth two (2) times a day.  DESLORATADINE (CLARINEX PO) Take  by mouth.  Omeprazole delayed release (PRILOSEC D/R) 20 mg tablet Take 20 mg by mouth every morning.  multivitamin (ONE A DAY) tablet Take 1 Tab by mouth daily.  VITAMIN B COMPLEX (B COMPLEX PO) Take  by mouth daily.  DOCOSAHEXANOIC ACID/EPA (FISH OIL PO) Take 1,200 mg by mouth two (2) times a day.  GLUCOSAMINE SULFATE (GLUCOSAMINE PO) Take 1,500 mg by mouth two (2) times a day.  CHOLECALCIFEROL, VITAMIN D3, (VITAMIN D3 PO) Take 1 Tab by mouth daily.   
 
 
 
PHYSICAL EXAM: 
 General: WD, WN. Alert, cooperative, no acute distress   
HEENT: NC, Atraumatic. PERRLA, EOMI. Anicteric sclerae. Lungs:  CTA Bilaterally. No Wheezing/Rhonchi/Rales. Heart:  Regular  rhythm,  No murmur, No Rubs, No Gallops Abdomen: Soft, Non distended, Non tender.  +Bowel sounds, no HSM Extremities: No c/c/e Neurologic:  CN 2-12 gi, Alert and oriented X 3. No acute neurological distress Psych:   Good insight. Not anxious nor agitated. Assessment:  
I have reviewed with the patient +/- family alternatives,benefits and risks for the procedure, as well as potential complications(with emphasis on, but not limited to, bleeding, perforation, cardiovascular/cerebrovascular/pulmonary events, reactions to the medications, infection, risk of missing a lesions/a cancer, and the imponderables including death), alternative options, and patient/family voices understanding. Plan:  
· Endoscopic procedure · Conscious sedation or MAC

## 2018-09-26 NOTE — IP AVS SNAPSHOT
[address] [facility] 
 
 
 [address] 
[phone] Patient: Vida Garcia MRN: [id number] About your hospitalization You were admitted on:  September 26, 2018 You last received care in the:  [facility] ENDOSCOPY You were discharged on:  September 26, 2018 Why you were hospitalized Your primary diagnosis was:  Not on File Follow-up Information None Your Scheduled Appointments Wednesday October 17, 2018  9:00 AM EDT  
ESTABLISHED PATIENT with Beulah Sprague MD  
[facility] ([facility]) [address]  
[phone] Wednesday October 24, 2018  9:15 AM EDT ROUTINE CARE with Anel Iverson MD  
[facility] [address]  
[phone] Discharge Orders None A check malathi indicates which time of day the medication should be taken. My Medications CONTINUE taking these medications Instructions Each Dose to Equal  
 Morning Noon Evening Bedtime  
 acetaminophen 500 mg tablet Commonly known as:  [address], [facility] [place] Your last dose was: Your next dose is: Take 1-2 Tabs by mouth every six (6) hours as needed for Pain. Not to exceed 4,000mg in any 24 hour period  Indications: Pain 500-1000 mg  
    
   
   
   
  
 * aspirin delayed-release 81 mg tablet Your last dose was: Your next dose is: Take  by mouth daily. * aspirin delayed-release 325 mg tablet Your last dose was: Your next dose is: Take 1 Tab by mouth two (2) times a day. 325 mg  
    
   
   
   
  
 B COMPLEX PO Your last dose was: Your next dose is: Take  by mouth daily. CLARINEX PO Your last dose was: Your next dose is: Take  by mouth. FISH OIL PO Your last dose was: Your next dose is: Take 1,200 mg by mouth two (2) times a day. 1200 mg  
    
   
   
   
  
 flecainide 50 mg tablet Commonly known as:  TAMBOCOR Your last dose was: Your next dose is: Take 1 Tab by mouth two (2) times a day. 50 mg  
    
   
   
   
  
 GLUCOSAMINE PO Your last dose was: Your next dose is: Take 1,500 mg by mouth two (2) times a day. 1500 mg  
    
   
   
   
  
 ibuprofen 400 mg tablet Commonly known as:  MOTRIN Your last dose was: Your next dose is: Take 400 mg by mouth every six (6) hours as needed for Pain. 400 mg  
    
   
   
   
  
 losartan-hydroCHLOROthiazide 50-12.5 mg per tablet Commonly known as:  HYZAAR Your last dose was: Your next dose is: Take 1 Tab by mouth daily. Indications: hypertension 1 Tab  
    
   
   
   
  
 metoprolol succinate 25 mg XL tablet Commonly known as:  TOPROL-XL Your last dose was: Your next dose is: Take 0.5 Tabs by mouth daily. 12.5 mg  
    
   
   
   
  
 multivitamin tablet Commonly known as:  ONE A DAY Your last dose was: Your next dose is: Take 1 Tab by mouth daily. 1 Tab Omeprazole delayed release 20 mg tablet Commonly known as:  PRILOSEC D/R Your last dose was: Your next dose is: Take 20 mg by mouth every morning. 20 mg  
    
   
   
   
  
 simvastatin 10 mg tablet Commonly known as:  ZOCOR Your last dose was: Your next dose is: Take 1 Tab by mouth nightly. 10 mg  
    
   
   
   
  
 traMADol 50 mg tablet Commonly known as:  ULTRAM  
   
Your last dose was: Your next dose is: Take 1 Tab by mouth every six (6) hours as needed for Pain (Take for breakthrough pain if Oxycodone is not working). Max Daily Amount: 200 mg. Indications: Pain, Post-op Pain, Diagnosis Hip and Knee Arthritis ICD 10 - M16.9  
 50 mg  
    
   
   
   
  
 VITAMIN D3 PO Your last dose was: Your next dose is: Take 1 Tab by mouth daily. 1 Tab * Notice: This list has 2 medication(s) that are the same as other medications prescribed for you. Read the directions carefully, and ask your doctor or other care provider to review them with you. Opioid Education Prescription Opioids: What You Need to Know: 
 
 
2018 [facility] :  1950 [place] Medical Record Number:  [id number] Discomfort: 
Sore throat- throat lozenges or warm salt water gargle Redness at IV site- apply warm compress to area; if redness or soreness persist- contact your physician There may be a slight amount of blood passed from the rectum Gaseous discomfort- walking, belching will help relieve any discomfort You may not operate a vehicle for 12 hours You may not engage in an occupation involving machinery or appliances for rest of today You may not drink alcoholic beverages for at least 12 hours Avoid making any critical decisions for at least 24 hour DIET: 
 GERD diet: avoid fried and fatty foods.  peppermint, chocolate, alcohol, coffee, citrus fruits and juices, tomoato products; avoid lying down for 2 to 3 hours after eating.  however -  remember your colon is empty and a heavy meal will produce gas. Avoid these foods:  vegetables, fried / greasy foods, carbonated drinks for today ACTIVITY: 
You may  resume your normal daily activities it is recommended that you spend the remainder of the day resting -  avoid any strenuous activity and driving. CALL M.D. ANY SIGN OF: Increasing pain, nausea, vomiting Abdominal distension (swelling) New increased bleeding (oral or rectal) Fever (chills) Pain in chest area Bloody discharge from nose or mouth Shortness of breath Mira Bennett [id number] 
1950 Follow-up Instructions: 
 Call Dr. Marilyn Mathias if any questions at [phone]. Results of procedure / biopsy in 7 to 10 days, we will call you with these results. Your endoscopy showed benign stricture-dilated DISCHARGE SUMMARY from Nurse The following personal items collected during your admission are returned to you:  
Dental Appliance: Dental Appliances: None Vision: Visual Aid: Glasses Hearing Aid:   
Jewelry:   
Clothing:   
Other Valuables:   
Valuables sent to safe:   
 
PATIENT INSTRUCTIONS: 
 
Take Home Medications: 
same ACO Transitions of Care Introducing [place] [address] Vicki Duncan offers a voluntary care coordination program to provide high quality service and care to [facility] fee-for-service beneficiaries. Ahsan Ortiz was designed to help you enhance your health and well-being through the following services: ? Transitions of Care  support for individuals who are transitioning from one care setting to another (example: Hospital to home). ? Chronic and Complex Care Coordination  support for individuals and caregivers of those with serious or chronic illnesses or with more than one chronic (ongoing) condition and those who take a number of different medications. If you meet specific medical criteria, a [address] [place] [address] Rd may call you directly to coordinate your care with your primary care physician and your other care providers. For questions about the [facility] programs, please, contact your physicians office. For general questions or additional information about Accountable Care Organizations: 
Please visit www.medicare.gov/acos. html or call 1-800-MEDICARE (0-414.399.9045) TTY users should call 7-828.964.2772. Introducing hospitals & HEALTH SERVICES! Dear Sandy Lennon: 
Thank you for requesting a [employer] account. Our records indicate that you already have an active [employer] account. You can access your account anytime at [email or web address] Did you know that you can access your hospital and ER discharge instructions at any time in [employer]? You can also review all of your test results from your hospital stay or ER visit. Additional Information If you have questions, please visit the Frequently Asked Questions section of the [employer] website at [email or web address] Remember, [employer] is NOT to be used for urgent needs. For medical emergencies, dial 911. Now available from your iPhone and Android! Introducing Blayne Haynes As a [employer] [facility] patient, I wanted to make you aware of our electronic visit tool called Blayne Haynes. [employer] 24/7 allows you to connect within minutes with a medical provider 24 hours a day, seven days a week via a mobile device or tablet or logging into a secure website from your computer. You can access Blayne Haynes from anywhere in the [place].  
 
A virtual visit might be right for you when you have a simple condition and feel like you just dont want to get out of bed, or cant get away from work for an appointment, when your regular [employer] provider is not available (evenings, weekends or holidays), or when youre out of town and need minor care. Electronic visits cost only $49 and if the [employer] 24/7 provider determines a prescription is needed to treat your condition, one can be electronically transmitted to a nearby pharmacy*. Please take a moment to enroll today if you have not already done so. The enrollment process is free and takes just a few minutes. To enroll, please download the [employer] 24/7 berny to your tablet or phone, or visit [email or web address] to enroll on your computer. And, as an [address] [place] [address] patient with a [employer] account, the results of your visits will be scanned into your electronic medical record and your primary care provider will be able to view the scanned results. We urge you to continue to see your regular [employer] provider for your ongoing medical care. And while your primary care provider may not be the one available when you seek a [employer] virtual visit, the peace of mind you get from getting a real diagnosis real time can be priceless. For more information on [employer], view our Frequently Asked Questions (FAQs) at [email or web address]. Sincerely, 
 
Jorge Coon MD 
Chief Medical Officer [employer] *:  certain medications cannot be prescribed via [employer] Providers Seen During Your Hospitalization Provider Specialty Primary office phone Zion Robles MD Gastroenterology [phone] Your Primary Care Physician (PCP) Primary Care Physician Office Phone Office Fax Randee Hunter [phone] You are allergic to the following No active allergies Recent Documentation Height Weight BMI Smoking Status 1.829 m 106.6 kg 31.87 kg/m2 Former Smoker Emergency Contacts Name Discharge Info Relation Home Work Mobile Van,June DISCHARGE CAREGIVER [3] Spouse [3] [phone] Patient Belongings The following personal items are in your possession at time of discharge: 
  Dental Appliances: None  Visual Aid: Glasses Please provide this summary of care documentation to your next provider. Signatures-by signing, you are acknowledging that this After Visit Summary has been reviewed with you and you have received a copy. Patient Signature:  ____________________________________________________________ Date:  ____________________________________________________________  
  
[facility] Provider Signature:  ____________________________________________________________ Date:  ____________________________________________________________

## 2018-09-26 NOTE — DISCHARGE INSTRUCTIONS
[address] [facility]. Dominic Bentley M.D.  [phone]                  EGD DISCHARGE INSTRUCTIONS    2018    Janessa Senior  :  1950  [place] Medical Record Number:  [id number]      Discomfort:  Sore throat- throat lozenges or warm salt water gargle  Redness at IV site- apply warm compress to area; if redness or soreness persist- contact your physician  There may be a slight amount of blood passed from the rectum  Gaseous discomfort- walking, belching will help relieve any discomfort  You may not operate a vehicle for 12 hours  You may not engage in an occupation involving machinery or appliances for rest of today  You may not drink alcoholic beverages for at least 12 hours  Avoid making any critical decisions for at least 24 hour  DIET:   GERD diet: avoid fried and fatty foods. peppermint, chocolate, alcohol, coffee, citrus fruits and juices, tomoato products; avoid lying down for 2 to 3 hours after eating.   - however -  remember your colon is empty and a heavy meal will produce gas. Avoid these foods:  vegetables, fried / greasy foods, carbonated drinks for today     ACTIVITY:  You may  resume your normal daily activities it is recommended that you spend the remainder of the day resting -  avoid any strenuous activity and driving. CALL M.D. ANY SIGN OF:   Increasing pain, nausea, vomiting  Abdominal distension (swelling)  New increased bleeding (oral or rectal)  Fever (chills)  Pain in chest area  Bloody discharge from nose or mouth  Shortness of breath        Janessa Senior  [id number]  1950      Follow-up Instructions:   Call Dr. Maribeth Null if any questions at [phone]. Results of procedure / biopsy in 7 to 10 days, we will call you with these results.   Your endoscopy showed benign stricture-dilated      DISCHARGE SUMMARY from Nurse    The following personal items collected during your admission are returned to you:   Dental Appliance: Dental Appliances: None  Vision: Visual Aid: Glasses  Hearing Aid:    Jewelry:    Clothing:    Other Valuables:    Valuables sent to safe:      PATIENT INSTRUCTIONS:    Take Home Medications:  same

## 2018-10-30 ENCOUNTER — OFFICE VISIT (OUTPATIENT)
Dept: INTERNAL MEDICINE CLINIC | Age: 68
End: 2018-10-30

## 2018-10-30 VITALS
HEART RATE: 56 BPM | OXYGEN SATURATION: 98 % | SYSTOLIC BLOOD PRESSURE: 134 MMHG | WEIGHT: 239 LBS | DIASTOLIC BLOOD PRESSURE: 80 MMHG | BODY MASS INDEX: 32.37 KG/M2 | HEIGHT: 72 IN | RESPIRATION RATE: 16 BRPM | TEMPERATURE: 97.9 F

## 2018-10-30 DIAGNOSIS — Z12.5 PROSTATE CANCER SCREENING: ICD-10-CM

## 2018-10-30 DIAGNOSIS — E78.2 MIXED HYPERLIPIDEMIA: Primary | ICD-10-CM

## 2018-10-30 DIAGNOSIS — Z00.00 GENERAL MEDICAL EXAM: ICD-10-CM

## 2018-10-31 LAB
ALBUMIN SERPL-MCNC: 4.4 G/DL (ref 3.6–4.8)
ALBUMIN/GLOB SERPL: 1.8 {RATIO} (ref 1.2–2.2)
ALP SERPL-CCNC: 75 IU/L (ref 39–117)
ALT SERPL-CCNC: 19 IU/L (ref 0–44)
AST SERPL-CCNC: 19 IU/L (ref 0–40)
BILIRUB SERPL-MCNC: 0.5 MG/DL (ref 0–1.2)
BUN SERPL-MCNC: 23 MG/DL (ref 8–27)
BUN/CREAT SERPL: 15 (ref 10–24)
CALCIUM SERPL-MCNC: 9.6 MG/DL (ref 8.6–10.2)
CHLORIDE SERPL-SCNC: 103 MMOL/L (ref 96–106)
CHOLEST SERPL-MCNC: 148 MG/DL (ref 100–199)
CO2 SERPL-SCNC: 25 MMOL/L (ref 20–29)
CREAT SERPL-MCNC: 1.57 MG/DL (ref 0.76–1.27)
GLOBULIN SER CALC-MCNC: 2.5 G/DL (ref 1.5–4.5)
GLUCOSE SERPL-MCNC: 87 MG/DL (ref 65–99)
HCV AB S/CO SERPL IA: <0.1 S/CO RATIO (ref 0–0.9)
HDLC SERPL-MCNC: 37 MG/DL
INTERPRETATION, 910389: NORMAL
INTERPRETATION: NORMAL
LDLC SERPL CALC-MCNC: 85 MG/DL (ref 0–99)
PDF IMAGE, 910387: NORMAL
POTASSIUM SERPL-SCNC: 4.6 MMOL/L (ref 3.5–5.2)
PROT SERPL-MCNC: 6.9 G/DL (ref 6–8.5)
PSA SERPL-MCNC: 1.2 NG/ML (ref 0–4)
SODIUM SERPL-SCNC: 143 MMOL/L (ref 134–144)
TRIGL SERPL-MCNC: 132 MG/DL (ref 0–149)
VLDLC SERPL CALC-MCNC: 26 MG/DL (ref 5–40)

## 2018-11-02 ENCOUNTER — TELEPHONE (OUTPATIENT)
Dept: INTERNAL MEDICINE CLINIC | Age: 68
End: 2018-11-02

## 2018-11-06 LAB
ALBUMIN SERPL-MCNC: 4.5 G/DL (ref 3.6–4.8)
ALP SERPL-CCNC: 80 IU/L (ref 39–117)
ALT SERPL-CCNC: 20 IU/L (ref 0–44)
AST SERPL-CCNC: 18 IU/L (ref 0–40)
BILIRUB DIRECT SERPL-MCNC: 0.14 MG/DL (ref 0–0.4)
BILIRUB SERPL-MCNC: 0.6 MG/DL (ref 0–1.2)
CHOLEST SERPL-MCNC: 150 MG/DL (ref 100–199)
HDLC SERPL-MCNC: 38 MG/DL
INTERPRETATION, 910389: NORMAL
LDLC SERPL CALC-MCNC: 88 MG/DL (ref 0–99)
PROT SERPL-MCNC: 6.9 G/DL (ref 6–8.5)
TRIGL SERPL-MCNC: 119 MG/DL (ref 0–149)
VLDLC SERPL CALC-MCNC: 24 MG/DL (ref 5–40)

## 2018-11-08 DIAGNOSIS — E78.00 HYPERCHOLESTEREMIA: Primary | ICD-10-CM

## 2018-11-16 DIAGNOSIS — I49.3 PVC (PREMATURE VENTRICULAR CONTRACTION): ICD-10-CM

## 2018-11-19 RX ORDER — FLECAINIDE ACETATE 50 MG/1
TABLET ORAL
Qty: 180 TAB | Refills: 3 | Status: SHIPPED | OUTPATIENT
Start: 2018-11-19 | End: 2019-12-18 | Stop reason: ALTCHOICE

## 2018-11-19 NOTE — TELEPHONE ENCOUNTER
Requested Prescriptions     Signed Prescriptions Disp Refills    flecainide (TAMBOCOR) 50 mg tablet 180 Tab 3     Sig: TAKE 1 TABLET TWICE DAILY     Authorizing Provider: Helder Vargas     Ordering User: Raj Snow     Refill per verbal order Dr. Joe Nguyen.

## 2018-11-27 NOTE — PROGRESS NOTES
Room # 5    Visit Vitals  BP (!) 140/94 (BP 1 Location: Left arm, BP Patient Position: Sitting)   Pulse 70   Resp 16   Ht 6' (1.829 m)   Wt 238 lb (108 kg)   SpO2 94%   BMI 32.28 kg/m²

## 2018-12-05 ENCOUNTER — OFFICE VISIT (OUTPATIENT)
Dept: CARDIOLOGY CLINIC | Age: 68
End: 2018-12-05

## 2018-12-05 VITALS
HEIGHT: 72 IN | SYSTOLIC BLOOD PRESSURE: 140 MMHG | WEIGHT: 238 LBS | DIASTOLIC BLOOD PRESSURE: 94 MMHG | OXYGEN SATURATION: 94 % | HEART RATE: 70 BPM | BODY MASS INDEX: 32.23 KG/M2 | RESPIRATION RATE: 16 BRPM

## 2018-12-05 DIAGNOSIS — I49.3 PVC'S (PREMATURE VENTRICULAR CONTRACTIONS): Primary | ICD-10-CM

## 2018-12-05 NOTE — PROGRESS NOTES
HISTORY OF PRESENTING ILLNESS      Marylou Zaragoza is a 70 y.o. male with  HTN, MVP and frequent PVCs with a recent holter demonstrating a PVC burden of 13%. He is asymptomatic and cMRI demonstrated normal LV/RV function without scar. Last visit, we planned for repeat Holter monitor and echocardiogram at 6 month follow up. Echocardiogram demonstrated preserved LV function and Holter demonstrated 30% PVC burden. As a result he was started on a trial of flecainide 50 mg twice daily. Repeat Holter monitoring demonstrated PVC burden of 5%. He has been doing well since his previous follow-up. He is tolerating his flecainide thus far. ACTIVE PROBLEM LIST     Patient Active Problem List    Diagnosis Date Noted    Primary osteoarthritis of left hip 05/06/2018    Seasonal allergic rhinitis due to pollen 05/19/2017    Chest tightness 05/18/2017    Mitral valve prolapse 02/16/2017    Colon polyps 01/19/2017    Essential hypertension 01/19/2017           PAST MEDICAL HISTORY     Past Medical History:   Diagnosis Date    Arthritis     Dyslipidemia     Essential hypertension     Frequent PVCs     GERD (gastroesophageal reflux disease)     H/O seasonal allergies     Mitral valve prolapse     pt states Dr. Nasim Yang is not sure if this is the correct diagnosis    Obesity 05/02/2018    Obesity  BMI= 32.6           PAST SURGICAL HISTORY     Past Surgical History:   Procedure Laterality Date    COLONOSCOPY N/A 6/2/2017    COLONOSCOPY performed by Charles L[facility]pez MD at [facility]  6/2/2017         EGD  6/2/2017         HX HERNIA REPAIR      times 2. 1980's inguinal bilateral    HX OTHER SURGICAL  1977    Repair of the right shoulder.      HX TONSILLECTOMY      childhood    UPPER GI ENDOSCOPY,DILATN W GUIDE  7/14/2017         UPPER GI ENDOSCOPY,DILATN W GUIDE  9/26/2018               ALLERGIES     No Known Allergies       FAMILY HISTORY     Family History Problem Relation Age of Onset   [facility] Sudden Death Mother         Car accident    Heart Attack Father 48    Heart Disease Father     Thyroid Disease Sister     No Known Problems Brother     No Known Problems Sister     No Known Problems Sister     No Known Problems Brother     No Known Problems Brother     negative for cardiac disease       SOCIAL HISTORY     Social History     Socioeconomic History    Marital status:      Spouse name: Not on file    Number of children: Not on file    Years of education: Not on file    Highest education level: Not on file   Tobacco Use    Smoking status: Former Smoker     Packs/day: 1.00     Years: 30.00     Pack years: 30.00     Last attempt to quit: 1997     Years since quittin.8    Smokeless tobacco: Never Used   Substance and Sexual Activity    Alcohol use: Yes     Alcohol/week: 3.6 oz     Types: 3 Glasses of wine, 3 Cans of beer per week    Drug use: No         MEDICATIONS     Current Outpatient Medications   Medication Sig    flecainide (TAMBOCOR) 50 mg tablet TAKE 1 TABLET TWICE DAILY    ibuprofen (MOTRIN) 400 mg tablet Take 400 mg by mouth every six (6) hours as needed for Pain.  aspirin delayed-release 81 mg tablet Take  by mouth daily.  simvastatin (ZOCOR) 10 mg tablet Take 1 Tab by mouth nightly.  metoprolol succinate (TOPROL-XL) 25 mg XL tablet Take 0.5 Tabs by mouth daily.  aspirin delayed-release 325 mg tablet Take 1 Tab by mouth two (2) times a day.  traMADol (ULTRAM) 50 mg tablet Take 1 Tab by mouth every six (6) hours as needed for Pain (Take for breakthrough pain if Oxycodone is not working). Max Daily Amount: 200 mg. Indications: Pain, Post-op Pain, Diagnosis Hip and Knee Arthritis ICD 10 - M16.9    acetaminophen (TYLENOL EXTRA STRENGTH) 500 mg tablet Take 1-2 Tabs by mouth every six (6) hours as needed for Pain.  Not to exceed 4,000mg in any 24 hour period  Indications: Pain    losartan-hydroCHLOROthiazide (HYZAAR) 50-12.5 mg per tablet Take 1 Tab by mouth daily. Indications: hypertension    DESLORATADINE (CLARINEX PO) Take  by mouth.  Omeprazole delayed release (PRILOSEC D/R) 20 mg tablet Take 20 mg by mouth every morning.  multivitamin (ONE A DAY) tablet Take 1 Tab by mouth daily.  VITAMIN B COMPLEX (B COMPLEX PO) Take  by mouth daily.  DOCOSAHEXANOIC ACID/EPA (FISH OIL PO) Take 1,200 mg by mouth two (2) times a day.  GLUCOSAMINE SULFATE (GLUCOSAMINE PO) Take 1,500 mg by mouth two (2) times a day.  CHOLECALCIFEROL, VITAMIN D3, (VITAMIN D3 PO) Take 1 Tab by mouth daily. No current facility-administered medications for this visit. I have reviewed the nurses notes, vitals, problem list, allergy list, medical history, family, social history and medications. REVIEW OF SYMPTOMS      General: Pt denies excessive weight gain or loss. Pt is able to conduct ADL's  HEENT: Denies blurred vision, headaches, hearing loss, epistaxis and difficulty swallowing. Respiratory: Denies cough, congestion, shortness of breath, OCONNOR, wheezing or stridor. Cardiovascular: Denies precordial pain, palpitations, edema or PND  Gastrointestinal: Denies poor appetite, indigestion, abdominal pain or blood in stool  Genitourinary: Denies hematuria, dysuria, increased urinary frequency  Musculoskeletal: Denies joint pain or swelling from muscles or joints  Neurologic: Denies tremor, paresthesias, headache, or sensory motor disturbance  Psychiatric: Denies confusion, insomnia, depression  Integumentray: Denies rash, itching or ulcers. Hematologic: Denies easy bruising, bleeding       PHYSICAL EXAMINATION      There were no vitals filed for this visit. General: Well developed, in no acute distress. HEENT: No jaundice, oral mucosa moist, no oral ulcers  Neck: Supple, no stiffness, no lymphadenopathy, supple  Heart:  Normal S1/S2 negative S3 or S4.  Regular, no murmur, gallop or rub, no jugular venous distention  Respiratory: Clear bilaterally x 4, no wheezing or rales  Abdomen:   Soft, non-tender, bowel sounds are active.   Extremities:  No edema, normal cap refill, no cyanosis. Musculoskeletal: No clubbing, no deformities  Neuro: A&Ox3, speech clear, gait stable, cooperative, no focal neurologic deficits  Skin: Skin color is normal. No rashes or lesions. Non diaphoretic, moist.  Vascular: 2+ pulses symmetric in all extremities       DIAGNOSTIC DATA      EKG:        LABORATORY DATA      Lab Results   Component Value Date/Time    WBC 6.0 05/02/2018 09:27 AM    HGB 10.7 (L) 05/08/2018 02:19 AM    HCT 42.0 05/02/2018 09:27 AM    PLATELET 337 91/43/3123 09:27 AM    MCV 90.7 05/02/2018 09:27 AM      Lab Results   Component Value Date/Time    Sodium 143 10/30/2018 11:13 AM    Potassium 4.6 10/30/2018 11:13 AM    Chloride 103 10/30/2018 11:13 AM    CO2 25 10/30/2018 11:13 AM    Anion gap 8 05/08/2018 02:19 AM    Glucose 87 10/30/2018 11:13 AM    BUN 23 10/30/2018 11:13 AM    Creatinine 1.57 (H) 10/30/2018 11:13 AM    BUN/Creatinine ratio 15 10/30/2018 11:13 AM    GFR est AA 51 (L) 10/30/2018 11:13 AM    GFR est non-AA 44 (L) 10/30/2018 11:13 AM    Calcium 9.6 10/30/2018 11:13 AM    Bilirubin, total 0.6 11/05/2018 09:34 AM    AST (SGOT) 18 11/05/2018 09:34 AM    Alk. phosphatase 80 11/05/2018 09:34 AM    Protein, total 6.9 11/05/2018 09:34 AM    Albumin 4.5 11/05/2018 09:34 AM    Globulin 3.1 05/02/2018 09:27 AM    A-G Ratio 1.8 10/30/2018 11:13 AM    ALT (SGPT) 20 11/05/2018 09:34 AM           ASSESSMENT      1. PVCs                        B. Asymptomatic  2. Hypertension  3. Mitral valve proplapse       PLAN     Continue current drug regimen and follow him clinically for recurrence of symptomatic PVCs    Thank you, Morris Alvarenga MD and Dr. Annabelle Linares for allowing me to participate in the care of this extraordinarily pleasant male. Please do not hesitate to contact me for further questions/concerns.          Milford Severs, MD  Cardiac Electrophysiology / Cardiology    [address]  [address], [address] [place] [address]  [phone] / [phone] Fax   [phone] / [phone] Fax

## 2018-12-10 ENCOUNTER — OFFICE VISIT (OUTPATIENT)
Dept: INTERNAL MEDICINE CLINIC | Age: 68
End: 2018-12-10

## 2018-12-10 VITALS
HEART RATE: 85 BPM | DIASTOLIC BLOOD PRESSURE: 82 MMHG | BODY MASS INDEX: 32.23 KG/M2 | HEIGHT: 72 IN | WEIGHT: 238 LBS | SYSTOLIC BLOOD PRESSURE: 125 MMHG | RESPIRATION RATE: 12 BRPM

## 2018-12-10 DIAGNOSIS — I10 ESSENTIAL HYPERTENSION: ICD-10-CM

## 2018-12-10 DIAGNOSIS — R79.89 ELEVATED SERUM CREATININE: Primary | ICD-10-CM

## 2018-12-10 NOTE — PROGRESS NOTES
Logan Rodriguez is a 70 y.o. male who presents today for No chief complaint on file. Treasure Romero He has a history of   Patient Active Problem List   Diagnosis Code    Colon polyps K63.5    Essential hypertension I10    Mitral valve prolapse I34.1    Chest tightness R07.89    Seasonal allergic rhinitis due to pollen J30.1    Primary osteoarthritis of left hip M16.12   . Today patient is here for follow-up. Elevated Cr: Patient was noted to have an elevated creatinine on last routine blood draws. The remainder of his blood work was unremarkable. Patient was instructed by PCP to stop all NSAIDs and follow-up. NSAID use was due to hip issues. Hypertension -patient taking combo ARB hydrochlorothiazide. Hypertension ROS: taking medications as instructed, no medication side effects noted, no TIA's, no chest pain on exertion, no dyspnea on exertion, no swelling of ankles     reports that he quit smoking about 21 years ago. He has a 30.00 pack-year smoking history. he has never used smokeless tobacco.    reports that he drinks about 3.6 oz of alcohol per week. BP Readings from Last 2 Encounters:   12/10/18 125/82   12/05/18 (!) 140/94         ROS  Review of Systems   Constitutional: Negative for chills, fever and weight loss. HENT: Negative for ear pain, hearing loss and nosebleeds. Respiratory: Negative for cough, hemoptysis and shortness of breath. Cardiovascular: Negative for chest pain, palpitations, orthopnea, claudication and leg swelling. Gastrointestinal: Negative for abdominal pain, diarrhea, heartburn, nausea and vomiting. Genitourinary: Negative for dysuria, frequency, hematuria and urgency. Musculoskeletal: Negative for back pain, myalgias and neck pain. Skin: Negative for rash. Neurological: Negative. Endo/Heme/Allergies: Does not bruise/bleed easily. Psychiatric/Behavioral: Negative for depression and suicidal ideas. The patient is not nervous/anxious.         Visit Vitals  /82   Pulse 85   Resp 12   Ht 6' (1.829 m)   Wt 238 lb (108 kg)   BMI 32.28 kg/m²       Physical Exam   Constitutional: He is oriented to person, place, and time. He appears well-developed and well-nourished. HENT:   Head: Normocephalic and atraumatic. Eyes: EOM are normal. Pupils are equal, round, and reactive to light. Cardiovascular: Normal rate and regular rhythm. No murmur heard. Pulmonary/Chest: Effort normal and breath sounds normal. No respiratory distress. Musculoskeletal: Normal range of motion. He exhibits no edema. Neurological: He is alert and oriented to person, place, and time. Skin: Skin is warm and dry. He is not diaphoretic. Psychiatric: He has a normal mood and affect. His behavior is normal.         Current Outpatient Medications   Medication Sig    flecainide (TAMBOCOR) 50 mg tablet TAKE 1 TABLET TWICE DAILY    aspirin delayed-release 81 mg tablet Take  by mouth daily.  simvastatin (ZOCOR) 10 mg tablet Take 1 Tab by mouth nightly.  metoprolol succinate (TOPROL-XL) 25 mg XL tablet Take 0.5 Tabs by mouth daily.  acetaminophen (TYLENOL EXTRA STRENGTH) 500 mg tablet Take 1-2 Tabs by mouth every six (6) hours as needed for Pain. Not to exceed 4,000mg in any 24 hour period  Indications: Pain    losartan-hydroCHLOROthiazide (HYZAAR) 50-12.5 mg per tablet Take 1 Tab by mouth daily. Indications: hypertension    DESLORATADINE (CLARINEX PO) Take  by mouth.  Omeprazole delayed release (PRILOSEC D/R) 20 mg tablet Take 20 mg by mouth every morning.  multivitamin (ONE A DAY) tablet Take 1 Tab by mouth daily.  VITAMIN B COMPLEX (B COMPLEX PO) Take  by mouth daily.  DOCOSAHEXANOIC ACID/EPA (FISH OIL PO) Take 1,200 mg by mouth two (2) times a day.  GLUCOSAMINE SULFATE (GLUCOSAMINE PO) Take 1,500 mg by mouth two (2) times a day.  CHOLECALCIFEROL, VITAMIN D3, (VITAMIN D3 PO) Take 1 Tab by mouth daily.      No current facility-administered medications for this visit. Past Medical History:   Diagnosis Date    Arthritis     Dyslipidemia     Essential hypertension     Frequent PVCs     GERD (gastroesophageal reflux disease)     H/O seasonal allergies     Mitral valve prolapse     pt states Dr. Tati Starr is not sure if this is the correct diagnosis    Obesity 2018    Obesity  BMI= 32.6      Past Surgical History:   Procedure Laterality Date    COLONOSCOPY N/A 2017    [facility]  2017         EGD  2017         HX HERNIA REPAIR      times 2.  inguinal bilateral    HX OTHER SURGICAL  1977    Repair of the right shoulder.  HX TONSILLECTOMY      childhood    UPPER GI ENDOSCOPY,DILATN W GUIDE  2017         UPPER GI ENDOSCOPY,DILATN W GUIDE  2018           Social History     Tobacco Use    Smoking status: Former Smoker     Packs/day: 1.00     Years: 30.00     Pack years: 30.00     Last attempt to quit: 1997     Years since quittin.8    Smokeless tobacco: Never Used   Substance Use Topics    Alcohol use: Yes     Alcohol/week: 3.6 oz     Types: 3 Glasses of wine, 3 Cans of beer per week      Family History   Problem Relation Age of Onset    Sudden Death Mother         Car accident    Heart Attack Father 48    Heart Disease Father     Thyroid Disease Sister     No Known Problems Brother     No Known Problems Sister     No Known Problems Sister     No Known Problems Brother     No Known Problems Brother         No Known Allergies     Assessment/Plan  Diagnoses and all orders for this visit:    1. Elevated serum creatinine -patient has been limiting NSAID use. We will repeat creatinine. If still elevated discussed that we will get urine studies and imaging studies.  -     METABOLIC PANEL, BASIC    2.  Essential hypertension -at goal no changes needed  -     METABOLIC PANEL, BASIC        Follow-up Disposition:  Return if symptoms worsen or fail to improve.     Marija Pichardo MD  12/10/2018

## 2018-12-14 ENCOUNTER — HOSPITAL ENCOUNTER (OUTPATIENT)
Dept: LAB | Age: 68
Discharge: HOME | End: 2018-12-14
Payer: MEDICARE

## 2018-12-14 PROCEDURE — 36415 COLL VENOUS BLD VENIPUNCTURE: CPT

## 2018-12-14 PROCEDURE — 80048 BASIC METABOLIC PNL TOTAL CA: CPT

## 2018-12-15 LAB
BUN SERPL-MCNC: 23 MG/DL (ref 8–27)
BUN/CREAT SERPL: 19 (ref 10–24)
CALCIUM SERPL-MCNC: 9.5 MG/DL (ref 8.6–10.2)
CHLORIDE SERPL-SCNC: 103 MMOL/L (ref 96–106)
CO2 SERPL-SCNC: 25 MMOL/L (ref 20–29)
CREAT SERPL-MCNC: 1.22 MG/DL (ref 0.76–1.27)
GLUCOSE SERPL-MCNC: 106 MG/DL (ref 65–99)
POTASSIUM SERPL-SCNC: 4.4 MMOL/L (ref 3.5–5.2)
SODIUM SERPL-SCNC: 142 MMOL/L (ref 134–144)

## 2019-02-11 ENCOUNTER — TELEPHONE (OUTPATIENT)
Dept: INTERNAL MEDICINE CLINIC | Age: 69
End: 2019-02-11

## 2019-02-18 RX ORDER — SIMVASTATIN 10 MG/1
TABLET, FILM COATED ORAL
Qty: 90 TAB | Refills: 0 | Status: SHIPPED | OUTPATIENT
Start: 2019-02-18 | End: 2019-07-01 | Stop reason: SDUPTHER

## 2019-02-18 RX ORDER — LOSARTAN POTASSIUM AND HYDROCHLOROTHIAZIDE 12.5; 5 MG/1; MG/1
TABLET ORAL
Qty: 90 TAB | Refills: 0 | Status: SHIPPED | OUTPATIENT
Start: 2019-02-18 | End: 2019-07-29 | Stop reason: SDUPTHER

## 2019-02-18 NOTE — TELEPHONE ENCOUNTER
Refills are per verbal order of Dr. Rajni Palafox.    Requested Prescriptions     Pending Prescriptions Disp Refills    simvastatin (ZOCOR) 10 mg tablet [Pharmacy Med Name: SIMVASTATIN 10 MG Tablet] 90 Tab 0     Sig: TAKE 1 TABLET EVERY NIGHT    losartan-hydroCHLOROthiazide (HYZAAR) 50-12.5 mg per tablet [Pharmacy Med Name: LOSARTAN POTASSIUM/HYDROCHLOROTHIAZIDE 50-12.5 MG Tablet] 90 Tab 0     Sig: TAKE 1 TABLET EVERY DAY FOR HYPERTENSION

## 2019-03-21 LAB
ALBUMIN SERPL-MCNC: 4.2 G/DL (ref 3.6–4.8)
ALP SERPL-CCNC: 78 IU/L (ref 39–117)
ALT SERPL-CCNC: 18 IU/L (ref 0–44)
AST SERPL-CCNC: 15 IU/L (ref 0–40)
BILIRUB DIRECT SERPL-MCNC: 0.11 MG/DL (ref 0–0.4)
BILIRUB SERPL-MCNC: 0.4 MG/DL (ref 0–1.2)
CHOLEST SERPL-MCNC: 148 MG/DL (ref 100–199)
HDLC SERPL-MCNC: 36 MG/DL
INTERPRETATION, 910389: NORMAL
LDLC SERPL CALC-MCNC: 88 MG/DL (ref 0–99)
PROT SERPL-MCNC: 6.8 G/DL (ref 6–8.5)
TRIGL SERPL-MCNC: 119 MG/DL (ref 0–149)
VLDLC SERPL CALC-MCNC: 24 MG/DL (ref 5–40)

## 2019-03-26 ENCOUNTER — OFFICE VISIT (OUTPATIENT)
Dept: CARDIOLOGY CLINIC | Age: 69
End: 2019-03-26

## 2019-03-26 VITALS
WEIGHT: 247.4 LBS | RESPIRATION RATE: 16 BRPM | SYSTOLIC BLOOD PRESSURE: 122 MMHG | HEART RATE: 79 BPM | DIASTOLIC BLOOD PRESSURE: 82 MMHG | HEIGHT: 72 IN | OXYGEN SATURATION: 93 % | BODY MASS INDEX: 33.51 KG/M2

## 2019-03-26 DIAGNOSIS — R07.89 CHEST TIGHTNESS: ICD-10-CM

## 2019-03-26 DIAGNOSIS — I10 ESSENTIAL HYPERTENSION: Primary | ICD-10-CM

## 2019-03-26 NOTE — PROGRESS NOTES
LAST OFFICE VISIT : 4/12/2018 ICD-10-CM ICD-9-CM 1. Essential hypertension I10 401.9 2. Chest tightness R07.89 786.59 Reggie Lewis is a 70 y.o. male with HTN and mitral valve prolapse referred for preoperative cardiac risk stratification. Cardiac risk factors: family history, male gender, hypertension I have personally obtained the history from the patient. HISTORY OF PRESENTING ILLNESS Overall the pt states he is doing well. He recently saw Dr. Yumiko Campbell on 12/5/18 for PVCs and was suggested that he remain on Flecainide 50 mg BID. Pt reports that he has no new complaints and feels well. The patient denies chest pain/ shortness of breath, orthopnea, PND, LE edema, palpitations, syncope, presyncope or fatigue. Notably, pt states that he underwent esophageal dilation since his last visit, which he says went well. ACTIVE PROBLEM LIST Patient Active Problem List  
 Diagnosis Date Noted  Primary osteoarthritis of left hip 05/06/2018  Seasonal allergic rhinitis due to pollen 05/19/2017  Chest tightness 05/18/2017  Mitral valve prolapse 02/16/2017  Colon polyps 01/19/2017  Essential hypertension 01/19/2017 PAST MEDICAL HISTORY Past Medical History:  
Diagnosis Date  Arthritis  Dyslipidemia  Essential hypertension  Frequent PVCs  GERD (gastroesophageal reflux disease)  H/O seasonal allergies  Mitral valve prolapse   
 pt states Dr. Sahra Macias is not sure if this is the correct diagnosis  Obesity 05/02/2018 Obesity  BMI= 32.6 PAST SURGICAL HISTORY Past Surgical History:  
Procedure Laterality Date  COLONOSCOPY N/A 6/2/2017 COLONOSCOPY performed by Alaina Ramos MD at [address] [place] [address] 10  COLONOSCOPY,DIAGNOSTIC  6/2/2017  
    
 EGD  6/2/2017  HX HERNIA REPAIR    
 times 2. 1980's inguinal bilateral  
 HX OTHER SURGICAL  1977 Repair of the right shoulder.  HX TONSILLECTOMY childhood  UPPER GI ENDOSCOPY,DILSETH W GUIDE  2017  UPPER GI ENDOSCOPY,DILATN W GUIDE  2018 ALLERGIES No Known Allergies FAMILY HISTORY Family History Problem Relation Age of Onset  Sudden Death Mother Car accident  Heart Attack Father 48  
 Heart Disease Father  Thyroid Disease Sister  No Known Problems Brother  No Known Problems Sister  No Known Problems Sister  No Known Problems Brother  No Known Problems Brother   
 negative for cardiac disease SOCIAL HISTORY Social History Socioeconomic History  Marital status:  Spouse name: Not on file  Number of children: Not on file  Years of education: Not on file  Highest education level: Not on file Tobacco Use  Smoking status: Former Smoker Packs/day: 1.00 Years: 30.00 Pack years: 30.00 Last attempt to quit: 1997 Years since quittin.1  Smokeless tobacco: Never Used Substance and Sexual Activity  Alcohol use: Yes Alcohol/week: 3.6 oz Types: 3 Glasses of wine, 3 Cans of beer per week  Drug use: No  
 
 
 
MEDICATIONS Current Outpatient Medications Medication Sig  
 simvastatin (ZOCOR) 10 mg tablet TAKE 1 TABLET EVERY NIGHT  losartan-hydroCHLOROthiazide (HYZAAR) 50-12.5 mg per tablet TAKE 1 TABLET EVERY DAY FOR HYPERTENSION  flecainide (TAMBOCOR) 50 mg tablet TAKE 1 TABLET TWICE DAILY  aspirin delayed-release 81 mg tablet Take  by mouth daily.  metoprolol succinate (TOPROL-XL) 25 mg XL tablet Take 0.5 Tabs by mouth daily.  Omeprazole delayed release (PRILOSEC D/R) 20 mg tablet Take 20 mg by mouth every morning.  multivitamin (ONE A DAY) tablet Take 1 Tab by mouth daily.  VITAMIN B COMPLEX (B COMPLEX PO) Take  by mouth daily.  DOCOSAHEXANOIC ACID/EPA (FISH OIL PO) Take 1,200 mg by mouth two (2) times a day.  GLUCOSAMINE SULFATE (GLUCOSAMINE PO) Take 1,500 mg by mouth two (2) times a day.  CHOLECALCIFEROL, VITAMIN D3, (VITAMIN D3 PO) Take 1 Tab by mouth daily. No current facility-administered medications for this visit. I have reviewed the nurses notes, vitals, problem list, allergy list, medical history, family, social history and medications. REVIEW OF SYMPTOMS General: Pt denies excessive weight gain or loss. Pt is able to conduct ADL's HEENT: Denies blurred vision, headaches, hearing loss, epistaxis and difficulty swallowing. Respiratory: Denies cough, congestion, shortness of breath, OCONNOR, wheezing or stridor. Cardiovascular: Denies precordial pain, palpitations, edema or PND Gastrointestinal: Denies poor appetite, indigestion, abdominal pain or blood in stool Genitourinary: Denies hematuria, dysuria, increased urinary frequency Musculoskeletal: Denies joint pain or swelling from muscles or joints Neurologic: Denies tremor, paresthesias, headache, or sensory motor disturbance Psychiatric: Denies confusion, insomnia, depression Integumentray: Denies rash, itching or ulcers. Hematologic: Denies easy bruising, bleeding PHYSICAL EXAMINATION Vitals:  
 03/26/19 1523 BP: 122/82 Pulse: 79 Resp: 16 SpO2: 93% Weight: 247 lb 6.4 oz (112.2 kg) Height: 6' (1.829 m) General: Well developed, in no acute distress. HEENT: No jaundice, oral mucosa moist, no oral ulcers Neck: Supple, no stiffness, no lymphadenopathy, supple Heart:  Normal S1/S2 negative S3 or S4. Regular, no murmur, gallop or rub, no jugular venous distention Respiratory: Clear bilaterally x 4, no wheezing or rales  
Extremities:  No edema, normal cap refill, no cyanosis. Musculoskeletal: No clubbing, no deformities Neuro: A&Ox3, speech clear, gait stable, cooperative, no focal neurologic deficits Skin: Skin color is normal. No rashes or lesions. Non diaphoretic, moist. 
 
 
 DIAGNOSTIC DATA 1. Lipids 1/9/17- , HDL 43, , TG 91 
5/2/18- , HDL 37, LDL 84,  
3/20/19-  HDL 36, LDL 88,  
 
2. Holter 
(1/19/17): sinus rhtyhm with PVCs there were frequent PVCs, rare couplets, triplets and bigeminy with rare episodes of non-sustained VT with rates up to 134. 
9/28/17- SB with PVCs, HR 43-91, PVC burden 30.48% 11/8/17- SR HR , PVC burden 5 % 3. Echo 
4/19/16- EF 30-44%, MR mild/mod, AI mild, LAE 
9/28/17- EF 50-55%, PI mild/mod 4. Stress Test 
Lexiscan- 6/15/17-no ischemia, EF 39% LABORATORY DATA Lab Results Component Value Date/Time WBC 6.0 05/02/2018 09:27 AM  
 HGB 10.7 (L) 05/08/2018 02:19 AM  
 HCT 42.0 05/02/2018 09:27 AM  
 PLATELET 321 94/93/0126 09:27 AM  
 MCV 90.7 05/02/2018 09:27 AM  
  
Lab Results Component Value Date/Time Sodium 142 12/14/2018 12:45 PM  
 Potassium 4.4 12/14/2018 12:45 PM  
 Chloride 103 12/14/2018 12:45 PM  
 CO2 25 12/14/2018 12:45 PM  
 Anion gap 8 05/08/2018 02:19 AM  
 Glucose 106 (H) 12/14/2018 12:45 PM  
 BUN 23 12/14/2018 12:45 PM  
 Creatinine 1.22 12/14/2018 12:45 PM  
 BUN/Creatinine ratio 19 12/14/2018 12:45 PM  
 GFR est AA 69 12/14/2018 12:45 PM  
 GFR est non-AA 60 12/14/2018 12:45 PM  
 Calcium 9.5 12/14/2018 12:45 PM  
 Bilirubin, total 0.4 03/20/2019 10:39 AM  
 AST (SGOT) 15 03/20/2019 10:39 AM  
 Alk. phosphatase 78 03/20/2019 10:39 AM  
 Protein, total 6.8 03/20/2019 10:39 AM  
 Albumin 4.2 03/20/2019 10:39 AM  
 Globulin 3.1 05/02/2018 09:27 AM  
 A-G Ratio 1.8 10/30/2018 11:13 AM  
 ALT (SGPT) 18 03/20/2019 10:39 AM  
  
 
 
 ASSESSMENT/RECOMMENDATIONS:.  
  
 
1. Cardiomyopathy 
- EF is within normal limits. He is NYHA Class I 2. PVCs -Has seen Dr. Brittany Cedeno and is to continue Flecainide 50 mg BID. 3. HTN 
- Blood pressure is under reasonably good control, no adjustments in antihypertensives. Continue low sodium diet. 4. Dyslipidemia - Lipids are at goal on current medical regimen. Will give him a lab slip to recheck his cholesterol in 6 months. 5. Possible esophageal stricture  
-Successfully dilated. 6. Return in 6 months or PRN. Orders Placed This Encounter  LIVER FUNCTION PANEL  
 LIPID PANEL I have discussed the diagnosis with  Purvi Mcdonald and the intended plan as seen in the above orders. Questions were answered concerning future plans. I have discussed medication side effects and warnings with the patient as well. Thank you,  Miri Sotomayor MD for involving me in the care of  Purvi Mcdonald. Please do not hesitate to contact me for further questions/concerns. Written by Radha Blair, as dictated by Robert Moraes MD.  
 
Binu Ontiveros MD, [facility] [place] Patient Care Team: 
Miri Sotomayor MD as PCP - General (Internal Medicine) Abram Lam MD (General Surgery) Carol Mallory MD (Orthopedic Surgery) [address], [address] [place] [facility]     
[phone] / [phone] Fax

## 2019-03-26 NOTE — PROGRESS NOTES
Alejandro Vasquez is a 70 y.o. male Chief Complaint Patient presents with  Follow-up 6mo f/u for PVC's and MVP  Cholesterol Problem  Hypertension 1. Have you been to the ER, urgent care clinic since your last visit? Hospitalized since your last visit? NO 
 
2. Have you seen or consulted any other health care providers outside of the [address] [place] [address] since your last visit? Include any pap smears or colon screening. NO Visit Vitals /82 (BP 1 Location: Left arm, BP Patient Position: Sitting) Pulse 79 Resp 16 Ht 6' (1.829 m) Wt 247 lb 6.4 oz (112.2 kg) SpO2 93% BMI 33.55 kg/m²

## 2019-05-22 ENCOUNTER — ANESTHESIA (OUTPATIENT)
Dept: ENDOSCOPY | Age: 69
End: 2019-05-22
Payer: MEDICARE

## 2019-05-22 ENCOUNTER — HOSPITAL ENCOUNTER (OUTPATIENT)
Age: 69
Setting detail: OUTPATIENT SURGERY
Discharge: HOME | End: 2019-05-22
Attending: INTERNAL MEDICINE | Admitting: INTERNAL MEDICINE
Payer: MEDICARE

## 2019-05-22 ENCOUNTER — ANESTHESIA EVENT (OUTPATIENT)
Dept: ENDOSCOPY | Age: 69
End: 2019-05-22
Payer: MEDICARE

## 2019-05-22 VITALS
OXYGEN SATURATION: 98 % | WEIGHT: 232 LBS | TEMPERATURE: 97.5 F | BODY MASS INDEX: 31.42 KG/M2 | HEIGHT: 72 IN | DIASTOLIC BLOOD PRESSURE: 73 MMHG | RESPIRATION RATE: 17 BRPM | SYSTOLIC BLOOD PRESSURE: 113 MMHG | HEART RATE: 54 BPM

## 2019-05-22 PROCEDURE — 74011250636 HC RX REV CODE- 250/636: Performed by: INTERNAL MEDICINE

## 2019-05-22 PROCEDURE — 76040000019: Performed by: INTERNAL MEDICINE

## 2019-05-22 PROCEDURE — C1726 CATH, BAL DIL, NON-VASCULAR: HCPCS | Performed by: INTERNAL MEDICINE

## 2019-05-22 PROCEDURE — 76060000031 HC ANESTHESIA FIRST 0.5 HR: Performed by: INTERNAL MEDICINE

## 2019-05-22 PROCEDURE — 74011250636 HC RX REV CODE- 250/636

## 2019-05-22 PROCEDURE — 77030018712 HC DEV BLLN INFL BSC -B: Performed by: INTERNAL MEDICINE

## 2019-05-22 RX ORDER — FLUMAZENIL 0.1 MG/ML
0.2 INJECTION INTRAVENOUS
Status: DISCONTINUED | OUTPATIENT
Start: 2019-05-22 | End: 2019-05-22 | Stop reason: HOSPADM

## 2019-05-22 RX ORDER — MIDAZOLAM HYDROCHLORIDE 1 MG/ML
.25-5 INJECTION, SOLUTION INTRAMUSCULAR; INTRAVENOUS
Status: DISCONTINUED | OUTPATIENT
Start: 2019-05-22 | End: 2019-05-22 | Stop reason: HOSPADM

## 2019-05-22 RX ORDER — LIDOCAINE HYDROCHLORIDE 20 MG/ML
INJECTION, SOLUTION EPIDURAL; INFILTRATION; INTRACAUDAL; PERINEURAL AS NEEDED
Status: DISCONTINUED | OUTPATIENT
Start: 2019-05-22 | End: 2019-05-22 | Stop reason: HOSPADM

## 2019-05-22 RX ORDER — EPINEPHRINE 0.1 MG/ML
1 INJECTION INTRACARDIAC; INTRAVENOUS
Status: DISCONTINUED | OUTPATIENT
Start: 2019-05-22 | End: 2019-05-22 | Stop reason: HOSPADM

## 2019-05-22 RX ORDER — PROPOFOL 10 MG/ML
INJECTION, EMULSION INTRAVENOUS
Status: DISCONTINUED | OUTPATIENT
Start: 2019-05-22 | End: 2019-05-22 | Stop reason: HOSPADM

## 2019-05-22 RX ORDER — DEXTROMETHORPHAN/PSEUDOEPHED 2.5-7.5/.8
1.2 DROPS ORAL
Status: DISCONTINUED | OUTPATIENT
Start: 2019-05-22 | End: 2019-05-22 | Stop reason: HOSPADM

## 2019-05-22 RX ORDER — SODIUM CHLORIDE 9 MG/ML
50 INJECTION, SOLUTION INTRAVENOUS CONTINUOUS
Status: DISCONTINUED | OUTPATIENT
Start: 2019-05-22 | End: 2019-05-22 | Stop reason: HOSPADM

## 2019-05-22 RX ORDER — ATROPINE SULFATE 0.1 MG/ML
0.5 INJECTION INTRAVENOUS
Status: DISCONTINUED | OUTPATIENT
Start: 2019-05-22 | End: 2019-05-22 | Stop reason: HOSPADM

## 2019-05-22 RX ORDER — NALOXONE HYDROCHLORIDE 0.4 MG/ML
0.4 INJECTION, SOLUTION INTRAMUSCULAR; INTRAVENOUS; SUBCUTANEOUS
Status: DISCONTINUED | OUTPATIENT
Start: 2019-05-22 | End: 2019-05-22 | Stop reason: HOSPADM

## 2019-05-22 RX ORDER — PANTOPRAZOLE SODIUM 40 MG/1
40 TABLET, DELAYED RELEASE ORAL DAILY
Qty: 90 TAB | Refills: 1 | Status: SHIPPED | OUTPATIENT
Start: 2019-05-22 | End: 2019-10-02 | Stop reason: SDUPTHER

## 2019-05-22 RX ORDER — PROPOFOL 10 MG/ML
INJECTION, EMULSION INTRAVENOUS AS NEEDED
Status: DISCONTINUED | OUTPATIENT
Start: 2019-05-22 | End: 2019-05-22 | Stop reason: HOSPADM

## 2019-05-22 RX ADMIN — PROPOFOL 100 MCG/KG/MIN: 10 INJECTION, EMULSION INTRAVENOUS at 07:35

## 2019-05-22 RX ADMIN — PROPOFOL 100 MG: 10 INJECTION, EMULSION INTRAVENOUS at 07:35

## 2019-05-22 RX ADMIN — LIDOCAINE HYDROCHLORIDE 60 MG: 20 INJECTION, SOLUTION EPIDURAL; INFILTRATION; INTRACAUDAL; PERINEURAL at 07:35

## 2019-05-22 RX ADMIN — SODIUM CHLORIDE 50 ML/HR: 900 INJECTION, SOLUTION INTRAVENOUS at 06:46

## 2019-05-22 NOTE — ANESTHESIA PREPROCEDURE EVALUATION
Anesthetic History   No history of anesthetic complications            Review of Systems / Medical History  Patient summary reviewed and nursing notes reviewed    Pulmonary  Within defined limits                 Neuro/Psych   Within defined limits           Cardiovascular    Hypertension: well controlled        Dysrhythmias (pvcs) : PVC  Hyperlipidemia    Exercise tolerance: >4 METS  Comments:   ?  MVP     GI/Hepatic/Renal     GERD: poorly controlled           Endo/Other        Obesity and arthritis     Other Findings              Physical Exam    Airway  Mallampati: I    Neck ROM: normal range of motion   Mouth opening: Normal     Cardiovascular  Regular rate and rhythm,  S1 and S2 normal,  no murmur, click, rub, or gallop  Rhythm: regular  Rate: normal         Dental    Dentition: Caps/crowns     Pulmonary  Breath sounds clear to auscultation               Abdominal  GI exam deferred       Other Findings            Anesthetic Plan    ASA: 2  Anesthesia type: MAC            Anesthetic plan and risks discussed with: Patient and Spouse

## 2019-05-22 NOTE — DISCHARGE INSTRUCTIONS
[address]  [address] [facility], [address] [facility]  [phone]               EGD Kwesi Martinez  [id number]  1950    DISCOMFORT:  Sore throat- throat lozenges or warm salt water gargle  redness at IV site- apply warm compress to area; if redness or soreness persist- contact your physician  Gaseous discomfort- walking, belching will help relieve any discomfort  You may not operate a vehicle for 12 hours  You may not engage in an occupation involving machinery or appliances for rest of today  You may not drink alcoholic beverages for at least 12 hours  Avoid making any critical decisions for at least 24 hour  DIET  You may eat and drink now and after you leave. You may resume your regular diet - however -  remember your colon is empty and a heavy meal will produce gas. Avoid these foods:  vegetables, fried / greasy foods, carbonated drinks    ACTIVITY  You may resume your normal daily activities   Spend the remainder of the day resting -  avoid any strenuous activity. CALL M.D. ANY SIGN OF   Increasing pain, nausea, vomiting  Abdominal distension (swelling)  New increased bleeding (oral or rectal)  Fever (chills)  Pain in chest area  Bloody discharge from nose or mouth  Shortness of breath    Follow-up Instructions:   Call Dr. Ramiro Lara for any questions or problems.               Telephone # [phone]    ENDOSCOPY FINDINGS:   Your endoscopy showed large hiatal hernia and esophageal stricture          Signed By: Arun Caldwell MD     5/22/2019  7:49 AM

## 2019-05-22 NOTE — ANESTHESIA POSTPROCEDURE EVALUATION
Procedure(s):  ESOPHAGOGASTRODUODENOSCOPY (EGD) WITH DILATATION  ESOPHAGEAL DILATION. MAC    Anesthesia Post Evaluation      Multimodal analgesia: multimodal analgesia not used between 6 hours prior to anesthesia start to PACU discharge  Patient location during evaluation: PACU  Patient participation: complete - patient participated  Level of consciousness: awake and alert  Pain score: 0  Pain management: adequate  Airway patency: patent  Anesthetic complications: no  Cardiovascular status: hemodynamically stable and acceptable  Respiratory status: acceptable  Hydration status: acceptable  Comments: Patient seen and evaluated; no concerns.   Post anesthesia nausea and vomiting:  none      Vitals Value Taken Time   /73 5/22/2019  8:12 AM   Temp 36.4 °C (97.5 °F) 5/22/2019  7:49 AM   Pulse 54 5/22/2019  8:12 AM   Resp 17 5/22/2019  8:12 AM   SpO2 98 % 5/22/2019  8:12 AM

## 2019-05-22 NOTE — ROUTINE PROCESS
Vida Garcia 1950 
[id number] Situation: 
Verbal report received from: [facility] Procedure: Procedure(s): ESOPHAGOGASTRODUODENOSCOPY (EGD) WITH DILATATION 
ESOPHAGEAL DILATION Background: 
 
Preoperative diagnosis: DYSPHAGIA Postoperative diagnosis: stricture, hiatal hernia. :  Dr. Jj Muñoz Assistant(s): Endoscopy Technician-1: Dianelys Pace Endoscopy RN-1: Lauren Sanchez RN Specimens: * No specimens in log * H. Pylori  no Assessment: 
Intra-procedure medications Anesthesia gave intra-procedure sedation and medications, see anesthesia flow sheet yes Intravenous fluids: NS@ Anna Kohut Vital signs stable Abdominal assessment: round and soft Recommendation: 
Discharge patient per MD order. Family or Friend Permission to share finding with family or friend yes

## 2019-05-22 NOTE — PERIOP NOTES
[id number]  Anesthesia staff at patient's bedside administering anesthesia and monitoring patients vital signs throughout procedure. See anesthesia note. [id number]  Endoscope was pre-cleaned at bedside immediately following procedure by Pravin Melendez endo tech. [id number]  Patient tolerated procedure without problems. Abdomen soft and patient arousable and voices no complaints Report received from CRNA, see anesthesia note. Patient transported to endoscopy recovery area. Report given to Luis Park RN, in post.     CRE balloon dilatation of the esophagus   15 mm Balloon inflated to 3 ATMs and held for 2 seconds. 16.5 mm Balloon inflated to 4.5 ATMs and held for 2 seconds. 18 mm Balloon inflated to 7 ATMs and held for 30 seconds. No subcutaneous crepitus of the chest or cervical region was noted post dilatation.

## 2019-05-22 NOTE — PROCEDURES
[address]  [address] [facility], [address] [place] [address]       [phone]                   2019                EGD Operative Report  Carl Cabrera  :  1950  [employer] Medical Record Number:  [id number]      Indication:  Dysphagia/odynophagia, GERD     : Padma Go MD    Assistants: None    Referring Provider:  Yessi Marie MD      Anesthesia/Sedation:  MAC anesthesia Propofol    Airway assessment: No airway problems anticipated    Pre-Procedural Exam:      Airway: clear, no airway problems anticipated  Heart: RRR, without gallops or rubs  Lungs: clear bilaterally without wheezes, crackles, or rhonchi  Abdomen: soft, nontender, nondistended, bowel sounds present  Mental Status: awake, alert and oriented to person, place and time       Procedure Details     After infomed consent was obtained for the procedure, with all risks and benefits of procedure explained the patient was taken to the endoscopy suite and placed in the left lateral decubitus position. Following sequential administration of sedation as per above, the endoscope was inserted into the mouth and advanced under direct vision to second portion of the duodenum. A careful inspection was made as the gastroscope was withdrawn, including a retroflexed view of the proximal stomach; findings and interventions are described below. Findings:   Esophagus:normal proximal esophagus. Distal esophageal stricture ( benign) dilated 18 mm with TTS balloon dilator. Superficial mucosa tear noted. Stomach: normal mucosa. Large hiatal hernia ? Paraesophageal component  Duodenum/jejunum: normal mucosa    Therapies:  esophageal dilation with 18 mm sized balloon    Specimens: none    Implants: none           Complications:   None; patient tolerated the procedure well. EBL:  None.            Impression:    Large hiatal hernia                          Esophageal stricture ( benign) s/p balloon dilation    Recommendations:    -Acid suppression with a proton pump inhibitor. ,   -GERD diet: avoid fried and fatty foods.  peppermint, chocolate, alcohol, coffee, citrus fruits and juices, tomoato products; avoid lying down for 2 to 3 hours after eating.,   -No NSAID's  -Repeat dilation PRN  -UGI series x ray ( call office to schedule)  -F/u office visit after UGI series  -D/c omeprazole   -Start Pantoprazole 40 mg daily 30 m before breakfast        Julia Garcia MD

## 2019-05-22 NOTE — H&P
[address]  [address] [facility], [address] [facility]  [phone]                     History and Physical     NAME: Army Ott   :  1950   MRN:  [id number]     HPI:  Pt presents for EGD. He has hx of dysphagia to solids that is intermittent. He has had esoph dilation in the past. Last dilation up  to 18 mm in fall. Past Surgical History:   Procedure Laterality Date    COLONOSCOPY N/A 2017    COLON[facility] performed by Yesi Bryan [facility]  2017         EGD  2017         HX HERNIA REPAIR      times 2.  inguinal bilateral    HX HIP REPLACEMENT Left 2018    HX ORTHOPAEDIC Left 2018    hip replacement    HX OTHER SURGICAL  1977    Repair of the right shoulder.  HX TONSILLECTOMY      childhood    UPPER GI ENDOSCOPY,DILATN W GUIDE  2017         UPPER GI ENDOSCOPY,DILATN W GUIDE  2018          Past Medical History:   Diagnosis Date    Arthritis     Dyslipidemia     Essential hypertension     Frequent PVCs     GERD (gastroesophageal reflux disease)     H/O seasonal allergies     Mitral valve prolapse     pt states Dr. Evgeny Hall is not sure if this is the correct diagnosis    Obesity 2018    Obesity  BMI= 32.6     Social History     Tobacco Use    Smoking status: Former Smoker     Packs/day: 1.00     Years: 30.00     Pack years: 30.00     Last attempt to quit: 1997     Years since quittin.3    Smokeless tobacco: Never Used   Substance Use Topics    Alcohol use:  Yes     Alcohol/week: 3.6 oz     Types: 3 Glasses of wine, 3 Cans of beer per week    Drug use: No     No Known Allergies  Family History   Problem Relation Age of Onset    Sudden Death Mother         Car accident    Heart Attack Father 48    Heart Disease Father     Thyroid Disease Sister     No Known Problems Brother     No Known Problems Sister     No Known Problems Sister     No Known Problems Brother     No Known Problems Brother     Cancer Maternal Aunt [de-identified]        multiple myloma     Current Facility-Administered Medications   Medication Dose Route Frequency    0.9% sodium chloride infusion  50 mL/hr IntraVENous CONTINUOUS    midazolam (VERSED) injection 0.25-5 mg  0.25-5 mg IntraVENous Multiple    naloxone (NARCAN) injection 0.4 mg  0.4 mg IntraVENous Multiple    flumazenil (ROMAZICON) 0.1 mg/mL injection 0.2 mg  0.2 mg IntraVENous Multiple    simethicone (MYLICON) 49OC/0.9LP oral drops 80 mg  1.2 mL Oral Multiple    atropine injection 0.5 mg  0.5 mg IntraVENous ONCE PRN    EPINEPHrine (ADRENALIN) 0.1 mg/mL syringe 1 mg  1 mg Endoscopically ONCE PRN         PHYSICAL EXAM:  General: WD, WN. Alert, cooperative, no acute distress    HEENT: NC, Atraumatic. PERRLA, EOMI. Anicteric sclerae. Lungs:  CTA Bilaterally. No Wheezing/Rhonchi/Rales. Heart:  Regular  rhythm,  No murmur, No Rubs, No Gallops  Abdomen: Soft, Non distended, Non tender.  +Bowel sounds, no HSM  Extremities: No c/c/e  Neurologic:  CN 2-12 gi, Alert and oriented X 3. No acute neurological distress   Psych:   Good insight. Not anxious nor agitated. Assessment:   I have reviewed with the patient +/- family alternatives,benefits and risks for the procedure, as well as potential complications(with emphasis on, but not limited to, bleeding, perforation, cardiovascular/cerebrovascular/pulmonary events, reactions to the medications, infection, risk of missing a lesions/a cancer, and the imponderables including death), alternative options, and patient/family voices understanding.       Plan:   · Endoscopic procedure  · Conscious sedation or MAC

## 2019-07-01 RX ORDER — SIMVASTATIN 10 MG/1
TABLET, FILM COATED ORAL
Qty: 90 TAB | Refills: 3 | Status: SHIPPED | OUTPATIENT
Start: 2019-07-01 | End: 2019-10-09

## 2019-07-12 NOTE — TELEPHONE ENCOUNTER
Refill is per verbal order of Dr. Katlyn Reyes.    Requested Prescriptions     Pending Prescriptions Disp Refills    metoprolol succinate (TOPROL-XL) 25 mg XL tablet [Pharmacy Med Name: METOPROLOL SUCCINATE ER 25 MG Tablet Extended Release 24 Hour] 45 Tab 0     Sig: TAKE 1/2 TABLET EVERY DAY

## 2019-07-15 RX ORDER — METOPROLOL SUCCINATE 25 MG/1
TABLET, EXTENDED RELEASE ORAL
Qty: 45 TAB | Refills: 0 | Status: SHIPPED | OUTPATIENT
Start: 2019-07-15 | End: 2019-09-28 | Stop reason: SDUPTHER

## 2019-07-30 RX ORDER — LOSARTAN POTASSIUM AND HYDROCHLOROTHIAZIDE 12.5; 5 MG/1; MG/1
1 TABLET ORAL DAILY
Qty: 90 TAB | Refills: 1 | Status: SHIPPED | OUTPATIENT
Start: 2019-07-30 | End: 2019-10-09 | Stop reason: ALTCHOICE

## 2019-09-30 RX ORDER — METOPROLOL SUCCINATE 25 MG/1
TABLET, EXTENDED RELEASE ORAL
Qty: 45 TAB | Refills: 0 | Status: SHIPPED | OUTPATIENT
Start: 2019-09-30 | End: 2020-05-20

## 2019-10-02 ENCOUNTER — HOSPITAL ENCOUNTER (OUTPATIENT)
Dept: LAB | Age: 69
Discharge: HOME | End: 2019-10-02
Payer: MEDICARE

## 2019-10-02 ENCOUNTER — OFFICE VISIT (OUTPATIENT)
Dept: INTERNAL MEDICINE CLINIC | Age: 69
End: 2019-10-02

## 2019-10-02 VITALS
HEART RATE: 58 BPM | BODY MASS INDEX: 30.48 KG/M2 | RESPIRATION RATE: 16 BRPM | WEIGHT: 225 LBS | SYSTOLIC BLOOD PRESSURE: 103 MMHG | OXYGEN SATURATION: 95 % | DIASTOLIC BLOOD PRESSURE: 54 MMHG | HEIGHT: 72 IN | TEMPERATURE: 98.7 F

## 2019-10-02 DIAGNOSIS — I10 ESSENTIAL HYPERTENSION: ICD-10-CM

## 2019-10-02 DIAGNOSIS — K21.9 GERD WITHOUT ESOPHAGITIS: ICD-10-CM

## 2019-10-02 DIAGNOSIS — Z87.891 H/O NICOTINE DEPENDENCE: ICD-10-CM

## 2019-10-02 DIAGNOSIS — Z13.6 SCREENING FOR AAA (ABDOMINAL AORTIC ANEURYSM): ICD-10-CM

## 2019-10-02 DIAGNOSIS — Z00.00 MEDICARE ANNUAL WELLNESS VISIT, SUBSEQUENT: Primary | ICD-10-CM

## 2019-10-02 DIAGNOSIS — Z23 ENCOUNTER FOR IMMUNIZATION: ICD-10-CM

## 2019-10-02 PROCEDURE — 80053 COMPREHEN METABOLIC PANEL: CPT

## 2019-10-02 PROCEDURE — 85025 COMPLETE CBC W/AUTO DIFF WBC: CPT

## 2019-10-02 PROCEDURE — 84443 ASSAY THYROID STIM HORMONE: CPT

## 2019-10-02 PROCEDURE — 36415 COLL VENOUS BLD VENIPUNCTURE: CPT

## 2019-10-02 RX ORDER — PANTOPRAZOLE SODIUM 40 MG/1
40 TABLET, DELAYED RELEASE ORAL DAILY
Qty: 90 TAB | Refills: 1 | Status: SHIPPED | OUTPATIENT
Start: 2019-10-02 | End: 2020-02-04 | Stop reason: DRUGHIGH

## 2019-10-02 NOTE — PROGRESS NOTES
HISTORY OF PRESENT ILLNESS  Kinjal Mittal is a 78 y.o. male. HPI  Seen for wellness visit and follow up on several issues:  1. GERD. He is taking Protonix 40mg daily and notes really very few breakthrough episodes. No nausea, vomiting or significant heartburn. 2. Hypertension, controlled on current meds. He will see Dr. Shila Walls next week. He enjoys golfing and is not having chest pain on the golf course. 3. History of tobacco use for 30 years, but quit about 20 years ago. No active symptoms of lung disease. Discussed recommendations for lung cancer screening with serial low dose CT scans and he is interested in starting this. 4. Prostate cancer screening. Had a PSA of 1.2 a year ago. Discussed pros and cons of following PSA levels. He elects to hold off on this this year. He is not having any symptoms. Review of Systems   Constitutional: Negative for chills, fever and weight loss. HENT: Negative for hearing loss. Respiratory: Negative for cough, shortness of breath and wheezing. Cardiovascular: Negative for chest pain, palpitations, orthopnea, leg swelling and PND. Gastrointestinal: Negative for abdominal pain, heartburn, nausea and vomiting. Genitourinary: Negative for dysuria, frequency and hematuria. Musculoskeletal: Negative for falls, joint pain and myalgias. Neurological: Negative for dizziness and headaches. Psychiatric/Behavioral: Negative for depression and memory loss. Physical Exam   Constitutional: He is oriented to person, place, and time. He appears well-developed and well-nourished. HENT:   Head: Normocephalic and atraumatic. Right Ear: Tympanic membrane, external ear and ear canal normal.   Left Ear: Tympanic membrane, external ear and ear canal normal.   Nose: Nose normal. Right sinus exhibits no maxillary sinus tenderness and no frontal sinus tenderness. Left sinus exhibits no maxillary sinus tenderness and no frontal sinus tenderness. Mouth/Throat: Oropharynx is clear and moist and mucous membranes are normal. No oropharyngeal exudate. Eyes: Pupils are equal, round, and reactive to light. Conjunctivae are normal. Right eye exhibits no discharge. Left eye exhibits no discharge. Neck: Normal range of motion. Neck supple. Carotid bruit is not present. No thyromegaly present. Cardiovascular: Normal rate, regular rhythm, normal heart sounds and intact distal pulses. Pulmonary/Chest: Effort normal and breath sounds normal. No respiratory distress. He has no wheezes. He has no rales. Abdominal: Soft. Bowel sounds are normal. He exhibits no distension and no mass. There is no tenderness. There is no rebound. Musculoskeletal: He exhibits no edema. Lymphadenopathy:     He has no cervical adenopathy. Neurological: He is alert and oriented to person, place, and time. Skin: No rash noted. Psychiatric: He has a normal mood and affect. His behavior is normal.   Nursing note and vitals reviewed. ASSESSMENT and PLAN  Diagnoses and all orders for this visit:    1. Medicare annual wellness visit, subsequent    2. H/O nicotine dependence  -     CT LOW DOSE LUNG CANCER SCREENING; Future    3. Essential hypertension  -     METABOLIC PANEL, COMPREHENSIVE  -     TSH 3RD GENERATION    4. GERD without esophagitis  -     pantoprazole (PROTONIX) 40 mg tablet; Take 1 Tab by mouth daily.  -     CBC WITH AUTOMATED DIFF    5. Screening for AAA (abdominal aortic aneurysm)  -     US EXAM SCREENING AAA; Future    6. Encounter for immunization  -     INFLUENZA VACCINE INACTIVATED (IIV), SUBUNIT, ADJUVANTED, IM  -     ADMIN INFLUENZA VIRUS VAC    This is the Subsequent Medicare Annual Wellness Exam, performed 12 months or more after the Initial AWV or the last Subsequent AWV    I have reviewed the patient's medical history in detail and updated the computerized patient record.      History     Past Medical History:   Diagnosis Date    Arthritis     Dyslipidemia     Essential hypertension     Frequent PVCs     GERD (gastroesophageal reflux disease)     H/O seasonal allergies     Mitral valve prolapse     pt states Dr. Douglas Doyle is not sure if this is the correct diagnosis    Obesity 05/02/2018    Obesity  BMI= 32.6      Past Surgical History:   Procedure Laterality Date    COLONOSCOPY N/A 6/2/2017    COLONOSCOPY performed by Elvira Treviño MD at [address]  6/2/2017         EGD  6/2/2017         HX HERNIA REPAIR      times 2. 1980's inguinal bilateral    HX HIP REPLACEMENT Left 2018    HX ORTHOPAEDIC Left 2018    hip replacement    HX OTHER SURGICAL  1977    Repair of the right shoulder.  HX TONSILLECTOMY      childhood    UPPER GI ENDOSCOPY,DILATN W GUIDE  7/14/2017         UPPER GI ENDOSCOPY,DILATN W GUIDE  9/26/2018          Current Outpatient Medications   Medication Sig Dispense Refill    pantoprazole (PROTONIX) 40 mg tablet Take 1 Tab by mouth daily. 90 Tab 1    metoprolol succinate (TOPROL-XL) 25 mg XL tablet TAKE 1/2 TABLET EVERY DAY 45 Tab 0    losartan-hydroCHLOROthiazide (HYZAAR) 50-12.5 mg per tablet Take 1 Tab by mouth daily. 90 Tab 1    simvastatin (ZOCOR) 10 mg tablet TAKE 1 TABLET EVERY NIGHT 90 Tab 3    flecainide (TAMBOCOR) 50 mg tablet TAKE 1 TABLET TWICE DAILY 180 Tab 3    aspirin delayed-release 81 mg tablet Take  by mouth daily.  multivitamin (ONE A DAY) tablet Take 1 Tab by mouth daily.  VITAMIN B COMPLEX (B COMPLEX PO) Take  by mouth daily.  DOCOSAHEXANOIC ACID/EPA (FISH OIL PO) Take 1,200 mg by mouth two (2) times a day.  GLUCOSAMINE SULFATE (GLUCOSAMINE PO) Take 1,500 mg by mouth two (2) times a day.  CHOLECALCIFEROL, VITAMIN D3, (VITAMIN D3 PO) Take 1 Tab by mouth daily.        No Known Allergies  Family History   Problem Relation Age of Onset    Sudden Death Mother         Car accident    Heart Attack Father 48    Heart Disease Father     Thyroid Disease Sister     No Known Problems Brother     No Known Problems Sister     No Known Problems Sister     No Known Problems Brother     No Known Problems Brother     Cancer Maternal Aunt [de-identified]        multiple myloma     Social History     Tobacco Use    Smoking status: Former Smoker     Packs/day: 1.00     Years: 30.00     Pack years: 30.00     Last attempt to quit: 1997     Years since quittin.6    Smokeless tobacco: Never Used   Substance Use Topics    Alcohol use: Yes     Alcohol/week: 6.0 standard drinks     Types: 3 Glasses of wine, 3 Cans of beer per week     Patient Active Problem List   Diagnosis Code    Colon polyps K63.5    Essential hypertension I10    Mitral valve prolapse I34.1    Chest tightness R07.89    Seasonal allergic rhinitis due to pollen J30.1    Primary osteoarthritis of left hip M16.12       Depression Risk Factor Screening:     3 most recent PHQ Screens 10/2/2019   Little interest or pleasure in doing things Not at all   Feeling down, depressed, irritable, or hopeless Not at all   Total Score PHQ 2 0     Alcohol Risk Factor Screenin per week    Functional Ability and Level of Safety:   Hearing Loss  Hearing is good. Activities of Daily Living  The home contains: no safety equipment. Patient does total self care    Fall Risk  Fall Risk Assessment, last 12 mths 10/2/2019   Able to walk? Yes   Fall in past 12 months?  No       Abuse Screen  Patient is not abused    Cognitive Screening   Evaluation of Cognitive Function:  Has your family/caregiver stated any concerns about your memory: no  Normal    Patient Care Team   Patient Care Team:  Margie Cohen MD as PCP - General (Internal Medicine)  Jocelyne Hernández MD (General Surgery)  Isabel Shah MD (Orthopedic Surgery)    Assessment/Plan   Education and counseling provided:  Are appropriate based on today's review and evaluation  Pneumococcal Vaccine  Influenza Vaccine  Prostate cancer screening tests (PSA, covered annually)    Diagnoses and all orders for this visit:    1. Medicare annual wellness visit, subsequent  prevnar ordered  2. H/O nicotine dependence  -     CT LOW DOSE LUNG CANCER SCREENING; Future    3. Essential hypertension  -     METABOLIC PANEL, COMPREHENSIVE  -     TSH 3RD GENERATION    4. GERD without esophagitis  -     pantoprazole (PROTONIX) 40 mg tablet; Take 1 Tab by mouth daily.  -     CBC WITH AUTOMATED DIFF    5. Screening for AAA (abdominal aortic aneurysm)  -     US EXAM SCREENING AAA; Future    6.  Encounter for immunization  -     INFLUENZA VACCINE INACTIVATED (IIV), SUBUNIT, ADJUVANTED, IM  -     ADMIN INFLUENZA VIRUS VAC        Health Maintenance Due   Topic Date Due    DTaP/Tdap/Td series (1 - Tdap) 06/25/1970    Shingrix Vaccine Age 50> (1 of 2) 06/25/1999    FOBT Q 1 YEAR AGE 50-75  06/25/1999    GLAUCOMA SCREENING Q2Y  06/25/2014    AAA Screening 73-67 YO Male Smoking Patients  06/25/2014    Pneumococcal 65+ years (2 of 2 - PCV13) 01/19/2018    MEDICARE YEARLY EXAM  03/14/2018    Influenza Age 9 to Adult  08/01/2019

## 2019-10-02 NOTE — PATIENT INSTRUCTIONS
Medicare Wellness Visit, Male The best way to live healthy is to have a lifestyle where you eat a well-balanced diet, exercise regularly, limit alcohol use, and quit all forms of tobacco/nicotine, if applicable. Regular preventive services are another way to keep healthy. Preventive services (vaccines, screening tests, monitoring & exams) can help personalize your care plan, which helps you manage your own care. Screening tests can find health problems at the earliest stages, when they are easiest to treat. [facility] follows the current, evidence-based guidelines published by the [facility] ([facility]) when recommending preventive services for our patients. Because we follow these guidelines, sometimes recommendations change over time as research supports it. (For example, a prostate screening blood test is no longer routinely recommended for men with no symptoms.) Of course, you and your doctor may decide to screen more often for some diseases, based on your risk and co-morbidities (chronic disease you are already diagnosed with). Preventive services for you include: - Medicare offers their members a free annual wellness visit, which is time for you and your primary care provider to discuss and plan for your preventive service needs. Take advantage of this benefit every year! 
-All adults over age 72 should receive the recommended pneumonia vaccines. Current USPSTF guidelines recommend a series of two vaccines for the best pneumonia protection.  
-All adults should have a flu vaccine yearly and an ECG.  All adults age 61 and older should receive a shingles vaccine once in their lifetime.   
-All adults age 38-68 who are overweight should have a diabetes screening test once every three years.  
-Other screening tests & preventive services for persons with diabetes include: an eye exam to screen for diabetic retinopathy, a kidney function test, a foot exam, and stricter control over your cholesterol.  
-Cardiovascular screening for adults with routine risk involves an electrocardiogram (ECG) at intervals determined by the provider.  
-Colorectal cancer screening should be done for adults age 54-65 with no increased risk factors for colorectal cancer. There are a number of acceptable methods of screening for this type of cancer. Each test has its own benefits and drawbacks. Discuss with your provider what is most appropriate for you during your annual wellness visit. The different tests include: colonoscopy (considered the best screening method), a fecal occult blood test, a fecal DNA test, and sigmoidoscopy. 
-All adults born between [facility] should be screened once for Hepatitis C. 
-An Abdominal Aortic Aneurysm (AAA) Screening is recommended for men age 73-68 who has ever smoked in their lifetime. Here is a list of your current Health Maintenance items (your personalized list of preventive services) with a due date: 
Health Maintenance Due Topic Date Due  
 DTaP/Tdap/Td  (1 - Tdap) 06/25/1970  Shingles Vaccine (1 of 2) 06/25/1999  Stool testing for trace blood  06/25/1999  Glaucoma Screening   06/25/2014  AAA Screening  06/25/2014  Pneumococcal Vaccine (2 of 2 - PCV13) 01/19/2018 [address] [place] [address] Annual Well Visit  03/14/2018  Flu Vaccine  08/01/2019

## 2019-10-03 LAB
ALBUMIN SERPL-MCNC: 4.4 G/DL (ref 3.5–4.8)
ALBUMIN/GLOB SERPL: 1.8 {RATIO} (ref 1.2–2.2)
ALP SERPL-CCNC: 68 IU/L (ref 39–117)
ALT SERPL-CCNC: 17 IU/L (ref 0–44)
AST SERPL-CCNC: 19 IU/L (ref 0–40)
BASOPHILS # BLD AUTO: 0.1 X10E3/UL (ref 0–0.2)
BASOPHILS NFR BLD AUTO: 1 %
BILIRUB SERPL-MCNC: 0.5 MG/DL (ref 0–1.2)
BUN SERPL-MCNC: 23 MG/DL (ref 8–27)
BUN/CREAT SERPL: 18 (ref 10–24)
CALCIUM SERPL-MCNC: 9.7 MG/DL (ref 8.6–10.2)
CHLORIDE SERPL-SCNC: 102 MMOL/L (ref 96–106)
CO2 SERPL-SCNC: 25 MMOL/L (ref 20–29)
CREAT SERPL-MCNC: 1.3 MG/DL (ref 0.76–1.27)
EOSINOPHIL # BLD AUTO: 0.3 X10E3/UL (ref 0–0.4)
EOSINOPHIL NFR BLD AUTO: 4 %
ERYTHROCYTE [DISTWIDTH] IN BLOOD BY AUTOMATED COUNT: 12.4 % (ref 12.3–15.4)
GLOBULIN SER CALC-MCNC: 2.5 G/DL (ref 1.5–4.5)
GLUCOSE SERPL-MCNC: 92 MG/DL (ref 65–99)
HCT VFR BLD AUTO: 38.5 % (ref 37.5–51)
HGB BLD-MCNC: 12.9 G/DL (ref 13–17.7)
IMM GRANULOCYTES # BLD AUTO: 0 X10E3/UL (ref 0–0.1)
IMM GRANULOCYTES NFR BLD AUTO: 0 %
INTERPRETATION: NORMAL
LYMPHOCYTES # BLD AUTO: 1.5 X10E3/UL (ref 0.7–3.1)
LYMPHOCYTES NFR BLD AUTO: 21 %
MCH RBC QN AUTO: 29.9 PG (ref 26.6–33)
MCHC RBC AUTO-ENTMCNC: 33.5 G/DL (ref 31.5–35.7)
MCV RBC AUTO: 89 FL (ref 79–97)
MONOCYTES # BLD AUTO: 0.7 X10E3/UL (ref 0.1–0.9)
MONOCYTES NFR BLD AUTO: 10 %
NEUTROPHILS # BLD AUTO: 4.5 X10E3/UL (ref 1.4–7)
NEUTROPHILS NFR BLD AUTO: 64 %
PLATELET # BLD AUTO: 222 X10E3/UL (ref 150–450)
POTASSIUM SERPL-SCNC: 4.4 MMOL/L (ref 3.5–5.2)
PROT SERPL-MCNC: 6.9 G/DL (ref 6–8.5)
RBC # BLD AUTO: 4.31 X10E6/UL (ref 4.14–5.8)
SODIUM SERPL-SCNC: 139 MMOL/L (ref 134–144)
TSH SERPL DL<=0.005 MIU/L-ACNC: 1.76 UIU/ML (ref 0.45–4.5)
WBC # BLD AUTO: 7 X10E3/UL (ref 3.4–10.8)

## 2019-10-07 ENCOUNTER — HOSPITAL ENCOUNTER (OUTPATIENT)
Dept: CT IMAGING | Age: 69
Discharge: HOME | End: 2019-10-07
Attending: INTERNAL MEDICINE
Payer: MEDICARE

## 2019-10-07 ENCOUNTER — HOSPITAL ENCOUNTER (OUTPATIENT)
Dept: ULTRASOUND IMAGING | Age: 69
Discharge: HOME | End: 2019-10-07
Attending: INTERNAL MEDICINE
Payer: MEDICARE

## 2019-10-07 DIAGNOSIS — Z87.891 H/O NICOTINE DEPENDENCE: ICD-10-CM

## 2019-10-07 DIAGNOSIS — Z13.6 SCREENING FOR AAA (ABDOMINAL AORTIC ANEURYSM): ICD-10-CM

## 2019-10-07 PROCEDURE — 76706 US ABDL AORTA SCREEN AAA: CPT

## 2019-10-07 PROCEDURE — G0297 LDCT FOR LUNG CA SCREEN: HCPCS

## 2019-10-08 LAB
ALBUMIN SERPL-MCNC: 4.6 G/DL (ref 3.5–4.8)
ALP SERPL-CCNC: 73 IU/L (ref 39–117)
ALT SERPL-CCNC: 15 IU/L (ref 0–44)
AST SERPL-CCNC: 18 IU/L (ref 0–40)
BILIRUB DIRECT SERPL-MCNC: 0.16 MG/DL (ref 0–0.4)
BILIRUB SERPL-MCNC: 0.6 MG/DL (ref 0–1.2)
CHOLEST SERPL-MCNC: 146 MG/DL (ref 100–199)
HDLC SERPL-MCNC: 38 MG/DL
INTERPRETATION, 910389: NORMAL
LDLC SERPL CALC-MCNC: 87 MG/DL (ref 0–99)
PROT SERPL-MCNC: 6.8 G/DL (ref 6–8.5)
TRIGL SERPL-MCNC: 106 MG/DL (ref 0–149)
VLDLC SERPL CALC-MCNC: 21 MG/DL (ref 5–40)

## 2019-10-09 ENCOUNTER — OFFICE VISIT (OUTPATIENT)
Dept: CARDIOLOGY CLINIC | Age: 69
End: 2019-10-09

## 2019-10-09 VITALS
BODY MASS INDEX: 30.26 KG/M2 | SYSTOLIC BLOOD PRESSURE: 100 MMHG | DIASTOLIC BLOOD PRESSURE: 60 MMHG | WEIGHT: 223.4 LBS | HEIGHT: 72 IN | HEART RATE: 68 BPM

## 2019-10-09 DIAGNOSIS — E78.5 DYSLIPIDEMIA: ICD-10-CM

## 2019-10-09 DIAGNOSIS — I49.3 PVC'S (PREMATURE VENTRICULAR CONTRACTIONS): ICD-10-CM

## 2019-10-09 DIAGNOSIS — E78.5 DYSLIPIDEMIA: Primary | ICD-10-CM

## 2019-10-09 DIAGNOSIS — I34.1 MITRAL VALVE PROLAPSE: ICD-10-CM

## 2019-10-09 DIAGNOSIS — I42.9 CARDIOMYOPATHY, UNSPECIFIED TYPE (HCC): Primary | ICD-10-CM

## 2019-10-09 DIAGNOSIS — I10 ESSENTIAL HYPERTENSION: ICD-10-CM

## 2019-10-09 RX ORDER — SIMVASTATIN 5 MG/1
5 TABLET, FILM COATED ORAL
Qty: 30 TAB | Refills: 5 | Status: SHIPPED | OUTPATIENT
Start: 2019-10-09 | End: 2020-01-10 | Stop reason: SDUPTHER

## 2019-10-09 RX ORDER — LOSARTAN POTASSIUM 50 MG/1
50 TABLET ORAL DAILY
Qty: 30 TAB | Refills: 4 | Status: SHIPPED | OUTPATIENT
Start: 2019-10-09 | End: 2020-01-10 | Stop reason: SDUPTHER

## 2019-10-09 NOTE — PROGRESS NOTES
LAST OFFICE VISIT : 3/26/19        ICD-10-CM ICD-9-CM   1. Cardiomyopathy, unspecified type ([facility] [place] [facility] I42.9 425.4   2. PVC's (premature ventricular contractions) I49.3 427.69   3. Essential hypertension I10 401.9   4. Dyslipidemia E78.5 272.4   5. Mitral valve prolapse I34.1 424.0          Aevlino Sepulveda is a 78 y.o. male with HTN and mitral valve prolapse referred for preoperative cardiac risk stratification. Cardiac risk factors: family history, male gender, hypertension  I have personally obtained the history from the patient. HISTORY OF PRESENTING ILLNESS     Avelino Sepulveda reports he had a good summer. He watched his calorie intake [facility], and lost about 20 pounds. The patient denies chest pain/ shortness of breath, orthopnea, PND, LE edema, palpitations, syncope, presyncope or fatigue.          ACTIVE PROBLEM LIST     Patient Active Problem List    Diagnosis Date Noted    Primary osteoarthritis of left hip 05/06/2018    Seasonal allergic rhinitis due to pollen 05/19/2017    Chest tightness 05/18/2017    Mitral valve prolapse 02/16/2017    Colon polyps 01/19/2017    Essential hypertension 01/19/2017           PAST MEDICAL HISTORY     Past Medical History:   Diagnosis Date    Arthritis     Dyslipidemia     Essential hypertension     Frequent PVCs     GERD (gastroesophageal reflux disease)     H/O seasonal allergies     Mitral valve prolapse     pt states Dr. Stallworth Eis is not sure if this is the correct diagnosis    Obesity 05/02/2018    Obesity  BMI= 32.6           PAST SURGICAL HISTORY     Past Surgical History:   Procedure Laterality Date    COLONOSCOPY N/A 6/2/2017    COLONOSCOPY performed by Sonna Paget, MD at [facility]  6/2/2017         EGD  6/2/2017         HX HERNIA REPAIR      times 2. 1980's inguinal bilateral    HX HIP REPLACEMENT Left 2018    HX ORTHOPAEDIC Left 2018    hip replacement    HX OTHER SURGICAL  1977    Repair of the right shoulder.  HX TONSILLECTOMY      childhood    UPPER GI ENDOSCOPY,DILATN W GUIDE  7/14/2017         UPPER GI ENDOSCOPY,DILATN W GUIDE  9/26/2018               ALLERGIES     No Known Allergies       FAMILY HISTORY     Family History   Problem Relation Age of Onset    Sudden Death Mother         Car accident    Heart Attack Father 48    Heart Disease Father     Thyroid Disease Sister     No Known Problems Brother     No Known Problems Sister     No Known Problems Sister     No Known Problems Brother     No Known Problems Brother     Cancer Maternal Aunt [de-identified]        multiple myloma    negative for cardiac disease       SOCIAL HISTORY           MEDICATIONS     Current Outpatient Medications   Medication Sig    simvastatin (ZOCOR) 5 mg tablet Take 1 Tab by mouth nightly. TAKE 1 TABLET EVERY NIGHT    losartan (COZAAR) 50 mg tablet Take 1 Tab by mouth daily.  pantoprazole (PROTONIX) 40 mg tablet Take 1 Tab by mouth daily.  metoprolol succinate (TOPROL-XL) 25 mg XL tablet TAKE 1/2 TABLET EVERY DAY    flecainide (TAMBOCOR) 50 mg tablet TAKE 1 TABLET TWICE DAILY    aspirin delayed-release 81 mg tablet Take  by mouth daily.  multivitamin (ONE A DAY) tablet Take 1 Tab by mouth daily.  VITAMIN B COMPLEX (B COMPLEX PO) Take  by mouth daily.  DOCOSAHEXANOIC ACID/EPA (FISH OIL PO) Take 1,200 mg by mouth two (2) times a day.  GLUCOSAMINE SULFATE (GLUCOSAMINE PO) Take 1,500 mg by mouth two (2) times a day.  CHOLECALCIFEROL, VITAMIN D3, (VITAMIN D3 PO) Take 1 Tab by mouth daily. No current facility-administered medications for this visit. I have reviewed the nurses notes, vitals, problem list, allergy list, medical history, family, social history and medications. REVIEW OF SYMPTOMS      General: Pt denies excessive weight gain or loss.  Pt is able to conduct ADL's  HEENT: Denies blurred vision, headaches, hearing loss, epistaxis and difficulty swallowing. Respiratory: Denies cough, congestion, shortness of breath, OCONNOR, wheezing or stridor. Cardiovascular: Denies precordial pain, palpitations, edema or PND  Gastrointestinal: Denies poor appetite, indigestion, abdominal pain or blood in stool  Genitourinary: Denies hematuria, dysuria, increased urinary frequency  Musculoskeletal: Denies joint pain or swelling from muscles or joints  Neurologic: Denies tremor, paresthesias, headache, or sensory motor disturbance  Psychiatric: Denies confusion, insomnia, depression  Integumentray: Denies rash, itching or ulcers. Hematologic: Denies easy bruising, bleeding     PHYSICAL EXAMINATION      Vitals:    10/09/19 1511   BP: 100/60   Pulse: 68   Weight: 223 lb 6.4 oz (101.3 kg)   Height: 6' (1.829 m)     General: Well developed, in no acute distress. HEENT: No jaundice, oral mucosa moist, no oral ulcers  Neck: Supple, no stiffness, no lymphadenopathy, supple  Heart:  Normal S1/S2 negative S3 or S4. Regular, no murmur, gallop or rub, no jugular venous distention  Respiratory: Clear bilaterally x 4, no wheezing or rales   Extremities:  No edema, normal cap refill, no cyanosis. Musculoskeletal: No clubbing, no deformities  Neuro: A&Ox3, speech clear, gait stable, cooperative, no focal neurologic deficits  Skin: Skin color is normal. No rashes or lesions. Non diaphoretic, moist.       DIAGNOSTIC DATA     1. Lipids  1/9/17- , HDL 43, , TG 91  5/2/18- , HDL 37, LDL 84,   3/20/19-  HDL 36, LDL 88,   10/7/19- , HDL 38, LDL 87,     2. Holter  (1/19/17): sinus rhtyhm with PVCs there were frequent PVCs, rare couplets, triplets and bigeminy with rare episodes of non-sustained VT with rates up to 134.  9/28/17- SB with PVCs, HR 43-91, PVC burden 30.48%  11/8/17- SR HR , PVC burden 5 %    3. Echo  4/19/16- EF 30-44%, MR mild/mod, AI mild, LAE  9/28/17- EF 50-55%, PI mild/mod    4.  Stress Test  Lexiscan- 6/15/17-no ischemia, EF 39%         LABORATORY DATA            Lab Results   Component Value Date/Time    WBC 7.0 10/02/2019 03:36 PM    HGB 12.9 (L) 10/02/2019 03:36 PM    HCT 38.5 10/02/2019 03:36 PM    PLATELET 780 09/32/2281 03:36 PM    MCV 89 10/02/2019 03:36 PM      Lab Results   Component Value Date/Time    Sodium 139 10/02/2019 03:36 PM    Potassium 4.4 10/02/2019 03:36 PM    Chloride 102 10/02/2019 03:36 PM    CO2 25 10/02/2019 03:36 PM    Anion gap 8 05/08/2018 02:19 AM    Glucose 92 10/02/2019 03:36 PM    BUN 23 10/02/2019 03:36 PM    Creatinine 1.30 (H) 10/02/2019 03:36 PM    BUN/Creatinine ratio 18 10/02/2019 03:36 PM    GFR est AA 64 10/02/2019 03:36 PM    GFR est non-AA 55 (L) 10/02/2019 03:36 PM    Calcium 9.7 10/02/2019 03:36 PM    Bilirubin, total 0.6 10/07/2019 07:51 AM    AST (SGOT) 18 10/07/2019 07:51 AM    Alk. phosphatase 73 10/07/2019 07:51 AM    Protein, total 6.8 10/07/2019 07:51 AM    Albumin 4.6 10/07/2019 07:51 AM    Globulin 3.1 05/02/2018 09:27 AM    A-G Ratio 1.8 10/02/2019 03:36 PM    ALT (SGPT) 15 10/07/2019 07:51 AM           ASSESSMENT/RECOMMENDATIONS:.        1. Cardiomyopathy  - EF in 2017 was WNL. - He is CECY class 1 with no evidence of HF on exam  - He is on Flecainide and tolerating it    2. PVCs  -Has seen Dr. Misty Garcia and is to continue Flecainide 50 mg BID. 3. HTN  - Blood pressure is slightly low  - On recheck 110/72  - I stopped his HCTZ and started Losartan 50mg/d.  - Return in 2 weeks for a BP check    4. Dyslipidemia   - Lipids are at goal on current medical regimen. - Will reduce Zocor to 5mg because of his significant weight loss  - Will recheck lipids in 2 months  - LDL should be close to 100. Return in 6 months or PRN. Orders Placed This Encounter    simvastatin (ZOCOR) 5 mg tablet     Sig: Take 1 Tab by mouth nightly. TAKE 1 TABLET EVERY NIGHT     Dispense:  30 Tab     Refill:  5    losartan (COZAAR) 50 mg tablet     Sig: Take 1 Tab by mouth daily.      Dispense:  30 Tab     Refill:  4          Follow-up and Dispositions  ·   Return in about 6 months (around 4/9/2020). I have discussed the diagnosis with  Clayton Purpura and the intended plan as seen in the above orders. Questions were answered concerning future plans. I have discussed medication side effects and warnings with the patient as well. Thank you,  Arianna Dimas MD for involving me in the care of  Geneva Purpura. Please do not hesitate to contact me for further questions/concerns. Written by Arnold Alexandre, as dictated by Heather Groves MD.      Reji Cummings. Antolin Reyes MD, [facility] [place]    Patient Care Team:  Arianna Dimas MD as PCP - General (Internal Medicine)  Leandra Cano MD (General Surgery)  Tom Coppola MD (Orthopedic Surgery)    [address]      [phone] / [phone] Fax

## 2019-10-09 NOTE — LETTER
10/9/19 Patient: Stephanie Morrison YOB: 1950 Date of Visit: 10/9/2019 Rose Pisano MD 
[place] [address] [address] [facility] [address] VIA In Basket Dear Rose Pisano MD, Thank you for referring Mr. Nickola Heck Thoen to [facility] for evaluation. My notes for this consultation are attached. If you have questions, please do not hesitate to call me. I look forward to following your patient along with you.  
 
 
Sincerely, 
 
Alec Schultz MD

## 2019-10-09 NOTE — PATIENT INSTRUCTIONS
- Stop Hyzaar  - Start Losartan 50mg daily  - Reduce Zocor to 5mg daily   - Return in 2 weeks for a blood pressure check

## 2019-10-10 ENCOUNTER — PATIENT MESSAGE (OUTPATIENT)
Dept: INTERNAL MEDICINE CLINIC | Age: 69
End: 2019-10-10

## 2019-10-14 NOTE — TELEPHONE ENCOUNTER
----- Message from Jan Mckeon sent at 10/14/2019  2:59 PM EDT -----  Regarding: Dr. Griffin Ayala / Shaunna Maxwell: [phone]  Caller's first and last name and relationship (if not the patient): N/A  Best contact number(s): [phone]  Whose call is being returned: Dr. Griffin Ayala advised him to call and speak to the nurse  Details to clarify the request: He was calling to get  test results for the aortic aneurysm ultrasound and CT scan for his lungs.

## 2019-10-15 ENCOUNTER — TELEPHONE (OUTPATIENT)
Dept: INTERNAL MEDICINE CLINIC | Age: 69
End: 2019-10-15

## 2019-10-15 NOTE — TELEPHONE ENCOUNTER
Patient states he's returning a call to the nurse, states he will call us back tmrw afternoon when he's available.

## 2019-10-23 ENCOUNTER — OFFICE VISIT (OUTPATIENT)
Dept: CARDIOLOGY CLINIC | Age: 69
End: 2019-10-23

## 2019-10-23 VITALS
SYSTOLIC BLOOD PRESSURE: 110 MMHG | OXYGEN SATURATION: 97 % | DIASTOLIC BLOOD PRESSURE: 78 MMHG | HEIGHT: 72 IN | BODY MASS INDEX: 30.4 KG/M2 | HEART RATE: 55 BPM | WEIGHT: 224.4 LBS

## 2019-10-23 DIAGNOSIS — I42.9 CARDIOMYOPATHY, UNSPECIFIED TYPE (HCC): ICD-10-CM

## 2019-10-23 DIAGNOSIS — E78.5 DYSLIPIDEMIA: ICD-10-CM

## 2019-10-23 DIAGNOSIS — I10 ESSENTIAL HYPERTENSION: Primary | ICD-10-CM

## 2019-10-23 DIAGNOSIS — I49.3 PVC'S (PREMATURE VENTRICULAR CONTRACTIONS): ICD-10-CM

## 2019-10-23 NOTE — LETTER
10/23/19 Patient: Ji Bonilla YOB: 1950 Date of Visit: 10/23/2019 Andreas Sandoval MD 
[place] [address] [address] [facility] [address] VIA In Basket Dear Andreas Sandoval MD, Thank you for referring Mr. Berneta Schatz Thoen to [facility] for evaluation. My notes for this consultation are attached. If you have questions, please do not hesitate to call me. I look forward to following your patient along with you.  
 
 
Sincerely, 
 
Zo Gonzalez MD

## 2019-10-23 NOTE — PROGRESS NOTES
LAST OFFICE VISIT : 10/9/19        ICD-10-CM ICD-9-CM   1. Essential hypertension I10 401.9   2. Cardiomyopathy, unspecified type ([address] I42.9 425.4   3. PVC's (premature ventricular contractions) I49.3 427.69   4. Dyslipidemia E78.5 272.4          Breanne Romero is a 78 y.o. male with HTN and mitral valve prolapse referred for preoperative cardiac risk stratification, referred for 2 week f/u. Cardiac risk factors: family history, male gender, hypertension  I have personally obtained the history from the patient. HISTORY OF PRESENTING ILLNESS     Breanne Romero reports returns today for BP check. Last office visit HCTZ was stopped, and started Losartan 50mg. He is doing well. His home BP's are ranging 110/80. The patient denies chest pain/ shortness of breath, orthopnea, PND, LE edema, palpitations, syncope, presyncope or fatigue.          ACTIVE PROBLEM LIST     Patient Active Problem List    Diagnosis Date Noted    Primary osteoarthritis of left hip 05/06/2018    Seasonal allergic rhinitis due to pollen 05/19/2017    Chest tightness 05/18/2017    Mitral valve prolapse 02/16/2017    Colon polyps 01/19/2017    Essential hypertension 01/19/2017           PAST MEDICAL HISTORY     Past Medical History:   Diagnosis Date    Arthritis     Dyslipidemia     Essential hypertension     Frequent PVCs     GERD (gastroesophageal reflux disease)     H/O seasonal allergies     Mitral valve prolapse     pt states Dr. Mercie Mohs is not sure if this is the correct diagnosis    Obesity 05/02/2018    Obesity  BMI= 32.6           PAST SURGICAL HISTORY     Past Surgical History:   Procedure Laterality Date    COLONOSCOPY N/A 6/2/2017    COLONOSCOPY performed by Jaida Madera MD at [facility]  6/2/2017         EGD  6/2/2017         HX HERNIA REPAIR      times 2. 1980's inguinal bilateral    HX HIP REPLACEMENT Left 2018    HX ORTHOPAEDIC Left 2018    hip replacement  HX OTHER SURGICAL  1977    Repair of the right shoulder.  HX TONSILLECTOMY      childhood    UPPER GI ENDOSCOPY,DILATN W GUIDE  7/14/2017         UPPER GI ENDOSCOPY,DILATN W GUIDE  9/26/2018               ALLERGIES     No Known Allergies       FAMILY HISTORY     Family History   Problem Relation Age of Onset    Sudden Death Mother         Car accident    Heart Attack Father 48    Heart Disease Father     Thyroid Disease Sister     No Known Problems Brother     No Known Problems Sister     No Known Problems Sister     No Known Problems Brother     No Known Problems Brother     Cancer Maternal Aunt [de-identified]        multiple myloma    negative for cardiac disease       SOCIAL HISTORY           MEDICATIONS     Current Outpatient Medications   Medication Sig    simvastatin (ZOCOR) 5 mg tablet Take 1 Tab by mouth nightly. TAKE 1 TABLET EVERY NIGHT    losartan (COZAAR) 50 mg tablet Take 1 Tab by mouth daily.  pantoprazole (PROTONIX) 40 mg tablet Take 1 Tab by mouth daily.  metoprolol succinate (TOPROL-XL) 25 mg XL tablet TAKE 1/2 TABLET EVERY DAY    flecainide (TAMBOCOR) 50 mg tablet TAKE 1 TABLET TWICE DAILY    aspirin delayed-release 81 mg tablet Take  by mouth daily.  multivitamin (ONE A DAY) tablet Take 1 Tab by mouth daily.  VITAMIN B COMPLEX (B COMPLEX PO) Take  by mouth daily.  DOCOSAHEXANOIC ACID/EPA (FISH OIL PO) Take 1,200 mg by mouth two (2) times a day.  GLUCOSAMINE SULFATE (GLUCOSAMINE PO) Take 1,500 mg by mouth two (2) times a day.  CHOLECALCIFEROL, VITAMIN D3, (VITAMIN D3 PO) Take 1 Tab by mouth daily. No current facility-administered medications for this visit. I have reviewed the nurses notes, vitals, problem list, allergy list, medical history, family, social history and medications. REVIEW OF SYMPTOMS      General: Pt denies excessive weight gain or loss.  Pt is able to conduct ADL's  HEENT: Denies blurred vision, headaches, hearing loss, epistaxis and difficulty swallowing. Respiratory: Denies cough, congestion, shortness of breath, OCONNOR, wheezing or stridor. Cardiovascular: Denies precordial pain, palpitations, edema or PND  Gastrointestinal: Denies poor appetite, indigestion, abdominal pain or blood in stool  Genitourinary: Denies hematuria, dysuria, increased urinary frequency  Musculoskeletal: Denies joint pain or swelling from muscles or joints  Neurologic: Denies tremor, paresthesias, headache, or sensory motor disturbance  Psychiatric: Denies confusion, insomnia, depression  Integumentray: Denies rash, itching or ulcers. Hematologic: Denies easy bruising, bleeding     PHYSICAL EXAMINATION      Vitals:    10/23/19 1423   BP: 110/78   Pulse: (!) 55   SpO2: 97%   Weight: 224 lb 6.4 oz (101.8 kg)   Height: 6' (1.829 m)     General: Well developed, in no acute distress. HEENT: No jaundice, oral mucosa moist, no oral ulcers  Neck: Supple, no stiffness, no lymphadenopathy, supple  Heart:  Normal S1/S2 negative S3 or S4. Regular, no murmur, gallop or rub, no jugular venous distention  Respiratory: Clear bilaterally x 4, no wheezing or rales   Extremities:  No edema, normal cap refill, no cyanosis. Musculoskeletal: No clubbing, no deformities  Neuro: A&Ox3, speech clear, gait stable, cooperative, no focal neurologic deficits  Skin: Skin color is normal. No rashes or lesions. Non diaphoretic, moist.       DIAGNOSTIC DATA     1. Lipids  1/9/17- , HDL 43, , TG 91  5/2/18- , HDL 37, LDL 84,   3/20/19-  HDL 36, LDL 88,   10/7/19- , HDL 38, LDL 87,     2. Holter  (1/19/17): sinus rhtyhm with PVCs there were frequent PVCs, rare couplets, triplets and bigeminy with rare episodes of non-sustained VT with rates up to 134.  9/28/17- SB with PVCs, HR 43-91, PVC burden 30.48%  11/8/17- SR HR , PVC burden 5 %    3. Echo  4/19/16- EF 30-44%, MR mild/mod, AI mild, LAE  9/28/17- EF 50-55%, PI mild/mod    4.  Stress Test  Lexiscan- 6/15/17-no ischemia, EF 39%         LABORATORY DATA            Lab Results   Component Value Date/Time    WBC 7.0 10/02/2019 03:36 PM    HGB 12.9 (L) 10/02/2019 03:36 PM    HCT 38.5 10/02/2019 03:36 PM    PLATELET 356 98/54/8370 03:36 PM    MCV 89 10/02/2019 03:36 PM      Lab Results   Component Value Date/Time    Sodium 139 10/02/2019 03:36 PM    Potassium 4.4 10/02/2019 03:36 PM    Chloride 102 10/02/2019 03:36 PM    CO2 25 10/02/2019 03:36 PM    Anion gap 8 05/08/2018 02:19 AM    Glucose 92 10/02/2019 03:36 PM    BUN 23 10/02/2019 03:36 PM    Creatinine 1.30 (H) 10/02/2019 03:36 PM    BUN/Creatinine ratio 18 10/02/2019 03:36 PM    GFR est AA 64 10/02/2019 03:36 PM    GFR est non-AA 55 (L) 10/02/2019 03:36 PM    Calcium 9.7 10/02/2019 03:36 PM    Bilirubin, total 0.6 10/07/2019 07:51 AM    AST (SGOT) 18 10/07/2019 07:51 AM    Alk. phosphatase 73 10/07/2019 07:51 AM    Protein, total 6.8 10/07/2019 07:51 AM    Albumin 4.6 10/07/2019 07:51 AM    Globulin 3.1 05/02/2018 09:27 AM    A-G Ratio 1.8 10/02/2019 03:36 PM    ALT (SGPT) 15 10/07/2019 07:51 AM           ASSESSMENT/RECOMMENDATIONS:.        1. HTN  - Stopped HCTZ and BP is good. Continue current medications  - On recheck 120/88. 2. Cardiomyopathy  - EF in 2017 was WNL. - He is CECY class 1 with no evidence of HF on exam  - He is on Flecainide and tolerating it    3. PVCs  -Has seen Dr. Juaquin Interiano and is to continue Flecainide 50 mg BID. 4. Dyslipidemia   - Lipids are at goal on current medical regimen. - Will reduce Zocor to 5mg because of his significant weight loss  - Will recheck lipids in 2 months  - LDL should be close to 100. Return in 6 months or PRN. No orders of the defined types were placed in this encounter. Follow-up and Dispositions  ·   Return in about 6 months (around 4/23/2020). I have discussed the diagnosis with  Yaakov Chambers and the intended plan as seen in the above orders.   Questions were answered concerning future plans. I have discussed medication side effects and warnings with the patient as well. Thank you,  Rama Whitehead MD for involving me in the care of  Marzena Hernandez. Please do not hesitate to contact me for further questions/concerns. Written by Nancy Fonseca, as dictated by Parvez Stephenson MD.      Nehemias Orosco. Douglas Randolph MD, [facility] [place]    Patient Care Team:  Rama Whitehead MD as PCP - General (Internal Medicine)  Dolly Escalera MD (General Surgery)  Vincent Alicea MD (Orthopedic Surgery)    [address]      [phone] / [phone] Fax

## 2019-10-23 NOTE — PROGRESS NOTES
On 10/9 stop HCTZ and started Losartan 50 mg. Patient says he has no cardiac complaints today.   Patient is here for blood pressure check           Visit Vitals  /78 (BP 1 Location: Left arm, BP Patient Position: Sitting)   Pulse (!) 55   Ht 6' (1.829 m)   Wt 224 lb 6.4 oz (101.8 kg)   SpO2 97%   BMI 30.43 kg/m²

## 2019-12-10 ENCOUNTER — TELEPHONE (OUTPATIENT)
Dept: CARDIOLOGY CLINIC | Age: 69
End: 2019-12-10

## 2019-12-10 NOTE — TELEPHONE ENCOUNTER
Patient is looking to schedule and appt with Dr. Brianna Rowland first available.      Phone: [phone]

## 2019-12-17 NOTE — PROGRESS NOTES
HISTORY OF PRESENTING ILLNESS      Divina Enriquez is a 78 y.o. male with  HTN, MVP and frequent PVCs with a recent holter demonstrating a PVC burden of 13%. He is asymptomatic and cMRI demonstrated normal LV/RV function without scar. Last visit, we planned for repeat Holter monitor and echocardiogram at 6 month follow up. Echocardiogram demonstrated preserved LV function and Holter demonstrated 30% PVC burden. As a result he was started on a trial of flecainide 50 mg twice daily. Repeat Holter monitoring demonstrated PVC burden of 5%. He has been doing well since his previous follow-up. He is tolerating his flecainide thus far. EKG shows normal sinus rhythm. ACTIVE PROBLEM LIST     Patient Active Problem List    Diagnosis Date Noted    Primary osteoarthritis of left hip 05/06/2018    Seasonal allergic rhinitis due to pollen 05/19/2017    Chest tightness 05/18/2017    Mitral valve prolapse 02/16/2017    Colon polyps 01/19/2017    Essential hypertension 01/19/2017           PAST MEDICAL HISTORY     Past Medical History:   Diagnosis Date    Arthritis     Dyslipidemia     Essential hypertension     Frequent PVCs     GERD (gastroesophageal reflux disease)     H/O seasonal allergies     Mitral valve prolapse     pt states Dr. Frederick Adkins is not sure if this is the correct diagnosis    Obesity 05/02/2018    Obesity  BMI= 32.6           PAST SURGICAL HISTORY     Past Surgical History:   Procedure Laterality Date    COLONOSCOPY N/A 6/2/2017    COLONOSCOPY performed by Georgia Adams MD at [facility]  6/2/2017         EGD  6/2/2017         HX HERNIA REPAIR      times 2. 1980's inguinal bilateral    HX HIP REPLACEMENT Left 2018    HX ORTHOPAEDIC Left 2018    hip replacement    HX OTHER SURGICAL  1977    Repair of the right shoulder.      HX TONSILLECTOMY      childhood    UPPER GI ENDOSCOPY,PABLO LUND GUIDE  7/14/2017         UPPER GI ENDOSCOPYPABLO GUIDE  2018               ALLERGIES     No Known Allergies       FAMILY HISTORY     Family History   Problem Relation Age of Onset   Shea Meehan Sudden Death Mother         Car accident    Heart Attack Father 48    Heart Disease Father     Thyroid Disease Sister     No Known Problems Brother     No Known Problems Sister     No Known Problems Sister     No Known Problems Brother     No Known Problems Brother     Cancer Maternal Aunt [de-identified]        multiple myloma    negative for cardiac disease       SOCIAL HISTORY     Social History     Socioeconomic History    Marital status:      Spouse name: Not on file    Number of children: Not on file    Years of education: Not on file    Highest education level: Not on file   Tobacco Use    Smoking status: Former Smoker     Packs/day: 1.00     Years: 30.00     Pack years: 30.00     Last attempt to quit: 1997     Years since quittin.8    Smokeless tobacco: Never Used   Substance and Sexual Activity    Alcohol use: Yes     Alcohol/week: 6.0 standard drinks     Types: 3 Glasses of wine, 3 Cans of beer per week    Drug use: No         MEDICATIONS     Current Outpatient Medications   Medication Sig    simvastatin (ZOCOR) 5 mg tablet Take 1 Tab by mouth nightly. TAKE 1 TABLET EVERY NIGHT    losartan (COZAAR) 50 mg tablet Take 1 Tab by mouth daily.  pantoprazole (PROTONIX) 40 mg tablet Take 1 Tab by mouth daily.  metoprolol succinate (TOPROL-XL) 25 mg XL tablet TAKE 1/2 TABLET EVERY DAY    flecainide (TAMBOCOR) 50 mg tablet TAKE 1 TABLET TWICE DAILY    aspirin delayed-release 81 mg tablet Take  by mouth daily.  multivitamin (ONE A DAY) tablet Take 1 Tab by mouth daily.  VITAMIN B COMPLEX (B COMPLEX PO) Take  by mouth daily.  DOCOSAHEXANOIC ACID/EPA (FISH OIL PO) Take 1,200 mg by mouth two (2) times a day.  GLUCOSAMINE SULFATE (GLUCOSAMINE PO) Take 1,500 mg by mouth two (2) times a day.     CHOLECALCIFEROL, VITAMIN D3, (VITAMIN D3 PO) Take 1 Tab by mouth daily. No current facility-administered medications for this visit. I have reviewed the nurses notes, vitals, problem list, allergy list, medical history, family, social history and medications. REVIEW OF SYMPTOMS      General: Pt denies excessive weight gain or loss. Pt is able to conduct ADL's  HEENT: Denies blurred vision, headaches, hearing loss, epistaxis and difficulty swallowing. Respiratory: Denies cough, congestion, shortness of breath, OCONNOR, wheezing or stridor. Cardiovascular: Denies precordial pain, palpitations, edema or PND  Gastrointestinal: Denies poor appetite, indigestion, abdominal pain or blood in stool  Genitourinary: Denies hematuria, dysuria, increased urinary frequency  Musculoskeletal: Denies joint pain or swelling from muscles or joints  Neurologic: Denies tremor, paresthesias, headache, or sensory motor disturbance  Psychiatric: Denies confusion, insomnia, depression  Integumentray: Denies rash, itching or ulcers. Hematologic: Denies easy bruising, bleeding       PHYSICAL EXAMINATION      There were no vitals filed for this visit. General: Well developed, in no acute distress. HEENT: No jaundice, oral mucosa moist, no oral ulcers  Neck: Supple, no stiffness, no lymphadenopathy, supple  Heart:  Normal S1/S2 negative S3 or S4. Regular, no murmur, gallop or rub, no jugular venous distention  Respiratory: Clear bilaterally x 4, no wheezing or rales  Abdomen:   Soft, non-tender, bowel sounds are active.   Extremities:  No edema, normal cap refill, no cyanosis. Musculoskeletal: No clubbing, no deformities  Neuro: A&Ox3, speech clear, gait stable, cooperative, no focal neurologic deficits  Skin: Skin color is normal. No rashes or lesions.  Non diaphoretic, moist.  Vascular: 2+ pulses symmetric in all extremities       DIAGNOSTIC DATA      EKG:        LABORATORY DATA      Lab Results   Component Value Date/Time    WBC 7.0 10/02/2019 03:36 PM    HGB 12.9 (L) 10/02/2019 03:36 PM    HCT 38.5 10/02/2019 03:36 PM    PLATELET 812 70/53/5713 03:36 PM    MCV 89 10/02/2019 03:36 PM      Lab Results   Component Value Date/Time    Sodium 139 10/02/2019 03:36 PM    Potassium 4.4 10/02/2019 03:36 PM    Chloride 102 10/02/2019 03:36 PM    CO2 25 10/02/2019 03:36 PM    Anion gap 8 05/08/2018 02:19 AM    Glucose 92 10/02/2019 03:36 PM    BUN 23 10/02/2019 03:36 PM    Creatinine 1.30 (H) 10/02/2019 03:36 PM    BUN/Creatinine ratio 18 10/02/2019 03:36 PM    GFR est AA 64 10/02/2019 03:36 PM    GFR est non-AA 55 (L) 10/02/2019 03:36 PM    Calcium 9.7 10/02/2019 03:36 PM    Bilirubin, total 0.6 10/07/2019 07:51 AM    AST (SGOT) 18 10/07/2019 07:51 AM    Alk. phosphatase 73 10/07/2019 07:51 AM    Protein, total 6.8 10/07/2019 07:51 AM    Albumin 4.6 10/07/2019 07:51 AM    Globulin 3.1 05/02/2018 09:27 AM    A-G Ratio 1.8 10/02/2019 03:36 PM    ALT (SGPT) 15 10/07/2019 07:51 AM           ASSESSMENT         1. PVCs                        U. Asymptomatic  2. Hypertension  3. Mitral valve proplapse        PLAN     He has lost over 30 pounds and adopted a healthier lifestyle. He'd like to trial discontinuing flecainide. Will stop this and repeat 24 hour holter monitor in 3-4 weeks. Should his PVC burden remain less than 10% without use of flecainide will continue metoprolol alone. If PVC burden is greater than 10%, will re-start Flecainide. FOLLOW-UP   1 year    Thank you, Royal Dav MD and Dr. Berto Fagan for allowing me to participate in the care of this extraordinarily pleasant male. Please do not hesitate to contact me for further questions/concerns.        Valeria Molina NP

## 2019-12-18 ENCOUNTER — OFFICE VISIT (OUTPATIENT)
Dept: CARDIOLOGY CLINIC | Age: 69
End: 2019-12-18

## 2019-12-18 VITALS
BODY MASS INDEX: 29.74 KG/M2 | WEIGHT: 219.6 LBS | HEIGHT: 72 IN | HEART RATE: 80 BPM | DIASTOLIC BLOOD PRESSURE: 80 MMHG | SYSTOLIC BLOOD PRESSURE: 116 MMHG | RESPIRATION RATE: 13 BRPM | OXYGEN SATURATION: 94 %

## 2019-12-18 DIAGNOSIS — I10 ESSENTIAL HYPERTENSION: ICD-10-CM

## 2019-12-18 DIAGNOSIS — I49.3 PVC (PREMATURE VENTRICULAR CONTRACTION): Primary | ICD-10-CM

## 2019-12-18 DIAGNOSIS — I42.9 CARDIOMYOPATHY, UNSPECIFIED TYPE (HCC): ICD-10-CM

## 2019-12-18 DIAGNOSIS — E78.5 DYSLIPIDEMIA: ICD-10-CM

## 2019-12-19 LAB
ALBUMIN SERPL-MCNC: 4.3 G/DL (ref 3.5–4.8)
ALP SERPL-CCNC: 70 IU/L (ref 39–117)
ALT SERPL-CCNC: 15 IU/L (ref 0–44)
AST SERPL-CCNC: 14 IU/L (ref 0–40)
BILIRUB DIRECT SERPL-MCNC: 0.12 MG/DL (ref 0–0.4)
BILIRUB SERPL-MCNC: 0.4 MG/DL (ref 0–1.2)
CHOLEST SERPL-MCNC: 138 MG/DL (ref 100–199)
HDLC SERPL-MCNC: 42 MG/DL
INTERPRETATION, 910389: NORMAL
LDLC SERPL CALC-MCNC: 83 MG/DL (ref 0–99)
PROT SERPL-MCNC: 6.6 G/DL (ref 6–8.5)
TRIGL SERPL-MCNC: 64 MG/DL (ref 0–149)
VLDLC SERPL CALC-MCNC: 13 MG/DL (ref 5–40)

## 2020-01-08 ENCOUNTER — CLINICAL SUPPORT (OUTPATIENT)
Dept: CARDIOLOGY CLINIC | Age: 70
End: 2020-01-08

## 2020-01-08 DIAGNOSIS — E78.5 DYSLIPIDEMIA: ICD-10-CM

## 2020-01-08 DIAGNOSIS — I42.9 CARDIOMYOPATHY, UNSPECIFIED TYPE (HCC): ICD-10-CM

## 2020-01-08 DIAGNOSIS — I49.3 PVC (PREMATURE VENTRICULAR CONTRACTION): ICD-10-CM

## 2020-01-08 DIAGNOSIS — I10 ESSENTIAL HYPERTENSION: ICD-10-CM

## 2020-01-08 NOTE — PROGRESS NOTES
Applied 24 hr holter per Agusto Falk NP dx: PVCs. Pt has #[id number]   Chargeable visit.   [facility]

## 2020-01-13 RX ORDER — SIMVASTATIN 5 MG/1
5 TABLET, FILM COATED ORAL
Qty: 90 TAB | Refills: 2 | Status: SHIPPED | OUTPATIENT
Start: 2020-01-13 | End: 2021-01-16 | Stop reason: SDUPTHER

## 2020-01-13 RX ORDER — LOSARTAN POTASSIUM 50 MG/1
50 TABLET ORAL DAILY
Qty: 90 TAB | Refills: 2 | Status: SHIPPED | OUTPATIENT
Start: 2020-01-13 | End: 2020-02-04 | Stop reason: SDUPTHER

## 2020-01-16 RX ORDER — LOSARTAN POTASSIUM 50 MG/1
TABLET ORAL
Qty: 90 TAB | Refills: 2 | Status: SHIPPED | OUTPATIENT
Start: 2020-01-16 | End: 2020-04-22

## 2020-02-04 ENCOUNTER — OFFICE VISIT (OUTPATIENT)
Dept: INTERNAL MEDICINE CLINIC | Age: 70
End: 2020-02-04

## 2020-02-04 ENCOUNTER — HOSPITAL ENCOUNTER (OUTPATIENT)
Dept: LAB | Age: 70
Discharge: HOME | End: 2020-02-04

## 2020-02-04 VITALS
SYSTOLIC BLOOD PRESSURE: 108 MMHG | RESPIRATION RATE: 18 BRPM | BODY MASS INDEX: 29.53 KG/M2 | OXYGEN SATURATION: 97 % | DIASTOLIC BLOOD PRESSURE: 71 MMHG | WEIGHT: 218 LBS | HEART RATE: 75 BPM | HEIGHT: 72 IN | TEMPERATURE: 98.4 F

## 2020-02-04 DIAGNOSIS — D64.9 NORMOCYTIC ANEMIA: ICD-10-CM

## 2020-02-04 DIAGNOSIS — I10 ESSENTIAL HYPERTENSION: ICD-10-CM

## 2020-02-04 DIAGNOSIS — K21.9 GERD WITHOUT ESOPHAGITIS: Primary | ICD-10-CM

## 2020-02-04 RX ORDER — PANTOPRAZOLE SODIUM 20 MG/1
20 TABLET, DELAYED RELEASE ORAL DAILY
Qty: 90 TAB | Refills: 1 | Status: SHIPPED | OUTPATIENT
Start: 2020-02-04 | End: 2020-10-12 | Stop reason: SDUPTHER

## 2020-02-05 LAB
ALBUMIN SERPL-MCNC: 4 G/DL (ref 3.5–5)
ALBUMIN/GLOB SERPL: 1.3 {RATIO} (ref 1.1–2.2)
ALP SERPL-CCNC: 73 U/L (ref 45–117)
ALT SERPL-CCNC: 33 U/L (ref 12–78)
ANION GAP SERPL CALC-SCNC: 5 MMOL/L (ref 5–15)
AST SERPL-CCNC: 22 U/L (ref 15–37)
BASOPHILS # BLD: 0 K/UL (ref 0–0.1)
BASOPHILS NFR BLD: 1 % (ref 0–1)
BILIRUB SERPL-MCNC: 0.6 MG/DL (ref 0.2–1)
BUN SERPL-MCNC: 29 MG/DL (ref 6–20)
BUN/CREAT SERPL: 20 (ref 12–20)
CALCIUM SERPL-MCNC: 9.7 MG/DL (ref 8.5–10.1)
CHLORIDE SERPL-SCNC: 108 MMOL/L (ref 97–108)
CO2 SERPL-SCNC: 25 MMOL/L (ref 21–32)
CREAT SERPL-MCNC: 1.45 MG/DL (ref 0.7–1.3)
DIFFERENTIAL METHOD BLD: NORMAL
EOSINOPHIL # BLD: 0.3 K/UL (ref 0–0.4)
EOSINOPHIL NFR BLD: 5 % (ref 0–7)
ERYTHROCYTE [DISTWIDTH] IN BLOOD BY AUTOMATED COUNT: 12.8 % (ref 11.5–14.5)
GLOBULIN SER CALC-MCNC: 3.1 G/DL (ref 2–4)
GLUCOSE SERPL-MCNC: 97 MG/DL (ref 65–100)
HCT VFR BLD AUTO: 41.2 % (ref 36.6–50.3)
HGB BLD-MCNC: 13.2 G/DL (ref 12.1–17)
IMM GRANULOCYTES # BLD AUTO: 0 K/UL (ref 0–0.04)
IMM GRANULOCYTES NFR BLD AUTO: 0 % (ref 0–0.5)
LYMPHOCYTES # BLD: 1.6 K/UL (ref 0.8–3.5)
LYMPHOCYTES NFR BLD: 24 % (ref 12–49)
MCH RBC QN AUTO: 29.5 PG (ref 26–34)
MCHC RBC AUTO-ENTMCNC: 32 G/DL (ref 30–36.5)
MCV RBC AUTO: 92 FL (ref 80–99)
MONOCYTES # BLD: 0.7 K/UL (ref 0–1)
MONOCYTES NFR BLD: 11 % (ref 5–13)
NEUTS SEG # BLD: 4 K/UL (ref 1.8–8)
NEUTS SEG NFR BLD: 59 % (ref 32–75)
NRBC # BLD: 0 K/UL (ref 0–0.01)
NRBC BLD-RTO: 0 PER 100 WBC
PLATELET # BLD AUTO: 200 K/UL (ref 150–400)
PMV BLD AUTO: 10.3 FL (ref 8.9–12.9)
POTASSIUM SERPL-SCNC: 4.3 MMOL/L (ref 3.5–5.1)
PROT SERPL-MCNC: 7.1 G/DL (ref 6.4–8.2)
RBC # BLD AUTO: 4.48 M/UL (ref 4.1–5.7)
SODIUM SERPL-SCNC: 138 MMOL/L (ref 136–145)
WBC # BLD AUTO: 6.8 K/UL (ref 4.1–11.1)

## 2020-02-05 NOTE — PROGRESS NOTES
HISTORY OF PRESENT ILLNESS  Mira Bennett is a 78 y.o. male. HPI  Follow up, generally feeling well. He is gradually slowly losing weight, down 6 pounds from the fall. He is having no breakthrough GERD symptoms on Protonix 40 and we are going to try dropping to a 20 mg dose. Hypertension is very well controlled on Losartan 50 and Toprol combination. I think if he continues to lose weight, we can drop the Losartan dose. He has had mild chronic renal insufficiency, creatinine in 2018 was 1.57, this fall was 1.3. Did discuss avoiding NSAIDs and continuing to monitor. Hemoglobin has been stable. Review of Systems   Constitutional: Positive for weight loss. Negative for chills and fever. Respiratory: Negative for cough, shortness of breath and wheezing. Cardiovascular: Negative for chest pain, palpitations, orthopnea, leg swelling and PND. Gastrointestinal: Negative for abdominal pain, diarrhea, heartburn and nausea. Musculoskeletal: Negative for myalgias. Neurological: Negative for dizziness and headaches. Physical Exam  Vitals signs and nursing note reviewed. Constitutional:       Appearance: He is well-developed. HENT:      Head: Normocephalic and atraumatic. Neck:      Musculoskeletal: Normal range of motion and neck supple. Thyroid: No thyromegaly. Vascular: No carotid bruit. Cardiovascular:      Rate and Rhythm: Normal rate and regular rhythm. Heart sounds: Normal heart sounds. Pulmonary:      Effort: Pulmonary effort is normal. No respiratory distress. Breath sounds: Normal breath sounds. No wheezing or rales. Musculoskeletal:      Right lower leg: No edema. Left lower leg: No edema. Neurological:      Mental Status: He is alert and oriented to person, place, and time. Psychiatric:         Behavior: Behavior normal.         ASSESSMENT and PLAN  Diagnoses and all orders for this visit:    1.  GERD without esophagitis  -     pantoprazole (PROTONIX) 20 mg tablet; Take 1 Tab by mouth daily. 2. Essential hypertension  -     METABOLIC PANEL, COMPREHENSIVE; Future    3. Normocytic anemia  -     CBC WITH AUTOMATED DIFF;  Future      the following changes in treatment are made: dec protonix dose

## 2020-02-25 DIAGNOSIS — E78.5 DYSLIPIDEMIA: Primary | ICD-10-CM

## 2020-04-22 ENCOUNTER — VIRTUAL VISIT (OUTPATIENT)
Dept: CARDIOLOGY CLINIC | Age: 70
End: 2020-04-22

## 2020-04-22 DIAGNOSIS — E78.5 DYSLIPIDEMIA: ICD-10-CM

## 2020-04-22 DIAGNOSIS — I49.3 PVC'S (PREMATURE VENTRICULAR CONTRACTIONS): ICD-10-CM

## 2020-04-22 DIAGNOSIS — I10 ESSENTIAL HYPERTENSION: Primary | ICD-10-CM

## 2020-04-22 RX ORDER — LOSARTAN POTASSIUM 25 MG/1
25 TABLET ORAL DAILY
Qty: 1 TAB | Refills: 5
Start: 2020-04-22 | End: 2021-02-10 | Stop reason: ALTCHOICE

## 2020-04-22 NOTE — PROGRESS NOTES
VIRTUAL VISIT DOCUMENTATION     Pursuant to the emergency declaration under the [facility], [facility] waiver authority and the [employer] Act, this Virtual  Visit was conducted, with patient's consent, to reduce the patient's risk of exposure to COVID-19 and provide continuity of care for an established patient. Services were provided through a video synchronous discussion virtually to substitute for in-person clinic visit. CHIEF COMPLAINT      Gala Schwartz is a 78 y.o. male who was seen by synchronous (real-time) audio-video technology on 4/22/2020. Patient is being seen today for hypertension, PVCs and dyslipidemia     ASSESSMENT        ICD-10-CM ICD-9-CM    1. Essential hypertension I10 401.9    2. Dyslipidemia E78.5 272.4    3. PVC's (premature ventricular contractions) I49.3 427.69         PLAN   1. HTN  - Blood pressure has been dropping particularly since he is lost a significant amount of weight. Reduce his losartan to 25 mg  -He will provide me his numbers in my chart in about 7 to 10 days     2. Cardiomyopathy  - EF in 2017 was WNL. - He is CECY class 1 based on his history       3. PVC's  -Has seen Dr. Deuce Calhoun and THEY STOPPED HIS FLECAINIDE AND MONITORED HIS RHYTHM . HE HAD MINIMAL EPISDOES OF VENTRICULAR ECTOPY AND BURDEN WAS LOW.     4. Dyslipidemia   - Lipids are at goal on current medical regimen. - Will reduce Zocor to 5mg because of his significant weight loss  -We will have his cholesterol checked in June.     Return in 6 months or PRN. We discussed the expected course, resolution and complications of the diagnosis(es) in detail. Medication risks, benefits, costs, interactions, and alternatives were discussed as indicated. I advised him to contact the office if his condition worsens, changes or fails to improve as anticipated.  He expressed understanding with the diagnosis(es) and plan    HISTORY OF PRESENTING ILLNESS      Morena Ray is a 78 y.o. male  with HTN and mitral valve prolapse referred for preoperative cardiac risk stratification, referred for 2 week f/u. Is been doing well with no chest discomfort he states is not feeling any irregularity in his heart rhythm. He continues to lose weight and is doing excellent job. Does state that his blood pressure will go down into the 90s at times.     Cardiac risk factors: family history, male gender, hypertension  I have personally obtained the history from the patient.         ACTIVE PROBLEM LIST     Patient Active Problem List    Diagnosis Date Noted    Primary osteoarthritis of left hip 05/06/2018    Seasonal allergic rhinitis due to pollen 05/19/2017    Chest tightness 05/18/2017    Mitral valve prolapse 02/16/2017    Colon polyps 01/19/2017    Essential hypertension 01/19/2017           PAST MEDICAL HISTORY     Past Medical History:   Diagnosis Date    Arthritis     Dyslipidemia     Essential hypertension     Frequent PVCs     GERD (gastroesophageal reflux disease)     H/O seasonal allergies     Mitral valve prolapse     pt states Dr. Aure Ann is not sure if this is the correct diagnosis    Obesity 05/02/2018    Obesity  BMI= 32.6           PAST SURGICAL HISTORY     Past Surgical History:   Procedure Laterality Date    COLONOSCOPY N/A 6/2/2017    [facility]  6/2/2017         EGD  6/2/2017         HX HERNIA REPAIR      times 2. 1980's inguinal bilateral    HX HIP REPLACEMENT Left 2018    HX ORTHOPAEDIC Left 2018    hip replacement    HX OTHER SURGICAL  1977    Repair of the right shoulder.      HX TONSILLECTOMY      childhood    UPPER GI ENDOSCOPY,DILATN W GUIDE  7/14/2017         UPPER GI ENDOSCOPY,DILATN W GUIDE  9/26/2018               ALLERGIES     No Known Allergies       FAMILY HISTORY     Family History   Problem Relation Age of Onset   Daniela Rajput Sudden Death Mother         Car accident    Heart Attack Father 48    Heart Disease Father     Thyroid Disease Sister     No Known Problems Brother     No Known Problems Sister     No Known Problems Sister     No Known Problems Brother     No Known Problems Brother     Cancer Maternal Aunt [de-identified]        multiple myloma    negative for cardiac disease       SOCIAL HISTORY     Social History     Socioeconomic History    Marital status:      Spouse name: Not on file    Number of children: Not on file    Years of education: Not on file    Highest education level: Not on file   Tobacco Use    Smoking status: Former Smoker     Packs/day: 1.00     Years: 30.00     Pack years: 30.00     Last attempt to quit: 1997     Years since quittin.2    Smokeless tobacco: Never Used   Substance and Sexual Activity    Alcohol use: Yes     Alcohol/week: 6.0 standard drinks     Types: 3 Glasses of wine, 3 Cans of beer per week    Drug use: No         MEDICATIONS     Current Outpatient Medications   Medication Sig    pantoprazole (PROTONIX) 20 mg tablet Take 1 Tab by mouth daily.  losartan (COZAAR) 50 mg tablet TAKE 1 TABLET EVERY DAY    simvastatin (ZOCOR) 5 mg tablet Take 1 Tab by mouth nightly. TAKE 1 TABLET EVERY NIGHT    metoprolol succinate (TOPROL-XL) 25 mg XL tablet TAKE 1/2 TABLET EVERY DAY    aspirin delayed-release 81 mg tablet Take  by mouth daily.  multivitamin (ONE A DAY) tablet Take 1 Tab by mouth daily.  VITAMIN B COMPLEX (B COMPLEX PO) Take  by mouth daily.  DOCOSAHEXANOIC ACID/EPA (FISH OIL PO) Take 1,200 mg by mouth two (2) times a day.  GLUCOSAMINE SULFATE (GLUCOSAMINE PO) Take 1,500 mg by mouth two (2) times a day.  CHOLECALCIFEROL, VITAMIN D3, (VITAMIN D3 PO) Take 1 Tab by mouth daily. No current facility-administered medications for this visit.         I have reviewed the nurses notes, vitals, problem list, allergy list, medical history, family, social history and medications. REVIEW OF SYMPTOMS     Constitutional: Negative for fever, chills, malaise/fatigue and diaphoresis. Respiratory: Negative for cough, hemoptysis, sputum production, shortness of breath and wheezing. Cardiovascular: Negative for chest pain, palpitations, orthopnea, claudication, leg swelling and PND. Gastrointestinal: Negative for heartburn, nausea, vomiting, blood in stool and melena. Genitourinary: Negative for dysuria and flank pain. Musculoskeletal: Negative for joint pain and back pain. Skin: Negative for rash. Neurological: Negative for focal weakness, seizures, loss of consciousness, weakness and headaches. Endo/Heme/Allergies: Negative for abnormal bleeding. Psychiatric/Behavioral: Negative for memory loss. PHYSICAL EXAMINATION      Due to this being a TeleHealth evaluation, many elements of the physical examination are unable to be assessed. General: Well developed, in no acute distress, cooperative and alert  HEENT: Pupils equal/round. No marked JVD visible on video. Respiratory: No audible wheezing, no signs of respiratory distress, lips non cyanotic  Extremities:  No edema  Neuro: A&Ox3, speech clear, no facial droop, answering questions appropriately  Skin: Skin color is normal. No rashes or lesions. Non diaphoretic on visible skin during exam       DIAGNOSTIC DATA      1. Lipids  1/9/17- , HDL 43, , TG 91  5/2/18- , HDL 37, LDL 84,   3/20/19-  HDL 36, LDL 88,   10/7/19- , HDL 38, LDL 87,   12/18/19- , HDL 42, LDL 83, TG 64    2. Holter  (1/19/17): sinus rhtyhm with PVCs there were frequent PVCs, rare couplets, triplets and bigeminy with rare episodes of non-sustained VT with rates up to 134.  9/28/17- SB with PVCs, HR 43-91, PVC burden 30.48%  11/8/17- SR HR , PVC burden 5 %    3. Echo  4/19/16- EF 30-44%, MR mild/mod, AI mild, LAE  9/28/17- EF 50-55%, PI mild/mod    4.  Stress Test  Lexiscan- 6/15/17-no ischemia, EF 39%       LABORATORY DATA      Lab Results   Component Value Date/Time    WBC 6.8 02/04/2020 03:36 PM    HGB 13.2 02/04/2020 03:36 PM    HCT 41.2 02/04/2020 03:36 PM    PLATELET 300 77/57/0512 03:36 PM    MCV 92.0 02/04/2020 03:36 PM      Lab Results   Component Value Date/Time    Sodium 138 02/04/2020 03:36 PM    Potassium 4.3 02/04/2020 03:36 PM    Chloride 108 02/04/2020 03:36 PM    CO2 25 02/04/2020 03:36 PM    Anion gap 5 02/04/2020 03:36 PM    Glucose 97 02/04/2020 03:36 PM    BUN 29 (H) 02/04/2020 03:36 PM    Creatinine 1.45 (H) 02/04/2020 03:36 PM    BUN/Creatinine ratio 20 02/04/2020 03:36 PM    GFR est AA 58 (L) 02/04/2020 03:36 PM    GFR est non-AA 48 (L) 02/04/2020 03:36 PM    Calcium 9.7 02/04/2020 03:36 PM    Bilirubin, total 0.6 02/04/2020 03:36 PM    AST (SGOT) 22 02/04/2020 03:36 PM    Alk. phosphatase 73 02/04/2020 03:36 PM    Protein, total 7.1 02/04/2020 03:36 PM    Albumin 4.0 02/04/2020 03:36 PM    Globulin 3.1 02/04/2020 03:36 PM    A-G Ratio 1.3 02/04/2020 03:36 PM    ALT (SGPT) 33 02/04/2020 03:36 PM             FOLLOW-UP            Patient was made aware and verbalized understanding that an appointment will be scheduled for them for a virtual visit and/or office visit within the above time frame. Patient understanding his/her responsibility to call and change time/date if he/she so chooses. Thank you, Flakito Haywood MD for allowing me to participate in the care of Suresh Webber. Please do not hesitate to contact me for further questions/concerns. Greater than 20 minutes was spent in direct video patient care, planning and chart review. This visit was conducted using [employer] telemedicine services.        MD Jose Manuel Martinezákova [address] [place] [address] [address]        [phone] / [phone] Fax       [facility], [address] [facility],[address]  [place]ua Lucas  [phone] / [phone] Fax

## 2020-05-11 ENCOUNTER — TELEPHONE (OUTPATIENT)
Dept: CARDIOLOGY CLINIC | Age: 70
End: 2020-05-11

## 2020-05-14 ENCOUNTER — TELEPHONE (OUTPATIENT)
Dept: CARDIOLOGY CLINIC | Age: 70
End: 2020-05-14

## 2020-05-14 NOTE — TELEPHONE ENCOUNTER
[facility] pharmacy is calling to request losartan 50mg be called in to them (location verified).

## 2020-05-21 ENCOUNTER — PATIENT MESSAGE (OUTPATIENT)
Dept: INTERNAL MEDICINE CLINIC | Age: 70
End: 2020-05-21

## 2020-05-22 NOTE — TELEPHONE ENCOUNTER
From: Vik Perez  To: Wendy Isaacs MD  Sent: 5/21/2020 4:38 PM EDT  Subject: Non-Urgent Medical Question    I have been experiencing stiffness and joint pain in my right hand. Specifically the four first joints by the palm and the second joints of my middle two fingers and the second joint of my thumb. I'm thinking this is arthritis. I'm using topical creams during the day and have a compression glove with medication for overnight. They don't seem to help much. Can you suggest how I should proceed? Rheumatologist? or something/someone else? Hope you and family are well. Thanks for any guidance you can provide.   Sincerely,  Keegan Perrin

## 2020-05-26 ENCOUNTER — OFFICE VISIT (OUTPATIENT)
Dept: INTERNAL MEDICINE CLINIC | Age: 70
End: 2020-05-26

## 2020-05-26 ENCOUNTER — HOSPITAL ENCOUNTER (OUTPATIENT)
Dept: LAB | Age: 70
Discharge: HOME | End: 2020-05-26

## 2020-05-26 VITALS
HEIGHT: 72 IN | RESPIRATION RATE: 18 BRPM | TEMPERATURE: 97.9 F | HEART RATE: 68 BPM | SYSTOLIC BLOOD PRESSURE: 122 MMHG | OXYGEN SATURATION: 96 % | WEIGHT: 216.4 LBS | BODY MASS INDEX: 29.31 KG/M2 | DIASTOLIC BLOOD PRESSURE: 51 MMHG

## 2020-05-26 DIAGNOSIS — M19.049 HAND ARTHRITIS: ICD-10-CM

## 2020-05-26 DIAGNOSIS — M19.049 HAND ARTHRITIS: Primary | ICD-10-CM

## 2020-05-26 DIAGNOSIS — I10 ESSENTIAL HYPERTENSION: ICD-10-CM

## 2020-05-26 DIAGNOSIS — E78.2 MIXED HYPERLIPIDEMIA: ICD-10-CM

## 2020-05-26 LAB
ALBUMIN SERPL-MCNC: 4 G/DL (ref 3.5–5)
ALBUMIN/GLOB SERPL: 1.3 {RATIO} (ref 1.1–2.2)
ALP SERPL-CCNC: 78 U/L (ref 45–117)
ALT SERPL-CCNC: 28 U/L (ref 12–78)
ANION GAP SERPL CALC-SCNC: 7 MMOL/L (ref 5–15)
AST SERPL-CCNC: 18 U/L (ref 15–37)
BILIRUB SERPL-MCNC: 0.6 MG/DL (ref 0.2–1)
BUN SERPL-MCNC: 25 MG/DL (ref 6–20)
BUN/CREAT SERPL: 18 (ref 12–20)
CALCIUM SERPL-MCNC: 9.6 MG/DL (ref 8.5–10.1)
CHLORIDE SERPL-SCNC: 109 MMOL/L (ref 97–108)
CHOLEST SERPL-MCNC: 145 MG/DL
CO2 SERPL-SCNC: 25 MMOL/L (ref 21–32)
CREAT SERPL-MCNC: 1.42 MG/DL (ref 0.7–1.3)
GLOBULIN SER CALC-MCNC: 3 G/DL (ref 2–4)
GLUCOSE SERPL-MCNC: 76 MG/DL (ref 65–100)
HDLC SERPL-MCNC: 43 MG/DL
HDLC SERPL: 3.4 {RATIO} (ref 0–5)
LDLC SERPL CALC-MCNC: 83.2 MG/DL (ref 0–100)
LIPID PROFILE,FLP: NORMAL
POTASSIUM SERPL-SCNC: 4.7 MMOL/L (ref 3.5–5.1)
PROT SERPL-MCNC: 7 G/DL (ref 6.4–8.2)
RHEUMATOID FACT SERPL-ACNC: <10 IU/ML
SODIUM SERPL-SCNC: 141 MMOL/L (ref 136–145)
TRIGL SERPL-MCNC: 94 MG/DL (ref ?–150)
VLDLC SERPL CALC-MCNC: 18.8 MG/DL

## 2020-05-26 RX ORDER — DICLOFENAC SODIUM 10 MG/G
GEL TOPICAL 4 TIMES DAILY
Qty: 100 G | Refills: 4 | Status: SHIPPED | OUTPATIENT
Start: 2020-05-26 | End: 2021-02-02 | Stop reason: ALTCHOICE

## 2020-05-26 NOTE — PROGRESS NOTES
Aubrie Mcgraw is a 78 y.o. male    Chief Complaint   Patient presents with    Joint Pain     stiffness and joint pain in right hand. Patient states his pain has gotten worse in the last couple months. Patient states he tries icing and using heat. Visit Vitals  /51 (BP 1 Location: Left arm, BP Patient Position: Sitting)   Pulse 68   Temp 97.9 °F (36.6 °C) (Oral)   Resp 18   Ht 6' (1.829 m)   Wt 216 lb 6.4 oz (98.2 kg)   SpO2 96%   BMI 29.35 kg/m²       1. Have you been to the ER, urgent care clinic since your last visit? Hospitalized since your last visit? No    2. Have you seen or consulted any other health care providers outside of the [address] [place] [address] since your last visit? Include any pap smears or colon screening.  No

## 2020-05-26 NOTE — PROGRESS NOTES
HISTORY OF PRESENT ILLNESS  Carmen Mann is a 78 y.o. male. HPI  He is seen for hand pain, stiffness and discomfort in hand, particularly the second and third digits, bothers him when playing golf. No injuries. He has osteoarthritis of his hips and has seen Dr. Jake Galicia. He is using icing. No oral meds. Dyslipidemia, on medication. Due for lipids. No diffuse myalgias. Hypertension, stable on meds. Review of Systems   Constitutional: Negative for chills, fever, malaise/fatigue and weight loss. Respiratory: Negative for cough, shortness of breath and wheezing. Cardiovascular: Negative for chest pain, palpitations, orthopnea, leg swelling and PND. Gastrointestinal: Negative for heartburn and nausea. Musculoskeletal: Positive for joint pain. Negative for falls and myalgias. Neurological: Negative for dizziness and headaches. Physical Exam  Vitals signs and nursing note reviewed. Constitutional:       Appearance: He is well-developed. HENT:      Head: Normocephalic and atraumatic. Neck:      Musculoskeletal: Normal range of motion and neck supple. Thyroid: No thyromegaly. Vascular: No carotid bruit. Cardiovascular:      Rate and Rhythm: Normal rate and regular rhythm. Heart sounds: Normal heart sounds. Pulmonary:      Effort: Pulmonary effort is normal. No respiratory distress. Breath sounds: Normal breath sounds. No wheezing or rales. Musculoskeletal:         General: No swelling or deformity. Comments: Right hand some tenderness at first mcp joint and second and third pip joints no diffuse tenosynovitis and no redness or warmth   Neurological:      Mental Status: He is alert and oriented to person, place, and time. Psychiatric:         Behavior: Behavior normal.         ASSESSMENT and PLAN  Diagnoses and all orders for this visit:    1. Hand arthritis  -     diclofenac (VOLTAREN) 1 % gel; Apply  to affected area four (4) times daily.   - REFERRAL TO ORTHOPEDICS  -     RHEUMATOID FACTOR, QL; Future  -     CYCLIC CITRUL PEPTIDE AB, IGG; Future    2. Mixed hyperlipidemia-cotn low dose zocor I do not t hink this is cause of his joint sxs  -     METABOLIC PANEL, COMPREHENSIVE; Future  -     LIPID PANEL; Future    3.  Essential hypertension  Stable on meds cont

## 2020-05-27 LAB — CCP IGA+IGG SERPL IA-ACNC: 10 UNITS (ref 0–19)

## 2020-05-28 NOTE — PROGRESS NOTES
Patient notified of lab results as noted per PCP. He has scheduled an appt with  for next week to have his hand evaluated.

## 2020-09-29 RX ORDER — PANTOPRAZOLE SODIUM 40 MG/1
TABLET, DELAYED RELEASE ORAL
Qty: 90 TAB | Refills: 1 | Status: SHIPPED | OUTPATIENT
Start: 2020-09-29 | End: 2020-10-12 | Stop reason: DRUGHIGH

## 2020-10-12 ENCOUNTER — OFFICE VISIT (OUTPATIENT)
Dept: INTERNAL MEDICINE CLINIC | Age: 70
End: 2020-10-12
Payer: MEDICARE

## 2020-10-12 VITALS
WEIGHT: 207 LBS | RESPIRATION RATE: 18 BRPM | OXYGEN SATURATION: 97 % | HEIGHT: 72 IN | HEART RATE: 54 BPM | DIASTOLIC BLOOD PRESSURE: 62 MMHG | BODY MASS INDEX: 28.04 KG/M2 | SYSTOLIC BLOOD PRESSURE: 118 MMHG

## 2020-10-12 DIAGNOSIS — Z00.00 MEDICARE ANNUAL WELLNESS VISIT, SUBSEQUENT: Primary | ICD-10-CM

## 2020-10-12 DIAGNOSIS — I10 ESSENTIAL HYPERTENSION: ICD-10-CM

## 2020-10-12 DIAGNOSIS — K21.9 GERD WITHOUT ESOPHAGITIS: ICD-10-CM

## 2020-10-12 DIAGNOSIS — Z12.5 PROSTATE CANCER SCREENING: ICD-10-CM

## 2020-10-12 DIAGNOSIS — Z72.0 TOBACCO ABUSE: ICD-10-CM

## 2020-10-12 PROBLEM — M17.12 PRIMARY OSTEOARTHRITIS OF LEFT KNEE: Status: ACTIVE | Noted: 2018-04-25

## 2020-10-12 LAB
ALBUMIN SERPL-MCNC: 3.9 G/DL (ref 3.5–5)
ALBUMIN/GLOB SERPL: 1.3 {RATIO} (ref 1.1–2.2)
ALP SERPL-CCNC: 66 U/L (ref 45–117)
ALT SERPL-CCNC: 27 U/L (ref 12–78)
ANION GAP SERPL CALC-SCNC: 8 MMOL/L (ref 5–15)
AST SERPL-CCNC: 13 U/L (ref 15–37)
BASOPHILS # BLD: 0.1 K/UL (ref 0–0.1)
BASOPHILS NFR BLD: 1 % (ref 0–1)
BILIRUB SERPL-MCNC: 0.6 MG/DL (ref 0.2–1)
BUN SERPL-MCNC: 25 MG/DL (ref 6–20)
BUN/CREAT SERPL: 19 (ref 12–20)
CALCIUM SERPL-MCNC: 9.5 MG/DL (ref 8.5–10.1)
CHLORIDE SERPL-SCNC: 110 MMOL/L (ref 97–108)
CHOLEST SERPL-MCNC: 155 MG/DL
CO2 SERPL-SCNC: 26 MMOL/L (ref 21–32)
COMMENT, HOLDF: NORMAL
CREAT SERPL-MCNC: 1.31 MG/DL (ref 0.7–1.3)
DIFFERENTIAL METHOD BLD: ABNORMAL
EOSINOPHIL # BLD: 0.2 K/UL (ref 0–0.4)
EOSINOPHIL NFR BLD: 2 % (ref 0–7)
ERYTHROCYTE [DISTWIDTH] IN BLOOD BY AUTOMATED COUNT: 12.3 % (ref 11.5–14.5)
GLOBULIN SER CALC-MCNC: 2.9 G/DL (ref 2–4)
GLUCOSE SERPL-MCNC: 75 MG/DL (ref 65–100)
HCT VFR BLD AUTO: 41.3 % (ref 36.6–50.3)
HDLC SERPL-MCNC: 46 MG/DL
HDLC SERPL: 3.4 {RATIO} (ref 0–5)
HGB BLD-MCNC: 13.7 G/DL (ref 12.1–17)
IMM GRANULOCYTES # BLD AUTO: 0 K/UL (ref 0–0.04)
IMM GRANULOCYTES NFR BLD AUTO: 1 % (ref 0–0.5)
LDLC SERPL CALC-MCNC: 86.4 MG/DL (ref 0–100)
LIPID PROFILE,FLP: NORMAL
LYMPHOCYTES # BLD: 1.3 K/UL (ref 0.8–3.5)
LYMPHOCYTES NFR BLD: 17 % (ref 12–49)
MCH RBC QN AUTO: 30.8 PG (ref 26–34)
MCHC RBC AUTO-ENTMCNC: 33.2 G/DL (ref 30–36.5)
MCV RBC AUTO: 92.8 FL (ref 80–99)
MONOCYTES # BLD: 0.8 K/UL (ref 0–1)
MONOCYTES NFR BLD: 11 % (ref 5–13)
NEUTS SEG # BLD: 5.1 K/UL (ref 1.8–8)
NEUTS SEG NFR BLD: 68 % (ref 32–75)
NRBC # BLD: 0 K/UL (ref 0–0.01)
NRBC BLD-RTO: 0 PER 100 WBC
PLATELET # BLD AUTO: 226 K/UL (ref 150–400)
PMV BLD AUTO: 10 FL (ref 8.9–12.9)
POTASSIUM SERPL-SCNC: 4.7 MMOL/L (ref 3.5–5.1)
PROT SERPL-MCNC: 6.8 G/DL (ref 6.4–8.2)
PSA SERPL-MCNC: 1.6 NG/ML (ref 0.01–4)
RBC # BLD AUTO: 4.45 M/UL (ref 4.1–5.7)
SAMPLES BEING HELD,HOLD: NORMAL
SODIUM SERPL-SCNC: 144 MMOL/L (ref 136–145)
TRIGL SERPL-MCNC: 113 MG/DL (ref ?–150)
TSH SERPL DL<=0.05 MIU/L-ACNC: 1.83 UIU/ML (ref 0.36–3.74)
VLDLC SERPL CALC-MCNC: 22.6 MG/DL
WBC # BLD AUTO: 7.5 K/UL (ref 4.1–11.1)

## 2020-10-12 PROCEDURE — G8419 CALC BMI OUT NRM PARAM NOF/U: HCPCS | Performed by: INTERNAL MEDICINE

## 2020-10-12 PROCEDURE — G8427 DOCREV CUR MEDS BY ELIG CLIN: HCPCS | Performed by: INTERNAL MEDICINE

## 2020-10-12 PROCEDURE — G8752 SYS BP LESS 140: HCPCS | Performed by: INTERNAL MEDICINE

## 2020-10-12 PROCEDURE — G8754 DIAS BP LESS 90: HCPCS | Performed by: INTERNAL MEDICINE

## 2020-10-12 PROCEDURE — G8510 SCR DEP NEG, NO PLAN REQD: HCPCS | Performed by: INTERNAL MEDICINE

## 2020-10-12 PROCEDURE — 3017F COLORECTAL CA SCREEN DOC REV: CPT | Performed by: INTERNAL MEDICINE

## 2020-10-12 PROCEDURE — G8536 NO DOC ELDER MAL SCRN: HCPCS | Performed by: INTERNAL MEDICINE

## 2020-10-12 PROCEDURE — 1100F PTFALLS ASSESS-DOCD GE2>/YR: CPT | Performed by: INTERNAL MEDICINE

## 2020-10-12 PROCEDURE — 3288F FALL RISK ASSESSMENT DOCD: CPT | Performed by: INTERNAL MEDICINE

## 2020-10-12 PROCEDURE — 99214 OFFICE O/P EST MOD 30 MIN: CPT | Performed by: INTERNAL MEDICINE

## 2020-10-12 PROCEDURE — G0463 HOSPITAL OUTPT CLINIC VISIT: HCPCS | Performed by: INTERNAL MEDICINE

## 2020-10-12 PROCEDURE — G0439 PPPS, SUBSEQ VISIT: HCPCS | Performed by: INTERNAL MEDICINE

## 2020-10-12 RX ORDER — PANTOPRAZOLE SODIUM 20 MG/1
20 TABLET, DELAYED RELEASE ORAL DAILY
Qty: 90 TAB | Refills: 1 | Status: SHIPPED | OUTPATIENT
Start: 2020-10-12 | End: 2021-04-12 | Stop reason: SDUPTHER

## 2020-10-12 NOTE — PROGRESS NOTES
HISTORY OF PRESENT ILLNESS  Grayson Jeff is a 69 y.o. male. [facility]  Avelino Brothers is seen for annual wellness visit, but follow up on a few issues:  1. Hypertension. He has intentionally lost 10 pounds. He is taking Toprol 25 mg, half tab, and Losartan 25 mg, half tab. Notes that in the afternoon he occasionally gets lightheaded and BP will have dropped to 90/50. Denies headache, chest pain or SOB with these episodes. He is walking typically at least a mile a day and denies any cardiac symptoms. I suggested we could drop Losartan. He will discuss further with cardiology. 2. History of tobacco use for 30 years. Did have a low dose CT last year, which showed small nodule. Has not come back in for follow up. Discussed needs at least annual CT scans to screen for lung cancer. 3. Dyslipidemia, on Simvastatin 5 mg. No myalgias. 4. GERD. He ran out of PPI and did have a lot of symptoms, got back on the 40 mg dose and doing well. Will try dropping to 40 alternating with 20 every other day. Review of Systems   Constitutional: Positive for weight loss. Negative for chills and fever. HENT: Negative for hearing loss. Eyes: Negative for blurred vision. Respiratory: Negative for cough, shortness of breath and wheezing. Cardiovascular: Negative for chest pain, palpitations, orthopnea, leg swelling and PND. Gastrointestinal: Negative for abdominal pain, constipation, diarrhea, heartburn, nausea and vomiting. Musculoskeletal: Negative for joint pain and myalgias. Neurological: Positive for dizziness. Negative for headaches. Psychiatric/Behavioral: Negative for memory loss. All other systems reviewed and are negative. Physical Exam  Vitals signs and nursing note reviewed. Constitutional:       Appearance: He is well-developed. HENT:      Head: Normocephalic and atraumatic.       Right Ear: Tympanic membrane normal.      Left Ear: Tympanic membrane normal.   Eyes:      Pupils: Pupils are equal, round, and reactive to light. Neck:      Musculoskeletal: Normal range of motion and neck supple. Thyroid: No thyromegaly. Vascular: No carotid bruit. Cardiovascular:      Rate and Rhythm: Normal rate and regular rhythm. Heart sounds: Normal heart sounds. Pulmonary:      Effort: Pulmonary effort is normal. No respiratory distress. Breath sounds: Normal breath sounds. No wheezing or rales. Abdominal:      General: Bowel sounds are normal. There is no distension. Palpations: Abdomen is soft. Tenderness: There is no abdominal tenderness. Musculoskeletal: Normal range of motion. Right lower leg: No edema. Left lower leg: No edema. Neurological:      General: No focal deficit present. Mental Status: He is alert and oriented to person, place, and time. Psychiatric:         Behavior: Behavior normal.         ASSESSMENT and PLAN  Diagnoses and all orders for this visit:    1. Medicare annual wellness visit, subsequent    2. GERD without esophagitis  -     pantoprazole (PROTONIX) 20 mg tablet; Take 1 Tab by mouth daily.  -     CBC WITH AUTOMATED DIFF; Future    3. Essential hypertension-consider dec in bp  Regimen he will d iscuss with dr Roberto Carlos Thao, COMPREHENSIVE; Future  -     LIPID PANEL; Future  -     TSH 3RD GENERATION; Future    4. Prostate cancer screening  -     PSA, DIAGNOSTIC (PROSTATE SPECIFIC AG); Future    5. Tobacco abuse  -     CT LOW DOSE LUNG CANCER SCREENING; Future    This is the Subsequent Medicare Annual Wellness Exam, performed 12 months or more after the Initial AWV or the last Subsequent AWV    I have reviewed the patient's medical history in detail and updated the computerized patient record.      History     Patient Active Problem List   Diagnosis Code    Colon polyps K63.5    Essential hypertension I10    Mitral valve prolapse I34.1    Chest tightness R07.89    Seasonal allergic rhinitis due to pollen J30.1  Primary osteoarthritis of left hip M16.12    Primary osteoarthritis of left knee M17.12     Past Medical History:   Diagnosis Date    Arthritis     Dyslipidemia     Essential hypertension     Frequent PVCs     GERD (gastroesophageal reflux disease)     H/O seasonal allergies     Mitral valve prolapse     pt states Dr. Lakeisha Kauffman is not sure if this is the correct diagnosis    Obesity 05/02/2018    Obesity  BMI= 32.6      Past Surgical History:   Procedure Laterality Date    COLONOSCOPY N/A 6/2/2017    COLON[facility]on [facility]  6/2/2017         EGD  6/2/2017         HX HERNIA REPAIR      times 2. 1980's inguinal bilateral    HX HIP REPLACEMENT Left 2018    HX ORTHOPAEDIC Left 2018    hip replacement    HX OTHER SURGICAL  1977    Repair of the right shoulder.  HX TONSILLECTOMY      childhood    UPPER GI ENDOSCOPY,DILATN W GUIDE  7/14/2017         UPPER GI ENDOSCOPY,DILATN W GUIDE  9/26/2018          Current Outpatient Medications   Medication Sig Dispense Refill    pantoprazole (PROTONIX) 20 mg tablet Take 1 Tab by mouth daily. 90 Tab 1    OTHER nightly. Theraworx compression glove and topical foam      diclofenac (VOLTAREN) 1 % gel Apply  to affected area four (4) times daily. 100 g 4    metoprolol succinate (TOPROL-XL) 25 mg XL tablet TAKE 1/2 TABLET EVERY DAY 45 Tab 3    simvastatin (ZOCOR) 5 mg tablet Take 1 Tab by mouth nightly. TAKE 1 TABLET EVERY NIGHT 90 Tab 2    aspirin delayed-release 81 mg tablet Take  by mouth daily.  multivitamin (ONE A DAY) tablet Take 1 Tab by mouth daily.  VITAMIN B COMPLEX (B COMPLEX PO) Take  by mouth daily.  DOCOSAHEXANOIC ACID/EPA (FISH OIL PO) Take 1,200 mg by mouth two (2) times a day.  GLUCOSAMINE SULFATE (GLUCOSAMINE PO) Take 1,500 mg by mouth two (2) times a day.  CHOLECALCIFEROL, VITAMIN D3, (VITAMIN D3 PO) Take 1 Tab by mouth daily.       losartan (COZAAR) 25 mg tablet Take 1 Tab by mouth daily. (Patient taking differently: Take 12.5 mg by mouth daily. ) 1 Tab 5     No Known Allergies    Family History   Problem Relation Age of Onset   Remi Ga Sudden Death Mother         Car accident    Heart Attack Father 48    Heart Disease Father     Thyroid Disease Sister     No Known Problems Brother     No Known Problems Sister     No Known Problems Sister     No Known Problems Brother     No Known Problems Brother     Cancer Maternal Aunt [de-identified]        multiple myloma     Social History     Tobacco Use    Smoking status: Former Smoker     Packs/day: 1.00     Years: 30.00     Pack years: 30.00     Last attempt to quit: 1997     Years since quittin.7    Smokeless tobacco: Never Used   Substance Use Topics    Alcohol use: Yes     Alcohol/week: 6.0 standard drinks     Types: 3 Glasses of wine, 3 Cans of beer per week       Depression Risk Factor Screening:     3 most recent PHQ Screens 10/12/2020   Little interest or pleasure in doing things Not at all   Feeling down, depressed, irritable, or hopeless Not at all   Total Score PHQ 2 0       Alcohol Risk Screen   Do you average more than 1 drink per night or more than 7 drinks a week?: (P) No  In the past three months have you had more than 4 drinks containing alcohol on one occasion?: (P) No          Functional Ability and Level of Safety:   Hearing:  Hearing: (P) Patient reports hearing is good      Activities of Daily Living: The home contains: (P) grab bars  Functional ADLs: (P) Patient does total self care     Ambulation:  Patient ambulates: (P) with no difficulty     Fall Risk:  Fall Risk Assessment, last 12 mths 10/12/2020   Able to walk? Yes   Fall in past 12 months? Yes   Fall with injury?  No   Number of falls in past 12 months 1   Fall Risk Score 1     Abuse Screen:          Cognitive Screening   Has your family/caregiver stated any concerns about your memory?: (P) No         Patient Care Team   Patient Care Team:  April Leroy MD as PCP - General (Internal Medicine)  April Leroy MD as PCP - [facility] Provider  Emmanuel Mcneal MD (General Surgery)  Yue Goyal MD (Orthopedic Surgery)    Assessment/Plan   Education and counseling provided:  Are appropriate based on today's review and evaluation  Pneumococcal Vaccine  Influenza Vaccine  Prostate cancer screening tests (PSA, covered annually)  Colorectal cancer screening tests    Diagnoses and all orders for this visit:    1. Medicare annual wellness visit, subsequent    2. GERD without esophagitis  -     pantoprazole (PROTONIX) 20 mg tablet; Take 1 Tab by mouth daily.  -     CBC WITH AUTOMATED DIFF; Future    3. Essential hypertension  -     METABOLIC PANEL, COMPREHENSIVE; Future  -     LIPID PANEL; Future  -     TSH 3RD GENERATION; Future    4. Prostate cancer screening  -     PSA, DIAGNOSTIC (PROSTATE SPECIFIC AG); Future    5. Tobacco abuse  -     CT LOW DOSE LUNG CANCER SCREENING;  Future        Health Maintenance Due   Topic Date Due    DTaP/Tdap/Td series (1 - Tdap) 06/25/1970    Shingrix Vaccine Age 50> (1 of 2) 06/25/1999    FOBT Q1Y Age 50-75  06/25/1999    GLAUCOMA SCREENING Q2Y  06/25/2014    Flu Vaccine (1) 09/01/2020    Medicare Yearly Exam  10/02/2020

## 2020-10-12 NOTE — PATIENT INSTRUCTIONS
Medicare Wellness Visit, Male    The best way to live healthy is to have a lifestyle where you eat a well-balanced diet, exercise regularly, limit alcohol use, and quit all forms of tobacco/nicotine, if applicable. Regular preventive services are another way to keep healthy. Preventive services (vaccines, screening tests, monitoring & exams) can help personalize your care plan, which helps you manage your own care. Screening tests can find health problems at the earliest stages, when they are easiest to treat. [facility] follows the current, evidence-based guidelines published by the [facility] ([facility]) when recommending preventive services for our patients. Because we follow these guidelines, sometimes recommendations change over time as research supports it. (For example, a prostate screening blood test is no longer routinely recommended for men with no symptoms). Of course, you and your doctor may decide to screen more often for some diseases, based on your risk and co-morbidities (chronic disease you are already diagnosed with). Preventive services for you include:  - Medicare offers their members a free annual wellness visit, which is time for you and your primary care provider to discuss and plan for your preventive service needs. Take advantage of this benefit every year!  -All adults over age 72 should receive the recommended pneumonia vaccines. Current USPSTF guidelines recommend a series of two vaccines for the best pneumonia protection.   -All adults should have a flu vaccine yearly and tetanus vaccine every 10 years.  -All adults age 48 and older should receive the shingles vaccines (series of two vaccines).        -All adults age 38-68 who are overweight should have a diabetes screening test once every three years.   -Other screening tests & preventive services for persons with diabetes include: an eye exam to screen for diabetic retinopathy, a kidney function test, a foot exam, and stricter control over your cholesterol.   -Cardiovascular screening for adults with routine risk involves an electrocardiogram (ECG) at intervals determined by the provider.   -Colorectal cancer screening should be done for adults age 54-65 with no increased risk factors for colorectal cancer. There are a number of acceptable methods of screening for this type of cancer. Each test has its own benefits and drawbacks. Discuss with your provider what is most appropriate for you during your annual wellness visit. The different tests include: colonoscopy (considered the best screening method), a fecal occult blood test, a fecal DNA test, and sigmoidoscopy.  -All adults born between [facility] should be screened once for Hepatitis C.  -An Abdominal Aortic Aneurysm (AAA) Screening is recommended for men age 73-68 who has ever smoked in their lifetime.      Here is a list of your current Health Maintenance items (your personalized list of preventive services) with a due date:  Health Maintenance Due   Topic Date Due    DTaP/Tdap/Td  (1 - Tdap) 06/25/1970    Shingles Vaccine (1 of 2) 06/25/1999    Colon Cancer Stool Test  06/25/1999    Glaucoma Screening   06/25/2014    Yearly Flu Vaccine (1) 09/01/2020    Annual Well Visit  10/02/2020

## 2020-10-14 ENCOUNTER — TELEPHONE (OUTPATIENT)
Dept: INTERNAL MEDICINE CLINIC | Age: 70
End: 2020-10-14

## 2020-10-14 DIAGNOSIS — Z87.891 PERSONAL HISTORY OF NICOTINE DEPENDENCE: ICD-10-CM

## 2020-10-14 DIAGNOSIS — Z72.0 TOBACCO ABUSE: Primary | ICD-10-CM

## 2020-10-20 ENCOUNTER — HOSPITAL ENCOUNTER (OUTPATIENT)
Dept: CT IMAGING | Age: 70
Discharge: HOME | End: 2020-10-20
Attending: INTERNAL MEDICINE
Payer: MEDICARE

## 2020-10-20 DIAGNOSIS — Z87.891 PERSONAL HISTORY OF NICOTINE DEPENDENCE: ICD-10-CM

## 2020-10-20 PROCEDURE — G0297 LDCT FOR LUNG CA SCREEN: HCPCS

## 2020-11-11 ENCOUNTER — OFFICE VISIT (OUTPATIENT)
Dept: CARDIOLOGY CLINIC | Age: 70
End: 2020-11-11
Payer: MEDICARE

## 2020-11-11 VITALS
BODY MASS INDEX: 28.17 KG/M2 | SYSTOLIC BLOOD PRESSURE: 120 MMHG | DIASTOLIC BLOOD PRESSURE: 72 MMHG | OXYGEN SATURATION: 98 % | HEART RATE: 64 BPM | HEIGHT: 72 IN | WEIGHT: 208 LBS

## 2020-11-11 DIAGNOSIS — I42.9 CARDIOMYOPATHY, UNSPECIFIED TYPE (HCC): ICD-10-CM

## 2020-11-11 DIAGNOSIS — E78.5 DYSLIPIDEMIA: ICD-10-CM

## 2020-11-11 DIAGNOSIS — I34.1 MITRAL VALVE PROLAPSE: ICD-10-CM

## 2020-11-11 DIAGNOSIS — I10 ESSENTIAL HYPERTENSION: Primary | ICD-10-CM

## 2020-11-11 DIAGNOSIS — I49.3 PVC'S (PREMATURE VENTRICULAR CONTRACTIONS): ICD-10-CM

## 2020-11-11 PROCEDURE — G8752 SYS BP LESS 140: HCPCS | Performed by: SPECIALIST

## 2020-11-11 PROCEDURE — G8536 NO DOC ELDER MAL SCRN: HCPCS | Performed by: SPECIALIST

## 2020-11-11 PROCEDURE — G0463 HOSPITAL OUTPT CLINIC VISIT: HCPCS | Performed by: SPECIALIST

## 2020-11-11 PROCEDURE — 3288F FALL RISK ASSESSMENT DOCD: CPT | Performed by: SPECIALIST

## 2020-11-11 PROCEDURE — 99214 OFFICE O/P EST MOD 30 MIN: CPT | Performed by: SPECIALIST

## 2020-11-11 PROCEDURE — 3017F COLORECTAL CA SCREEN DOC REV: CPT | Performed by: SPECIALIST

## 2020-11-11 PROCEDURE — G8432 DEP SCR NOT DOC, RNG: HCPCS | Performed by: SPECIALIST

## 2020-11-11 PROCEDURE — G8419 CALC BMI OUT NRM PARAM NOF/U: HCPCS | Performed by: SPECIALIST

## 2020-11-11 PROCEDURE — G8754 DIAS BP LESS 90: HCPCS | Performed by: SPECIALIST

## 2020-11-11 PROCEDURE — G8427 DOCREV CUR MEDS BY ELIG CLIN: HCPCS | Performed by: SPECIALIST

## 2020-11-11 PROCEDURE — 1100F PTFALLS ASSESS-DOCD GE2>/YR: CPT | Performed by: SPECIALIST

## 2020-11-11 NOTE — PROGRESS NOTES
CARDIOLOGY OFFICE NOTE    Rick Brown MD, [address], [place], [address] [facility]  Phone [phone]; Fax [phone]  Mobile [phone]   Voice Mail [phone]    LAST OFFICE VISIT : Visit date not found  Roosevelt Lockhart MD       ATTENTION:   This medical record was transcribed using an electronic medical records/speech recognition system. Although proofread, it may and can contain electronic, spelling and other errors. Corrections may be executed at a later time. Please feel free to contact us for any clarifications as needed. ICD-10-CM ICD-9-CM   1. Essential hypertension  I10 401.9   2. Dyslipidemia  E78.5 272.4   3. PVC's (premature ventricular contractions)  I49.3 427.69   4. Mitral valve prolapse  I34.1 424.0   5. Cardiomyopathy, unspecified type [facility])  I42.9 425.4            Melly Anthony is a 69 y.o. male with  referred for   hypertension dyslipidemia and mitral valve prolapse   . The patient denies chest pain/ shortness of breath, orthopnea, PND, LE edema, palpitations, syncope, presyncope or fatigue. Cardiac risk factors: family history, male gender, hypertension  I have personally obtained the history from the patient. HISTORY OF PRESENTING ILLNESS      Melly Anthony is a 69 y.o. male  with HTN and mitral valve prolapse who has been doing well with no chest pain or shortness of breath. He has no interval complaints today.          ACTIVE PROBLEM LIST     Patient Active Problem List    Diagnosis Date Noted    Primary osteoarthritis of left hip 05/06/2018    Primary osteoarthritis of left knee 04/25/2018    Seasonal allergic rhinitis due to pollen 05/19/2017    Chest tightness 05/18/2017    Mitral valve prolapse 02/16/2017    Colon polyps 01/19/2017    Essential hypertension 01/19/2017           PAST MEDICAL HISTORY     Past Medical History:   Diagnosis Date    Arthritis     Dyslipidemia     Essential hypertension     Frequent PVCs     GERD (gastroesophageal reflux disease)     H/O seasonal allergies     Mitral valve prolapse     pt states Dr. Veto Palomo is not sure if this is the correct diagnosis    Obesity 2018    Obesity  BMI= 32.6           PAST SURGICAL HISTORY     Past Surgical History:   Procedure Laterality Date    COLONOSCOPY N/A 2017    COLONOSCOPY performed by Naomy Hinds MD at [address]  2017         EGD  2017         HX HERNIA REPAIR      times 2. 1980's inguinal bilateral    HX HIP REPLACEMENT Left 2018    HX ORTHOPAEDIC Left 2018    hip replacement    HX OTHER SURGICAL  1977    Repair of the right shoulder.  HX TONSILLECTOMY      childhood    UPPER GI ENDOSCOPY,DILATN W GUIDE  2017         UPPER GI ENDOSCOPY,DILATN W GUIDE  2018               ALLERGIES     No Known Allergies       FAMILY HISTORY     Family History   Problem Relation Age of Onset   Yolette Hall Sudden Death Mother         Car accident    Heart Attack Father 48    Heart Disease Father     Thyroid Disease Sister     No Known Problems Brother     No Known Problems Sister     No Known Problems Sister     No Known Problems Brother     No Known Problems Brother     Cancer Maternal Aunt [de-identified]        multiple myloma    negative for cardiac disease       SOCIAL HISTORY     Social History     Socioeconomic History    Marital status:      Spouse name: Not on file    Number of children: Not on file    Years of education: Not on file    Highest education level: Not on file   Tobacco Use    Smoking status: Former Smoker     Packs/day: 1.00     Years: 30.00     Pack years: 30.00     Last attempt to quit: 1997     Years since quittin.7    Smokeless tobacco: Never Used   Substance and Sexual Activity    Alcohol use:  Yes     Alcohol/week: 6.0 standard drinks     Types: 3 Glasses of wine, 3 Cans of beer per week    Drug use: No         MEDICATIONS Current Outpatient Medications   Medication Sig    pantoprazole (PROTONIX) 20 mg tablet Take 1 Tab by mouth daily.  OTHER nightly. Theraworx compression glove and topical foam    diclofenac (VOLTAREN) 1 % gel Apply  to affected area four (4) times daily.  metoprolol succinate (TOPROL-XL) 25 mg XL tablet TAKE 1/2 TABLET EVERY DAY    losartan (COZAAR) 25 mg tablet Take 1 Tab by mouth daily. (Patient taking differently: Take 12.5 mg by mouth daily.)    simvastatin (ZOCOR) 5 mg tablet Take 1 Tab by mouth nightly. TAKE 1 TABLET EVERY NIGHT    aspirin delayed-release 81 mg tablet Take  by mouth daily.  multivitamin (ONE A DAY) tablet Take 1 Tab by mouth daily.  VITAMIN B COMPLEX (B COMPLEX PO) Take  by mouth daily.  DOCOSAHEXANOIC ACID/EPA (FISH OIL PO) Take 1,200 mg by mouth two (2) times a day.  GLUCOSAMINE SULFATE (GLUCOSAMINE PO) Take 1,500 mg by mouth two (2) times a day.  CHOLECALCIFEROL, VITAMIN D3, (VITAMIN D3 PO) Take 1 Tab by mouth daily. No current facility-administered medications for this visit. I have reviewed the nurses notes, vitals, problem list, allergy list, medical history, family, social history and medications. REVIEW OF SYMPTOMS      General: Pt denies excessive weight gain or loss. Pt is able to conduct ADL's  HEENT: Denies blurred vision, headaches, hearing loss, epistaxis and difficulty swallowing. Respiratory: Denies cough, congestion, shortness of breath, OCONNOR, wheezing or stridor.   Cardiovascular: Denies precordial pain, palpitations, edema or PND  Gastrointestinal: Denies poor appetite, indigestion, abdominal pain or blood in stool  Genitourinary: Denies hematuria, dysuria, increased urinary frequency  Musculoskeletal: Denies joint pain or swelling from muscles or joints  Neurologic: Denies tremor, paresthesias, headache, or sensory motor disturbance  Psychiatric: Denies confusion, insomnia, depression  Integumentray: Denies rash, itching or ulcers. Hematologic: Denies easy bruising, bleeding     PHYSICAL EXAMINATION      There were no vitals filed for this visit. General: Well developed, in no acute distress. HEENT: No jaundice, oral mucosa moist, no oral ulcers  Neck: Supple, no stiffness, no lymphadenopathy, supple  Heart:  Normal S1/S2 negative S3 or S4. Regular, no murmur, gallop or rub, no jugular venous distention  Respiratory: Clear bilaterally x 4, no wheezing or rales  Abdomen:   Soft, non-tender, bowel sounds are active. Extremities:  No edema, normal cap refill, no cyanosis. Musculoskeletal: No clubbing, no deformities  Neuro: A&Ox3, speech clear, gait stable, cooperative, no focal neurologic deficits  Skin: Skin color is normal. No rashes or lesions. Non diaphoretic, moist.  Vascular: 2+ pulses symmetric in all extremities             DIAGNOSTIC DATA     1. Lipids  1/9/17- , HDL 43, , TG 91  5/2/18- , HDL 37, LDL 84,   3/20/19-  HDL 36, LDL 88,   10/7/19- , HDL 38, LDL 87,   12/18/19- , HDL 42, LDL 83, TG 64  10/12/20- , HDL 46, LDL 86.4,     2. Holter  (1/19/17): sinus rhtyhm with PVCs there were frequent PVCs, rare couplets, triplets and bigeminy with rare episodes of non-sustained VT with rates up to 134.  9/28/17- SB with PVCs, HR 43-91, PVC burden 30.48%  11/8/17- SR HR , PVC burden 5 %  1/8/20- SR average HR 85, min 47, max 133, 1 VT 3 beats , PVC burden 0.7%    3. Echo  4/19/16- EF 30-44%, MR mild/mod, AI mild, LAE  9/28/17- EF 50-55%, PI mild/mod    4.  Stress Test  Lexiscan- 6/15/17-no ischemia, EF 39%         LABORATORY DATA            Lab Results   Component Value Date/Time    WBC 7.5 10/12/2020 12:58 PM    HGB 13.7 10/12/2020 12:58 PM    HCT 41.3 10/12/2020 12:58 PM    PLATELET 522 73/31/8235 12:58 PM    MCV 92.8 10/12/2020 12:58 PM      Lab Results   Component Value Date/Time    Sodium 144 10/12/2020 12:58 PM    Potassium 4.7 10/12/2020 12:58 PM Chloride 110 (H) 10/12/2020 12:58 PM    CO2 26 10/12/2020 12:58 PM    Anion gap 8 10/12/2020 12:58 PM    Glucose 75 10/12/2020 12:58 PM    BUN 25 (H) 10/12/2020 12:58 PM    Creatinine 1.31 (H) 10/12/2020 12:58 PM    BUN/Creatinine ratio 19 10/12/2020 12:58 PM    GFR est AA >60 10/12/2020 12:58 PM    GFR est non-AA 54 (L) 10/12/2020 12:58 PM    Calcium 9.5 10/12/2020 12:58 PM    Bilirubin, total 0.6 10/12/2020 12:58 PM    Alk. phosphatase 66 10/12/2020 12:58 PM    Protein, total 6.8 10/12/2020 12:58 PM    Albumin 3.9 10/12/2020 12:58 PM    Globulin 2.9 10/12/2020 12:58 PM    A-G Ratio 1.3 10/12/2020 12:58 PM    ALT (SGPT) 27 10/12/2020 12:58 PM           ASSESSMENT/RECOMMENDATIONS:.      1. HTN  - Blood pressure is excellent on his current dose of metoprolol 12.5 a day. We were reducing his medicines particularly since he has lost weight and his blood pressure was trending downward     2. Cardiomyopathy  -As of 2017 and his EF was normal; he has no symptoms of heart failure and is NYHA class I continues to call       3. PVC's  -Has not had any excessive irregularity in his heart rhythm off flecainide and is tolerating this     4. Dyslipidemia   - Lipids have been followed by Dr. Gila Parikh and are at goal    5. History of mitral valve prolapse  -Consider echo in the future       return in 6 months or PRN. We discussed the expected course, resolution and complications of the diagnosis(es) in detail. Medication risks, benefits, costs, interactions, and alternatives were discussed as indicated. I advised him to contact the office if his condition worsens, changes or fails to improve as anticipated. He expressed understanding with the diagnosis(es) and plan    No orders of the defined types were placed in this encounter. We discussed the expected course, resolution and complications of the diagnosis(es) in detail. Medication risks, benefits, costs, interactions, and alternatives were discussed as indicated. I advised him to contact the office if his condition worsens, changes or fails to improve as anticipated. He expressed understanding with the diagnosis(es) and plan          Follow-up and Dispositions  ·   Return in about 6 months (around 5/11/2021). I have discussed the diagnosis with  Charline Dhaliwal and the intended plan as seen in the above orders. Questions were answered concerning future plans. I have discussed medication side effects and warnings with the patient as well. Thank you,  Melisa Gutierrez MD for involving me in the care of  Charline Dhaliwal. Please do not hesitate to contact me for further questions/concerns. Rick Nina MD, [facility] [address]      [facility], [address] [place] [address] [address]      [phone] / [phone] Fax

## 2020-11-11 NOTE — PROGRESS NOTES
Chief Complaint   Patient presents with    Cholesterol Problem    Hypertension     PVC's     Visit Vitals  /72 (BP 1 Location: Left arm, BP Patient Position: Sitting)   Pulse 64   Ht 6' (1.829 m)   Wt 208 lb (94.3 kg)   SpO2 98%   BMI 28.21 kg/m²     Chest pain denied   SOB denied   Palpitations denied   Swelling in hands/feet denied   Dizziness denied   Recent hospital stays denied   Refills denied     Losartan 50 mg cut down to 25 mg last visit. In month, send me BP readings and will adjust further. Response was to cut it in half. He has been taking 12.5 mg.    BP higher in morning (130's/80's), afternoon seems to get lower (115/75). Does get dizziness at times (90/60). Noted skipped beats today. Pt reported no palpitations.

## 2020-11-25 ENCOUNTER — TRANSCRIBE ORDER (OUTPATIENT)
Dept: SCHEDULING | Age: 70
End: 2020-11-25

## 2020-11-25 DIAGNOSIS — S46.012A TRAUMATIC COMPLETE TEAR OF LEFT ROTATOR CUFF: Primary | ICD-10-CM

## 2020-12-07 ENCOUNTER — OFFICE VISIT (OUTPATIENT)
Dept: CARDIOLOGY CLINIC | Age: 70
End: 2020-12-07
Payer: MEDICARE

## 2020-12-07 VITALS
HEIGHT: 72 IN | OXYGEN SATURATION: 100 % | HEART RATE: 66 BPM | DIASTOLIC BLOOD PRESSURE: 72 MMHG | SYSTOLIC BLOOD PRESSURE: 106 MMHG | WEIGHT: 213 LBS | BODY MASS INDEX: 28.85 KG/M2

## 2020-12-07 DIAGNOSIS — I10 ESSENTIAL HYPERTENSION: ICD-10-CM

## 2020-12-07 DIAGNOSIS — I49.3 PVC'S (PREMATURE VENTRICULAR CONTRACTIONS): Primary | ICD-10-CM

## 2020-12-07 PROCEDURE — 1100F PTFALLS ASSESS-DOCD GE2>/YR: CPT | Performed by: INTERNAL MEDICINE

## 2020-12-07 PROCEDURE — G0463 HOSPITAL OUTPT CLINIC VISIT: HCPCS | Performed by: INTERNAL MEDICINE

## 2020-12-07 PROCEDURE — 3288F FALL RISK ASSESSMENT DOCD: CPT | Performed by: INTERNAL MEDICINE

## 2020-12-07 PROCEDURE — G8419 CALC BMI OUT NRM PARAM NOF/U: HCPCS | Performed by: INTERNAL MEDICINE

## 2020-12-07 PROCEDURE — G8752 SYS BP LESS 140: HCPCS | Performed by: INTERNAL MEDICINE

## 2020-12-07 PROCEDURE — G8510 SCR DEP NEG, NO PLAN REQD: HCPCS | Performed by: INTERNAL MEDICINE

## 2020-12-07 PROCEDURE — G8427 DOCREV CUR MEDS BY ELIG CLIN: HCPCS | Performed by: INTERNAL MEDICINE

## 2020-12-07 PROCEDURE — 3017F COLORECTAL CA SCREEN DOC REV: CPT | Performed by: INTERNAL MEDICINE

## 2020-12-07 PROCEDURE — 93010 ELECTROCARDIOGRAM REPORT: CPT | Performed by: INTERNAL MEDICINE

## 2020-12-07 PROCEDURE — 93005 ELECTROCARDIOGRAM TRACING: CPT | Performed by: INTERNAL MEDICINE

## 2020-12-07 PROCEDURE — G8754 DIAS BP LESS 90: HCPCS | Performed by: INTERNAL MEDICINE

## 2020-12-07 PROCEDURE — G8536 NO DOC ELDER MAL SCRN: HCPCS | Performed by: INTERNAL MEDICINE

## 2020-12-07 PROCEDURE — 99215 OFFICE O/P EST HI 40 MIN: CPT | Performed by: INTERNAL MEDICINE

## 2020-12-07 NOTE — PROGRESS NOTES
HISTORY OF PRESENTING ILLNESS      Charline Dhaliwal is a 69 y.o. male with  HTN, MVP and frequent PVCs with a recent holter demonstrating a PVC burden of 13%. He is asymptomatic and cMRI demonstrated normal LV/RV function without scar. Last visit, we planned for repeat Holter monitor and echocardiogram at 6 month follow up. Echocardiogram demonstrated preserved LV function and Holter demonstrated 30% PVC burden. As a result he was started on a trial of flecainide 50 mg twice daily. Repeat Holter monitoring demonstrated PVC burden of 5%. He was seen one year ago and discussed trial of discontinuing flecainide. Repeat holter showed PVC burden <0.1% on metoprolol alone. He denies changes in his symptoms since last year. EKG today shows PVCs. PAST MEDICAL HISTORY     Past Medical History:   Diagnosis Date    Arthritis     Dyslipidemia     Essential hypertension     Frequent PVCs     GERD (gastroesophageal reflux disease)     H/O seasonal allergies     Mitral valve prolapse     pt states Dr. Sha Asif is not sure if this is the correct diagnosis    Obesity 05/02/2018    Obesity  BMI= 32.6           PAST SURGICAL HISTORY     Past Surgical History:   Procedure Laterality Date    COLONOSCOPY N/A 6/2/2017    COLONOSCOPY performed by Calli Avalos MD at [address]  6/2/2017         EGD  6/2/2017         HX HERNIA REPAIR      times 2. 1980's inguinal bilateral    HX HIP REPLACEMENT Left 2018    HX ORTHOPAEDIC Left 2018    hip replacement    HX OTHER SURGICAL  1977    Repair of the right shoulder.      HX TONSILLECTOMY      childhood    UPPER GI ENDOSCOPY,DILATN W GUIDE  7/14/2017         UPPER GI ENDOSCOPY,DILATN W GUIDE  9/26/2018               ALLERGIES     No Known Allergies       FAMILY HISTORY     Family History   Problem Relation Age of Onset    Sudden Death Mother         Car accident    Heart Attack Father 48    Heart Disease Father     Thyroid Disease Sister     No Known Problems Brother     No Known Problems Sister     No Known Problems Sister     No Known Problems Brother     No Known Problems Brother     Cancer Maternal Aunt [de-identified]        multiple myloma    negative for cardiac disease       SOCIAL HISTORY     Social History     Socioeconomic History    Marital status:      Spouse name: Not on file    Number of children: Not on file    Years of education: Not on file    Highest education level: Not on file   Tobacco Use    Smoking status: Former Smoker     Packs/day: 1.00     Years: 30.00     Pack years: 30.00     Last attempt to quit: 1997     Years since quittin.8    Smokeless tobacco: Never Used   Substance and Sexual Activity    Alcohol use: Yes     Alcohol/week: 6.0 standard drinks     Types: 3 Glasses of wine, 3 Cans of beer per week    Drug use: No         MEDICATIONS     Current Outpatient Medications   Medication Sig    pantoprazole (PROTONIX) 20 mg tablet Take 1 Tab by mouth daily. (Patient taking differently: Take 20 mg by mouth daily. Alternating every other day 20 mg and 40 mg.)    diclofenac (VOLTAREN) 1 % gel Apply  to affected area four (4) times daily.  metoprolol succinate (TOPROL-XL) 25 mg XL tablet TAKE 1/2 TABLET EVERY DAY    losartan (COZAAR) 25 mg tablet Take 1 Tab by mouth daily. (Patient taking differently: Take 12.5 mg by mouth daily.)    simvastatin (ZOCOR) 5 mg tablet Take 1 Tab by mouth nightly. TAKE 1 TABLET EVERY NIGHT    aspirin delayed-release 81 mg tablet Take  by mouth daily.  multivitamin (ONE A DAY) tablet Take 1 Tab by mouth daily.  VITAMIN B COMPLEX (B COMPLEX PO) Take  by mouth daily.  DOCOSAHEXANOIC ACID/EPA (FISH OIL PO) Take 1,200 mg by mouth two (2) times a day.  GLUCOSAMINE SULFATE (GLUCOSAMINE PO) Take 1,500 mg by mouth two (2) times a day.  CHOLECALCIFEROL, VITAMIN D3, (VITAMIN D3 PO) Take 1 Tab by mouth daily.  OTHER nightly.  Theraworx compression glove and topical foam     No current facility-administered medications for this visit. I have reviewed the nurses notes, vitals, problem list, allergy list, medical history, family, social history and medications. REVIEW OF SYMPTOMS      General: Pt denies excessive weight gain or loss. Pt is able to conduct ADL's  HEENT: Denies blurred vision, headaches, hearing loss, epistaxis and difficulty swallowing. Respiratory: Denies cough, congestion, shortness of breath, OCONNOR, wheezing or stridor. Cardiovascular: Denies precordial pain, palpitations, edema or PND  Gastrointestinal: Denies poor appetite, indigestion, abdominal pain or blood in stool  Genitourinary: Denies hematuria, dysuria, increased urinary frequency  Musculoskeletal: Denies joint pain or swelling from muscles or joints  Neurologic: Denies tremor, paresthesias, headache, or sensory motor disturbance  Psychiatric: Denies confusion, insomnia, depression  Integumentray: Denies rash, itching or ulcers. Hematologic: Denies easy bruising, bleeding       PHYSICAL EXAMINATION      Vitals: see vitals section  General: Well developed, in no acute distress. HEENT: No jaundice, oral mucosa moist, no oral ulcers  Neck: Supple, no stiffness, no lymphadenopathy, supple  Heart:  Normal S1/S2 negative S3 or S4. Regular, no murmur, gallop or rub, no jugular venous distention  Respiratory: Clear bilaterally x 4, no wheezing or rales  Abdomen:   Soft, non-tender, bowel sounds are active. Extremities:  No edema, normal cap refill, no cyanosis. Musculoskeletal: No clubbing, no deformities  Neuro: A&Ox3, speech clear, gait stable, cooperative, no focal neurologic deficits  Skin: Skin color is normal. No rashes or lesions.  Non diaphoretic, moist.  Vascular: 2+ pulses symmetric in all extremities       DIAGNOSTIC DATA      EKG: Sinus rhythm with PVCs       LABORATORY DATA      Lab Results   Component Value Date/Time    WBC 7.5 10/12/2020 12:58 PM HGB 13.7 10/12/2020 12:58 PM    HCT 41.3 10/12/2020 12:58 PM    PLATELET 342 26/78/2690 12:58 PM    MCV 92.8 10/12/2020 12:58 PM      Lab Results   Component Value Date/Time    Sodium 144 10/12/2020 12:58 PM    Potassium 4.7 10/12/2020 12:58 PM    Chloride 110 (H) 10/12/2020 12:58 PM    CO2 26 10/12/2020 12:58 PM    Anion gap 8 10/12/2020 12:58 PM    Glucose 75 10/12/2020 12:58 PM    BUN 25 (H) 10/12/2020 12:58 PM    Creatinine 1.31 (H) 10/12/2020 12:58 PM    BUN/Creatinine ratio 19 10/12/2020 12:58 PM    GFR est AA >60 10/12/2020 12:58 PM    GFR est non-AA 54 (L) 10/12/2020 12:58 PM    Calcium 9.5 10/12/2020 12:58 PM    Bilirubin, total 0.6 10/12/2020 12:58 PM    Alk. phosphatase 66 10/12/2020 12:58 PM    Protein, total 6.8 10/12/2020 12:58 PM    Albumin 3.9 10/12/2020 12:58 PM    Globulin 2.9 10/12/2020 12:58 PM    A-G Ratio 1.3 10/12/2020 12:58 PM    ALT (SGPT) 27 10/12/2020 12:58 PM           ASSESSMENT      1. PVCs                        A. Asymptomatic  2. Hypertension  3. Mitral valve proplapse       PLAN     Recommend repeat 24 hour holter monitor and echocardiogram with follow up to determine if re-introduction of Flecainide is warranted. ICD-10-CM ICD-9-CM    1. PVC's (premature ventricular contractions)  I49.3 427.69 AMB POC EKG ROUTINE W/ 12 LEADS, INTER & REP      ECHO ADULT COMPLETE   2. Essential hypertension  I10 401.9      Orders Placed This Encounter    AMB POC EKG ROUTINE W/ 12 LEADS, INTER & REP     Order Specific Question:   Reason for Exam:     Answer:   PVCs          FOLLOW-UP     1 month with Lata Quiñones        Thank you, Sravanthi Felder MD and Dr. Michelle Saha for allowing me to participate in the care of this extraordinarily pleasant male. Please do not hesitate to contact me for further questions/concerns. Clementina Dubois MD    Patient seen and examined by me with nurse practitioner.   I personally performed all components of the history, physical, and medical decision making and agree with the assessment and plan with minor modifications as noted.        Roz Oquendo MD  Cardiac Electrophysiology / Cardiology    [address]  [address] [facility], [facility], [address] [place], [place] [address] [address], [facility]  [phone] / [phone] Fax   [phone] / [phone] Fax

## 2020-12-07 NOTE — PROGRESS NOTES
Room 1    Visit Vitals  /72 (BP 1 Location: Left arm, BP Patient Position: Sitting)   Pulse 66   Ht 6' (1.829 m)   Wt 213 lb (96.6 kg)   SpO2 100%   BMI 28.89 kg/m²       Chest pain: no  Shortness of breath: no  Edema: no  Palpitations, Skipped beats, Rapid heartbeat: no  Dizziness: no    New diagnosis/Surgeries: rotator cuff    ER/Hospitalizations: no    Refills: no

## 2020-12-10 ENCOUNTER — HOSPITAL ENCOUNTER (OUTPATIENT)
Dept: MRI IMAGING | Age: 70
Discharge: HOME | End: 2020-12-10
Attending: ORTHOPAEDIC SURGERY
Payer: MEDICARE

## 2020-12-10 DIAGNOSIS — S46.012A TRAUMATIC COMPLETE TEAR OF LEFT ROTATOR CUFF: ICD-10-CM

## 2020-12-10 PROCEDURE — 73221 MRI JOINT UPR EXTREM W/O DYE: CPT

## 2021-01-04 ENCOUNTER — ANCILLARY PROCEDURE (OUTPATIENT)
Dept: CARDIOLOGY CLINIC | Age: 71
End: 2021-01-04
Payer: MEDICARE

## 2021-01-04 ENCOUNTER — CLINICAL SUPPORT (OUTPATIENT)
Dept: CARDIOLOGY CLINIC | Age: 71
End: 2021-01-04
Payer: MEDICARE

## 2021-01-04 VITALS
BODY MASS INDEX: 28.85 KG/M2 | SYSTOLIC BLOOD PRESSURE: 116 MMHG | DIASTOLIC BLOOD PRESSURE: 72 MMHG | WEIGHT: 213 LBS | HEIGHT: 72 IN

## 2021-01-04 DIAGNOSIS — I10 ESSENTIAL HYPERTENSION: ICD-10-CM

## 2021-01-04 DIAGNOSIS — I49.3 PVC (PREMATURE VENTRICULAR CONTRACTION): Primary | ICD-10-CM

## 2021-01-04 DIAGNOSIS — I49.3 PVC'S (PREMATURE VENTRICULAR CONTRACTIONS): ICD-10-CM

## 2021-01-04 LAB
AV R PG: 67.56 MMHG
ECHO AO ASC DIAM: 3.56 CM
ECHO AO ROOT DIAM: 3.67 CM
ECHO AR MAX VEL PISA: 410.99 CM/S
ECHO AV AREA PEAK VELOCITY: 4 CM2
ECHO AV AREA VTI: 4.33 CM2
ECHO AV AREA/BSA PEAK VELOCITY: 1.8 CM2/M2
ECHO AV AREA/BSA VTI: 2 CM2/M2
ECHO AV MEAN GRADIENT: 2.39 MMHG
ECHO AV PEAK GRADIENT: 4.5 MMHG
ECHO AV PEAK VELOCITY: 106.04 CM/S
ECHO AV REGURGITANT PHT: 832.46 MS
ECHO AV VTI: 22.85 CM
ECHO EST RA PRESSURE: 3 MMHG
ECHO LA AREA 4C: 24.59 CM2
ECHO LA MAJOR AXIS: 3.73 CM
ECHO LA MINOR AXIS: 1.7 CM
ECHO LA VOL 2C: 71.64 ML (ref 18–58)
ECHO LA VOL 4C: 74.9 ML (ref 18–58)
ECHO LA VOL BP: 86.13 ML (ref 18–58)
ECHO LA VOL/BSA BIPLANE: 39.36 ML/M2 (ref 16–28)
ECHO LA VOLUME INDEX A2C: 32.74 ML/M2 (ref 16–28)
ECHO LA VOLUME INDEX A4C: 34.23 ML/M2 (ref 16–28)
ECHO LV E' LATERAL VELOCITY: 6.98 CM/S
ECHO LV E' SEPTAL VELOCITY: 4.15 CM/S
ECHO LV EDV A2C: 197.49 ML
ECHO LV EDV A4C: 195.6 ML
ECHO LV EDV BP: 197.02 ML (ref 67–155)
ECHO LV EDV INDEX A4C: 89.4 ML/M2
ECHO LV EDV INDEX BP: 90 ML/M2
ECHO LV EDV NDEX A2C: 90.3 ML/M2
ECHO LV EJECTION FRACTION A2C: 49 PERCENT
ECHO LV EJECTION FRACTION A4C: 47 PERCENT
ECHO LV EJECTION FRACTION BIPLANE: 46.7 PERCENT (ref 55–100)
ECHO LV ESV A2C: 101.66 ML
ECHO LV ESV A4C: 103.54 ML
ECHO LV ESV BP: 105.06 ML (ref 22–58)
ECHO LV ESV INDEX A2C: 46.5 ML/M2
ECHO LV ESV INDEX A4C: 47.3 ML/M2
ECHO LV ESV INDEX BP: 48 ML/M2
ECHO LV INTERNAL DIMENSION DIASTOLIC: 5.25 CM (ref 4.2–5.9)
ECHO LV INTERNAL DIMENSION SYSTOLIC: 3.85 CM
ECHO LV IVSD: 0.93 CM (ref 0.6–1)
ECHO LV MASS 2D: 184.3 G (ref 88–224)
ECHO LV MASS INDEX 2D: 84.2 G/M2 (ref 49–115)
ECHO LV POSTERIOR WALL DIASTOLIC: 0.97 CM (ref 0.6–1)
ECHO LVOT DIAM: 2.53 CM
ECHO LVOT PEAK GRADIENT: 2.85 MMHG
ECHO LVOT PEAK VELOCITY: 84.43 CM/S
ECHO LVOT SV: 98.9 ML
ECHO LVOT VTI: 19.68 CM
ECHO MV A VELOCITY: 43.57 CM/S
ECHO MV E DECELERATION TIME (DT): 305.49 MS
ECHO MV E VELOCITY: 28.84 CM/S
ECHO MV E/A RATIO: 0.66
ECHO MV E/E' LATERAL: 4.13
ECHO MV E/E' RATIO (AVERAGED): 5.54
ECHO MV E/E' SEPTAL: 6.95
ECHO MV MAX VELOCITY: 48.08 CM/S
ECHO MV MEAN GRADIENT: 0.32 MMHG
ECHO MV PEAK GRADIENT: 0.92 MMHG
ECHO MV PRESSURE HALF TIME (PHT): 88.59 MS
ECHO MV VTI: 12.69 CM
ECHO RA AREA 4C: 24.63 CM2
ECHO RIGHT VENTRICULAR SYSTOLIC PRESSURE (RVSP): 28 MMHG
ECHO RV INTERNAL DIMENSION: 4.25 CM
ECHO RV TAPSE: 2.31 CM (ref 1.5–2)
ECHO TV REGURGITANT MAX VELOCITY: 250.18 CM/S
ECHO TV REGURGITANT PEAK GRADIENT: 25.04 MMHG

## 2021-01-04 PROCEDURE — 93225 XTRNL ECG REC<48 HRS REC: CPT | Performed by: INTERNAL MEDICINE

## 2021-01-04 PROCEDURE — 93306 TTE W/DOPPLER COMPLETE: CPT | Performed by: SPECIALIST

## 2021-01-04 PROCEDURE — 93227 XTRNL ECG REC<48 HR R&I: CPT | Performed by: INTERNAL MEDICINE

## 2021-01-04 NOTE — PROGRESS NOTES
Applied 24 hr holter per Dr Reynaldo Heath dx: PVC. Pt has #[id number]   Chargeable visit.   [facility]

## 2021-01-11 NOTE — PROGRESS NOTES
HISTORY OF PRESENTING ILLNESS      Lucrecia Barthel is a 69 y.o. male with  HTN, MVP and frequent PVCs with a recent holter demonstrating a PVC burden of 13%. He is asymptomatic and cMRI demonstrated normal LV/RV function without scar. Last visit, we planned for repeat Holter monitor and echocardiogram at 6 month follow up. Echocardiogram demonstrated preserved LV function and Holter demonstrated 30% PVC burden. As a result he was started on a trial of flecainide 50 mg twice daily. Repeat Holter monitoring demonstrated PVC burden of 5%. He was seen one year ago and discussed trial of discontinuing flecainide. Repeat holter showed PVC burden <0.1% on metoprolol alone. He denies changes in his symptoms since last year. EKG today shows PVCs. Repeat echocardiogram demonstrated an EF of 50% and 24 hour monitor demonstrated a PVC burden of 14%. He is feeling well. PAST MEDICAL HISTORY     Past Medical History:   Diagnosis Date    Arthritis     Dyslipidemia     Essential hypertension     Frequent PVCs     GERD (gastroesophageal reflux disease)     H/O seasonal allergies     Mitral valve prolapse     pt states Dr. Jasmyn Ibanez is not sure if this is the correct diagnosis    Obesity 05/02/2018    Obesity  BMI= 32.6           PAST SURGICAL HISTORY     Past Surgical History:   Procedure Laterality Date    COLONOSCOPY N/A 6/2/2017    COLONOSCOPY performed by Vijaya Larson MD at [address]  6/2/2017         EGD  6/2/2017         HX HERNIA REPAIR      times 2. 1980's inguinal bilateral    HX HIP REPLACEMENT Left 2018    HX ORTHOPAEDIC Left 2018    hip replacement    HX OTHER SURGICAL  1977    Repair of the right shoulder.      HX TONSILLECTOMY      childhood    UPPER GI ENDOSCOPY,DILATN W GUIDE  7/14/2017         UPPER GI ENDOSCOPY,DILATELVI W GUIDE  9/26/2018               ALLERGIES     No Known Allergies       FAMILY HISTORY     Family History Problem Relation Age of Onset   [address] [facility] Sudden Death Mother         Car accident    Heart Attack Father 48    Heart Disease Father     Thyroid Disease Sister     No Known Problems Brother     No Known Problems Sister     No Known Problems Sister     No Known Problems Brother     No Known Problems Brother     Cancer Maternal Aunt [de-identified]        multiple myloma    negative for cardiac disease       SOCIAL HISTORY     Social History     Socioeconomic History    Marital status:      Spouse name: Not on file    Number of children: Not on file    Years of education: Not on file    Highest education level: Not on file   Tobacco Use    Smoking status: Former Smoker     Packs/day: 1.00     Years: 30.00     Pack years: 30.00     Quit date: 1997     Years since quittin.9    Smokeless tobacco: Never Used   Substance and Sexual Activity    Alcohol use: Yes     Alcohol/week: 6.0 standard drinks     Types: 3 Glasses of wine, 3 Cans of beer per week    Drug use: No         MEDICATIONS     Current Outpatient Medications   Medication Sig    pantoprazole (PROTONIX) 20 mg tablet Take 1 Tab by mouth daily. (Patient taking differently: Take 20 mg by mouth daily. Alternating every other day 20 mg and 40 mg.)    diclofenac (VOLTAREN) 1 % gel Apply  to affected area four (4) times daily. (Patient taking differently: Apply  to affected area four (4) times daily as needed.)    metoprolol succinate (TOPROL-XL) 25 mg XL tablet TAKE 1/2 TABLET EVERY DAY    losartan (COZAAR) 25 mg tablet Take 1 Tab by mouth daily. (Patient taking differently: Take 12.5 mg by mouth daily.)    simvastatin (ZOCOR) 5 mg tablet Take 1 Tab by mouth nightly. TAKE 1 TABLET EVERY NIGHT    aspirin delayed-release 81 mg tablet Take  by mouth daily.  multivitamin (ONE A DAY) tablet Take 1 Tab by mouth daily.  VITAMIN B COMPLEX (B COMPLEX PO) Take  by mouth daily.     DOCOSAHEXANOIC ACID/EPA (FISH OIL PO) Take 1,200 mg by mouth two (2) times a day.  GLUCOSAMINE SULFATE (GLUCOSAMINE PO) Take 1,500 mg by mouth two (2) times a day.  CHOLECALCIFEROL, VITAMIN D3, (VITAMIN D3 PO) Take 1 Tab by mouth daily.  OTHER nightly. Theraworx compression glove and topical foam     No current facility-administered medications for this visit. I have reviewed the nurses notes, vitals, problem list, allergy list, medical history, family, social history and medications. REVIEW OF SYMPTOMS      General: Pt denies excessive weight gain or loss. Pt is able to conduct ADL's  HEENT: Denies blurred vision, headaches, hearing loss, epistaxis and difficulty swallowing. Respiratory: Denies cough, congestion, shortness of breath, OCONNOR, wheezing or stridor. Cardiovascular: Denies precordial pain, palpitations, edema or PND  Gastrointestinal: Denies poor appetite, indigestion, abdominal pain or blood in stool  Genitourinary: Denies hematuria, dysuria, increased urinary frequency  Musculoskeletal: Denies joint pain or swelling from muscles or joints  Neurologic: Denies tremor, paresthesias, headache, or sensory motor disturbance  Psychiatric: Denies confusion, insomnia, depression  Integumentray: Denies rash, itching or ulcers. Hematologic: Denies easy bruising, bleeding       PHYSICAL EXAMINATION      Vitals: see vitals section  General: Well developed, in no acute distress. HEENT: No jaundice, oral mucosa moist, no oral ulcers  Neck: Supple, no stiffness, no lymphadenopathy, supple  Heart:  Normal S1/S2 negative S3 or S4. Regular, no murmur, gallop or rub, no jugular venous distention  Respiratory: Clear bilaterally x 4, no wheezing or rales  Abdomen:   Soft, non-tender, bowel sounds are active. Extremities:  No edema, normal cap refill, no cyanosis. Musculoskeletal: No clubbing, no deformities  Neuro: A&Ox3, speech clear, gait stable, cooperative, no focal neurologic deficits  Skin: Skin color is normal. No rashes or lesions.  Non diaphoretic, moist.  Vascular: 2+ pulses symmetric in all extremities       DIAGNOSTIC DATA      EKG:        LABORATORY DATA      Lab Results   Component Value Date/Time    WBC 7.5 10/12/2020 12:58 PM    HGB 13.7 10/12/2020 12:58 PM    HCT 41.3 10/12/2020 12:58 PM    PLATELET 941 78/18/4472 12:58 PM    MCV 92.8 10/12/2020 12:58 PM      Lab Results   Component Value Date/Time    Sodium 144 10/12/2020 12:58 PM    Potassium 4.7 10/12/2020 12:58 PM    Chloride 110 (H) 10/12/2020 12:58 PM    CO2 26 10/12/2020 12:58 PM    Anion gap 8 10/12/2020 12:58 PM    Glucose 75 10/12/2020 12:58 PM    BUN 25 (H) 10/12/2020 12:58 PM    Creatinine 1.31 (H) 10/12/2020 12:58 PM    BUN/Creatinine ratio 19 10/12/2020 12:58 PM    GFR est AA >60 10/12/2020 12:58 PM    GFR est non-AA 54 (L) 10/12/2020 12:58 PM    Calcium 9.5 10/12/2020 12:58 PM    Bilirubin, total 0.6 10/12/2020 12:58 PM    Alk. phosphatase 66 10/12/2020 12:58 PM    Protein, total 6.8 10/12/2020 12:58 PM    Albumin 3.9 10/12/2020 12:58 PM    Globulin 2.9 10/12/2020 12:58 PM    A-G Ratio 1.3 10/12/2020 12:58 PM    ALT (SGPT) 27 10/12/2020 12:58 PM           ASSESSMENT         1. PVCs                        A. Asymptomatic  2. Hypertension  3. Mitral valve proplapse       PLAN     Increase metoprolol to 25mg daily and follow up in 2 weeks with 24 hour holter monitor. If PVC burden less than 10% will avoid re-introduction of Flecainide. ICD-10-CM ICD-9-CM    1. PVC's (premature ventricular contractions)  I49.3 427.69 CARDIAC HOLTER MONITOR   2. Essential hypertension  I10 401.9    3. Mitral valve prolapse  I34.1 424.0    4. Cardiomyopathy, unspecified type (HCC)  I42.9 425.4      No orders of the defined types were placed in this encounter. FOLLOW-UP   3-4 weeks      Thank you, Mel Ouch, MD and Dr. Chavez Santos for allowing me to participate in the care of this extraordinarily pleasant male. Please do not hesitate to contact me for further questions/concerns.        [facility]  [facility]  [address] [facility], [address]   [address] [facility], [address]  [address]  [address] [phone] / [phone] Fax   [phone] / [phone] Fax

## 2021-01-12 ENCOUNTER — OFFICE VISIT (OUTPATIENT)
Dept: CARDIOLOGY CLINIC | Age: 71
End: 2021-01-12
Payer: MEDICARE

## 2021-01-12 VITALS
RESPIRATION RATE: 16 BRPM | OXYGEN SATURATION: 97 % | DIASTOLIC BLOOD PRESSURE: 82 MMHG | WEIGHT: 213 LBS | BODY MASS INDEX: 28.85 KG/M2 | HEART RATE: 60 BPM | SYSTOLIC BLOOD PRESSURE: 124 MMHG | HEIGHT: 72 IN

## 2021-01-12 DIAGNOSIS — I42.9 CARDIOMYOPATHY, UNSPECIFIED TYPE (HCC): ICD-10-CM

## 2021-01-12 DIAGNOSIS — I49.3 PVC'S (PREMATURE VENTRICULAR CONTRACTIONS): Primary | ICD-10-CM

## 2021-01-12 DIAGNOSIS — I34.1 MITRAL VALVE PROLAPSE: ICD-10-CM

## 2021-01-12 DIAGNOSIS — I10 ESSENTIAL HYPERTENSION: ICD-10-CM

## 2021-01-12 PROCEDURE — G8536 NO DOC ELDER MAL SCRN: HCPCS | Performed by: NURSE PRACTITIONER

## 2021-01-12 PROCEDURE — 1100F PTFALLS ASSESS-DOCD GE2>/YR: CPT | Performed by: NURSE PRACTITIONER

## 2021-01-12 PROCEDURE — 99215 OFFICE O/P EST HI 40 MIN: CPT | Performed by: NURSE PRACTITIONER

## 2021-01-12 PROCEDURE — G0463 HOSPITAL OUTPT CLINIC VISIT: HCPCS | Performed by: NURSE PRACTITIONER

## 2021-01-12 PROCEDURE — G8752 SYS BP LESS 140: HCPCS | Performed by: NURSE PRACTITIONER

## 2021-01-12 PROCEDURE — G8419 CALC BMI OUT NRM PARAM NOF/U: HCPCS | Performed by: NURSE PRACTITIONER

## 2021-01-12 PROCEDURE — G8754 DIAS BP LESS 90: HCPCS | Performed by: NURSE PRACTITIONER

## 2021-01-12 PROCEDURE — G8432 DEP SCR NOT DOC, RNG: HCPCS | Performed by: NURSE PRACTITIONER

## 2021-01-12 PROCEDURE — 3017F COLORECTAL CA SCREEN DOC REV: CPT | Performed by: NURSE PRACTITIONER

## 2021-01-12 PROCEDURE — G8427 DOCREV CUR MEDS BY ELIG CLIN: HCPCS | Performed by: NURSE PRACTITIONER

## 2021-01-12 PROCEDURE — 3288F FALL RISK ASSESSMENT DOCD: CPT | Performed by: NURSE PRACTITIONER

## 2021-01-12 RX ORDER — METOPROLOL SUCCINATE 25 MG/1
25 TABLET, EXTENDED RELEASE ORAL DAILY
Qty: 90 TAB | Refills: 1
Start: 2021-01-12 | End: 2021-05-07

## 2021-01-12 NOTE — TELEPHONE ENCOUNTER
Requested Prescriptions     Signed Prescriptions Disp Refills    metoprolol succinate (TOPROL-XL) 25 mg XL tablet 90 Tab 1     Sig: Take 1 Tab by mouth daily. Authorizing Provider: Arabella Velasco     Ordering User: Doris Hoang S     Dose increased to 25 mg daily per L.  Eren Matos NP.

## 2021-01-12 NOTE — PROGRESS NOTES
Room # 4    Chest pain: no  Shortness of breath: no  Edema: no  Palpitations, Skipped beats, Rapid heartbeat: no  Dizziness: no    New diagnosis/Surgeries: no    ER/Hospitalizations: no    Refills:Needs Simvastatin Dr. Lana Irwin orders it, to be sent to [place]    Rotator cuff left arm-surgery on Thursday, Dr. Broderick Abel    Visit Vitals  /82 (BP 1 Location: Left arm, BP Patient Position: Sitting)   Pulse 60   Resp 16   Ht 6' (1.829 m)   Wt 213 lb (96.6 kg)   SpO2 97%   BMI 28.89 kg/m²

## 2021-01-12 NOTE — LETTER
1/12/2021 Patient: Caryn Wagoner YOB: 1950 Date of Visit: 1/12/2021 Cady Miller MD 
[address] [facility] [address] [facility] [address] [address] [address] [employer] Dear Cady Miller MD, Thank you for referring Mr. Linna Schimke Thoen to [facility] for evaluation. My notes for this consultation are attached. If you have questions, please do not hesitate to call me. I look forward to following your patient along with you. Sincerely, Javad Montano NP

## 2021-01-18 RX ORDER — SIMVASTATIN 5 MG/1
5 TABLET, FILM COATED ORAL
Qty: 90 TAB | Refills: 3 | Status: SHIPPED | OUTPATIENT
Start: 2021-01-18 | End: 2021-02-10 | Stop reason: SDUPTHER

## 2021-01-18 RX ORDER — SIMVASTATIN 5 MG/1
5 TABLET, FILM COATED ORAL
Qty: 90 TAB | Refills: 3 | Status: SHIPPED | OUTPATIENT
Start: 2021-01-18 | End: 2021-02-02 | Stop reason: ALTCHOICE

## 2021-01-26 ENCOUNTER — CLINICAL SUPPORT (OUTPATIENT)
Dept: CARDIOLOGY CLINIC | Age: 71
End: 2021-01-26
Payer: MEDICARE

## 2021-01-26 DIAGNOSIS — I49.3 PVC (PREMATURE VENTRICULAR CONTRACTION): Primary | ICD-10-CM

## 2021-01-26 PROCEDURE — 93227 XTRNL ECG REC<48 HR R&I: CPT | Performed by: INTERNAL MEDICINE

## 2021-01-26 PROCEDURE — 93225 XTRNL ECG REC<48 HRS REC: CPT | Performed by: INTERNAL MEDICINE

## 2021-01-26 NOTE — PROGRESS NOTES
Applied 24 hr holter per Gladis Finnegan NP dx: PVc. Pt has #[id number]   Chargeable visit.   [facility]

## 2021-01-27 ENCOUNTER — PATIENT MESSAGE (OUTPATIENT)
Dept: INTERNAL MEDICINE CLINIC | Age: 71
End: 2021-01-27

## 2021-01-27 NOTE — TELEPHONE ENCOUNTER
From: Michael Fleming  To: Ashley Stevenson MD  Sent: 1/27/2021 11:23 AM EST  Subject: Non-Urgent Medical Question    Is there a way to get on a Covid-19 vaccine list through [facility] and/or your practice? I (and my wife) would be very interested. I am 69 my wife is 79. No underlying conditions, just would like to get the vaccination as soon as possible.   Thanks,  Marva Damon

## 2021-01-29 ENCOUNTER — HOSPITAL ENCOUNTER (EMERGENCY)
Age: 71
Discharge: HOME | End: 2021-01-29
Attending: STUDENT IN AN ORGANIZED HEALTH CARE EDUCATION/TRAINING PROGRAM
Payer: MEDICARE

## 2021-01-29 VITALS
OXYGEN SATURATION: 94 % | WEIGHT: 212 LBS | DIASTOLIC BLOOD PRESSURE: 83 MMHG | SYSTOLIC BLOOD PRESSURE: 123 MMHG | TEMPERATURE: 98.9 F | BODY MASS INDEX: 28.71 KG/M2 | HEART RATE: 66 BPM | HEIGHT: 72 IN | RESPIRATION RATE: 17 BRPM

## 2021-01-29 DIAGNOSIS — B02.9 HERPES ZOSTER WITHOUT COMPLICATION: Primary | ICD-10-CM

## 2021-01-29 PROCEDURE — 99283 EMERGENCY DEPT VISIT LOW MDM: CPT

## 2021-01-29 PROCEDURE — 74011250637 HC RX REV CODE- 250/637: Performed by: STUDENT IN AN ORGANIZED HEALTH CARE EDUCATION/TRAINING PROGRAM

## 2021-01-29 RX ORDER — VALACYCLOVIR HYDROCHLORIDE 1 G/1
1000 TABLET, FILM COATED ORAL 3 TIMES DAILY
Qty: 21 TAB | Refills: 0 | Status: SHIPPED | OUTPATIENT
Start: 2021-01-29 | End: 2021-02-05

## 2021-01-29 RX ORDER — VALACYCLOVIR HYDROCHLORIDE 500 MG/1
1000 TABLET, FILM COATED ORAL
Status: COMPLETED | OUTPATIENT
Start: 2021-01-29 | End: 2021-01-29

## 2021-01-29 RX ORDER — PREDNISONE 20 MG/1
60 TABLET ORAL
Status: DISCONTINUED | OUTPATIENT
Start: 2021-01-29 | End: 2021-01-29

## 2021-01-29 RX ADMIN — VALACYCLOVIR HYDROCHLORIDE 1000 MG: 500 TABLET, FILM COATED ORAL at 23:02

## 2021-01-30 NOTE — ED PROVIDER NOTES
Kyler Velez is a 69 y.o. male with past medical history notable for arthritis, dyslipidemia, had a recent dog bite which was evaluated by orthopedics and placed on Keflex as well as having local treatment presenting with lesions noted in the right ear. States that he noticed an 2 days ago and on further questioning he also noted some lesions on his chin. He has not had headache, neck pain. Had a fever of 100.4 today. He has not noticed drainage from lesions. He notes that they were vesicular. No focal weakness or numbness. Denies any difficulty swallowing or facial numbness or difficulty moving his eyes or closing his eyelids. Past Medical History:   Diagnosis Date    Arthritis     Dyslipidemia     Essential hypertension     Frequent PVCs     GERD (gastroesophageal reflux disease)     H/O seasonal allergies     Mitral valve prolapse     pt states Dr. Xena Romero is not sure if this is the correct diagnosis    Obesity 05/02/2018    Obesity  BMI= 32.6       Past Surgical History:   Procedure Laterality Date    COLONOSCOPY N/A 6/2/2017    COLONOSCOPY performed by Wilver [facility]uelos [facility] at [facility]  6/2/2017         EGD  6/2/2017         HX HERNIA REPAIR      times 2. 1980's inguinal bilateral    HX HIP REPLACEMENT Left 2018    HX ORTHOPAEDIC Left 2018    hip replacement    HX OTHER SURGICAL  1977    Repair of the right shoulder.      HX TONSILLECTOMY      childhood    UPPER GI ENDOSCOPY,DILATN W GUIDE  7/14/2017         UPPER GI ENDOSCOPY,DILATN W GUIDE  9/26/2018              Family History:   Problem Relation Age of Onset    Sudden Death Mother         Car accident    Heart Attack Father 48    Heart Disease Father     Thyroid Disease Sister     No Known Problems Brother     No Known Problems Sister     No Known Problems Sister     No Known Problems Brother     No Known Problems Brother     Cancer Maternal Aunt [de-identified]        multiple [facility]       Social History     Socioeconomic History    Marital status:      Spouse name: Not on file    Number of children: Not on file    Years of education: Not on file    Highest education level: Not on file   Occupational History    Not on file   Social Needs    Financial resource strain: Not on file    Food insecurity     Worry: Not on file     Inability: Not on file    Transportation needs     Medical: Not on file     Non-medical: Not on file   Tobacco Use    Smoking status: Former Smoker     Packs/day: 1.00     Years: 30.00     Pack years: 30.00     Quit date: 1997     Years since quittin.0    Smokeless tobacco: Never Used   Substance and Sexual Activity    Alcohol use: Yes     Alcohol/week: 6.0 standard drinks     Types: 3 Glasses of wine, 3 Cans of beer per week    Drug use: No    Sexual activity: Not on file   Lifestyle    Physical activity     Days per week: Not on file     Minutes per session: Not on file    Stress: Not on file   Relationships    Social connections     Talks on phone: Not on file     Gets together: Not on file     Attends Che[employer] service: Not on file     Active member of club or organization: Not on file     Attends meetings of clubs or organizations: Not on file     Relationship status: Not on file    Intimate partner violence     Fear of current or ex partner: Not on file     Emotionally abused: Not on file     Physically abused: Not on file     Forced sexual activity: Not on file   Other Topics Concern    Not on file   Social History Narrative    Not on file         ALLERGIES: Patient has no known allergies. Review of Systems   Constitutional: Negative for chills, fatigue and fever. HENT: Negative for ear pain, sore throat and trouble swallowing. Eyes: Negative for visual disturbance. Respiratory: Negative for cough and shortness of breath. Cardiovascular: Negative for chest pain. Gastrointestinal: Negative for abdominal pain. Genitourinary: Negative for dysuria. Musculoskeletal: Negative for back pain. Skin: Negative for rash. Neurological: Negative for light-headedness and headaches. Psychiatric/Behavioral: Negative for confusion. All other systems reviewed and are negative. Vitals:    01/29/21 2207   BP: 123/83   Pulse: 66   Resp: 17   Temp: 98.9 °F (37.2 °C)   SpO2: 94%   Weight: 96.2 kg (212 lb)   Height: 6' (1.829 m)            Physical Exam  Vitals signs reviewed. Constitutional:       General: He is not in acute distress. HENT:      Head: Normocephalic and atraumatic. Mouth/Throat:      Mouth: Mucous membranes are moist.      Pharynx: Oropharynx is clear. Eyes:      General: No scleral icterus. Right eye: No discharge. Left eye: No discharge. Conjunctiva/sclera: Conjunctivae normal.   Neck:      Musculoskeletal: No neck rigidity. Cardiovascular:      Rate and Rhythm: Normal rate and regular rhythm. Heart sounds: Normal heart sounds. Pulmonary:      Effort: Pulmonary effort is normal.      Breath sounds: Normal breath sounds. Abdominal:      Tenderness: There is no abdominal tenderness. There is no guarding or rebound. Musculoskeletal: Normal range of motion. Lymphadenopathy:      Cervical: No cervical adenopathy. Skin:     General: Skin is warm and dry. Capillary Refill: Capillary refill takes less than 2 seconds. Neurological:      General: No focal deficit present. Mental Status: He is alert and oriented to person, place, and time. Cranial Nerves: Cranial nerves are intact. No cranial nerve deficit. Sensory: No sensory deficit. Motor: No weakness or pronator drift. Coordination: Coordination is intact. Coordination normal.      Gait: Gait normal.   Psychiatric:         Mood and Affect: Mood normal.     2 lesions also noted over the scalp    MDM      Patient with vesicular lesions on his face consistent with herpes zoster.   No evidence of encephalitis or meningitis. No evidence of hearing loss or any eye complaints or eye involvement. This corresponds to a V3 distribution.   Will place on Valtrex 3 times daily x7 days   Did not have severe pain-at this point prednisone likely not necessary  Follow-up with primary care  Return precautions given for signs of encephalitis, meningitis  Patient expressed understanding, will return if he has any new concerns or worsening symptoms    Procedures

## 2021-01-30 NOTE — ED TRIAGE NOTES
Patient reports possible spider bite to right ear Tuesday night. Swelling and redness noted in triage. Per patient using bendryl cream, and pills for relief.  Last dose 1500

## 2021-02-01 ENCOUNTER — OFFICE VISIT (OUTPATIENT)
Dept: INTERNAL MEDICINE CLINIC | Age: 71
End: 2021-02-01
Payer: MEDICARE

## 2021-02-01 ENCOUNTER — HOSPITAL ENCOUNTER (EMERGENCY)
Age: 71
Discharge: HOME | End: 2021-02-01
Attending: EMERGENCY MEDICINE | Admitting: EMERGENCY MEDICINE
Payer: MEDICARE

## 2021-02-01 VITALS
SYSTOLIC BLOOD PRESSURE: 86 MMHG | BODY MASS INDEX: 28.75 KG/M2 | HEIGHT: 72 IN | OXYGEN SATURATION: 100 % | TEMPERATURE: 98.7 F | DIASTOLIC BLOOD PRESSURE: 54 MMHG | RESPIRATION RATE: 18 BRPM | HEART RATE: 68 BPM

## 2021-02-01 VITALS
DIASTOLIC BLOOD PRESSURE: 72 MMHG | HEART RATE: 90 BPM | RESPIRATION RATE: 17 BRPM | HEIGHT: 72 IN | TEMPERATURE: 97.9 F | BODY MASS INDEX: 28.75 KG/M2 | SYSTOLIC BLOOD PRESSURE: 119 MMHG | OXYGEN SATURATION: 95 %

## 2021-02-01 DIAGNOSIS — E86.0 DEHYDRATION: ICD-10-CM

## 2021-02-01 DIAGNOSIS — R11.2 NON-INTRACTABLE VOMITING WITH NAUSEA, UNSPECIFIED VOMITING TYPE: ICD-10-CM

## 2021-02-01 DIAGNOSIS — R55 PRE-SYNCOPE: Primary | ICD-10-CM

## 2021-02-01 DIAGNOSIS — B02.22 TRIGEMINAL HERPES ZOSTER: ICD-10-CM

## 2021-02-01 DIAGNOSIS — I95.2 HYPOTENSION DUE TO DRUGS: Primary | ICD-10-CM

## 2021-02-01 LAB
ANION GAP SERPL CALC-SCNC: 12 MMOL/L (ref 5–15)
ATRIAL RATE: 100 BPM
BASOPHILS # BLD: 0.1 K/UL (ref 0–0.1)
BASOPHILS NFR BLD: 1 % (ref 0–1)
BUN SERPL-MCNC: 20 MG/DL (ref 6–20)
BUN/CREAT SERPL: 13 (ref 12–20)
CALCIUM SERPL-MCNC: 9.6 MG/DL (ref 8.5–10.1)
CALCULATED P AXIS, ECG09: 35 DEGREES
CALCULATED R AXIS, ECG10: -13 DEGREES
CALCULATED T AXIS, ECG11: 22 DEGREES
CHLORIDE SERPL-SCNC: 99 MMOL/L (ref 97–108)
CO2 SERPL-SCNC: 23 MMOL/L (ref 21–32)
CREAT SERPL-MCNC: 1.53 MG/DL (ref 0.7–1.3)
DIAGNOSIS, 93000: NORMAL
DIFFERENTIAL METHOD BLD: NORMAL
EOSINOPHIL # BLD: 0.2 K/UL (ref 0–0.4)
EOSINOPHIL NFR BLD: 3 % (ref 0–7)
ERYTHROCYTE [DISTWIDTH] IN BLOOD BY AUTOMATED COUNT: 12 % (ref 11.5–14.5)
GLUCOSE SERPL-MCNC: 114 MG/DL (ref 65–100)
HCT VFR BLD AUTO: 39.2 % (ref 36.6–50.3)
HGB BLD-MCNC: 13.4 G/DL (ref 12.1–17)
IMM GRANULOCYTES # BLD AUTO: 0 K/UL (ref 0–0.04)
IMM GRANULOCYTES NFR BLD AUTO: 0 % (ref 0–0.5)
LYMPHOCYTES # BLD: 1.7 K/UL (ref 0.8–3.5)
LYMPHOCYTES NFR BLD: 21 % (ref 12–49)
MCH RBC QN AUTO: 29.8 PG (ref 26–34)
MCHC RBC AUTO-ENTMCNC: 34.2 G/DL (ref 30–36.5)
MCV RBC AUTO: 87.3 FL (ref 80–99)
MONOCYTES # BLD: 0.9 K/UL (ref 0–1)
MONOCYTES NFR BLD: 12 % (ref 5–13)
NEUTS SEG # BLD: 5 K/UL (ref 1.8–8)
NEUTS SEG NFR BLD: 63 % (ref 32–75)
NRBC # BLD: 0 K/UL (ref 0–0.01)
NRBC BLD-RTO: 0 PER 100 WBC
P-R INTERVAL, ECG05: 172 MS
PLATELET # BLD AUTO: 213 K/UL (ref 150–400)
PMV BLD AUTO: 9.5 FL (ref 8.9–12.9)
POTASSIUM SERPL-SCNC: 3.5 MMOL/L (ref 3.5–5.1)
Q-T INTERVAL, ECG07: 384 MS
QRS DURATION, ECG06: 98 MS
QTC CALCULATION (BEZET), ECG08: 500 MS
RBC # BLD AUTO: 4.49 M/UL (ref 4.1–5.7)
SODIUM SERPL-SCNC: 134 MMOL/L (ref 136–145)
TROPONIN I SERPL-MCNC: <0.05 NG/ML
VENTRICULAR RATE, ECG03: 102 BPM
WBC # BLD AUTO: 7.9 K/UL (ref 4.1–11.1)

## 2021-02-01 PROCEDURE — 1100F PTFALLS ASSESS-DOCD GE2>/YR: CPT | Performed by: INTERNAL MEDICINE

## 2021-02-01 PROCEDURE — 85025 COMPLETE CBC W/AUTO DIFF WBC: CPT

## 2021-02-01 PROCEDURE — 96360 HYDRATION IV INFUSION INIT: CPT

## 2021-02-01 PROCEDURE — 74011250636 HC RX REV CODE- 250/636: Performed by: EMERGENCY MEDICINE

## 2021-02-01 PROCEDURE — 3288F FALL RISK ASSESSMENT DOCD: CPT | Performed by: INTERNAL MEDICINE

## 2021-02-01 PROCEDURE — G0463 HOSPITAL OUTPT CLINIC VISIT: HCPCS | Performed by: INTERNAL MEDICINE

## 2021-02-01 PROCEDURE — G8754 DIAS BP LESS 90: HCPCS | Performed by: INTERNAL MEDICINE

## 2021-02-01 PROCEDURE — 93005 ELECTROCARDIOGRAM TRACING: CPT

## 2021-02-01 PROCEDURE — G8510 SCR DEP NEG, NO PLAN REQD: HCPCS | Performed by: INTERNAL MEDICINE

## 2021-02-01 PROCEDURE — 36415 COLL VENOUS BLD VENIPUNCTURE: CPT

## 2021-02-01 PROCEDURE — 84484 ASSAY OF TROPONIN QUANT: CPT

## 2021-02-01 PROCEDURE — 80048 BASIC METABOLIC PNL TOTAL CA: CPT

## 2021-02-01 PROCEDURE — G8419 CALC BMI OUT NRM PARAM NOF/U: HCPCS | Performed by: INTERNAL MEDICINE

## 2021-02-01 PROCEDURE — G8536 NO DOC ELDER MAL SCRN: HCPCS | Performed by: INTERNAL MEDICINE

## 2021-02-01 PROCEDURE — G8427 DOCREV CUR MEDS BY ELIG CLIN: HCPCS | Performed by: INTERNAL MEDICINE

## 2021-02-01 PROCEDURE — 3017F COLORECTAL CA SCREEN DOC REV: CPT | Performed by: INTERNAL MEDICINE

## 2021-02-01 PROCEDURE — G8752 SYS BP LESS 140: HCPCS | Performed by: INTERNAL MEDICINE

## 2021-02-01 PROCEDURE — 99214 OFFICE O/P EST MOD 30 MIN: CPT | Performed by: INTERNAL MEDICINE

## 2021-02-01 PROCEDURE — 99284 EMERGENCY DEPT VISIT MOD MDM: CPT

## 2021-02-01 RX ORDER — GABAPENTIN 100 MG/1
100 CAPSULE ORAL 3 TIMES DAILY
Qty: 90 CAP | Refills: 0 | Status: SHIPPED | OUTPATIENT
Start: 2021-02-01 | End: 2021-05-25

## 2021-02-01 RX ORDER — ASPIRIN 325 MG
325 TABLET, DELAYED RELEASE (ENTERIC COATED) ORAL DAILY
COMMUNITY
Start: 2021-01-14 | End: 2021-05-25

## 2021-02-01 RX ORDER — ONDANSETRON 4 MG/1
4 TABLET, FILM COATED ORAL
COMMUNITY
End: 2021-05-25

## 2021-02-01 RX ORDER — CEPHALEXIN 500 MG/1
500 CAPSULE ORAL 4 TIMES DAILY
COMMUNITY
Start: 2021-01-26 | End: 2021-02-01

## 2021-02-01 RX ADMIN — SODIUM CHLORIDE 500 ML: 9 INJECTION, SOLUTION INTRAVENOUS at 14:12

## 2021-02-01 RX ADMIN — SODIUM CHLORIDE 500 ML: 9 INJECTION, SOLUTION INTRAVENOUS at 15:00

## 2021-02-01 NOTE — ED NOTES
Pt ambulated in room, reports feeling better, still feels a little weak. Vitals updated.  /74, HR 78

## 2021-02-01 NOTE — ED NOTES
The patient was discharged home by Dr. Carlos Livingston  in stable condition. The patient is alert and oriented, in no respiratory distress and discharge vital signs obtained. The patient's diagnosis, condition and treatment were explained. The patient expressed understanding. No prescriptions given/e-scribed to pharmacy. No work/school note given. A discharge plan has been developed. A  was not involved in the process. Aftercare instructions were given. Pt ambulatory out of the ED.

## 2021-02-01 NOTE — ED TRIAGE NOTES
Pt reports having shingles since Friday, is on medication for that. Pt was seen at Dr. Zoila Mares office today for follow up, was wheeled down to ED for hypotension and feeling like pt was going to pass out. Pt reports knowing his BP was low for a few days.

## 2021-02-01 NOTE — PROGRESS NOTES
VIRTUAL VISIT DOCUMENTATION     Pursuant to the emergency declaration under the [facility], [facility] waiver authority and the [employer] Act, this Virtual  Visit was conducted, with patient's consent, to reduce the patient's risk of exposure to COVID-19 and provide continuity of care for an established patient. Services were provided through a video synchronous discussion virtually to substitute for in-person clinic visit. HISTORY OF PRESENTING ILLNESS      Oleksandr Guillaume is a 69 y.o. male who was seen by synchronous (real-time) audio-video technology on 2/2/2021. He has hx of  HTN, MVP and frequent PVCs with a recent holter demonstrating a PVC burden of 13%. He is asymptomatic and cMRI demonstrated normal LV/RV function without scar. Last visit, we planned for repeat Holter monitor and echocardiogram at 6 month follow up. Echocardiogram demonstrated preserved LV function and Holter demonstrated 30% PVC burden. As a result he was started on a trial of flecainide 50 mg twice daily. Repeat Holter monitoring demonstrated PVC burden of 5%. He discontinued Flecainide and repeat holter showed PVC burden <0.1% on metoprolol alone. He was last seen 1/2021 and his metoprolol was increased to 25mg daily and follow up in 2 weeks with 24 hour holter monitor. Repeat echo shows heart function of 50% and his monitor demonstrated a 20.2% PVC burden. He was seen this week in the ER for hypotension and losartan was adjusted. Additionally, he was diagnosed with shingles and is now receiving treatment with his PCP. He notes improvement in his symptoms overall.         ACTIVE PROBLEM LIST     Patient Active Problem List    Diagnosis Date Noted    Primary osteoarthritis of left hip 05/06/2018    Primary osteoarthritis of left knee 04/25/2018    Seasonal allergic rhinitis due to pollen 05/19/2017    Chest tightness 05/18/2017    Mitral valve prolapse 02/16/2017    Colon polyps 01/19/2017    Essential hypertension 01/19/2017           PAST MEDICAL HISTORY     Past Medical History:   Diagnosis Date    Arthritis     Dyslipidemia     Essential hypertension     Frequent PVCs     GERD (gastroesophageal reflux disease)     H/O seasonal allergies     Mitral valve prolapse     pt states Dr. Adrianne Robison is not sure if this is the correct diagnosis    Obesity 05/02/2018    Obesity  BMI= 32.6           PAST SURGICAL HISTORY     Past Surgical History:   Procedure Laterality Date    COLONOSCOPY N/A 6/2/2017    COLONOSCOPY performed by Misty Roman MD at [address]  6/2/2017         EGD  6/2/2017         HX HERNIA REPAIR      times 2. 1980's inguinal bilateral    HX HIP REPLACEMENT Left 2018    HX ORTHOPAEDIC Left 2018    hip replacement    HX OTHER SURGICAL  1977    Repair of the right shoulder.      HX ROTATOR CUFF REPAIR Left 01/14/2021    HX TONSILLECTOMY      childhood    UPPER GI ENDOSCOPY,DILATN W GUIDE  7/14/2017         UPPER GI ENDOSCOPY,DILATN W GUIDE  9/26/2018               ALLERGIES     No Known Allergies       FAMILY HISTORY     Family History   Problem Relation Age of Onset    Sudden Death Mother         Car accident    Heart Attack Father 48    Heart Disease Father     Thyroid Disease Sister     No Known Problems Brother     No Known Problems Sister     No Known Problems Sister     No Known Problems Brother     No Known Problems Brother     Cancer Maternal Aunt [de-identified]        multiple myloma    negative for cardiac disease       SOCIAL HISTORY     Social History     Socioeconomic History    Marital status:      Spouse name: Not on file    Number of children: Not on file    Years of education: Not on file    Highest education level: Not on file   Tobacco Use    Smoking status: Former Smoker     Packs/day: 1.00     Years: 30.00     Pack years: 30.00     Quit date: 1997     Years since quittin.0    Smokeless tobacco: Never Used   Substance and Sexual Activity    Alcohol use: Yes     Alcohol/week: 6.0 standard drinks     Types: 3 Glasses of wine, 3 Cans of beer per week    Drug use: No         MEDICATIONS     Current Outpatient Medications   Medication Sig    flecainide (TAMBOCOR) 50 mg tablet Take 1 Tab by mouth two (2) times a day.  flecainide (TAMBOCOR) 50 mg tablet Take 1 Tab by mouth two (2) times a day.  aspirin delayed-release 325 mg tablet Take 325 mg by mouth daily.  ondansetron hcl (ZOFRAN) 4 mg tablet Take 4 mg by mouth every eight (8) hours as needed for Nausea or Vomiting.  gabapentin (NEURONTIN) 100 mg capsule Take 1 Cap by mouth three (3) times daily. Max Daily Amount: 300 mg.    valACYclovir (VALTREX) 1 gram tablet Take 1 Tab by mouth three (3) times daily for 7 days.  simvastatin (ZOCOR) 5 mg tablet Take 1 Tab by mouth nightly. TAKE 1 TABLET EVERY NIGHT    metoprolol succinate (TOPROL-XL) 25 mg XL tablet Take 1 Tab by mouth daily.  pantoprazole (PROTONIX) 20 mg tablet Take 1 Tab by mouth daily. (Patient taking differently: Take 20 mg by mouth daily. Alternating every other day 20 mg and 40 mg.)    losartan (COZAAR) 25 mg tablet Take 1 Tab by mouth daily. (Patient taking differently: Take 12.5 mg by mouth daily.)    multivitamin (ONE A DAY) tablet Take 1 Tab by mouth daily.  VITAMIN B COMPLEX (B COMPLEX PO) Take  by mouth daily.  DOCOSAHEXANOIC ACID/EPA (FISH OIL PO) Take 1,200 mg by mouth two (2) times a day.  GLUCOSAMINE SULFATE (GLUCOSAMINE PO) Take 1,500 mg by mouth two (2) times a day.  CHOLECALCIFEROL, VITAMIN D3, (VITAMIN D3 PO) Take 1 Tab by mouth daily. No current facility-administered medications for this visit. I have reviewed the nurses notes, vitals, problem list, allergy list, medical history, family, social history and medications.        REVIEW OF SYMPTOMS      General: Pt denies excessive weight gain or loss. Pt is able to conduct ADL's  HEENT: Denies blurred vision, headaches, hearing loss, epistaxis and difficulty swallowing. Respiratory: Denies cough, congestion, shortness of breath, OCONNOR, wheezing or stridor. Cardiovascular: Denies precordial pain, palpitations, edema or PND  Gastrointestinal: Denies poor appetite, indigestion, abdominal pain or blood in stool  Genitourinary: Denies hematuria, dysuria, increased urinary frequency  Musculoskeletal: Denies joint pain or swelling from muscles or joints  Neurologic: Denies tremor, paresthesias, headache, or sensory motor disturbance  Psychiatric: Denies confusion, insomnia, depression  Integumentray: Denies rash, itching or ulcers. Hematologic: Denies easy bruising, bleeding       PHYSICAL EXAMINATION      Due to this being a TeleHealth evaluation, many elements of the physical examination are unable to be assessed. General: Well developed, in no acute distress, cooperative and alert  HEENT: Pupils equal/round. No marked JVD visible on video. Respiratory: No audible wheezing, no signs of respiratory distress, lips non cyanotic  Extremities:  No edema  Neuro: A&Ox3, speech clear, no facial droop, answering questions appropriately  Skin: Skin color is normal. No rashes or lesions.  Non diaphoretic on visible skin during exam       DIAGNOSTIC DATA      EKG:        LABORATORY DATA      Lab Results   Component Value Date/Time    WBC 7.9 02/01/2021 02:04 PM    HGB 13.4 02/01/2021 02:04 PM    HCT 39.2 02/01/2021 02:04 PM    PLATELET 277 93/70/2033 02:04 PM    MCV 87.3 02/01/2021 02:04 PM      Lab Results   Component Value Date/Time    Sodium 134 (L) 02/01/2021 02:04 PM    Potassium 3.5 02/01/2021 02:04 PM    Chloride 99 02/01/2021 02:04 PM    CO2 23 02/01/2021 02:04 PM    Anion gap 12 02/01/2021 02:04 PM    Glucose 114 (H) 02/01/2021 02:04 PM    BUN 20 02/01/2021 02:04 PM    Creatinine 1.53 (H) 02/01/2021 02:04 PM    BUN/Creatinine ratio 13 02/01/2021 02:04 PM    GFR est AA 55 (L) 02/01/2021 02:04 PM    GFR est non-AA 45 (L) 02/01/2021 02:04 PM    Calcium 9.6 02/01/2021 02:04 PM    Bilirubin, total 0.6 10/12/2020 12:58 PM    Alk. phosphatase 66 10/12/2020 12:58 PM    Protein, total 6.8 10/12/2020 12:58 PM    Albumin 3.9 10/12/2020 12:58 PM    Globulin 2.9 10/12/2020 12:58 PM    A-G Ratio 1.3 10/12/2020 12:58 PM    ALT (SGPT) 27 10/12/2020 12:58 PM           ASSESSMENT      1. PVCs                        A. Asymptomatic  2. Hypertension  3. Mitral valve proplapse           ICD-10-CM ICD-9-CM    1. PVC (premature ventricular contraction)  I49.3 427.69    2. Essential hypertension  I10 401.9    3. Mitral valve prolapse  I34.1 424.0    4. Herpes zoster without complication  M43.1 916.1      Orders Placed This Encounter    flecainide (TAMBOCOR) 50 mg tablet     Sig: Take 1 Tab by mouth two (2) times a day. Dispense:  90 Tab     Refill:  3    flecainide (TAMBOCOR) 50 mg tablet     Sig: Take 1 Tab by mouth two (2) times a day. Dispense:  60 Tab     Refill:  1        PLAN     Decrease metoprolol to 12.5mg BID to preserve blood pressure, he's going to monitor BPs at home. Re-introduce Flecainide 50mg BID. Follow up with monitor with Dr. Erasto Romo 3 months. We discussed the expected course, resolution and complications of the diagnosis(es) in detail. Medication risks, benefits, costs, interactions, and alternatives were discussed as indicated. I advised him to contact the office if his condition worsens, changes or fails to improve as anticipated. He expressed understanding with the diagnosis(es) and plan     FOLLOW-UP   3 months with monitor    Patient was made aware and verbalized understanding that an appointment will be scheduled for them for a virtual visit and/or office visit within the above time frame. Patient understanding his/her responsibility to call and change time/date if he/she so chooses.     Thank you, Cecilio Aly, MD and Dr. Jaqueline Medina for allowing me to participate in the care of this extraordinarily pleasant male. Please do not hesitate to contact me for further questions/concerns. EV [facility] [address].  [address] [place] [address], [address] [place] [address]  [phone] / [phone] Fax   [phone] / [phone] Fax        Greater than 25 minutes was spent in direct video patient care, planning and chart review. This visit was conducted using [employer] telemedicine services.

## 2021-02-01 NOTE — ED PROVIDER NOTES
Date of Service:  2/1/2021    Patient:  Bong Crawford    Chief Complaint:  Hypotension       HPI:  Bong Crafword is a 69 y.o.  male who presents for evaluation of hypotension. Patient was at the [address] [place] [address] office today where he apparently had a blood pressure that was in the 96H as a systolic reading. He was sent here for evaluation. He states that at the time he felt somewhat lightheaded with fuzzy vision as though he may pass out. Patient states that this happens about once a month. He is told his cardiologist and primary care doctor about it. He has not really been eating or drinking a whole lot since his shoulder surgery about 3 weeks ago. He was at a routine follow-up today for shingles. Patient denies any type of chest pain shortness of breath abdominal pain nausea vomiting diarrhea headaches fevers chills. He states that at the moment he feels fine. No other acute           Past Medical History:   Diagnosis Date    Arthritis     Dyslipidemia     Essential hypertension     Frequent PVCs     GERD (gastroesophageal reflux disease)     H/O seasonal allergies     Mitral valve prolapse     pt states Dr. Laura Wong is not sure if this is the correct diagnosis    Obesity 05/02/2018    Obesity  BMI= 32.6       Past Surgical History:   Procedure Laterality Date    COLONOSCOPY N/A 6/2/2017    COLONOSCOPY performed by Keyanna Coker MD at [address]  6/2/2017         EGD  6/2/2017         HX HERNIA REPAIR      times 2. 1980's inguinal bilateral    HX HIP REPLACEMENT Left 2018    HX ORTHOPAEDIC Left 2018    hip replacement    HX OTHER SURGICAL  1977    Repair of the right shoulder.      HX ROTATOR CUFF REPAIR Left 01/14/2021    HX TONSILLECTOMY      childhood    UPPER GI ENDOSCOPY,PABLO LUND GUIDE  7/14/2017         UPPER GI ENDOSCOPY,PABLO LUND GUIDE  9/26/2018              Family History:   Problem Relation Age of Onset    Sudden Death Mother         Car accident    Heart Attack Father 48    Heart Disease Father     Thyroid Disease Sister     No Known Problems Brother     No Known Problems Sister     No Known Problems Sister     No Known Problems Brother     No Known Problems Brother     Cancer Maternal Aunt [de-identified]        multiple myloma       Social History     Socioeconomic History    Marital status:      Spouse name: Not on file    Number of children: Not on file    Years of education: Not on file    Highest education level: Not on file   Occupational History    Not on file   Social Needs    Financial resource strain: Not on file    Food insecurity     Worry: Not on file     Inability: Not on file    Transportation needs     Medical: Not on file     Non-medical: Not on file   Tobacco Use    Smoking status: Former Smoker     Packs/day: 1.00     Years: 30.00     Pack years: 30.00     Quit date: 1997     Years since quittin.0    Smokeless tobacco: Never Used   Substance and Sexual Activity    Alcohol use: Yes     Alcohol/week: 6.0 standard drinks     Types: 3 Glasses of wine, 3 Cans of beer per week    Drug use: No    Sexual activity: Not on file   Lifestyle    Physical activity     Days per week: Not on file     Minutes per session: Not on file    Stress: Not on file   Relationships    Social connections     Talks on phone: Not on file     Gets together: Not on file     Attends Religion service: Not on file     Active member of club or organization: Not on file     Attends meetings of clubs or organizations: Not on file     Relationship status: Not on file    Intimate partner violence     Fear of current or ex partner: Not on file     Emotionally abused: Not on file     Physically abused: Not on file     Forced sexual activity: Not on file   Other Topics Concern    Not on file   Social History Narrative    Not on file         ALLERGIES: Patient has no known allergies.     Review of Systems   Constitutional: Negative for fever.   HENT: Negative for hearing loss. Eyes: Negative for visual disturbance. Respiratory: Negative for shortness of breath. Cardiovascular: Negative for chest pain. Gastrointestinal: Negative for abdominal pain. Genitourinary: Negative for flank pain. Musculoskeletal: Negative for back pain. Skin: Negative for rash. Neurological: Positive for light-headedness. Negative for dizziness. Psychiatric/Behavioral: Negative for confusion. Vitals:    02/01/21 1351   BP: 105/62   Pulse: 69   Resp: 18   Temp: 97.9 °F (36.6 °C)   SpO2: 93%   Height: 6' (1.829 m)            Physical Exam  Vitals signs and nursing note reviewed. Constitutional:       General: He is not in acute distress. Appearance: He is well-developed. He is not ill-appearing, toxic-appearing or diaphoretic. HENT:      Head: Normocephalic and atraumatic. Eyes:      General: No scleral icterus. Conjunctiva/sclera: Conjunctivae normal.   Neck:      Musculoskeletal: Neck supple. Vascular: No JVD. Trachea: No tracheal deviation. Cardiovascular:      Rate and Rhythm: Normal rate and regular rhythm. Heart sounds: No murmur. Pulmonary:      Effort: Pulmonary effort is normal. No respiratory distress. Breath sounds: Normal breath sounds. No wheezing or rales. Abdominal:      General: Abdomen is flat. There is no distension. Palpations: Abdomen is soft. Tenderness: There is no abdominal tenderness. There is no right CVA tenderness, left CVA tenderness or guarding. Musculoskeletal:         General: No deformity. Comments: Left arm/shoulder in immobilizer   Skin:     General: Skin is warm and dry. Capillary Refill: Capillary refill takes less than 2 seconds. Findings: No rash. Neurological:      Mental Status: He is alert and oriented to person, place, and time.    Psychiatric:         Mood and Affect: Mood normal.         Behavior: Behavior normal.          MDM  Number of Diagnoses or Management Options    ED Course as of Feb 01 1414   Mon Feb 01, 2021   1409 EKG 1358    Normal axis and intervals  PVC  No acute ischemic changes    [GG]      ED Course User Index  [GG] Estephania Downing DO     VITAL SIGNS:  Patient Vitals for the past 4 hrs:   Pulse Resp BP SpO2   02/01/21 1630 90 17 119/72 95 %   02/01/21 1546    94 %   02/01/21 1545   131/74    02/01/21 1530 89 20 114/82 95 %   02/01/21 1515 91 19 113/76 94 %   02/01/21 1445 89 13 95/69 92 %         LABS:  Recent Results (from the past 6 hour(s))   EKG, 12 LEAD, INITIAL    Collection Time: 02/01/21  1:58 PM   Result Value Ref Range    Ventricular Rate 102 BPM    Atrial Rate 100 BPM    P-R Interval 172 ms    QRS Duration 98 ms    Q-T Interval 384 ms    QTC Calculation (Bezet) 500 ms    Calculated P Axis 35 degrees    Calculated R Axis -13 degrees    Calculated T Axis 22 degrees    Diagnosis       Normal sinus rhythm  premature ventricular complexes  Nonspecific ST segment changes  Otherwise normal ECG  No previous ECGs available  Confirmed by Iris Aguilar M.D., Floyce Sandy ([id number]) on 2/1/2021 5:28:50 PM     CBC WITH AUTOMATED DIFF    Collection Time: 02/01/21  2:04 PM   Result Value Ref Range    WBC 7.9 4.1 - 11.1 K/uL    RBC 4.49 4. 10 - 5.70 M/uL    HGB 13.4 12.1 - 17.0 g/dL    HCT 39.2 36.6 - 50.3 %    MCV 87.3 80.0 - 99.0 FL    MCH 29.8 26.0 - 34.0 PG    MCHC 34.2 30.0 - 36.5 g/dL    RDW 12.0 11.5 - 14.5 %    PLATELET 594 203 - 298 K/uL    MPV 9.5 8.9 - 12.9 FL    NRBC 0.0 0.0  WBC    ABSOLUTE NRBC 0.00 0.00 - 0.01 K/uL    NEUTROPHILS 63 32 - 75 %    LYMPHOCYTES 21 12 - 49 %    MONOCYTES 12 5 - 13 %    EOSINOPHILS 3 0 - 7 %    BASOPHILS 1 0 - 1 %    IMMATURE GRANULOCYTES 0 0 - 0.5 %    ABS. NEUTROPHILS 5.0 1.8 - 8.0 K/UL    ABS. LYMPHOCYTES 1.7 0.8 - 3.5 K/UL    ABS. MONOCYTES 0.9 0.0 - 1.0 K/UL    ABS. EOSINOPHILS 0.2 0.0 - 0.4 K/UL    ABS. BASOPHILS 0.1 0.0 - 0.1 K/UL    ABS. IMM.  GRANS. 0.0 0.00 - 0.04 K/UL    DF AUTOMATED     METABOLIC PANEL, BASIC    Collection Time: 02/01/21  2:04 PM   Result Value Ref Range    Sodium 134 (L) 136 - 145 mmol/L    Potassium 3.5 3.5 - 5.1 mmol/L    Chloride 99 97 - 108 mmol/L    CO2 23 21 - 32 mmol/L    Anion gap 12 5 - 15 mmol/L    Glucose 114 (H) 65 - 100 mg/dL    BUN 20 6 - 20 MG/DL    Creatinine 1.53 (H) 0.70 - 1.30 MG/DL    BUN/Creatinine ratio 13 12 - 20      GFR est AA 55 (L) >60 ml/min/1.73m2    GFR est non-AA 45 (L) >60 ml/min/1.73m2    Calcium 9.6 8.5 - 10.1 MG/DL   TROPONIN I    Collection Time: 02/01/21  2:04 PM   Result Value Ref Range    Troponin-I, Qt. <0.05 <0.05 ng/mL        IMAGING:  No orders to display         Medications During Visit:  Medications   sodium chloride 0.9 % bolus infusion 500 mL (0 mL IntraVENous IV Completed 2/1/21 2907)   sodium chloride 0.9 % bolus infusion 500 mL (0 mL IntraVENous IV Completed 2/1/21 1540)         DECISION MAKING:  Vik Perez is a 69 y.o. male who comes in as above. Here, patient appears well. She has no signs of distress and states that she is feeling much better after the medicines provided here. She will get up and ambulate throughout the emergency department without signs of distress. This time I will discharge him home. Because of this hypotensive episode is unknown but patient does not want to stay in the hospital.  Given the fact that he is asymptomatic and has had normal vital signs here I feel safe to discharge him home. IMPRESSION:  1. Pre-syncope        DISPOSITION:  Discharged      Discharge Medication List as of 2/1/2021  4:16 PM           Follow-up Information     Follow up With Specialties Details Why Contact Info    Carly Michael MD Internal Medicine Schedule an appointment as soon as possible for a visit   [address] [place] [address]  [facility]  [phone]              The patient is asked to follow-up with their primary care provider in the next several days.   They are to call tomorrow for an appointment. The patient is asked to return promptly for any increased concerns or worsening of symptoms. They can return to this emergency department or any other emergency department.     Procedures

## 2021-02-01 NOTE — PROGRESS NOTES
HISTORY OF PRESENT ILLNESS  Mariya Chandler is a 69 y.o. male. HPI  He feels terrible. He is accompanied by his wife, June. He had left rotator cuff repair with Dr. Bishop Neil on January 14th. On January 26th he had a dog bite to the right hand and was given Keflex. On January 29th he developed a rash on the right side of his face, was seen in the ER, was diagnosed with shingles and started on Valtrex 1 gram t.i.d.  Still on Keflex. He notes over the weekend he is having nausea and vomiting, mostly of fluids. He is currently feeling very weak and lightheaded, as though he might pass out. He feels that the Valtrex and Keflex are upsetting his stomach, although no abdominal pain, no chest pain. Some pain in face, rated as a 6/10, for which he is taking Tylenol. Has tried Zofran for nausea. Review of Systems   Constitutional: Positive for diaphoresis and malaise/fatigue. Negative for chills, fever and weight loss. Respiratory: Negative for cough, shortness of breath and wheezing. Cardiovascular: Negative for chest pain, palpitations, orthopnea, leg swelling and PND. Gastrointestinal: Positive for nausea and vomiting. Negative for abdominal pain, blood in stool, diarrhea and heartburn. Musculoskeletal: Negative for myalgias. Skin: Positive for rash. Negative for itching. Neurological: Positive for dizziness. Negative for headaches. Physical Exam  Vitals signs and nursing note reviewed. Constitutional:       Appearance: He is well-developed. He is ill-appearing. Comments: Lying on table and diaphoretic and feels lightheaded  Rash on right side of face from shingles   HENT:      Head: Normocephalic and atraumatic. Neck:      Musculoskeletal: Normal range of motion and neck supple. Thyroid: No thyromegaly. Vascular: No carotid bruit. Cardiovascular:      Rate and Rhythm: Normal rate and regular rhythm. Heart sounds: Normal heart sounds.    Pulmonary:      Effort: Pulmonary effort is normal. No respiratory distress. Breath sounds: Normal breath sounds. No wheezing or rales. Neurological:      Mental Status: He is alert and oriented to person, place, and time. Psychiatric:         Behavior: Behavior normal.         ASSESSMENT and PLAN  Diagnoses and all orders for this visit:    1. Hypotension due to drugs  Recent dec po intake due to meds he thinks and some n/v over weekend  Will s end to ed for fluids and advised hold the losartan and ok to stop the keflex at this time as no sign of infection at site of dog bite and now has had 7 days of abx  2. Trigeminal herpes zoster  -     gabapentin (NEURONTIN) 100 mg capsule; Take 1 Cap by mouth three (3) times daily. Max Daily Amount: 300 mg.    3. Non-intractable vomiting with nausea, unspecified vomiting type    4.  Dehydration-send for fluids  appt with me in 1 week   discussed with wife

## 2021-02-02 ENCOUNTER — VIRTUAL VISIT (OUTPATIENT)
Dept: CARDIOLOGY CLINIC | Age: 71
End: 2021-02-02
Payer: MEDICARE

## 2021-02-02 DIAGNOSIS — I49.3 PVC (PREMATURE VENTRICULAR CONTRACTION): Primary | ICD-10-CM

## 2021-02-02 DIAGNOSIS — I34.1 MITRAL VALVE PROLAPSE: ICD-10-CM

## 2021-02-02 DIAGNOSIS — B02.9 HERPES ZOSTER WITHOUT COMPLICATION: ICD-10-CM

## 2021-02-02 DIAGNOSIS — I10 ESSENTIAL HYPERTENSION: ICD-10-CM

## 2021-02-02 PROCEDURE — 3288F FALL RISK ASSESSMENT DOCD: CPT | Performed by: NURSE PRACTITIONER

## 2021-02-02 PROCEDURE — 99215 OFFICE O/P EST HI 40 MIN: CPT | Performed by: NURSE PRACTITIONER

## 2021-02-02 PROCEDURE — G0463 HOSPITAL OUTPT CLINIC VISIT: HCPCS | Performed by: NURSE PRACTITIONER

## 2021-02-02 PROCEDURE — 1100F PTFALLS ASSESS-DOCD GE2>/YR: CPT | Performed by: NURSE PRACTITIONER

## 2021-02-02 PROCEDURE — 3017F COLORECTAL CA SCREEN DOC REV: CPT | Performed by: NURSE PRACTITIONER

## 2021-02-02 PROCEDURE — G8756 NO BP MEASURE DOC: HCPCS | Performed by: NURSE PRACTITIONER

## 2021-02-02 PROCEDURE — G8432 DEP SCR NOT DOC, RNG: HCPCS | Performed by: NURSE PRACTITIONER

## 2021-02-02 PROCEDURE — G8427 DOCREV CUR MEDS BY ELIG CLIN: HCPCS | Performed by: NURSE PRACTITIONER

## 2021-02-02 RX ORDER — FLECAINIDE ACETATE 50 MG/1
50 TABLET ORAL 2 TIMES DAILY
Qty: 60 TAB | Refills: 1 | Status: SHIPPED | OUTPATIENT
Start: 2021-02-02 | End: 2021-02-02

## 2021-02-02 RX ORDER — FLECAINIDE ACETATE 50 MG/1
50 TABLET ORAL 2 TIMES DAILY
Qty: 90 TAB | Refills: 3 | Status: SHIPPED | OUTPATIENT
Start: 2021-02-02 | End: 2021-11-18

## 2021-02-02 RX ORDER — FLECAINIDE ACETATE 50 MG/1
TABLET ORAL
Qty: 180 TAB | Refills: 0 | Status: SHIPPED | OUTPATIENT
Start: 2021-02-02 | End: 2021-02-10 | Stop reason: SDUPTHER

## 2021-02-10 ENCOUNTER — OFFICE VISIT (OUTPATIENT)
Dept: INTERNAL MEDICINE CLINIC | Age: 71
End: 2021-02-10
Payer: MEDICARE

## 2021-02-10 VITALS
SYSTOLIC BLOOD PRESSURE: 137 MMHG | WEIGHT: 212 LBS | HEART RATE: 76 BPM | BODY MASS INDEX: 28.71 KG/M2 | DIASTOLIC BLOOD PRESSURE: 84 MMHG | RESPIRATION RATE: 16 BRPM | HEIGHT: 72 IN | TEMPERATURE: 98.2 F | OXYGEN SATURATION: 98 %

## 2021-02-10 DIAGNOSIS — H90.41 SENSORINEURAL HEARING LOSS (SNHL) OF RIGHT EAR WITH UNRESTRICTED HEARING OF LEFT EAR: ICD-10-CM

## 2021-02-10 DIAGNOSIS — B02.8 HERPES ZOSTER WITH OTHER COMPLICATION: Primary | ICD-10-CM

## 2021-02-10 DIAGNOSIS — Z09 HOSPITAL DISCHARGE FOLLOW-UP: ICD-10-CM

## 2021-02-10 DIAGNOSIS — I95.2 HYPOTENSION DUE TO DRUGS: ICD-10-CM

## 2021-02-10 DIAGNOSIS — E78.5 DYSLIPIDEMIA, GOAL LDL BELOW 100: ICD-10-CM

## 2021-02-10 PROCEDURE — G8419 CALC BMI OUT NRM PARAM NOF/U: HCPCS | Performed by: INTERNAL MEDICINE

## 2021-02-10 PROCEDURE — 3017F COLORECTAL CA SCREEN DOC REV: CPT | Performed by: INTERNAL MEDICINE

## 2021-02-10 PROCEDURE — G0463 HOSPITAL OUTPT CLINIC VISIT: HCPCS | Performed by: INTERNAL MEDICINE

## 2021-02-10 PROCEDURE — G8427 DOCREV CUR MEDS BY ELIG CLIN: HCPCS | Performed by: INTERNAL MEDICINE

## 2021-02-10 PROCEDURE — 3288F FALL RISK ASSESSMENT DOCD: CPT | Performed by: INTERNAL MEDICINE

## 2021-02-10 PROCEDURE — 1100F PTFALLS ASSESS-DOCD GE2>/YR: CPT | Performed by: INTERNAL MEDICINE

## 2021-02-10 PROCEDURE — 99214 OFFICE O/P EST MOD 30 MIN: CPT | Performed by: INTERNAL MEDICINE

## 2021-02-10 PROCEDURE — G8536 NO DOC ELDER MAL SCRN: HCPCS | Performed by: INTERNAL MEDICINE

## 2021-02-10 PROCEDURE — 1111F DSCHRG MED/CURRENT MED MERGE: CPT | Performed by: INTERNAL MEDICINE

## 2021-02-10 PROCEDURE — G8754 DIAS BP LESS 90: HCPCS | Performed by: INTERNAL MEDICINE

## 2021-02-10 PROCEDURE — G8752 SYS BP LESS 140: HCPCS | Performed by: INTERNAL MEDICINE

## 2021-02-10 PROCEDURE — G8432 DEP SCR NOT DOC, RNG: HCPCS | Performed by: INTERNAL MEDICINE

## 2021-02-10 RX ORDER — GUAIFENESIN 100 MG/5ML
81 LIQUID (ML) ORAL DAILY
COMMUNITY

## 2021-02-10 RX ORDER — SIMVASTATIN 5 MG/1
5 TABLET, FILM COATED ORAL
Qty: 30 TAB | Refills: 0 | Status: SHIPPED | OUTPATIENT
Start: 2021-02-10 | End: 2022-03-01 | Stop reason: SDUPTHER

## 2021-02-10 NOTE — PROGRESS NOTES
HISTORY OF PRESENT ILLNESS  Gala Schwartz is a 69 y.o. male. HPI  Seen at follow up after ER visit for presyncope and hypertension. He was dehydrated and given fluids. He had been having nausea and vomiting prior to the visit. He also had Losartan held since that time and has had no more nausea or vomiting. Shingles, right side of faced, involving right ear. Pain is much diminished, using Gabapentin 100 only at bedtime. Rates pain as 2/10. However, is having trouble with hearing in his right ear, which is significantly diminished since the outbreak of shingles. The rash does seem to be drying up. Dog bite to hand, seems to be improved and has been off antibiotics. Review of Systems   Constitutional: Negative for chills, fever and weight loss. HENT: Positive for hearing loss. Negative for ear discharge and nosebleeds. Respiratory: Negative for cough, shortness of breath and wheezing. Cardiovascular: Negative for chest pain, palpitations, orthopnea, leg swelling and PND. Gastrointestinal: Negative for heartburn, nausea and vomiting. Musculoskeletal: Negative for myalgias. Skin: Positive for rash. Negative for itching. Neurological: Negative for dizziness and headaches. Physical Exam  Vitals signs and nursing note reviewed. Constitutional:       Appearance: He is well-developed. HENT:      Head: Normocephalic and atraumatic. Neck:      Musculoskeletal: Normal range of motion and neck supple. Thyroid: No thyromegaly. Vascular: No carotid bruit. Cardiovascular:      Rate and Rhythm: Normal rate and regular rhythm. Heart sounds: Normal heart sounds. Pulmonary:      Effort: Pulmonary effort is normal. No respiratory distress. Breath sounds: Normal breath sounds. No wheezing or rales.    Skin:     Comments: Dried shingles rash on right side of  Face extending to  Right ear-canal somewhat obstructed   Neurological:      Mental Status: He is alert and oriented to person, place, and time. Psychiatric:         Behavior: Behavior normal.         ASSESSMENT and PLAN  Diagnoses and all orders for this visit:    1. Herpes zoster with other complication    2. Hypotension due to drugs-resolved no longer dehydrated-off losartan and will cont with only toprol for now    3. Dyslipidemia, goal LDL below 100  -     simvastatin (ZOCOR) 5 mg tablet; Take 1 Tab by mouth nightly.  TAKE 1 TABLET EVERY NIGHT    4. Sensorineural hearing loss (SNHL) of right ear with unrestricted hearing of left ear  Dec ini hearing coincides with the shingles-refer to ent  Pain is down considerably using only gabapentin qhs and can taper off

## 2021-04-06 RX ORDER — LOSARTAN POTASSIUM 25 MG/1
TABLET ORAL
Qty: 90 TAB | OUTPATIENT
Start: 2021-04-06

## 2021-04-07 ENCOUNTER — TELEPHONE (OUTPATIENT)
Dept: CARDIOLOGY CLINIC | Age: 71
End: 2021-04-07

## 2021-04-07 NOTE — TELEPHONE ENCOUNTER
Patient would like to speak with someone regarding a medication refill that is showing on his mychart yesterday and he is unsure what it is about. I did not see anything listed but would like to discuss further with a nurse. Please advise.     Phone: [phone]

## 2021-04-12 ENCOUNTER — OFFICE VISIT (OUTPATIENT)
Dept: INTERNAL MEDICINE CLINIC | Age: 71
End: 2021-04-12
Payer: MEDICARE

## 2021-04-12 VITALS
OXYGEN SATURATION: 96 % | TEMPERATURE: 97.9 F | DIASTOLIC BLOOD PRESSURE: 65 MMHG | HEIGHT: 72 IN | HEART RATE: 103 BPM | BODY MASS INDEX: 30.42 KG/M2 | RESPIRATION RATE: 16 BRPM | WEIGHT: 224.6 LBS | SYSTOLIC BLOOD PRESSURE: 96 MMHG

## 2021-04-12 DIAGNOSIS — K21.9 GERD WITHOUT ESOPHAGITIS: ICD-10-CM

## 2021-04-12 DIAGNOSIS — I95.2 HYPOTENSION DUE TO DRUGS: ICD-10-CM

## 2021-04-12 DIAGNOSIS — E78.5 DYSLIPIDEMIA, GOAL LDL BELOW 100: Primary | ICD-10-CM

## 2021-04-12 LAB
ALBUMIN SERPL-MCNC: 3.8 G/DL (ref 3.5–5)
ALBUMIN/GLOB SERPL: 1.3 {RATIO} (ref 1.1–2.2)
ALP SERPL-CCNC: 86 U/L (ref 45–117)
ALT SERPL-CCNC: 27 U/L (ref 12–78)
ANION GAP SERPL CALC-SCNC: 3 MMOL/L (ref 5–15)
AST SERPL-CCNC: 16 U/L (ref 15–37)
BILIRUB SERPL-MCNC: 0.4 MG/DL (ref 0.2–1)
BUN SERPL-MCNC: 25 MG/DL (ref 6–20)
BUN/CREAT SERPL: 16 (ref 12–20)
CALCIUM SERPL-MCNC: 9.5 MG/DL (ref 8.5–10.1)
CHLORIDE SERPL-SCNC: 111 MMOL/L (ref 97–108)
CHOLEST SERPL-MCNC: 161 MG/DL
CO2 SERPL-SCNC: 28 MMOL/L (ref 21–32)
CREAT SERPL-MCNC: 1.56 MG/DL (ref 0.7–1.3)
GLOBULIN SER CALC-MCNC: 3 G/DL (ref 2–4)
GLUCOSE SERPL-MCNC: 93 MG/DL (ref 65–100)
HDLC SERPL-MCNC: 41 MG/DL
HDLC SERPL: 3.9 {RATIO} (ref 0–5)
LDLC SERPL CALC-MCNC: 88 MG/DL (ref 0–100)
LIPID PROFILE,FLP: ABNORMAL
POTASSIUM SERPL-SCNC: 4.4 MMOL/L (ref 3.5–5.1)
PROT SERPL-MCNC: 6.8 G/DL (ref 6.4–8.2)
SODIUM SERPL-SCNC: 142 MMOL/L (ref 136–145)
TRIGL SERPL-MCNC: 160 MG/DL (ref ?–150)
VLDLC SERPL CALC-MCNC: 32 MG/DL

## 2021-04-12 PROCEDURE — 3017F COLORECTAL CA SCREEN DOC REV: CPT | Performed by: INTERNAL MEDICINE

## 2021-04-12 PROCEDURE — G8754 DIAS BP LESS 90: HCPCS | Performed by: INTERNAL MEDICINE

## 2021-04-12 PROCEDURE — G8417 CALC BMI ABV UP PARAM F/U: HCPCS | Performed by: INTERNAL MEDICINE

## 2021-04-12 PROCEDURE — 3288F FALL RISK ASSESSMENT DOCD: CPT | Performed by: INTERNAL MEDICINE

## 2021-04-12 PROCEDURE — G8752 SYS BP LESS 140: HCPCS | Performed by: INTERNAL MEDICINE

## 2021-04-12 PROCEDURE — G8432 DEP SCR NOT DOC, RNG: HCPCS | Performed by: INTERNAL MEDICINE

## 2021-04-12 PROCEDURE — G8427 DOCREV CUR MEDS BY ELIG CLIN: HCPCS | Performed by: INTERNAL MEDICINE

## 2021-04-12 PROCEDURE — 1100F PTFALLS ASSESS-DOCD GE2>/YR: CPT | Performed by: INTERNAL MEDICINE

## 2021-04-12 PROCEDURE — G0463 HOSPITAL OUTPT CLINIC VISIT: HCPCS | Performed by: INTERNAL MEDICINE

## 2021-04-12 PROCEDURE — 99214 OFFICE O/P EST MOD 30 MIN: CPT | Performed by: INTERNAL MEDICINE

## 2021-04-12 PROCEDURE — G8536 NO DOC ELDER MAL SCRN: HCPCS | Performed by: INTERNAL MEDICINE

## 2021-04-12 RX ORDER — PANTOPRAZOLE SODIUM 20 MG/1
20 TABLET, DELAYED RELEASE ORAL DAILY
Qty: 90 TAB | Refills: 1 | Status: SHIPPED | OUTPATIENT
Start: 2021-04-12 | End: 2021-10-27

## 2021-04-12 RX ORDER — LOSARTAN POTASSIUM 25 MG/1
TABLET ORAL
COMMUNITY
Start: 2021-04-06 | End: 2021-04-13 | Stop reason: ALTCHOICE

## 2021-04-13 NOTE — PROGRESS NOTES
HISTORY OF PRESENT ILLNESS  Rhonda Crenshaw is a 69 y.o. male. HPI  Meghan Pill is seen at follow up. Issues:  1. Hypertension. Blood pressure is still running on the low side. I have suggested stopping Losartan, but he is still taking it. We discussed again I think it would be safe to come off of the Losartan 25.  2. Dyslipidemia, on Simvastatin 5. No diffuse myalgias. He is in physical therapy for his left shoulder and range of motion is hugely improved. 3. He notes rare dysphagia with poorly chewed meat. Discussed that if this escalates, needs endoscopy. 4. He has had a Holter monitor with cardiology and is planning a trip to [place]. Review of Systems   Constitutional: Negative for chills, fever and weight loss. Respiratory: Negative for cough, shortness of breath and wheezing. Cardiovascular: Negative for chest pain, palpitations, orthopnea, leg swelling and PND. Gastrointestinal: Negative for abdominal pain, diarrhea, heartburn and nausea. Musculoskeletal: Positive for joint pain. Negative for myalgias. Neurological: Negative for dizziness and headaches. Physical Exam  Vitals signs and nursing note reviewed. Constitutional:       Appearance: He is well-developed. HENT:      Head: Normocephalic and atraumatic. Neck:      Musculoskeletal: Normal range of motion and neck supple. Thyroid: No thyromegaly. Vascular: No carotid bruit. Cardiovascular:      Rate and Rhythm: Normal rate and regular rhythm. Heart sounds: Normal heart sounds. Pulmonary:      Effort: Pulmonary effort is normal. No respiratory distress. Breath sounds: Normal breath sounds. No wheezing or rales. Musculoskeletal: Normal range of motion. Right lower leg: No edema. Left lower leg: No edema. Neurological:      Mental Status: He is alert and oriented to person, place, and time.    Psychiatric:         Behavior: Behavior normal.         ASSESSMENT and PLAN  Diagnoses and all orders for this visit:    1. Dyslipidemia, goal LDL below 899  -     METABOLIC PANEL, COMPREHENSIVE; Future  -     LIPID PANEL; Future    2. GERD without esophagitis  -     pantoprazole (PROTONIX) 20 mg tablet; Take 1 Tab by mouth daily.     3.htn-overly controlled  the following changes in treatment are made: stop the losartan due to low bp  appt in 6 mo

## 2021-05-05 ENCOUNTER — CLINICAL SUPPORT (OUTPATIENT)
Dept: CARDIOLOGY CLINIC | Age: 71
End: 2021-05-05
Payer: MEDICARE

## 2021-05-05 DIAGNOSIS — I49.3 PVC (PREMATURE VENTRICULAR CONTRACTION): Primary | ICD-10-CM

## 2021-05-05 PROCEDURE — 93227 XTRNL ECG REC<48 HR R&I: CPT | Performed by: INTERNAL MEDICINE

## 2021-05-05 PROCEDURE — 93225 XTRNL ECG REC<48 HRS REC: CPT | Performed by: INTERNAL MEDICINE

## 2021-05-05 NOTE — PROGRESS NOTES
Applied 24 hr holter per Dr Renetta Quiñonez dx: PVCs. Pt has #[id number]   Chargeable visit.   [employer]

## 2021-05-07 RX ORDER — METOPROLOL SUCCINATE 25 MG/1
25 TABLET, EXTENDED RELEASE ORAL DAILY
Qty: 90 TAB | Refills: 1 | OUTPATIENT
Start: 2021-05-07

## 2021-05-07 NOTE — TELEPHONE ENCOUNTER
Requested Prescriptions     Refused Prescriptions Disp Refills    metoprolol succinate (TOPROL-XL) 25 mg XL tablet 90 Tab 1     Sig: Take 1 Tab by mouth daily. Refused By: Anival Patricia     Reason for Refusal: Request already responded to by other means (e.g. phone or fax)     Already refilled today per CHETAN Faulkner NP.

## 2021-05-21 ENCOUNTER — OFFICE VISIT (OUTPATIENT)
Dept: CARDIOLOGY CLINIC | Age: 71
End: 2021-05-21
Payer: MEDICARE

## 2021-05-21 VITALS
SYSTOLIC BLOOD PRESSURE: 114 MMHG | DIASTOLIC BLOOD PRESSURE: 70 MMHG | OXYGEN SATURATION: 94 % | HEART RATE: 88 BPM | WEIGHT: 223 LBS | HEIGHT: 72 IN | BODY MASS INDEX: 30.2 KG/M2

## 2021-05-21 DIAGNOSIS — I49.3 PVC (PREMATURE VENTRICULAR CONTRACTION): Primary | ICD-10-CM

## 2021-05-21 DIAGNOSIS — I10 ESSENTIAL HYPERTENSION: ICD-10-CM

## 2021-05-21 DIAGNOSIS — E78.5 DYSLIPIDEMIA: ICD-10-CM

## 2021-05-21 DIAGNOSIS — I42.9 CARDIOMYOPATHY, UNSPECIFIED TYPE (HCC): ICD-10-CM

## 2021-05-21 PROCEDURE — G8427 DOCREV CUR MEDS BY ELIG CLIN: HCPCS | Performed by: INTERNAL MEDICINE

## 2021-05-21 PROCEDURE — G0463 HOSPITAL OUTPT CLINIC VISIT: HCPCS | Performed by: INTERNAL MEDICINE

## 2021-05-21 PROCEDURE — 1100F PTFALLS ASSESS-DOCD GE2>/YR: CPT | Performed by: INTERNAL MEDICINE

## 2021-05-21 PROCEDURE — G8754 DIAS BP LESS 90: HCPCS | Performed by: INTERNAL MEDICINE

## 2021-05-21 PROCEDURE — 3288F FALL RISK ASSESSMENT DOCD: CPT | Performed by: INTERNAL MEDICINE

## 2021-05-21 PROCEDURE — G8432 DEP SCR NOT DOC, RNG: HCPCS | Performed by: INTERNAL MEDICINE

## 2021-05-21 PROCEDURE — G8752 SYS BP LESS 140: HCPCS | Performed by: INTERNAL MEDICINE

## 2021-05-21 PROCEDURE — 3017F COLORECTAL CA SCREEN DOC REV: CPT | Performed by: INTERNAL MEDICINE

## 2021-05-21 PROCEDURE — 99215 OFFICE O/P EST HI 40 MIN: CPT | Performed by: INTERNAL MEDICINE

## 2021-05-21 PROCEDURE — G8417 CALC BMI ABV UP PARAM F/U: HCPCS | Performed by: INTERNAL MEDICINE

## 2021-05-21 PROCEDURE — G8536 NO DOC ELDER MAL SCRN: HCPCS | Performed by: INTERNAL MEDICINE

## 2021-05-21 NOTE — PROGRESS NOTES
HISTORY OF PRESENTING ILLNESS      Cecilia Vera is a 69 y.o. male with hx of hypertension, MVP and frequent PVCs with holter demonstrating a PVC burden of 13%. He is asymptomatic and cMRI demonstrated normal LV/RV function without scar. Last visit, we planned for repeat Holter monitor and echocardiogram at 6 month follow up. Echocardiogram demonstrated preserved LV function and Holter demonstrated 30% PVC burden.  As a result he was started on a trial of flecainide 50 mg twice daily.  Repeat Holter monitoring demonstrated PVC burden of 5%.        He discontinued Flecainide and repeat holter showed PVC burden <0.1% on metoprolol alone.      He was last seen 1/2021 and his metoprolol was increased to 25mg daily and follow up in 2 weeks with 24 hour holter monitor. Repeat echo shows heart function of 50% and his monitor demonstrated a 20.2% PVC burden.      Flecainide was restarted and monitor shows 1.7% PVCs. PAST MEDICAL HISTORY     Past Medical History:   Diagnosis Date    Arthritis     Dyslipidemia     Essential hypertension     Frequent PVCs     GERD (gastroesophageal reflux disease)     H/O seasonal allergies     Mitral valve prolapse     pt states Dr. Jennifer Barrett is not sure if this is the correct diagnosis    Obesity 05/02/2018    Obesity  BMI= 32.6           PAST SURGICAL HISTORY     Past Surgical History:   Procedure Laterality Date    COLONOSCOPY N/A 6/2/2017    COL[facility] Bl[facility]  6/2/2017         EGD  6/2/2017         HX HERNIA REPAIR      times 2. 1980's inguinal bilateral    HX HIP REPLACEMENT Left 2018    HX ORTHOPAEDIC Left 2018    hip replacement    HX OTHER SURGICAL  1977    Repair of the right shoulder.      HX ROTATOR CUFF REPAIR Left 01/14/2021    HX TONSILLECTOMY      childhood    UPPER GI ENDOSCOPY,DILATN W GUIDE  7/14/2017         UPPER GI ENDOSCOPY,DILATN W GUIDE  9/26/2018 ALLERGIES     No Known Allergies       FAMILY HISTORY     Family History   Problem Relation Age of Onset   Rodney Burgess Sudden Death Mother         Car accident    Heart Attack Father 48    Heart Disease Father     Thyroid Disease Sister     No Known Problems Brother     No Known Problems Sister     No Known Problems Sister     No Known Problems Brother     No Known Problems Brother     Cancer Maternal Aunt [de-identified]        multiple myloma    negative for cardiac disease       SOCIAL HISTORY     Social History     Socioeconomic History    Marital status:      Spouse name: Not on file    Number of children: Not on file    Years of education: Not on file    Highest education level: Not on file   Tobacco Use    Smoking status: Former Smoker     Packs/day: 1.00     Years: 30.00     Pack years: 30.00     Quit date: 1997     Years since quittin.3    Smokeless tobacco: Never Used   Vaping Use    Vaping Use: Never used   Substance and Sexual Activity    Alcohol use: Yes     Alcohol/week: 6.0 standard drinks     Types: 3 Glasses of wine, 3 Cans of beer per week    Drug use: No     Social Determinants of Health     Financial Resource Strain:     Difficulty of Paying Living Expenses:    Food Insecurity:     Worried About Running Out of Food in the Last Year:     [address] in the Last Year:    Transportation Needs:     Lack of Transportation (Medical):      Lack of Transportation (Non-Medical):    Physical Activity:     Days of Exercise per Week:     Minutes of Exercise per Session:    Stress:     Feeling of Stress :    Social Connections:     Frequency of Communication with Friends and Family:     Frequency of Social Gatherings with Friends and Family:     Attends Faith Services:     Active Member of Clubs or Organizations:     Attends Club or Organization Meetings:     Marital Status:          MEDICATIONS     Current Outpatient Medications   Medication Sig    metoprolol succinate (TOPROL-XL) 25 mg XL tablet TAKE 1/2 TABLET EVERY DAY    pantoprazole (PROTONIX) 20 mg tablet Take 1 Tab by mouth daily.  aspirin 81 mg chewable tablet Take 81 mg by mouth daily.  simvastatin (ZOCOR) 5 mg tablet Take 1 Tab by mouth nightly. TAKE 1 TABLET EVERY NIGHT    flecainide (TAMBOCOR) 50 mg tablet Take 1 Tab by mouth two (2) times a day.  multivitamin (ONE A DAY) tablet Take 1 Tab by mouth daily.  VITAMIN B COMPLEX (B COMPLEX PO) Take  by mouth daily.  DOCOSAHEXANOIC ACID/EPA (FISH OIL PO) Take 1,200 mg by mouth two (2) times a day.  GLUCOSAMINE SULFATE (GLUCOSAMINE PO) Take 1,500 mg by mouth two (2) times a day.  CHOLECALCIFEROL, VITAMIN D3, (VITAMIN D3 PO) Take 1 Tab by mouth daily.  aspirin delayed-release 325 mg tablet Take 325 mg by mouth daily.  ondansetron hcl (ZOFRAN) 4 mg tablet Take 4 mg by mouth every eight (8) hours as needed for Nausea or Vomiting.  gabapentin (NEURONTIN) 100 mg capsule Take 1 Cap by mouth three (3) times daily. Max Daily Amount: 300 mg. No current facility-administered medications for this visit. I have reviewed the nurses notes, vitals, problem list, allergy list, medical history, family, social history and medications. REVIEW OF SYMPTOMS      General: Pt denies excessive weight gain or loss. Pt is able to conduct ADL's  HEENT: Denies blurred vision, headaches, hearing loss, epistaxis and difficulty swallowing. Respiratory: Denies cough, congestion, shortness of breath, OCONNOR, wheezing or stridor.   Cardiovascular: Denies precordial pain, palpitations, edema or PND  Gastrointestinal: Denies poor appetite, indigestion, abdominal pain or blood in stool  Genitourinary: Denies hematuria, dysuria, increased urinary frequency  Musculoskeletal: Denies joint pain or swelling from muscles or joints  Neurologic: Denies tremor, paresthesias, headache, or sensory motor disturbance  Psychiatric: Denies confusion, insomnia, depression  Integumentray: Denies rash, itching or ulcers. Hematologic: Denies easy bruising, bleeding       PHYSICAL EXAMINATION      Vitals: see vitals section  General: Well developed, in no acute distress. HEENT: No jaundice, oral mucosa moist, no oral ulcers  Neck: Supple, no stiffness, no lymphadenopathy, supple  Heart:  Normal S1/S2 negative S3 or S4. Regular, no murmur, gallop or rub, no jugular venous distention  Respiratory: Clear bilaterally x 4, no wheezing or rales  Abdomen:   Soft, non-tender, bowel sounds are active. Extremities:  No edema, normal cap refill, no cyanosis. Musculoskeletal: No clubbing, no deformities  Neuro: A&Ox3, speech clear, gait stable, cooperative, no focal neurologic deficits  Skin: Skin color is normal. No rashes or lesions. Non diaphoretic, moist.  Vascular: 2+ pulses symmetric in all extremities       DIAGNOSTIC DATA      EKG:        LABORATORY DATA      Lab Results   Component Value Date/Time    WBC 7.9 02/01/2021 02:04 PM    HGB 13.4 02/01/2021 02:04 PM    HCT 39.2 02/01/2021 02:04 PM    PLATELET 176 29/79/2490 02:04 PM    MCV 87.3 02/01/2021 02:04 PM      Lab Results   Component Value Date/Time    Sodium 142 04/12/2021 03:52 PM    Potassium 4.4 04/12/2021 03:52 PM    Chloride 111 (H) 04/12/2021 03:52 PM    CO2 28 04/12/2021 03:52 PM    Anion gap 3 (L) 04/12/2021 03:52 PM    Glucose 93 04/12/2021 03:52 PM    BUN 25 (H) 04/12/2021 03:52 PM    Creatinine 1.56 (H) 04/12/2021 03:52 PM    BUN/Creatinine ratio 16 04/12/2021 03:52 PM    GFR est AA 53 (L) 04/12/2021 03:52 PM    GFR est non-AA 44 (L) 04/12/2021 03:52 PM    Calcium 9.5 04/12/2021 03:52 PM    Bilirubin, total 0.4 04/12/2021 03:52 PM    Alk. phosphatase 86 04/12/2021 03:52 PM    Protein, total 6.8 04/12/2021 03:52 PM    Albumin 3.8 04/12/2021 03:52 PM    Globulin 3.0 04/12/2021 03:52 PM    A-G Ratio 1.3 04/12/2021 03:52 PM    ALT (SGPT) 27 04/12/2021 03:52 PM           ASSESSMENT      1. PVCs                        E. Asymptomatic  2. Hypertension  3. Mitral valve proplapse       PLAN     Continue current drug regimen. ICD-10-CM ICD-9-CM    1. PVC (premature ventricular contraction)  I49.3 427.69    2. Essential hypertension  I10 401.9    3. Cardiomyopathy, unspecified type ([facility] [address]  I42.9 425.4    4. Dyslipidemia  E78.5 272.4      No orders of the defined types were placed in this encounter. FOLLOW-UP     6 months with Priscila Davis        Thank you, Vikas Antonio MD for allowing me to participate in the care of this extraordinarily pleasant male. Please do not hesitate to contact me for further questions/concerns.          Jacky Devi MD  Cardiac Electrophysiology / Cardiology    [address]  [address] [facility], [facility], [address] [place], [place] [address]  [phone] / [phone] Fax   [phone] / [phone] Fax

## 2021-05-21 NOTE — PROGRESS NOTES
Codey Medina is a 69 y.o. male    Chief Complaint   Patient presents with    Follow-up     3  month f/u PVC       Chest pain No    SOB No    Dizziness No     Swelling No    Refills No    Visit Vitals  /70 (BP 1 Location: Left upper arm, BP Patient Position: Sitting)   Pulse 88   Ht 6' (1.829 m)   Wt 223 lb (101.2 kg)   SpO2 94%   BMI 30.24 kg/m²       1. Have you been to the ER, urgent care clinic since your last visit? Hospitalized since your last visit? No    2. Have you seen or consulted any other health care providers outside of the [address] [place] [address] since your last visit? Include any pap smears or colon screening.   No

## 2021-05-24 NOTE — PROGRESS NOTES
CARDIOLOGY OFFICE NOTE    Rick Barrett MD, [address], [place], [address] [facility]  Phone [phone]; Fax [phone]  Mobile [phone]   Voice Mail [phone]    LAST OFFICE VISIT : Visit date not found  Solomon Schumacher MD       ATTENTION:   This medical record was transcribed using an electronic medical records/speech recognition system. Although proofread, it may and can contain electronic, spelling and other errors. Corrections may be executed at a later time. Please feel free to contact us for any clarifications as needed. ICD-10-CM ICD-9-CM   1. Essential hypertension  I10 401.9   2. PVC (premature ventricular contraction)  I49.3 427.69   3. Dyslipidemia  E78.5 272.4   4. Mitral valve prolapse  I34.1 424.0   5. Cardiomyopathy, unspecified type [facility])  I42.9 425.4            Cecilia Vera is a 69 y.o. male with  referred for   hypertension dyslipidemia and mitral valve prolapse   . The patient denies chest pain/ shortness of breath, orthopnea, PND, LE edema, palpitations, syncope, presyncope or fatigue. Cardiac risk factors: family history, male gender, hypertension  I have personally obtained the history from the patient.     HISTORY OF PRESENTING ILLNESS      Cecilia Vera is a 69 y.o. male  with HTN and mitral valve prolapse          ACTIVE PROBLEM LIST     Patient Active Problem List    Diagnosis Date Noted    Primary osteoarthritis of left hip 05/06/2018    Primary osteoarthritis of left knee 04/25/2018    Seasonal allergic rhinitis due to pollen 05/19/2017    Chest tightness 05/18/2017    Mitral valve prolapse 02/16/2017    Colon polyps 01/19/2017    Essential hypertension 01/19/2017           PAST MEDICAL HISTORY     Past Medical History:   Diagnosis Date    Arthritis     Dyslipidemia     Essential hypertension     Frequent PVCs     GERD (gastroesophageal reflux disease)     H/O seasonal allergies     Mitral valve prolapse     pt states Dr. Aspen Valladares is not sure if this is the correct diagnosis    Obesity 2018    Obesity  BMI= 32.6           PAST SURGICAL HISTORY     Past Surgical History:   Procedure Laterality Date    COLONOSCOPY N/A 2017    COLONOSCOPY performed by Magui Kate MD at [address]  2017         EGD  2017         HX HERNIA REPAIR      times 2. 's inguinal bilateral    HX HIP REPLACEMENT Left 2018    HX ORTHOPAEDIC Left 2018    hip replacement    HX OTHER SURGICAL  1977    Repair of the right shoulder.  HX ROTATOR CUFF REPAIR Left 2021    HX TONSILLECTOMY      childhood    UPPER GI ENDOSCOPY,DILATN W GUIDE  2017         UPPER GI ENDOSCOPY,DILATN W GUIDE  2018               ALLERGIES     No Known Allergies       FAMILY HISTORY     Family History   Problem Relation Age of Onset   Georgianna Olszewski Sudden Death Mother         Car accident    Heart Attack Father 48    Heart Disease Father     Thyroid Disease Sister     No Known Problems Brother     No Known Problems Sister     No Known Problems Sister     No Known Problems Brother     No Known Problems Brother     Cancer Maternal Aunt [de-identified]        multiple myloma    negative for cardiac disease       SOCIAL HISTORY     Social History     Socioeconomic History    Marital status:      Spouse name: Not on file    Number of children: Not on file    Years of education: Not on file    Highest education level: Not on file   Tobacco Use    Smoking status: Former Smoker     Packs/day: 1.00     Years: 30.00     Pack years: 30.00     Quit date: 1997     Years since quittin.3    Smokeless tobacco: Never Used   Vaping Use    Vaping Use: Never used   Substance and Sexual Activity    Alcohol use:  Yes     Alcohol/week: 6.0 standard drinks     Types: 3 Glasses of wine, 3 Cans of beer per week    Drug use: No     Social Determinants of Health     Financial Resource Strain:  Difficulty of Paying Living Expenses:    Food Insecurity:     Worried About Running Out of Food in the Last Year:     Ran Out of Food in the Last Year:    Transportation Needs:     Lack of Transportation (Medical):  Lack of Transportation (Non-Medical):    Physical Activity:     Days of Exercise per Week:     Minutes of Exercise per Session:    Stress:     Feeling of Stress :    Social Connections:     Frequency of Communication with Friends and Family:     Frequency of Social Gatherings with Friends and Family:     Attends Yarsani Services:     Active Member of Clubs or Organizations:     Attends Club or Organization Meetings:     Marital Status:          MEDICATIONS     Current Outpatient Medications   Medication Sig    metoprolol succinate (TOPROL-XL) 25 mg XL tablet TAKE 1/2 TABLET EVERY DAY    pantoprazole (PROTONIX) 20 mg tablet Take 1 Tab by mouth daily.  aspirin 81 mg chewable tablet Take 81 mg by mouth daily.  simvastatin (ZOCOR) 5 mg tablet Take 1 Tab by mouth nightly. TAKE 1 TABLET EVERY NIGHT    flecainide (TAMBOCOR) 50 mg tablet Take 1 Tab by mouth two (2) times a day.  multivitamin (ONE A DAY) tablet Take 1 Tab by mouth daily.  VITAMIN B COMPLEX (B COMPLEX PO) Take  by mouth daily.  DOCOSAHEXANOIC ACID/EPA (FISH OIL PO) Take 1,200 mg by mouth two (2) times a day.  GLUCOSAMINE SULFATE (GLUCOSAMINE PO) Take 1,500 mg by mouth two (2) times a day.  CHOLECALCIFEROL, VITAMIN D3, (VITAMIN D3 PO) Take 1 Tab by mouth daily.  aspirin delayed-release 325 mg tablet Take 325 mg by mouth daily. (Patient not taking: Reported on 5/25/2021)    ondansetron hcl (ZOFRAN) 4 mg tablet Take 4 mg by mouth every eight (8) hours as needed for Nausea or Vomiting. (Patient not taking: Reported on 5/25/2021)    gabapentin (NEURONTIN) 100 mg capsule Take 1 Cap by mouth three (3) times daily. Max Daily Amount: 300 mg.  (Patient not taking: Reported on 5/25/2021)     No current facility-administered medications for this visit. I have reviewed the nurses notes, vitals, problem list, allergy list, medical history, family, social history and medications. REVIEW OF SYMPTOMS   Pertinent positive per HPI  General: Pt denies excessive weight gain or loss. Pt is able to conduct ADL's  HEENT: Denies blurred vision, headaches, hearing loss, epistaxis and difficulty swallowing. Respiratory: Denies cough, congestion, shortness of breath, OCONNOR, wheezing or stridor. Cardiovascular: Denies precordial pain, palpitations, edema or PND  Gastrointestinal: Denies poor appetite, indigestion, abdominal pain or blood in stool  Genitourinary: Denies hematuria, dysuria, increased urinary frequency  Musculoskeletal: Denies joint pain or swelling from muscles or joints  Neurologic: Denies tremor, paresthesias, headache, or sensory motor disturbance  Psychiatric: Denies confusion, insomnia, depression  Integumentray: Denies rash, itching or ulcers. Hematologic: Denies easy bruising, bleeding     PHYSICAL EXAMINATION      Vitals:    05/25/21 1421   BP: 110/80   Pulse: 62   SpO2: 96%   Weight: 227 lb (103 kg)   Height: 6' (1.829 m)     General: Well developed, in no acute distress. HEENT: No jaundice, oral mucosa moist, no oral ulcers  Neck: Supple, no stiffness, no lymphadenopathy, supple  Heart:  Normal S1/S2 negative S3 or S4. Regular, no murmur, gallop or rub, no jugular venous distention  Respiratory: Clear bilaterally x 4, no wheezing or rales  Extremities:  No edema, normal cap refill, no cyanosis. Musculoskeletal: No clubbing, no deformities  Neuro: A&Ox3, speech clear, gait stable, cooperative, no focal neurologic deficits  Skin: Skin color is normal. No rashes or lesions. Non diaphoretic, moist.               DIAGNOSTIC DATA     1.  Lipids   1/9/17- , HDL 43, , TG 91   5/2/18- , HDL 37, LDL 84,    3/20/19-  HDL 36, LDL 88,    10/7/19- , HDL 38, LDL 87,    12/18/19- , HDL 42, LDL 83, TG 64   10/12/20- , HDL 46, LDL 86.4,    4/12/21- , HDL 41, LDL 88,      2. Holter   (1/19/17): sinus rhtyhm with PVCs there were frequent PVCs, rare couplets, triplets and bigeminy with rare episodes of non-sustained VT with rates up to 134.   9/28/17- SB with PVCs, HR 43-91, PVC burden 30.48%   11/8/17- SR HR , PVC burden 5 %   1/8/20- SR average HR 85, min 47, max 133, 1 VT 3 beats , PVC burden 0.7%   1/26/21- SR average HR 77, min 47, max 106,  times, longest 6 beats , fastest 3 beats , PVC burden 20.2%   5/5/21- PVC burden 1/7%    3. Echo   4/19/16- EF 30-44%, MR mild/mod, AI mild, LAE   9/28/17- EF 50-55%, PI mild/mod   1/4/21-EF  50%, RVE, CHELSIE, mild AI/MR/TR, PAP 28 mmHg     4. Stress Test   Lexiscan- 6/15/17-no ischemia, EF 39%         LABORATORY DATA            Lab Results   Component Value Date/Time    WBC 7.9 02/01/2021 02:04 PM    HGB 13.4 02/01/2021 02:04 PM    HCT 39.2 02/01/2021 02:04 PM    PLATELET 310 08/15/4036 02:04 PM    MCV 87.3 02/01/2021 02:04 PM      Lab Results   Component Value Date/Time    Sodium 142 04/12/2021 03:52 PM    Potassium 4.4 04/12/2021 03:52 PM    Chloride 111 (H) 04/12/2021 03:52 PM    CO2 28 04/12/2021 03:52 PM    Anion gap 3 (L) 04/12/2021 03:52 PM    Glucose 93 04/12/2021 03:52 PM    BUN 25 (H) 04/12/2021 03:52 PM    Creatinine 1.56 (H) 04/12/2021 03:52 PM    BUN/Creatinine ratio 16 04/12/2021 03:52 PM    GFR est AA 53 (L) 04/12/2021 03:52 PM    GFR est non-AA 44 (L) 04/12/2021 03:52 PM    Calcium 9.5 04/12/2021 03:52 PM    Bilirubin, total 0.4 04/12/2021 03:52 PM    Alk.  phosphatase 86 04/12/2021 03:52 PM    Protein, total 6.8 04/12/2021 03:52 PM    Albumin 3.8 04/12/2021 03:52 PM    Globulin 3.0 04/12/2021 03:52 PM    A-G Ratio 1.3 04/12/2021 03:52 PM    ALT (SGPT) 27 04/12/2021 03:52 PM           ASSESSMENT/RECOMMENDATIONS:.      1. HTN  - Blood pressure is reasonable today on current medical regimen no adjustments     2. Cardiomyopathy  -Last EF by echo was 50% in 2021       3. PVC's  -Flecainide has brought down his PVCs to 1%     4. Dyslipidemia   -LDL has been at goal, continue current medical regimen    5. History of mitral valve prolapse        return in 1 year or PRN. We discussed the expected course, resolution and complications of the diagnosis(es) in detail. Medication risks, benefits, costs, interactions, and alternatives were discussed as indicated. I advised him to contact the office if his condition worsens, changes or fails to improve as anticipated. He expressed understanding with the diagnosis(es) and plan    No orders of the defined types were placed in this encounter. We discussed the expected course, resolution and complications of the diagnosis(es) in detail. Medication risks, benefits, costs, interactions, and alternatives were discussed as indicated. I advised him to contact the office if his condition worsens, changes or fails to improve as anticipated. He expressed understanding with the diagnosis(es) and plan          Follow-up and Dispositions  ·   Return in about 6 months (around 11/25/2021). I have discussed the diagnosis with  Consuelo Vergara and the intended plan as seen in the above orders. Questions were answered concerning future plans. I have discussed medication side effects and warnings with the patient as well. Thank you,  Griffin Aguilar MD for involving me in the care of  Consuelo Vergara. Please do not hesitate to contact me for further questions/concerns. Rick Nina MD, [address] [place] [address] Rd[address]      [facility], [address] [place] [address] [address]      [phone] / [phone] Fax

## 2021-05-25 ENCOUNTER — OFFICE VISIT (OUTPATIENT)
Dept: CARDIOLOGY CLINIC | Age: 71
End: 2021-05-25
Payer: MEDICARE

## 2021-05-25 VITALS
DIASTOLIC BLOOD PRESSURE: 80 MMHG | HEART RATE: 62 BPM | WEIGHT: 227 LBS | SYSTOLIC BLOOD PRESSURE: 110 MMHG | HEIGHT: 72 IN | BODY MASS INDEX: 30.75 KG/M2 | OXYGEN SATURATION: 96 %

## 2021-05-25 DIAGNOSIS — I42.9 CARDIOMYOPATHY, UNSPECIFIED TYPE (HCC): ICD-10-CM

## 2021-05-25 DIAGNOSIS — I34.1 MITRAL VALVE PROLAPSE: ICD-10-CM

## 2021-05-25 DIAGNOSIS — I49.3 PVC (PREMATURE VENTRICULAR CONTRACTION): ICD-10-CM

## 2021-05-25 DIAGNOSIS — E78.5 DYSLIPIDEMIA: ICD-10-CM

## 2021-05-25 DIAGNOSIS — I10 ESSENTIAL HYPERTENSION: Primary | ICD-10-CM

## 2021-05-25 PROCEDURE — 3017F COLORECTAL CA SCREEN DOC REV: CPT | Performed by: SPECIALIST

## 2021-05-25 PROCEDURE — G0463 HOSPITAL OUTPT CLINIC VISIT: HCPCS | Performed by: SPECIALIST

## 2021-05-25 PROCEDURE — 1100F PTFALLS ASSESS-DOCD GE2>/YR: CPT | Performed by: SPECIALIST

## 2021-05-25 PROCEDURE — G8754 DIAS BP LESS 90: HCPCS | Performed by: SPECIALIST

## 2021-05-25 PROCEDURE — G8536 NO DOC ELDER MAL SCRN: HCPCS | Performed by: SPECIALIST

## 2021-05-25 PROCEDURE — 99214 OFFICE O/P EST MOD 30 MIN: CPT | Performed by: SPECIALIST

## 2021-05-25 PROCEDURE — G8417 CALC BMI ABV UP PARAM F/U: HCPCS | Performed by: SPECIALIST

## 2021-05-25 PROCEDURE — G8432 DEP SCR NOT DOC, RNG: HCPCS | Performed by: SPECIALIST

## 2021-05-25 PROCEDURE — 3288F FALL RISK ASSESSMENT DOCD: CPT | Performed by: SPECIALIST

## 2021-05-25 PROCEDURE — G8752 SYS BP LESS 140: HCPCS | Performed by: SPECIALIST

## 2021-05-25 PROCEDURE — G8427 DOCREV CUR MEDS BY ELIG CLIN: HCPCS | Performed by: SPECIALIST

## 2021-05-25 NOTE — PROGRESS NOTES
Consuelo Vergara is a 69 y.o. male    Visit Vitals  /80 (BP 1 Location: Left upper arm, BP Patient Position: Sitting, BP Cuff Size: Adult)   Pulse 62   Ht 6' (1.829 m)   Wt 227 lb (103 kg)   SpO2 96%   BMI 30.79 kg/m²       Chief Complaint   Patient presents with    Hypertension    Cardiomyopathy    Cholesterol Problem    Other     PVC       Chest pain NO  SOB NO  Dizziness NO  Swelling NO  Recent hospital visit [address] [place] [address] 1/14/21  Refills NO

## 2021-08-04 ENCOUNTER — ANESTHESIA EVENT (OUTPATIENT)
Dept: ENDOSCOPY | Age: 71
End: 2021-08-04
Payer: MEDICARE

## 2021-08-04 ENCOUNTER — HOSPITAL ENCOUNTER (OUTPATIENT)
Age: 71
Setting detail: OUTPATIENT SURGERY
Discharge: HOME | End: 2021-08-04
Attending: INTERNAL MEDICINE | Admitting: INTERNAL MEDICINE
Payer: MEDICARE

## 2021-08-04 ENCOUNTER — ANESTHESIA (OUTPATIENT)
Dept: ENDOSCOPY | Age: 71
End: 2021-08-04
Payer: MEDICARE

## 2021-08-04 VITALS
SYSTOLIC BLOOD PRESSURE: 121 MMHG | BODY MASS INDEX: 30.82 KG/M2 | HEART RATE: 69 BPM | TEMPERATURE: 97.7 F | RESPIRATION RATE: 15 BRPM | OXYGEN SATURATION: 95 % | HEIGHT: 72 IN | WEIGHT: 227.51 LBS | DIASTOLIC BLOOD PRESSURE: 83 MMHG

## 2021-08-04 PROCEDURE — C1726 CATH, BAL DIL, NON-VASCULAR: HCPCS | Performed by: INTERNAL MEDICINE

## 2021-08-04 PROCEDURE — 76040000019: Performed by: INTERNAL MEDICINE

## 2021-08-04 PROCEDURE — 74011000250 HC RX REV CODE- 250: Performed by: NURSE ANESTHETIST, CERTIFIED REGISTERED

## 2021-08-04 PROCEDURE — 76060000031 HC ANESTHESIA FIRST 0.5 HR: Performed by: INTERNAL MEDICINE

## 2021-08-04 PROCEDURE — 74011250636 HC RX REV CODE- 250/636: Performed by: NURSE ANESTHETIST, CERTIFIED REGISTERED

## 2021-08-04 PROCEDURE — 2709999900 HC NON-CHARGEABLE SUPPLY: Performed by: INTERNAL MEDICINE

## 2021-08-04 PROCEDURE — 74011000258 HC RX REV CODE- 258: Performed by: NURSE ANESTHETIST, CERTIFIED REGISTERED

## 2021-08-04 RX ORDER — EPINEPHRINE 0.1 MG/ML
1 INJECTION INTRACARDIAC; INTRAVENOUS
Status: DISCONTINUED | OUTPATIENT
Start: 2021-08-04 | End: 2021-08-04 | Stop reason: HOSPADM

## 2021-08-04 RX ORDER — MIDAZOLAM HYDROCHLORIDE 1 MG/ML
.25-5 INJECTION, SOLUTION INTRAMUSCULAR; INTRAVENOUS
Status: DISCONTINUED | OUTPATIENT
Start: 2021-08-04 | End: 2021-08-04 | Stop reason: HOSPADM

## 2021-08-04 RX ORDER — DEXTROMETHORPHAN/PSEUDOEPHED 2.5-7.5/.8
1.2 DROPS ORAL
Status: DISCONTINUED | OUTPATIENT
Start: 2021-08-04 | End: 2021-08-04 | Stop reason: HOSPADM

## 2021-08-04 RX ORDER — LIDOCAINE HYDROCHLORIDE 20 MG/ML
INJECTION, SOLUTION EPIDURAL; INFILTRATION; INTRACAUDAL; PERINEURAL AS NEEDED
Status: DISCONTINUED | OUTPATIENT
Start: 2021-08-04 | End: 2021-08-04 | Stop reason: HOSPADM

## 2021-08-04 RX ORDER — SODIUM CHLORIDE 9 MG/ML
INJECTION, SOLUTION INTRAVENOUS
Status: DISCONTINUED | OUTPATIENT
Start: 2021-08-04 | End: 2021-08-04 | Stop reason: HOSPADM

## 2021-08-04 RX ORDER — SODIUM CHLORIDE 9 MG/ML
50 INJECTION, SOLUTION INTRAVENOUS CONTINUOUS
Status: DISCONTINUED | OUTPATIENT
Start: 2021-08-04 | End: 2021-08-04 | Stop reason: HOSPADM

## 2021-08-04 RX ORDER — ATROPINE SULFATE 0.1 MG/ML
0.5 INJECTION INTRAVENOUS
Status: DISCONTINUED | OUTPATIENT
Start: 2021-08-04 | End: 2021-08-04 | Stop reason: HOSPADM

## 2021-08-04 RX ORDER — NALOXONE HYDROCHLORIDE 0.4 MG/ML
0.4 INJECTION, SOLUTION INTRAMUSCULAR; INTRAVENOUS; SUBCUTANEOUS
Status: DISCONTINUED | OUTPATIENT
Start: 2021-08-04 | End: 2021-08-04 | Stop reason: HOSPADM

## 2021-08-04 RX ORDER — FLUMAZENIL 0.1 MG/ML
0.2 INJECTION INTRAVENOUS
Status: DISCONTINUED | OUTPATIENT
Start: 2021-08-04 | End: 2021-08-04 | Stop reason: HOSPADM

## 2021-08-04 RX ORDER — PROPOFOL 10 MG/ML
INJECTION, EMULSION INTRAVENOUS
Status: DISCONTINUED | OUTPATIENT
Start: 2021-08-04 | End: 2021-08-04 | Stop reason: HOSPADM

## 2021-08-04 RX ORDER — PROPOFOL 10 MG/ML
INJECTION, EMULSION INTRAVENOUS AS NEEDED
Status: DISCONTINUED | OUTPATIENT
Start: 2021-08-04 | End: 2021-08-04 | Stop reason: HOSPADM

## 2021-08-04 RX ADMIN — PHENYLEPHRINE HYDROCHLORIDE 80 MCG: 10 INJECTION INTRAVENOUS at 07:16

## 2021-08-04 RX ADMIN — SODIUM CHLORIDE: 9 INJECTION, SOLUTION INTRAVENOUS at 07:10

## 2021-08-04 RX ADMIN — PROPOFOL 100 MCG/KG/MIN: 10 INJECTION, EMULSION INTRAVENOUS at 07:10

## 2021-08-04 RX ADMIN — LIDOCAINE HYDROCHLORIDE 100 MG: 20 INJECTION, SOLUTION INTRAVENOUS at 07:10

## 2021-08-04 RX ADMIN — PROPOFOL INJECTABLE EMULSION 100 MG: 10 INJECTION, EMULSION INTRAVENOUS at 07:10

## 2021-08-04 NOTE — H&P
[address] [facility] [address]  [address] [facility], [address] [facility]  [phone]                     History and Physical     NAME: Pratik Soni   :  1950   MRN:  [id number]     HPI:   69year old male who presents with a complaint of difficulty swallowing. Note for \"Difficulty swallowing \": Patient is a 68 yo male who presents today for evaluation of dysphagia. He is up to date with colonoscopy - due for repeat in . He has had endoscopies regularly over the last 5 years for dysphagia. Has a history of large hiatal hernia and benign esophageal stricture causing dysphagia. This has improved with dilation. Today, he is doing well. He states he has started to notice dysphagia again over the last 3-4 months. Occurring regularly with foods/solids. Often having to regurgitate/throw his food back up. No abd pain, nausea, vomiting. He states his reflux is pretty well controlled on pantoprazole 20mg daily. Past Surgical History:   Procedure Laterality Date    COLONOSCOPY N/A 2017    COLONOSCOPY performed by Sheela Mckee MD at [address]  2017         EGD  2017         HX HERNIA REPAIR      times 2. 1980's inguinal bilateral    HX HIP REPLACEMENT Left 2018    HX ORTHOPAEDIC Left 2018    hip replacement    HX OTHER SURGICAL  1977    Repair of the right shoulder.      HX ROTATOR CUFF REPAIR Left 2021    HX TONSILLECTOMY      childhood    UPPER GI ENDOSCOPY,DILATN W GUIDE  2017         UPPER GI ENDOSCOPY,DILATN W GUIDE  2018          Past Medical History:   Diagnosis Date    Arthritis     Dyslipidemia     Essential hypertension     Frequent PVCs     GERD (gastroesophageal reflux disease)     H/O seasonal allergies     Mitral valve prolapse     pt states Dr. Laurie Rodriguez is not sure if this is the correct diagnosis    Obesity 2018    Obesity  BMI= 32.6     Social History     Tobacco Use    Smoking status: Former Smoker     Packs/day: 1.00     Years: 30.00     Pack years: 30.00     Quit date: 1997     Years since quittin.5    Smokeless tobacco: Never Used   Vaping Use    Vaping Use: Never used   Substance Use Topics    Alcohol use: Yes     Alcohol/week: 6.0 standard drinks     Types: 3 Glasses of wine, 3 Cans of beer per week    Drug use: No     No Known Allergies  Family History   Problem Relation Age of Onset    Sudden Death Mother         Car accident    Heart Attack Father 48    Heart Disease Father     Thyroid Disease Sister     No Known Problems Brother     No Known Problems Sister     No Known Problems Sister     No Known Problems Brother     No Known Problems Brother     Cancer Maternal Aunt [de-identified]        multiple myloma     No current facility-administered medications for this encounter. PHYSICAL EXAM:  General: WD, WN. Alert, cooperative, no acute distress    HEENT: NC, Atraumatic. PERRLA, EOMI. Anicteric sclerae. Lungs:  CTA Bilaterally. No Wheezing/Rhonchi/Rales. Heart:  Regular  rhythm,  No murmur, No Rubs, No Gallops  Abdomen: Soft, Non distended, Non tender.  +Bowel sounds, no HSM  Extremities: No c/c/e  Neurologic:  CN 2-12 gi, Alert and oriented X 3. No acute neurological distress   Psych:   Good insight. Not anxious nor agitated. Assessment:   I have reviewed with the patient +/- family alternatives,benefits and risks for the procedure, as well as potential complications(with emphasis on, but not limited to, bleeding, perforation, cardiovascular/cerebrovascular/pulmonary events, reactions to the medications, infection, risk of missing a lesions/a cancer, and the imponderables including death), alternative options, and patient/family voices understanding.       Plan:   · Endoscopic procedure  · Conscious sedation or MAC

## 2021-08-04 NOTE — ANESTHESIA PREPROCEDURE EVALUATION
Anesthetic History   No history of anesthetic complications            Review of Systems / Medical History  Patient summary reviewed and nursing notes reviewed    Pulmonary  Within defined limits                 Neuro/Psych   Within defined limits           Cardiovascular    Hypertension: well controlled        Dysrhythmias (pvcs) : PVC  Hyperlipidemia    Exercise tolerance: >4 METS  Comments: TTE on 1/4/21 demonstrates EF of 50%, mild MR and AR with mild diastolic dysfunction, PA pressures estimated at 28 mmHg   GI/Hepatic/Renal     GERD: poorly controlled           Endo/Other        Obesity and arthritis     Other Findings            Physical Exam    Airway  Mallampati: I    Neck ROM: normal range of motion   Mouth opening: Normal     Cardiovascular  Regular rate and rhythm,  S1 and S2 normal,  no murmur, click, rub, or gallop  Rhythm: regular  Rate: normal         Dental    Dentition: Caps/crowns     Pulmonary  Breath sounds clear to auscultation               Abdominal  GI exam deferred       Other Findings            Anesthetic Plan    ASA: 2  Anesthesia type: MAC            Anesthetic plan and risks discussed with: Patient

## 2021-08-04 NOTE — ANESTHESIA POSTPROCEDURE EVALUATION
Procedure(s):  ESOPHAGOGASTRODUODENOSCOPY (EGD)  ESOPHAGEAL DILATION. MAC    Anesthesia Post Evaluation      Multimodal analgesia: multimodal analgesia used between 6 hours prior to anesthesia start to PACU discharge  Patient location during evaluation: PACU  Patient participation: complete - patient participated  Level of consciousness: awake and alert  Pain management: adequate  Airway patency: patent  Anesthetic complications: no  Cardiovascular status: acceptable  Respiratory status: acceptable  Hydration status: acceptable  Post anesthesia nausea and vomiting:  none  Final Post Anesthesia Temperature Assessment:  Normothermia (36.0-37.5 degrees C)      INITIAL Post-op Vital signs:   Vitals Value Taken Time   /86 08/04/21 0743   Temp     Pulse 60 08/04/21 0745   Resp 17 08/04/21 0745   SpO2 94 % 08/04/21 0745   Vitals shown include unvalidated device data.

## 2021-08-04 NOTE — PROGRESS NOTES
Endoscopy discharge instructions have been reviewed and given to patient. The patient verbalized understanding and acceptance of instructions. Dr. Terrance Gong discussed with patient procedure findings and next steps.

## 2021-08-04 NOTE — PERIOP NOTES
Received recovery report from Anesthesia team, see anesthesia note. ABD remains soft and non-tender post procedure. Pt has no complaints at this time and tolerated the procedure well. Endoscope was pre-cleaned at bedside immediately following procedure by Santiago Abrams. Post recovery report given to [address] [place] [address].

## 2021-08-04 NOTE — PERIOP NOTES
CRE balloon dilatation of the esophagus   15 mm Balloon inflated to 3 ATMs and held for 15 seconds  16.5 mm Balloon inflated to 4.5 ATMs and held for 0 seconds. 18 mm Balloon inflated to 7 ATMs and held for 15 seconds. No subcutaneous crepitus of the chest or cervical region was noted post dilatation.

## 2021-08-04 NOTE — DISCHARGE INSTRUCTIONS
[address] [facility]    [phone]      Colonoscopy and Egd Discharge Instructions    2021    Bipin Root  :  1950  [facility] Medical Record Number:  [id number]      Discomfort:  Sore throat- throat lozenges or warm salt water gargle  Redness at IV site- apply warm compress to area; if redness or soreness persist- contact your physician  There may be a slight amount of blood passed from the rectum  Gaseous discomfort- walking, belching will help relieve any discomfort  You may not operate a vehicle for 12 hours  You may not engage in an occupation involving machinery or appliances for rest of today  You may not drink alcoholic beverages for at least 12 hours  Avoid making any critical decisions for at least 24 hour  DIET:   GERD diet: avoid fried and fatty foods. peppermint, chocolate, alcohol, coffee, citrus fruits and juices, tomoato products; avoid lying down for 2 to 3 hours after eating.   - however -  remember your colon is empty and a heavy meal will produce gas. Avoid these foods:  vegetables, fried / greasy foods, carbonated drinks for today     ACTIVITY:  You may  resume your normal daily activities it is recommended that you spend the remainder of the day resting -  avoid any strenuous activity and driving. CALL M.D. ANY SIGN OF:   Increasing pain, nausea, vomiting  Abdominal distension (swelling)  New increased bleeding (oral or rectal)  Fever (chills)  Pain in chest area  Bloody discharge from nose or mouth  Shortness of breath        Bipin Root  [id number]  1950      Follow-up Instructions:   Call Dr. Yesika Hartman if any questions at [phone]. Results of procedure / biopsy in 7 to 10 days, we will call you with these results.   Your endoscopy showed esophageal stricture( benign narrowing)      DISCHARGE SUMMARY from Nurse    The following personal items collected during your admission are returned to you:   Dental Appliance: Dental Appliances: None  Vision: Visual Aid: At bedside, Glasses  Hearing Aid:    Derinda Blanks:    Clothing:    Other Valuables:    Valuables sent to safe:      PATIENT INSTRUCTIONS:    Take Home Medications:  {Medication reconciliation information is now added to the patient's AVS automatically when it is printed. There is no need to use this SmartLink in discharge instructions.   Highlight this text and delete it to clear this message}

## 2021-08-04 NOTE — PROCEDURES
[address]  [address] [facility], [address] [facility]       [phone]                   2021                EGD Operative Report  Roberto Frazier  :  1950  [facility] Medical Record Number:  [id number]      Indication:  Dysphagia/odynophagia     : Taylor Sims MD    Assistants: None    Referring Provider:  Jhon Webb MD      Anesthesia/Sedation:  MAC anesthesia Propofol    Airway assessment: No airway problems anticipated    Pre-Procedural Exam:      Airway: clear, no airway problems anticipated  Heart: RRR, without gallops or rubs  Lungs: clear bilaterally without wheezes, crackles, or rhonchi  Abdomen: soft, nontender, nondistended, bowel sounds present  Mental Status: awake, alert and oriented to person, place and time       Procedure Details     After infomed consent was obtained for the procedure, with all risks and benefits of procedure explained the patient was taken to the endoscopy suite and placed in the left lateral decubitus position. Following sequential administration of sedation as per above, the endoscope was inserted into the mouth and advanced under direct vision to second portion of the duodenum. A careful inspection was made as the gastroscope was withdrawn, including a retroflexed view of the proximal stomach; findings and interventions are described below. Findings:   Esophagus: Benign appearing  esophageal stricture GEJ. TTS  18 mm dilator used for dilation of stricture successfully. Superficial mucosa tear noted. Presbyesophagus noted. Normal mucosa of rest of the examined esophagus. Stomach: Large ( 7 cm) sliding hiatal hernia noted. Mucosal exam was normal   Duodenum/jejunum: normal mucosa    Therapies:  esophageal dilation with 18mm sized balloon    Specimens: none    Implants: none           Complications:   None; patient tolerated the procedure well. EBL:  None.            Impression:    Hiatal Hernia  Esophageal Stricture      Recommendations:    -Continue acid suppression. ,  -Acid suppression with a proton pump inhibitor. ,   -Follow symptoms. ,   -GERD diet: avoid fried and fatty foods.  peppermint, chocolate, alcohol, coffee, citrus fruits and juices, tomoato products; avoid lying down for 2 to 3 hours after eating.  -Call office for questions/concerns  -repeat dilation SERGEI Sims MD

## 2021-08-04 NOTE — PROGRESS NOTES
Jonna Burns  1950  [id number]    Situation:  Verbal report received from: Carly  Procedure: Procedure(s):  ESOPHAGOGASTRODUODENOSCOPY (EGD)  ESOPHAGEAL DILATION    Background:    Preoperative diagnosis: DYSPHAGIA  Postoperative diagnosis: Hiatal Hernia  Esophageal Stricture    :  Dr. Rexine Bosworth  Assistant(s): Endoscopy Technician-1: Josephine Narvaez  Endoscopy RN-1: Imtiaz Reich RN    Specimens: * No specimens in log *  H. Pylori  no    Assessment:  Intra-procedure medications     Anesthesia gave intra-procedure sedation and medications, see anesthesia flow sheet yes    Intravenous fluids: NS@ KVO     Vital signs stable Yes    Abdominal assessment: round and soft Yes    Recommendation:  Discharge patient per MD order yes.   Return to floor NA  Family or Friend Family  Permission to share finding with family or friend yes

## 2021-10-27 DIAGNOSIS — K21.9 GERD WITHOUT ESOPHAGITIS: ICD-10-CM

## 2021-10-27 RX ORDER — PANTOPRAZOLE SODIUM 20 MG/1
TABLET, DELAYED RELEASE ORAL
Qty: 90 TABLET | Refills: 1 | Status: SHIPPED | OUTPATIENT
Start: 2021-10-27 | End: 2022-02-18

## 2021-11-04 ENCOUNTER — OFFICE VISIT (OUTPATIENT)
Dept: INTERNAL MEDICINE CLINIC | Age: 71
End: 2021-11-04
Payer: MEDICARE

## 2021-11-04 VITALS
WEIGHT: 228 LBS | DIASTOLIC BLOOD PRESSURE: 84 MMHG | HEART RATE: 70 BPM | SYSTOLIC BLOOD PRESSURE: 120 MMHG | BODY MASS INDEX: 30.88 KG/M2 | OXYGEN SATURATION: 98 % | HEIGHT: 72 IN | RESPIRATION RATE: 16 BRPM

## 2021-11-04 DIAGNOSIS — I10 ESSENTIAL HYPERTENSION: Primary | ICD-10-CM

## 2021-11-04 DIAGNOSIS — Z23 ENCOUNTER FOR IMMUNIZATION: ICD-10-CM

## 2021-11-04 DIAGNOSIS — K22.2 ESOPHAGEAL STRICTURE: ICD-10-CM

## 2021-11-04 DIAGNOSIS — Z72.0 TOBACCO ABUSE: ICD-10-CM

## 2021-11-04 DIAGNOSIS — E78.5 DYSLIPIDEMIA, GOAL LDL BELOW 100: ICD-10-CM

## 2021-11-04 PROBLEM — R07.89 CHEST TIGHTNESS: Status: RESOLVED | Noted: 2017-05-18 | Resolved: 2021-11-04

## 2021-11-04 LAB
ALBUMIN SERPL-MCNC: 3.6 G/DL (ref 3.5–5)
ALBUMIN/GLOB SERPL: 1.1 {RATIO} (ref 1.1–2.2)
ALP SERPL-CCNC: 85 U/L (ref 45–117)
ALT SERPL-CCNC: 26 U/L (ref 12–78)
ANION GAP SERPL CALC-SCNC: 4 MMOL/L (ref 5–15)
AST SERPL-CCNC: 19 U/L (ref 15–37)
BILIRUB SERPL-MCNC: 0.4 MG/DL (ref 0.2–1)
BUN SERPL-MCNC: 23 MG/DL (ref 6–20)
BUN/CREAT SERPL: 17 (ref 12–20)
CALCIUM SERPL-MCNC: 9.2 MG/DL (ref 8.5–10.1)
CHLORIDE SERPL-SCNC: 109 MMOL/L (ref 97–108)
CHOLEST SERPL-MCNC: 138 MG/DL
CO2 SERPL-SCNC: 25 MMOL/L (ref 21–32)
CREAT SERPL-MCNC: 1.33 MG/DL (ref 0.7–1.3)
GLOBULIN SER CALC-MCNC: 3.2 G/DL (ref 2–4)
GLUCOSE SERPL-MCNC: 106 MG/DL (ref 65–100)
HDLC SERPL-MCNC: 42 MG/DL
HDLC SERPL: 3.3 {RATIO} (ref 0–5)
LDLC SERPL CALC-MCNC: 78.2 MG/DL (ref 0–100)
POTASSIUM SERPL-SCNC: 5 MMOL/L (ref 3.5–5.1)
PROT SERPL-MCNC: 6.8 G/DL (ref 6.4–8.2)
SODIUM SERPL-SCNC: 138 MMOL/L (ref 136–145)
TRIGL SERPL-MCNC: 89 MG/DL (ref ?–150)
TSH SERPL DL<=0.05 MIU/L-ACNC: 1.69 UIU/ML (ref 0.36–3.74)
VLDLC SERPL CALC-MCNC: 17.8 MG/DL

## 2021-11-04 PROCEDURE — 90471 IMMUNIZATION ADMIN: CPT

## 2021-11-04 PROCEDURE — 3017F COLORECTAL CA SCREEN DOC REV: CPT | Performed by: INTERNAL MEDICINE

## 2021-11-04 PROCEDURE — G8432 DEP SCR NOT DOC, RNG: HCPCS | Performed by: INTERNAL MEDICINE

## 2021-11-04 PROCEDURE — G0463 HOSPITAL OUTPT CLINIC VISIT: HCPCS | Performed by: INTERNAL MEDICINE

## 2021-11-04 PROCEDURE — G8754 DIAS BP LESS 90: HCPCS | Performed by: INTERNAL MEDICINE

## 2021-11-04 PROCEDURE — 3288F FALL RISK ASSESSMENT DOCD: CPT | Performed by: INTERNAL MEDICINE

## 2021-11-04 PROCEDURE — G8427 DOCREV CUR MEDS BY ELIG CLIN: HCPCS | Performed by: INTERNAL MEDICINE

## 2021-11-04 PROCEDURE — G8536 NO DOC ELDER MAL SCRN: HCPCS | Performed by: INTERNAL MEDICINE

## 2021-11-04 PROCEDURE — 90715 TDAP VACCINE 7 YRS/> IM: CPT | Performed by: INTERNAL MEDICINE

## 2021-11-04 PROCEDURE — 1100F PTFALLS ASSESS-DOCD GE2>/YR: CPT | Performed by: INTERNAL MEDICINE

## 2021-11-04 PROCEDURE — G8417 CALC BMI ABV UP PARAM F/U: HCPCS | Performed by: INTERNAL MEDICINE

## 2021-11-04 PROCEDURE — 99214 OFFICE O/P EST MOD 30 MIN: CPT | Performed by: INTERNAL MEDICINE

## 2021-11-04 PROCEDURE — G8752 SYS BP LESS 140: HCPCS | Performed by: INTERNAL MEDICINE

## 2021-11-04 NOTE — PROGRESS NOTES
HISTORY OF PRESENT ILLNESS  Flex Huber is a 66 y.o. male. HPI  He looks forward to traveling to [place] to see his family over Thanksgiving. He has not seen them since the pandemic. He has not been going to the gym and notes weight gain and some deconditioning. No chest pain. He did have an esophageal stricture dilated this summer with Dr. Negar Cary, but unfortunately notes recurrence of dysphagia, particularly to chicken. Remains on Protonix. Suggested follow up with GI. Tolerates Simvastatin. No myalgias. Will check labs. Review of Systems   Constitutional: Positive for malaise/fatigue. Negative for chills, fever and weight loss. Respiratory: Negative for cough, shortness of breath and wheezing. Cardiovascular: Negative for chest pain, palpitations, orthopnea, leg swelling and PND. Gastrointestinal: Positive for heartburn. Negative for abdominal pain, blood in stool, constipation, diarrhea and nausea. Musculoskeletal: Negative for myalgias. Neurological: Negative for dizziness and headaches. Physical Exam  Vitals and nursing note reviewed. Constitutional:       Appearance: He is well-developed. HENT:      Head: Normocephalic and atraumatic. Neck:      Thyroid: No thyromegaly. Vascular: No carotid bruit. Cardiovascular:      Rate and Rhythm: Normal rate and regular rhythm. Heart sounds: Normal heart sounds. Pulmonary:      Effort: Pulmonary effort is normal. No respiratory distress. Breath sounds: Normal breath sounds. No wheezing or rales. Musculoskeletal:      Cervical back: Normal range of motion and neck supple. Lymphadenopathy:      Cervical: No cervical adenopathy. Neurological:      Mental Status: He is alert and oriented to person, place, and time. Psychiatric:         Behavior: Behavior normal.         ASSESSMENT and PLAN  Diagnoses and all orders for this visit:    1. Essential hypertension  Stable on meds  2.  Esophageal stricture-dilated in august and on ppi but still with dysphagia advised see dr Josue Aaron again    3. Dyslipidemia, goal LDL below 906  -     METABOLIC PANEL, COMPREHENSIVE; Future  -     LIPID PANEL; Future  -     TSH 3RD GENERATION; Future    4.  Encounter for immunization  -     TETANUS, DIPHTHERIA TOXOIDS AND ACELLULAR PERTUSSIS VACCINE (TDAP), IN INDIVIDS. >=7, IM  appt in 6mose

## 2021-11-17 ENCOUNTER — OFFICE VISIT (OUTPATIENT)
Dept: CARDIOLOGY CLINIC | Age: 71
End: 2021-11-17
Payer: MEDICARE

## 2021-11-17 VITALS
RESPIRATION RATE: 14 BRPM | OXYGEN SATURATION: 97 % | HEIGHT: 72 IN | BODY MASS INDEX: 30.91 KG/M2 | WEIGHT: 228.2 LBS | DIASTOLIC BLOOD PRESSURE: 84 MMHG | SYSTOLIC BLOOD PRESSURE: 122 MMHG | HEART RATE: 82 BPM

## 2021-11-17 DIAGNOSIS — I49.3 PVC (PREMATURE VENTRICULAR CONTRACTION): Primary | ICD-10-CM

## 2021-11-17 PROCEDURE — 93010 ELECTROCARDIOGRAM REPORT: CPT | Performed by: INTERNAL MEDICINE

## 2021-11-17 PROCEDURE — G8417 CALC BMI ABV UP PARAM F/U: HCPCS | Performed by: INTERNAL MEDICINE

## 2021-11-17 PROCEDURE — G8752 SYS BP LESS 140: HCPCS | Performed by: INTERNAL MEDICINE

## 2021-11-17 PROCEDURE — G8432 DEP SCR NOT DOC, RNG: HCPCS | Performed by: INTERNAL MEDICINE

## 2021-11-17 PROCEDURE — 1100F PTFALLS ASSESS-DOCD GE2>/YR: CPT | Performed by: INTERNAL MEDICINE

## 2021-11-17 PROCEDURE — G8754 DIAS BP LESS 90: HCPCS | Performed by: INTERNAL MEDICINE

## 2021-11-17 PROCEDURE — G8536 NO DOC ELDER MAL SCRN: HCPCS | Performed by: INTERNAL MEDICINE

## 2021-11-17 PROCEDURE — 3017F COLORECTAL CA SCREEN DOC REV: CPT | Performed by: INTERNAL MEDICINE

## 2021-11-17 PROCEDURE — G8427 DOCREV CUR MEDS BY ELIG CLIN: HCPCS | Performed by: INTERNAL MEDICINE

## 2021-11-17 PROCEDURE — G0463 HOSPITAL OUTPT CLINIC VISIT: HCPCS | Performed by: INTERNAL MEDICINE

## 2021-11-17 PROCEDURE — 3288F FALL RISK ASSESSMENT DOCD: CPT | Performed by: INTERNAL MEDICINE

## 2021-11-17 PROCEDURE — 93005 ELECTROCARDIOGRAM TRACING: CPT | Performed by: INTERNAL MEDICINE

## 2021-11-17 PROCEDURE — 99215 OFFICE O/P EST HI 40 MIN: CPT | Performed by: INTERNAL MEDICINE

## 2021-11-17 NOTE — PROGRESS NOTES
Room EP 4  Visit Vitals  /84 (BP 1 Location: Left upper arm, BP Patient Position: Sitting)   Pulse 82   Resp 14   Ht 6' (1.829 m)   Wt 228 lb 3.2 oz (103.5 kg)   SpO2 97%   BMI 30.95 kg/m²       Chest pain: no  Shortness of breath: no  Edema: no  Palpitations, Skipped beats, Rapid heartbeat: no  Dizziness: no  Fatigue:no    New diagnosis/Surgeries: left rotator cuff surgery in Jan 2021    ER/Hospitalizations: no    Refills:no

## 2021-11-17 NOTE — PROGRESS NOTES
HISTORY OF PRESENTING ILLNESS      Renata Padilla is a 66 y.o. male with hx of hypertension, MVP and frequent PVCs with holter demonstrating a PVC burden of 13%. He is asymptomatic and cMRI demonstrated normal LV/RV function without scar. Last visit, we planned for repeat Holter monitor and echocardiogram at 6 month follow up. Echocardiogram demonstrated preserved LV function and Holter demonstrated 30% PVC burden.  As a result he was started on a trial of flecainide 50 mg twice daily.  Repeat Holter monitoring demonstrated PVC burden of 5%.        He discontinued Flecainide and repeat holter showed PVC burden <0.1% on metoprolol alone.      He was last seen 1/2021 and his metoprolol was increased to 25mg daily and follow up in 2 weeks with 24 hour holter monitor. Repeat echo shows heart function of 50% and his monitor demonstrated a 20.2% PVC burden.      Flecainide was restarted and monitor shows 1.7% PVCs. EKG today shows sinus rhythm. PAST MEDICAL HISTORY     Past Medical History:   Diagnosis Date    Arthritis     Dyslipidemia     Esophageal stricture 11/4/2021    egd 8/21 dr Anca Sanders dilated    Essential hypertension     Frequent PVCs     GERD (gastroesophageal reflux disease)     H/O seasonal allergies     Mitral valve prolapse     pt states Dr. Chavez Santos is not sure if this is the correct diagnosis    Obesity 05/02/2018    Obesity  BMI= 32.6           PAST SURGICAL HISTORY     Past Surgical History:   Procedure Laterality Date    COLONOSCOPY N/A 6/2/2017    [facility]  6/2/2017         EGD  6/2/2017         HX HERNIA REPAIR      times 2. 1980's inguinal bilateral    HX HIP REPLACEMENT Left 2018    HX ORTHOPAEDIC Left 2018    hip replacement    HX OTHER SURGICAL  1977    Repair of the right shoulder.      HX ROTATOR CUFF REPAIR Left 01/14/2021    HX TONSILLECTOMY      childhood    UPPER GI ENDOSCOPY,PABLO W GUIDE  2017         UPPER GI ENDOSCOPY,PABLO W GUIDE  2018               ALLERGIES     No Known Allergies       FAMILY HISTORY     Family History   Problem Relation Age of Onset   [facility] Sudden Death Mother         Car accident    Heart Attack Father 48    Heart Disease Father     Thyroid Disease Sister     No Known Problems Brother     No Known Problems Sister     No Known Problems Sister     No Known Problems Brother     No Known Problems Brother     Cancer Maternal Aunt [de-identified]        multiple myloma    negative for cardiac disease       SOCIAL HISTORY     Social History     Socioeconomic History    Marital status:    Tobacco Use    Smoking status: Former Smoker     Packs/day: 1.00     Years: 30.00     Pack years: 30.00     Quit date: 1997     Years since quittin.8    Smokeless tobacco: Never Used   Vaping Use    Vaping Use: Never used   Substance and Sexual Activity    Alcohol use: Yes     Alcohol/week: 6.0 standard drinks     Types: 3 Glasses of wine, 3 Cans of beer per week    Drug use: No         MEDICATIONS     Current Outpatient Medications   Medication Sig    pantoprazole (PROTONIX) 20 mg tablet TAKE 1 TABLET EVERY DAY    metoprolol succinate (TOPROL-XL) 25 mg XL tablet TAKE 1/2 TABLET EVERY DAY    aspirin 81 mg chewable tablet Take 81 mg by mouth daily.  simvastatin (ZOCOR) 5 mg tablet Take 1 Tab by mouth nightly. TAKE 1 TABLET EVERY NIGHT    flecainide (TAMBOCOR) 50 mg tablet Take 1 Tab by mouth two (2) times a day.  multivitamin (ONE A DAY) tablet Take 1 Tab by mouth daily.  VITAMIN B COMPLEX (B COMPLEX PO) Take  by mouth daily.  DOCOSAHEXANOIC ACID/EPA (FISH OIL PO) Take 1,200 mg by mouth two (2) times a day.  GLUCOSAMINE SULFATE (GLUCOSAMINE PO) Take 1,500 mg by mouth two (2) times a day.  CHOLECALCIFEROL, VITAMIN D3, (VITAMIN D3 PO) Take 1 Tab by mouth daily. No current facility-administered medications for this visit. I have reviewed the nurses notes, vitals, problem list, allergy list, medical history, family, social history and medications. REVIEW OF SYMPTOMS      General: Pt denies excessive weight gain or loss. Pt is able to conduct ADL's  HEENT: Denies blurred vision, headaches, hearing loss, epistaxis and difficulty swallowing. Respiratory: Denies cough, congestion, shortness of breath, OCONNOR, wheezing or stridor. Cardiovascular: Denies precordial pain, palpitations, edema or PND  Gastrointestinal: Denies poor appetite, indigestion, abdominal pain or blood in stool  Genitourinary: Denies hematuria, dysuria, increased urinary frequency  Musculoskeletal: Denies joint pain or swelling from muscles or joints  Neurologic: Denies tremor, paresthesias, headache, or sensory motor disturbance  Psychiatric: Denies confusion, insomnia, depression  Integumentray: Denies rash, itching or ulcers. Hematologic: Denies easy bruising, bleeding       PHYSICAL EXAMINATION      Vitals: see vitals section  General: Well developed, in no acute distress. HEENT: No jaundice, oral mucosa moist, no oral ulcers  Neck: Supple, no stiffness, no lymphadenopathy, supple  Heart:  Normal S1/S2 negative S3 or S4. Regular, no murmur, gallop or rub, no jugular venous distention  Respiratory: Clear bilaterally x 4, no wheezing or rales  Abdomen:   Soft, non-tender, bowel sounds are active. Extremities:  No edema, normal cap refill, no cyanosis. Musculoskeletal: No clubbing, no deformities  Neuro: A&Ox3, speech clear, gait stable, cooperative, no focal neurologic deficits  Skin: Skin color is normal. No rashes or lesions.  Non diaphoretic, moist.  Vascular: 2+ pulses symmetric in all extremities       DIAGNOSTIC DATA      EKG: Sinus rhythm       LABORATORY DATA      Lab Results   Component Value Date/Time    WBC 7.9 02/01/2021 02:04 PM    HGB 13.4 02/01/2021 02:04 PM    HCT 39.2 02/01/2021 02:04 PM    PLATELET 748 93/94/3981 02:04 PM    MCV 87.3 02/01/2021 02:04 PM      Lab Results   Component Value Date/Time    Sodium 138 11/04/2021 09:55 AM    Potassium 5.0 11/04/2021 09:55 AM    Chloride 109 (H) 11/04/2021 09:55 AM    CO2 25 11/04/2021 09:55 AM    Anion gap 4 (L) 11/04/2021 09:55 AM    Glucose 106 (H) 11/04/2021 09:55 AM    BUN 23 (H) 11/04/2021 09:55 AM    Creatinine 1.33 (H) 11/04/2021 09:55 AM    BUN/Creatinine ratio 17 11/04/2021 09:55 AM    GFR est AA >60 11/04/2021 09:55 AM    GFR est non-AA 53 (L) 11/04/2021 09:55 AM    Calcium 9.2 11/04/2021 09:55 AM    Bilirubin, total 0.4 11/04/2021 09:55 AM    Alk. phosphatase 85 11/04/2021 09:55 AM    Protein, total 6.8 11/04/2021 09:55 AM    Albumin 3.6 11/04/2021 09:55 AM    Globulin 3.2 11/04/2021 09:55 AM    A-G Ratio 1.1 11/04/2021 09:55 AM    ALT (SGPT) 26 11/04/2021 09:55 AM           ASSESSMENT      1. PVCs                        L. Asymptomatic  2. Hypertension  3. Mitral valve proplapse       PLAN     Continue current drug regimen. FOLLOW-UP     1 year with Walter Hahn        Thank you, Carly Michael MD for allowing me to participate in the care of this extraordinarily pleasant male. Please do not hesitate to contact me for further questions/concerns.          Toño Rivera MD  Cardiac Electrophysiology / Cardiology    [address]  [address] [facility], [facility], [address] [place] [address]  [phone] / [phone] Fax   [phone] / [phone] Fax

## 2021-11-18 RX ORDER — FLECAINIDE ACETATE 50 MG/1
TABLET ORAL
Qty: 180 TABLET | Refills: 0 | Status: SHIPPED | OUTPATIENT
Start: 2021-11-18 | End: 2022-02-21

## 2021-12-17 ENCOUNTER — OFFICE VISIT (OUTPATIENT)
Dept: INTERNAL MEDICINE CLINIC | Age: 71
End: 2021-12-17
Payer: MEDICARE

## 2021-12-17 VITALS
WEIGHT: 230.4 LBS | DIASTOLIC BLOOD PRESSURE: 80 MMHG | SYSTOLIC BLOOD PRESSURE: 116 MMHG | RESPIRATION RATE: 18 BRPM | HEART RATE: 98 BPM | BODY MASS INDEX: 31.21 KG/M2 | OXYGEN SATURATION: 94 % | HEIGHT: 72 IN | TEMPERATURE: 97.9 F

## 2021-12-17 DIAGNOSIS — I10 ESSENTIAL HYPERTENSION: ICD-10-CM

## 2021-12-17 DIAGNOSIS — Z87.891 PERSONAL HISTORY OF NICOTINE DEPENDENCE: ICD-10-CM

## 2021-12-17 DIAGNOSIS — R05.3 CHRONIC COUGH: Primary | ICD-10-CM

## 2021-12-17 DIAGNOSIS — K22.2 ESOPHAGEAL STRICTURE: ICD-10-CM

## 2021-12-17 PROCEDURE — G8417 CALC BMI ABV UP PARAM F/U: HCPCS | Performed by: INTERNAL MEDICINE

## 2021-12-17 PROCEDURE — G8536 NO DOC ELDER MAL SCRN: HCPCS | Performed by: INTERNAL MEDICINE

## 2021-12-17 PROCEDURE — G8752 SYS BP LESS 140: HCPCS | Performed by: INTERNAL MEDICINE

## 2021-12-17 PROCEDURE — 3017F COLORECTAL CA SCREEN DOC REV: CPT | Performed by: INTERNAL MEDICINE

## 2021-12-17 PROCEDURE — G8754 DIAS BP LESS 90: HCPCS | Performed by: INTERNAL MEDICINE

## 2021-12-17 PROCEDURE — G8427 DOCREV CUR MEDS BY ELIG CLIN: HCPCS | Performed by: INTERNAL MEDICINE

## 2021-12-17 PROCEDURE — G0463 HOSPITAL OUTPT CLINIC VISIT: HCPCS | Performed by: INTERNAL MEDICINE

## 2021-12-17 PROCEDURE — 1101F PT FALLS ASSESS-DOCD LE1/YR: CPT | Performed by: INTERNAL MEDICINE

## 2021-12-17 PROCEDURE — G8510 SCR DEP NEG, NO PLAN REQD: HCPCS | Performed by: INTERNAL MEDICINE

## 2021-12-17 PROCEDURE — 99214 OFFICE O/P EST MOD 30 MIN: CPT | Performed by: INTERNAL MEDICINE

## 2021-12-17 NOTE — PROGRESS NOTES
HISTORY OF PRESENT ILLNESS  Chehalis Men is a 66 y.o. male. HPI  Seen to discuss chronic cough. He had a cold in October. Since then he has had a spastic cough, seems to be worse later in the day, mostly nonproductive, not triggered by eating, maybe some postnasal drainage. He does not feel itchy, is not having fevers or chills. He takes Protonix every morning. Does occasionally feel like food is getting stuck and sees Dr. Nela Kwong in January for follow up. He has smoked for over 30 pack years and has had low dose lung CT screening annually. Due this year. Screen in 2020 did not show suspicious nodules. He has never used long acting inhalers for presumed emphysema. Does note that he feels a bit more winded. He is not having hemoptysis, fevers, chills or weight loss. Review of Systems   Constitutional: Negative for chills, fever and weight loss. Respiratory: Positive for cough and shortness of breath. Negative for hemoptysis, sputum production and wheezing. Cardiovascular: Negative for chest pain, palpitations, orthopnea, leg swelling and PND. Gastrointestinal: Positive for heartburn. Negative for abdominal pain and nausea. Musculoskeletal: Negative for myalgias. Neurological: Negative for dizziness and headaches. Endo/Heme/Allergies: Positive for environmental allergies. Physical Exam  Vitals and nursing note reviewed. Constitutional:       Appearance: He is well-developed. HENT:      Head: Normocephalic and atraumatic. Neck:      Thyroid: No thyromegaly. Vascular: No carotid bruit. Cardiovascular:      Rate and Rhythm: Normal rate and regular rhythm. Heart sounds: Normal heart sounds. Pulmonary:      Effort: Pulmonary effort is normal. No respiratory distress. Breath sounds: Normal breath sounds. No wheezing or rales. Musculoskeletal:      Cervical back: Normal range of motion and neck supple.    Neurological:      Mental Status: He is alert and oriented to person, place, and time. Psychiatric:         Behavior: Behavior normal.         ASSESSMENT and PLAN  Diagnoses and all orders for this visit:    1. Chronic cough  -     umeclidinium-vilanteroL (ANORO ELLIPTA) 62.5-25 mcg/actuation inhaler; Take 1 Puff by inhalation daily.  -     PULMONARY FUNCTION TEST; Future    2. Essential hypertension    3. Esophageal stricture    4. 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

## 2022-01-10 ENCOUNTER — HOSPITAL ENCOUNTER (OUTPATIENT)
Dept: LAB | Age: 72
Discharge: HOME | End: 2022-01-10
Payer: MEDICARE

## 2022-01-10 ENCOUNTER — TRANSCRIBE ORDER (OUTPATIENT)
Dept: REGISTRATION | Age: 72
End: 2022-01-10

## 2022-01-10 DIAGNOSIS — Z01.812 ENCOUNTER FOR PREOPERATIVE SCREENING LABORATORY TESTING FOR COVID-19 VIRUS: Primary | ICD-10-CM

## 2022-01-10 DIAGNOSIS — Z20.822 ENCOUNTER FOR PREOPERATIVE SCREENING LABORATORY TESTING FOR COVID-19 VIRUS: Primary | ICD-10-CM

## 2022-01-10 DIAGNOSIS — Z20.822 ENCOUNTER FOR PREOPERATIVE SCREENING LABORATORY TESTING FOR COVID-19 VIRUS: ICD-10-CM

## 2022-01-10 DIAGNOSIS — Z01.812 ENCOUNTER FOR PREOPERATIVE SCREENING LABORATORY TESTING FOR COVID-19 VIRUS: ICD-10-CM

## 2022-01-10 PROCEDURE — U0005 INFEC AGEN DETEC AMPLI PROBE: HCPCS

## 2022-01-11 LAB
SARS-COV-2, XPLCVT: NOT DETECTED
SOURCE, COVRS: NORMAL

## 2022-01-14 ENCOUNTER — HOSPITAL ENCOUNTER (OUTPATIENT)
Dept: PULMONOLOGY | Age: 72
Discharge: HOME | End: 2022-01-14
Attending: INTERNAL MEDICINE
Payer: MEDICARE

## 2022-01-14 ENCOUNTER — HOSPITAL ENCOUNTER (OUTPATIENT)
Dept: CT IMAGING | Age: 72
Discharge: HOME | End: 2022-01-14
Attending: INTERNAL MEDICINE
Payer: MEDICARE

## 2022-01-14 VITALS — HEIGHT: 72 IN | BODY MASS INDEX: 30.46 KG/M2 | WEIGHT: 224.87 LBS

## 2022-01-14 VITALS — OXYGEN SATURATION: 96 %

## 2022-01-14 DIAGNOSIS — R05.3 CHRONIC COUGH: ICD-10-CM

## 2022-01-14 DIAGNOSIS — Z87.891 PERSONAL HISTORY OF NICOTINE DEPENDENCE: ICD-10-CM

## 2022-01-14 PROCEDURE — 94729 DIFFUSING CAPACITY: CPT

## 2022-01-14 PROCEDURE — 71271 CT THORAX LUNG CANCER SCR C-: CPT

## 2022-01-14 PROCEDURE — 94375 RESPIRATORY FLOW VOLUME LOOP: CPT

## 2022-01-14 PROCEDURE — 94760 N-INVAS EAR/PLS OXIMETRY 1: CPT

## 2022-01-14 PROCEDURE — 94726 PLETHYSMOGRAPHY LUNG VOLUMES: CPT

## 2022-01-15 NOTE — PROGRESS NOTES
Can you make sure he got my message to make a follow up with dr mercedes concerning the findings  of coronary artery disease based on ct scan-should see him in next 1-2 mo  See me in April as planned to discuss follow up ct for the small lung nodules

## 2022-01-16 NOTE — PROCEDURES
[facility] 78  PULMONARY FUNCTION TEST    Name:  Emilee Reyes  MR#:  [id number]  :  1950  ACCOUNT #:  [de-identified]  DATE OF SERVICE:  2022      Review of his spirometry shows an FEV1 to FVC ratio of 58% predicted. This is consistent with an obstructive defect with FVC and FEV1 within normal limits with an FVC of 5.56 L and 121% predicted and an FEV1 of 3.79 L and a 124% predicted making the airflow obstruction mild. There is evidence of air trapping and hyperinflation with the residual volume of 232% predicted and a total lung capacity of 171% predicted. Diffusing capacity within normal limits at 96% predicted.       Toyin Rodriguez MD      KP/MARY [id number]  D:  2022 10:38  T:  2022 13:23  JOB #:  [id number]

## 2022-02-11 DIAGNOSIS — I49.3 PVC (PREMATURE VENTRICULAR CONTRACTION): ICD-10-CM

## 2022-02-11 DIAGNOSIS — I42.9 CARDIOMYOPATHY, UNSPECIFIED TYPE (HCC): ICD-10-CM

## 2022-02-11 DIAGNOSIS — I25.10 CORONARY ARTERY DISEASE INVOLVING NATIVE CORONARY ARTERY OF NATIVE HEART WITHOUT ANGINA PECTORIS: Primary | ICD-10-CM

## 2022-02-11 DIAGNOSIS — I10 ESSENTIAL HYPERTENSION: ICD-10-CM

## 2022-02-17 DIAGNOSIS — K21.9 GERD WITHOUT ESOPHAGITIS: ICD-10-CM

## 2022-02-18 ENCOUNTER — PATIENT MESSAGE (OUTPATIENT)
Dept: INTERNAL MEDICINE CLINIC | Age: 72
End: 2022-02-18

## 2022-02-18 DIAGNOSIS — K21.9 GERD WITHOUT ESOPHAGITIS: ICD-10-CM

## 2022-02-18 RX ORDER — PANTOPRAZOLE SODIUM 20 MG/1
TABLET, DELAYED RELEASE ORAL
Qty: 90 TABLET | Refills: 1 | Status: SHIPPED | OUTPATIENT
Start: 2022-02-18 | End: 2022-02-18 | Stop reason: SDUPTHER

## 2022-02-18 RX ORDER — PANTOPRAZOLE SODIUM 20 MG/1
20 TABLET, DELAYED RELEASE ORAL 2 TIMES DAILY
Qty: 180 TABLET | Refills: 0 | Status: SHIPPED | OUTPATIENT
Start: 2022-02-18 | End: 2022-08-04

## 2022-02-18 NOTE — TELEPHONE ENCOUNTER
From: Magalys Franklin  To: Yi Dalton MD  Sent: 2/18/2022 10:31 AM EST  Subject: Pantoprazole refill/dosage    I saw Dr. Tai Sung on January 20. I had been experiencing continued esophogeal issues. At that time we scheduled me for another dialation on   April 13. He told me to continue with the 20mg dosage of Pantoprazole but to take it twice per day instead of just once. Can you update that on my prescription with [employer] ? Thanks.   PS see you on April 12 for our appointment

## 2022-02-21 RX ORDER — FLECAINIDE ACETATE 50 MG/1
TABLET ORAL
Qty: 180 TABLET | Refills: 2 | Status: SHIPPED | OUTPATIENT
Start: 2022-02-21 | End: 2022-03-17 | Stop reason: SDUPTHER

## 2022-02-21 NOTE — TELEPHONE ENCOUNTER
Requested Prescriptions     Signed Prescriptions Disp Refills    flecainide (TAMBOCOR) 50 mg tablet 180 Tablet 2     Sig: TAKE 1 TABLET TWICE DAILY     Authorizing Provider: Nereida French     Ordering User: Sydnie BAEZA     Refill per verbal order CHETAN Spear NP.    Last visit:  11/17/21  Next visit:  12/2/22

## 2022-02-23 ENCOUNTER — ANCILLARY PROCEDURE (OUTPATIENT)
Dept: CARDIOLOGY CLINIC | Age: 72
End: 2022-02-23
Payer: MEDICARE

## 2022-02-23 VITALS — WEIGHT: 224 LBS | BODY MASS INDEX: 30.34 KG/M2 | HEIGHT: 72 IN

## 2022-02-23 DIAGNOSIS — I25.10 CORONARY ARTERY DISEASE INVOLVING NATIVE CORONARY ARTERY OF NATIVE HEART WITHOUT ANGINA PECTORIS: ICD-10-CM

## 2022-02-23 DIAGNOSIS — I49.3 PVC (PREMATURE VENTRICULAR CONTRACTION): ICD-10-CM

## 2022-02-23 DIAGNOSIS — I42.9 CARDIOMYOPATHY, UNSPECIFIED TYPE (HCC): ICD-10-CM

## 2022-02-23 DIAGNOSIS — I10 ESSENTIAL HYPERTENSION: ICD-10-CM

## 2022-02-23 PROCEDURE — 93017 CV STRESS TEST TRACING ONLY: CPT | Performed by: INTERNAL MEDICINE

## 2022-02-23 PROCEDURE — A9500 TC99M SESTAMIBI: HCPCS | Performed by: INTERNAL MEDICINE

## 2022-02-23 RX ORDER — TETRAKIS(2-METHOXYISOBUTYLISOCYANIDE)COPPER(I) TETRAFLUOROBORATE 1 MG/ML
24.6 INJECTION, POWDER, LYOPHILIZED, FOR SOLUTION INTRAVENOUS ONCE
Status: COMPLETED | OUTPATIENT
Start: 2022-02-23 | End: 2022-02-23

## 2022-02-23 RX ORDER — TETRAKIS(2-METHOXYISOBUTYLISOCYANIDE)COPPER(I) TETRAFLUOROBORATE 1 MG/ML
8.3 INJECTION, POWDER, LYOPHILIZED, FOR SOLUTION INTRAVENOUS ONCE
Status: COMPLETED | OUTPATIENT
Start: 2022-02-23 | End: 2022-02-23

## 2022-02-23 RX ADMIN — TECHNETIUM TC 99M SESTAMIBI 24.6 MILLICURIE: 1 INJECTION, POWDER, FOR SOLUTION INTRAVENOUS at 14:41

## 2022-02-23 RX ADMIN — TECHNETIUM TC 99M SESTAMIBI 8.3 MILLICURIE: 1 INJECTION, POWDER, FOR SOLUTION INTRAVENOUS at 12:50

## 2022-02-25 LAB
NUC STRESS EJECTION FRACTION: 53 %
STRESS ANGINA INDEX: 0
STRESS BASELINE DIAS BP: 80 MMHG
STRESS BASELINE HR: 80 BPM
STRESS BASELINE ST DEPRESSION: 0 MM
STRESS BASELINE SYS BP: 124 MMHG
STRESS ESTIMATED WORKLOAD: 10.1 METS
STRESS EXERCISE DUR MIN: 6 MIN
STRESS EXERCISE DUR SEC: 31 SEC
STRESS O2 SAT PEAK: 95 %
STRESS O2 SAT REST: 99 %
STRESS PEAK DIAS BP: 84 MMHG
STRESS PEAK SYS BP: 154 MMHG
STRESS PERCENT HR ACHIEVED: 89 %
STRESS POST PEAK HR: 131 BPM
STRESS RATE PRESSURE PRODUCT: NORMAL BPM*MMHG
STRESS SR DUKE TREADMILL SCORE: 7
STRESS ST DEPRESSION: 0 MM
STRESS TARGET HR: 148 BPM

## 2022-02-25 PROCEDURE — 78452 HT MUSCLE IMAGE SPECT MULT: CPT | Performed by: INTERNAL MEDICINE

## 2022-02-25 PROCEDURE — 93018 CV STRESS TEST I&R ONLY: CPT | Performed by: INTERNAL MEDICINE

## 2022-02-25 PROCEDURE — 93016 CV STRESS TEST SUPVJ ONLY: CPT | Performed by: INTERNAL MEDICINE

## 2022-03-01 DIAGNOSIS — E78.5 DYSLIPIDEMIA, GOAL LDL BELOW 100: ICD-10-CM

## 2022-03-01 RX ORDER — SIMVASTATIN 5 MG/1
5 TABLET, FILM COATED ORAL
Qty: 90 TABLET | Refills: 0 | Status: SHIPPED | OUTPATIENT
Start: 2022-03-01 | End: 2022-03-31

## 2022-03-17 RX ORDER — FLECAINIDE ACETATE 50 MG/1
50 TABLET ORAL 2 TIMES DAILY
Qty: 24 TABLET | Refills: 0 | Status: SHIPPED | OUTPATIENT
Start: 2022-03-17

## 2022-03-17 NOTE — TELEPHONE ENCOUNTER
Patient is requesting a temporary refill of a 12 day supply for Flecinide 50mg, pt is currently on vacation and has no medication. Please Advise.       Pharmacy Verified   ( [address] [place] [address] )    [phone]    Pt's Phone Number   [phone]

## 2022-03-18 PROBLEM — I10 ESSENTIAL HYPERTENSION: Status: ACTIVE | Noted: 2017-01-19

## 2022-03-18 PROBLEM — M16.12 PRIMARY OSTEOARTHRITIS OF LEFT HIP: Status: ACTIVE | Noted: 2018-05-06

## 2022-03-19 PROBLEM — K63.5 COLON POLYPS: Status: ACTIVE | Noted: 2017-01-19

## 2022-03-19 PROBLEM — J30.1 SEASONAL ALLERGIC RHINITIS DUE TO POLLEN: Status: ACTIVE | Noted: 2017-05-19

## 2022-03-20 PROBLEM — K22.2 ESOPHAGEAL STRICTURE: Status: ACTIVE | Noted: 2021-11-04

## 2022-03-20 PROBLEM — I34.1 MITRAL VALVE PROLAPSE: Status: ACTIVE | Noted: 2017-02-16

## 2022-03-20 PROBLEM — M17.12 PRIMARY OSTEOARTHRITIS OF LEFT KNEE: Status: ACTIVE | Noted: 2018-04-25

## 2022-03-31 ENCOUNTER — OFFICE VISIT (OUTPATIENT)
Dept: CARDIOLOGY CLINIC | Age: 72
End: 2022-03-31
Payer: MEDICARE

## 2022-03-31 VITALS
SYSTOLIC BLOOD PRESSURE: 130 MMHG | HEART RATE: 75 BPM | DIASTOLIC BLOOD PRESSURE: 88 MMHG | BODY MASS INDEX: 31.97 KG/M2 | WEIGHT: 236 LBS | OXYGEN SATURATION: 98 % | HEIGHT: 72 IN

## 2022-03-31 DIAGNOSIS — I10 ESSENTIAL HYPERTENSION: Primary | ICD-10-CM

## 2022-03-31 DIAGNOSIS — E78.5 DYSLIPIDEMIA, GOAL LDL BELOW 100: ICD-10-CM

## 2022-03-31 DIAGNOSIS — E78.5 DYSLIPIDEMIA: ICD-10-CM

## 2022-03-31 PROCEDURE — 3017F COLORECTAL CA SCREEN DOC REV: CPT | Performed by: SPECIALIST

## 2022-03-31 PROCEDURE — 99214 OFFICE O/P EST MOD 30 MIN: CPT | Performed by: SPECIALIST

## 2022-03-31 PROCEDURE — G8536 NO DOC ELDER MAL SCRN: HCPCS | Performed by: SPECIALIST

## 2022-03-31 PROCEDURE — G8432 DEP SCR NOT DOC, RNG: HCPCS | Performed by: SPECIALIST

## 2022-03-31 PROCEDURE — G8752 SYS BP LESS 140: HCPCS | Performed by: SPECIALIST

## 2022-03-31 PROCEDURE — G8417 CALC BMI ABV UP PARAM F/U: HCPCS | Performed by: SPECIALIST

## 2022-03-31 PROCEDURE — G8427 DOCREV CUR MEDS BY ELIG CLIN: HCPCS | Performed by: SPECIALIST

## 2022-03-31 PROCEDURE — G8754 DIAS BP LESS 90: HCPCS | Performed by: SPECIALIST

## 2022-03-31 PROCEDURE — 1101F PT FALLS ASSESS-DOCD LE1/YR: CPT | Performed by: SPECIALIST

## 2022-03-31 PROCEDURE — G0463 HOSPITAL OUTPT CLINIC VISIT: HCPCS | Performed by: SPECIALIST

## 2022-03-31 RX ORDER — SIMVASTATIN 10 MG/1
10 TABLET, FILM COATED ORAL
Qty: 90 TABLET | Refills: 5 | Status: SHIPPED | OUTPATIENT
Start: 2022-03-31

## 2022-03-31 NOTE — LETTER
3/31/2022    Patient: Siobhan Hemphill   YOB: 1950   Date of Visit: 3/31/2022     Radha Juarez, [address] [facility]  [address]  [facility]  Via In Basket    Dear Radha Juarez MD,      Thank you for referring Mr. Linwood Pleasure Thoen to [facility] for evaluation. My notes for this consultation are attached. If you have questions, please do not hesitate to call me. I look forward to following your patient along with you.       Sincerely,    Carter Baptiste MD

## 2022-03-31 NOTE — PROGRESS NOTES
CARDIOLOGY OFFICE NOTE    Rick Black MD, [address], [place], [address] [facility]  Phone [phone]; Fax [phone]  Mobile [phone]   Voice Mail [phone]    LAST OFFICE VISIT : Visit date not found  Rigo Whaley MD       ATTENTION:   This medical record was transcribed using an electronic medical records/speech recognition system. Although proofread, it may and can contain electronic, spelling and other errors. Corrections may be executed at a later time. Please feel free to contact us for any clarifications as needed. No diagnosis found. Elaine Alford is a 66 y.o. male with  referred for   hypertension dyslipidemia and mitral valve prolapse. Cardiac risk factors: family history, male gender, hypertension  I have personally obtained the history from the patient. HISTORY OF PRESENTING ILLNESS      Elaine Alford is a 69 y.o. male  with HTN and mitral valve prolapse . Well since I last saw him. He did have a CT scan of his lungs was a low-dose CT aghast because of history of smoking. Addition of calcium involving his coronary arteries. He did have a stress test February that was normal.  We talked about the importance of risk factor modification and making sure his LDL is under 70.          ACTIVE PROBLEM LIST     Patient Active Problem List    Diagnosis Date Noted    Esophageal stricture 11/04/2021    Primary osteoarthritis of left hip 05/06/2018    Primary osteoarthritis of left knee 04/25/2018    Seasonal allergic rhinitis due to pollen 05/19/2017    Mitral valve prolapse 02/16/2017    Colon polyps 01/19/2017    Essential hypertension 01/19/2017           PAST MEDICAL HISTORY     Past Medical History:   Diagnosis Date    Arthritis     Dyslipidemia     Esophageal stricture 11/4/2021    egd 8/21 dr Jody Alanis dilated    Essential hypertension     Frequent PVCs     GERD (gastroesophageal reflux disease)     H/O seasonal allergies     Mitral valve prolapse     pt states Dr. Quique Mortensen is not sure if this is the correct diagnosis    Obesity 2018    Obesity  BMI= 32.6           PAST SURGICAL HISTORY     Past Surgical History:   Procedure Laterality Date    COLONOSCOPY N/A 2017    COLONOS[facility] performed by [facility]n Fall [facility] at [facility]  2017         EGD  2017         HX HERNIA REPAIR      times 2. 1980's inguinal bilateral    HX HIP REPLACEMENT Left 2018    HX ORTHOPAEDIC Left 2018    hip replacement    HX OTHER SURGICAL  1977    Repair of the right shoulder.  HX ROTATOR CUFF REPAIR Left 2021    HX TONSILLECTOMY      childhood    UPPER GI ENDOSCOPY,DILATN W GUIDE  2017         UPPER GI ENDOSCOPY,DILATN W GUIDE  2018               ALLERGIES     No Known Allergies       FAMILY HISTORY     Family History   Problem Relation Age of Onset    Sudden Death Mother         Car accident    Heart Attack Father 48    Heart Disease Father     Thyroid Disease Sister     No Known Problems Brother     No Known Problems Sister     No Known Problems Sister     No Known Problems Brother     No Known Problems Brother     Cancer Maternal Aunt [de-identified]        multiple myloma    negative for cardiac disease       SOCIAL HISTORY     Social History     Socioeconomic History    Marital status:    Tobacco Use    Smoking status: Former Smoker     Packs/day: 1.00     Years: 30.00     Pack years: 30.00     Quit date: 1997     Years since quittin.1    Smokeless tobacco: Never Used   Vaping Use    Vaping Use: Never used   Substance and Sexual Activity    Alcohol use: Yes     Alcohol/week: 6.0 standard drinks     Types: 3 Glasses of wine, 3 Cans of beer per week    Drug use: No         MEDICATIONS     Current Outpatient Medications   Medication Sig    flecainide (TAMBOCOR) 50 mg tablet Take 1 Tablet by mouth two (2) times a day.     pantoprazole (PROTONIX) 20 mg tablet Take 1 Tablet by mouth two (2) times a day.  metoprolol succinate (TOPROL-XL) 25 mg XL tablet TAKE 1/2 TABLET EVERY DAY    aspirin 81 mg chewable tablet Take 81 mg by mouth daily.  multivitamin (ONE A DAY) tablet Take 1 Tab by mouth daily.  VITAMIN B COMPLEX (B COMPLEX PO) Take  by mouth daily.  DOCOSAHEXANOIC ACID/EPA (FISH OIL PO) Take 1,200 mg by mouth two (2) times a day.  GLUCOSAMINE SULFATE (GLUCOSAMINE PO) Take 1,500 mg by mouth two (2) times a day.  CHOLECALCIFEROL, VITAMIN D3, (VITAMIN D3 PO) Take 1 Tab by mouth daily.  simvastatin (ZOCOR) 5 mg tablet Take 1 Tablet by mouth nightly. TAKE 1 TABLET EVERY NIGHT    umeclidinium-vilanteroL (ANORO ELLIPTA) 62.5-25 mcg/actuation inhaler Take 1 Puff by inhalation daily. (Patient not taking: Reported on 3/31/2022)     No current facility-administered medications for this visit. I have reviewed the nurses notes, vitals, problem list, allergy list, medical history, family, social history and medications. REVIEW OF SYMPTOMS   Pertinent positive per HPI  General: Pt denies excessive weight gain or loss. Pt is able to conduct ADL's  HEENT: Denies blurred vision, headaches, hearing loss, epistaxis and difficulty swallowing. Respiratory: Denies cough, congestion, shortness of breath, OCONNOR, wheezing or stridor. Cardiovascular: Denies precordial pain, palpitations, edema or PND  Gastrointestinal: Denies poor appetite, indigestion, abdominal pain or blood in stool  Genitourinary: Denies hematuria, dysuria, increased urinary frequency  Musculoskeletal: Denies joint pain or swelling from muscles or joints  Neurologic: Denies tremor, paresthesias, headache, or sensory motor disturbance  Psychiatric: Denies confusion, insomnia, depression  Integumentray: Denies rash, itching or ulcers.   Hematologic: Denies easy bruising, bleeding     PHYSICAL EXAMINATION      Vitals:    03/31/22 1537   BP: 130/88   Pulse: 75   SpO2: 98% Weight: 236 lb (107 kg)   Height: 6' (1.829 m)     General: Well developed, in no acute distress. HEENT: No jaundice, oral mucosa moist, no oral ulcers  Neck: Supple, no stiffness, no lymphadenopathy, supple  Heart:  Normal S1/S2 negative S3 or S4. Regular, no murmur, gallop or rub, no jugular venous distention  Respiratory: Clear bilaterally x 4, no wheezing or rales  Extremities:  No edema, normal cap refill, no cyanosis. Musculoskeletal: No clubbing, no deformities  Neuro: A&Ox3, speech clear, gait stable, cooperative, no focal neurologic deficits  Skin: Skin color is normal. No rashes or lesions. Non diaphoretic, moist.         DIAGNOSTIC DATA     1. Lipids   1/9/17- , HDL 43, , TG 91   5/2/18- , HDL 37, LDL 84,    3/20/19-  HDL 36, LDL 88,    10/7/19- , HDL 38, LDL 87,    12/18/19- , HDL 42, LDL 83, TG 64   10/12/20- , HDL 46, LDL 86.4,    4/12/21- , HDL 41, LDL 88,    11/4/21- , HDL 42, LDL 78.2, TG 89    2. Holter   (1/19/17): sinus rhtyhm with PVCs there were frequent PVCs, rare couplets, triplets and bigeminy with rare episodes of non-sustained VT with rates up to 134.   9/28/17- SB with PVCs, HR 43-91, PVC burden 30.48%   11/8/17- SR HR , PVC burden 5 %   1/8/20- SR average HR 85, min 47, max 133, 1 VT 3 beats , PVC burden 0.7%   1/26/21- SR average HR 77, min 47, max 106,  times, longest 6 beats , fastest 3 beats , PVC burden 20.2%   5/5/21- PVC burden 1/7%    3. Echo   4/19/16- EF 30-44%, MR mild/mod, AI mild, LAE   9/28/17- EF 50-55%, PI mild/mod   1/4/21-EF  50%, RVE, CHELSIE, mild AI/MR/TR, PAP 28 mmHg     4.  Stress Test   Lexiscan- 6/15/17-no ischemia, EF 39%  Cardiolite-2/23/22- no ischemia, 10.1 mets, 6 min, EF 52%         LABORATORY DATA            Lab Results   Component Value Date/Time    WBC 7.9 02/01/2021 02:04 PM    HGB 13.4 02/01/2021 02:04 PM    HCT 39.2 02/01/2021 02:04 PM PLATELET 585 99/45/7833 02:04 PM    MCV 87.3 02/01/2021 02:04 PM      Lab Results   Component Value Date/Time    Sodium 138 11/04/2021 09:55 AM    Potassium 5.0 11/04/2021 09:55 AM    Chloride 109 (H) 11/04/2021 09:55 AM    CO2 25 11/04/2021 09:55 AM    Anion gap 4 (L) 11/04/2021 09:55 AM    Glucose 106 (H) 11/04/2021 09:55 AM    BUN 23 (H) 11/04/2021 09:55 AM    Creatinine 1.33 (H) 11/04/2021 09:55 AM    BUN/Creatinine ratio 17 11/04/2021 09:55 AM    GFR est AA >60 11/04/2021 09:55 AM    GFR est non-AA 53 (L) 11/04/2021 09:55 AM    Calcium 9.2 11/04/2021 09:55 AM    Bilirubin, total 0.4 11/04/2021 09:55 AM    Alk. phosphatase 85 11/04/2021 09:55 AM    Protein, total 6.8 11/04/2021 09:55 AM    Albumin 3.6 11/04/2021 09:55 AM    Globulin 3.2 11/04/2021 09:55 AM    A-G Ratio 1.1 11/04/2021 09:55 AM    ALT (SGPT) 26 11/04/2021 09:55 AM           ASSESSMENT/RECOMMENDATIONS:.      1. HTN  - Blood pressure is up slightly but he will keep track of it and if it remains elevated I would favor a blood pressure of 130/80 or less     2. Cardiomyopathy  -Last EF by echo was 50% in 2021  -Repeat echo in the future but he has no heart failure symptoms at this time I see no need to check it at the end.       3. PVC's  -Flecainide has brought down his PVCs to 1%  -No complaints of PVCs     4. Dyslipidemia   -LDL is above 70 with his elevated calcium score we will just try to drive his LDL down slightly more by increasing his Zocor to 10 mg we will repeat a lab in 2 months    5. History of mitral valve prolapse        return in 6 months    We discussed the expected course, resolution and complications of the diagnosis(es) in detail. Medication risks, benefits, costs, interactions, and alternatives were discussed as indicated. I advised him to contact the office if his condition worsens, changes or fails to improve as anticipated.  He expressed understanding with the diagnosis(es) and plan    No orders of the defined types were placed in this encounter. We discussed the expected course, resolution and complications of the diagnosis(es) in detail. Medication risks, benefits, costs, interactions, and alternatives were discussed as indicated. I advised him to contact the office if his condition worsens, changes or fails to improve as anticipated. He expressed understanding with the diagnosis(es) and plan                I have discussed the diagnosis with  Kevin Wood and the intended plan as seen in the above orders. Questions were answered concerning future plans. I have discussed medication side effects and warnings with the patient as well. Thank you,  Bharath Alicea MD for involving me in the care of  Kevin Wood. Please do not hesitate to contact me for further questions/concerns. Rick Nina MD, [facility] [address]      [address]      [phone] / [phone] Fax

## 2022-03-31 NOTE — PROGRESS NOTES
Juani Handler is a 66 y.o. male    Visit Vitals  /88 (BP 1 Location: Left upper arm, BP Patient Position: Sitting, BP Cuff Size: Adult)   Pulse 75   Ht 6' (1.829 m)   Wt 236 lb (107 kg)   SpO2 98%   BMI 32.01 kg/m²       Chief Complaint   Patient presents with    Follow-up     STRESS TEST    Coronary Artery Disease       Chest pain NO  SOB NO  Dizziness NO  Swelling NO  Recent hospital visit NO  Refills NO  COVID VACCINE STATUS YES  HAD COVID?  NO

## 2022-03-31 NOTE — PATIENT INSTRUCTIONS
1) because you do have some calcium in your coronary arteries on the CT scan that you recently had I suggest we increase your Zocor from 5 to 10 mg to drive your lousy cholesterol known as the LDL even further below 70.    2) please get your cholesterol checked again in 2 months at [place] [address]    3) follow-up with me in 6 months      It is  my pleasure to have the opportunity to be involved in taking care of you and to provide you the best cardiac care. At the end of every visit I  encourage you to eat healthy and clean and reduce your red meat intake as well as exercise 30 minutes 5 days a week to ensure a healthy heart. If you are a smoker , it will be essential that you stop smoking to reduce your risk of heart disease. Part of providing you the best heart care possible IS AN ACCURATE KNOWLEDGE OF YOUR MEDICINE. It  will be  essential at each office visit that you provide me with an accurate list of your medicines. When you come into the office you should have that list or another option is lining up your pill bottles  and taking a snapshot with your cell phone of all the medicines you are taking currently and show it to the nurse in the examining room. Inaccurate reporting of your medication to the nurse may lead to adverse events and medical errors. Thank you again for giving me the opportunity to be part of your heart care and it is my pleasure. All the best,    Rick Oneal MD

## 2022-04-12 ENCOUNTER — OFFICE VISIT (OUTPATIENT)
Dept: INTERNAL MEDICINE CLINIC | Age: 72
End: 2022-04-12
Payer: MEDICARE

## 2022-04-12 VITALS
SYSTOLIC BLOOD PRESSURE: 124 MMHG | WEIGHT: 236.8 LBS | HEIGHT: 72 IN | OXYGEN SATURATION: 95 % | TEMPERATURE: 97.9 F | BODY MASS INDEX: 32.07 KG/M2 | DIASTOLIC BLOOD PRESSURE: 80 MMHG | RESPIRATION RATE: 18 BRPM | HEART RATE: 72 BPM

## 2022-04-12 DIAGNOSIS — E78.5 DYSLIPIDEMIA, GOAL LDL BELOW 100: ICD-10-CM

## 2022-04-12 DIAGNOSIS — K22.2 ESOPHAGEAL STRICTURE: ICD-10-CM

## 2022-04-12 DIAGNOSIS — Z00.00 MEDICARE ANNUAL WELLNESS VISIT, SUBSEQUENT: Primary | ICD-10-CM

## 2022-04-12 DIAGNOSIS — I10 ESSENTIAL HYPERTENSION: ICD-10-CM

## 2022-04-12 DIAGNOSIS — Z86.69 H/O SLEEP APNEA: ICD-10-CM

## 2022-04-12 DIAGNOSIS — I42.9 CARDIOMYOPATHY, UNSPECIFIED TYPE (HCC): ICD-10-CM

## 2022-04-12 DIAGNOSIS — H90.41 SENSORINEURAL HEARING LOSS (SNHL) OF RIGHT EAR WITH UNRESTRICTED HEARING OF LEFT EAR: ICD-10-CM

## 2022-04-12 PROBLEM — H90.5 SENSORINEURAL HEARING LOSS (SNHL) OF RIGHT EAR: Status: ACTIVE | Noted: 2022-04-12

## 2022-04-12 PROCEDURE — G0463 HOSPITAL OUTPT CLINIC VISIT: HCPCS | Performed by: INTERNAL MEDICINE

## 2022-04-12 PROCEDURE — 1101F PT FALLS ASSESS-DOCD LE1/YR: CPT | Performed by: INTERNAL MEDICINE

## 2022-04-12 PROCEDURE — 99214 OFFICE O/P EST MOD 30 MIN: CPT | Performed by: INTERNAL MEDICINE

## 2022-04-12 PROCEDURE — 3017F COLORECTAL CA SCREEN DOC REV: CPT | Performed by: INTERNAL MEDICINE

## 2022-04-12 PROCEDURE — G8510 SCR DEP NEG, NO PLAN REQD: HCPCS | Performed by: INTERNAL MEDICINE

## 2022-04-12 PROCEDURE — G8427 DOCREV CUR MEDS BY ELIG CLIN: HCPCS | Performed by: INTERNAL MEDICINE

## 2022-04-12 PROCEDURE — G8536 NO DOC ELDER MAL SCRN: HCPCS | Performed by: INTERNAL MEDICINE

## 2022-04-12 PROCEDURE — G8417 CALC BMI ABV UP PARAM F/U: HCPCS | Performed by: INTERNAL MEDICINE

## 2022-04-12 PROCEDURE — G8752 SYS BP LESS 140: HCPCS | Performed by: INTERNAL MEDICINE

## 2022-04-12 PROCEDURE — G0439 PPPS, SUBSEQ VISIT: HCPCS | Performed by: INTERNAL MEDICINE

## 2022-04-12 PROCEDURE — G8754 DIAS BP LESS 90: HCPCS | Performed by: INTERNAL MEDICINE

## 2022-04-12 NOTE — PERIOP NOTES
[address]  Endoscopy Preprocedure Instructions      1. On the day of your surgery, please report to registration located on the 2nd floor of the  [facility]. yes    2. You must have a responsible adult to drive you to the hospital, stay at the hospital during your procedure and drive you home. If they leave your procedure will not be started (no exceptions). yes    3. Do not have anything to eat or drink (including water, gum, mints, coffee, and juice) after midnight. This does not apply to the medications you were instructed to take by your physician. yes  If you are currently taking Plavix, Coumadin, Aspirin, or other blood-thinning agents, contact your physician for special instructions. yes    4. If you are having a procedure that requires bowel prep: We recommend that you have only clear liquids the day before your procedure and begin your bowel prep by 5:00 pm.  You may continue to drink clear liquids until midnight. If for any reason you are not able to complete your prep please notify your physician immediately. yes    5. Have a list of all current medications, including vitamins, herbal supplements and any other over the counter medications. yes    6. If you wear glasses, contacts, dentures and/or hearing aids, they may be removed prior to procedure, please bring a case to store them in. yes    7. You should understand that if you do not follow these instructions your procedure may be cancelled. If your physical condition changes (I.e. fever, cold or flu) please contact your doctor as soon as possible. 8. It is important that you be on time. If for any reason you are unable to keep your appointment please call [phone] the day of or your physicians office prior to your scheduled procedure    9.  Have you received your COVID Vaccine? yes If no, you will need to receive a COVID test/swab here at [facility] the [facility] [place] Monday - Friday 8a - 11am. There are no Saturday or Sunday swabbing at any [facility] facility. (patient verbalizes understanding) yes    Pt is aware that his  must be present for the entire duration of his stay.

## 2022-04-13 ENCOUNTER — ANESTHESIA EVENT (OUTPATIENT)
Dept: ENDOSCOPY | Age: 72
End: 2022-04-13
Payer: MEDICARE

## 2022-04-13 ENCOUNTER — ANESTHESIA (OUTPATIENT)
Dept: ENDOSCOPY | Age: 72
End: 2022-04-13
Payer: MEDICARE

## 2022-04-13 ENCOUNTER — HOSPITAL ENCOUNTER (OUTPATIENT)
Age: 72
Setting detail: OUTPATIENT SURGERY
Discharge: HOME | End: 2022-04-13
Attending: INTERNAL MEDICINE | Admitting: INTERNAL MEDICINE
Payer: MEDICARE

## 2022-04-13 VITALS
HEIGHT: 72 IN | SYSTOLIC BLOOD PRESSURE: 107 MMHG | WEIGHT: 233.91 LBS | DIASTOLIC BLOOD PRESSURE: 77 MMHG | BODY MASS INDEX: 31.68 KG/M2 | OXYGEN SATURATION: 95 % | TEMPERATURE: 97.2 F | RESPIRATION RATE: 17 BRPM | HEART RATE: 60 BPM

## 2022-04-13 PROCEDURE — 76040000019: Performed by: INTERNAL MEDICINE

## 2022-04-13 PROCEDURE — 76060000031 HC ANESTHESIA FIRST 0.5 HR: Performed by: INTERNAL MEDICINE

## 2022-04-13 PROCEDURE — 74011250636 HC RX REV CODE- 250/636: Performed by: NURSE ANESTHETIST, CERTIFIED REGISTERED

## 2022-04-13 PROCEDURE — 74011250636 HC RX REV CODE- 250/636: Performed by: INTERNAL MEDICINE

## 2022-04-13 PROCEDURE — 2709999900 HC NON-CHARGEABLE SUPPLY: Performed by: INTERNAL MEDICINE

## 2022-04-13 PROCEDURE — 77030018712 HC DEV BLLN INFL BSC -B: Performed by: INTERNAL MEDICINE

## 2022-04-13 PROCEDURE — C1726 CATH, BAL DIL, NON-VASCULAR: HCPCS | Performed by: INTERNAL MEDICINE

## 2022-04-13 RX ORDER — ATROPINE SULFATE 0.1 MG/ML
0.5 INJECTION INTRAVENOUS
Status: DISCONTINUED | OUTPATIENT
Start: 2022-04-13 | End: 2022-04-13 | Stop reason: HOSPADM

## 2022-04-13 RX ORDER — SODIUM CHLORIDE 9 MG/ML
50 INJECTION, SOLUTION INTRAVENOUS CONTINUOUS
Status: DISCONTINUED | OUTPATIENT
Start: 2022-04-13 | End: 2022-04-13 | Stop reason: HOSPADM

## 2022-04-13 RX ORDER — MIDAZOLAM HYDROCHLORIDE 1 MG/ML
.25-5 INJECTION, SOLUTION INTRAMUSCULAR; INTRAVENOUS
Status: DISCONTINUED | OUTPATIENT
Start: 2022-04-13 | End: 2022-04-13 | Stop reason: HOSPADM

## 2022-04-13 RX ORDER — PROPOFOL 10 MG/ML
INJECTION, EMULSION INTRAVENOUS
Status: DISCONTINUED | OUTPATIENT
Start: 2022-04-13 | End: 2022-04-13 | Stop reason: HOSPADM

## 2022-04-13 RX ORDER — SODIUM CHLORIDE 9 MG/ML
INJECTION, SOLUTION INTRAVENOUS
Status: DISCONTINUED | OUTPATIENT
Start: 2022-04-13 | End: 2022-04-13 | Stop reason: HOSPADM

## 2022-04-13 RX ORDER — NALOXONE HYDROCHLORIDE 0.4 MG/ML
0.4 INJECTION, SOLUTION INTRAMUSCULAR; INTRAVENOUS; SUBCUTANEOUS
Status: DISCONTINUED | OUTPATIENT
Start: 2022-04-13 | End: 2022-04-13 | Stop reason: HOSPADM

## 2022-04-13 RX ORDER — FLUMAZENIL 0.1 MG/ML
0.2 INJECTION INTRAVENOUS
Status: DISCONTINUED | OUTPATIENT
Start: 2022-04-13 | End: 2022-04-13 | Stop reason: HOSPADM

## 2022-04-13 RX ORDER — DEXTROMETHORPHAN/PSEUDOEPHED 2.5-7.5/.8
1.2 DROPS ORAL
Status: DISCONTINUED | OUTPATIENT
Start: 2022-04-13 | End: 2022-04-13 | Stop reason: HOSPADM

## 2022-04-13 RX ADMIN — PROPOFOL INJECTABLE EMULSION 500 MCG/KG/MIN: 10 INJECTION, EMULSION INTRAVENOUS at 07:41

## 2022-04-13 RX ADMIN — SODIUM CHLORIDE: 9 INJECTION, SOLUTION INTRAVENOUS at 07:05

## 2022-04-13 RX ADMIN — SODIUM CHLORIDE 50 ML/HR: 9 INJECTION, SOLUTION INTRAVENOUS at 06:45

## 2022-04-13 NOTE — H&P
[address] [facility] [address]  [address] [facility], [address] [place]  [phone]                     History and Physical     NAME: Mary Ayers   :  1950   MRN:  [id number]     HPI:  66year old male who presents with a complaint of difficulty swallowing. Note for \"Difficulty swallowing \":  He has had endoscopies regularly . He has a history of large hiatal hernia and benign esophageal stricture This has improved with dilation. He has undergone last summer EGD dilation 18 mm TTS with superficial tear on a stricture.  Pt also  noted to have large hiatal hernia as well. He did well for a couple of months but now stx have come back. Kalie Headley reports having and episode of food impaction requiring regurgitation of food recently. . No other alarming features at this point. Problem List/Past Medical (Sharath Alvarado. Ashu Lyle; 2022 8:57 AM)     Past Surgical History:   Procedure Laterality Date    COLONOSCOPY N/A 2017    COLONOSCOPY performed by Lawrence Bill MD at [facility]  2017         EGD  2017         HX HERNIA REPAIR      times 2. 's inguinal bilateral    HX HIP REPLACEMENT Left 2018    HX ORTHOPAEDIC Left 2018    hip replacement    HX OTHER SURGICAL  1977    Repair of the right shoulder.      HX ROTATOR CUFF REPAIR Left 2021    HX TONSILLECTOMY      childhood    UPPER GI ENDOSCOPY,DILATN W GUIDE  2017         UPPER GI ENDOSCOPY,DILATN W GUIDE  2018          Past Medical History:   Diagnosis Date    Arthritis     Dyslipidemia     Esophageal stricture 2021    egd  dr Brad Bates dilated    Essential hypertension     Frequent PVCs     GERD (gastroesophageal reflux disease)     H/O seasonal allergies     Mitral valve prolapse     pt states Dr. Susanna Camejo is not sure if this is the correct diagnosis    Obesity 2018    Obesity  BMI= 32.6     Social History     Tobacco Use    Smoking status: Former Smoker     Packs/day: 1.00     Years: 30.00     Pack years: 30.00     Quit date: 1997     Years since quittin.2    Smokeless tobacco: Never Used   Vaping Use    Vaping Use: Never used   Substance Use Topics    Alcohol use: Yes     Alcohol/week: 6.0 standard drinks     Types: 3 Glasses of wine, 3 Cans of beer per week     Comment: 6 drinks per week    Drug use: No     No Known Allergies  Family History   Problem Relation Age of Onset    Sudden Death Mother         Car accident    Heart Attack Father 48    Heart Disease Father     Thyroid Disease Sister     No Known Problems Brother     No Known Problems Sister     No Known Problems Sister     No Known Problems Brother     No Known Problems Brother     Cancer Maternal Aunt [de-identified]        multiple myloma     No current facility-administered medications for this encounter. Facility-Administered Medications Ordered in Other Encounters   Medication Dose Route Frequency    0.9% sodium chloride infusion   IntraVENous CONTINUOUS         PHYSICAL EXAM:  General: WD, WN. Alert, cooperative, no acute distress    HEENT: NC, Atraumatic. PERRLA, EOMI. Anicteric sclerae. Lungs:  CTA Bilaterally. No Wheezing/Rhonchi/Rales. Heart:  Regular  rhythm,  No murmur, No Rubs, No Gallops  Abdomen: Soft, Non distended, Non tender.  +Bowel sounds, no HSM  Extremities: No c/c/e  Neurologic:  CN 2-12 gi, Alert and oriented X 3. No acute neurological distress   Psych:   Good insight. Not anxious nor agitated. Assessment:   I have reviewed with the patient +/- family alternatives,benefits and risks for the procedure, as well as potential complications(with emphasis on, but not limited to, bleeding, perforation, cardiovascular/cerebrovascular/pulmonary events, reactions to the medications, infection, risk of missing a lesions/a cancer, and the imponderables including death), alternative options, and patient/family voices understanding.       Plan: · Endoscopic procedure  · Conscious sedation or MAC

## 2022-04-13 NOTE — DISCHARGE INSTRUCTIONS
[address]  [address] [facility], [address] [facility]  [phone]               EGD Kwesi Martinez  [id number]  1950    DISCOMFORT:  Sore throat- throat lozenges or warm salt water gargle  redness at IV site- apply warm compress to area; if redness or soreness persist- contact your physician  Gaseous discomfort- walking, belching will help relieve any discomfort  You may not operate a vehicle for 12 hours  You may not engage in an occupation involving machinery or appliances for rest of today  You may not drink alcoholic beverages for at least 12 hours  Avoid making any critical decisions for at least 24 hour  DIET  You may eat and drink now and after you leave. You may resume your regular diet - however -  remember your colon is empty and a heavy meal will produce gas. Avoid these foods:  vegetables, fried / greasy foods, carbonated drinks    ACTIVITY  You may resume your normal daily activities   Spend the remainder of the day resting -  avoid any strenuous activity. CALL M.D. ANY SIGN OF   Increasing pain, nausea, vomiting  Abdominal distension (swelling)  New increased bleeding (oral or rectal)  Fever (chills)  Pain in chest area  Bloody discharge from nose or mouth  Shortness of breath    Follow-up Instructions:   Call Dr. Melida Gupta for any questions or problems.               Telephone # [phone]    ENDOSCOPY FINDINGS:   Your endoscopy showed large  Hiatal hernia, esophageal strictures          Signed By: Halie Dias MD     4/13/2022  7:56 AM

## 2022-04-13 NOTE — PROGRESS NOTES
Mary Ayers  1950  [id number]    Situation:  Verbal report received from: Queta Mckeon RN   Procedure: Procedure(s):  ESOPHAGOGASTRODUODENOSCOPY (EGD) WITH DILATION  ESOPHAGEAL DILATION    Background:    Preoperative diagnosis: DYSPHAGIA/ACID REFLUX  Postoperative diagnosis: esophageal errosion, stricture and hiatal hernia. :  Dr. Paula Cuellar  Assistant(s): Endoscopy RN-1: Ceasar Chacon RN  Endoscopy RN-2: Lena Valenzuela    Specimens: * No specimens in log *  H. Pylori  no    Assessment:    Anesthesia gave intra-procedure sedation and medications, see anesthesia flow sheet no    Intravenous fluids: NS@ KVO     Vital signs stable     Abdominal assessment: round and soft     Recommendation:  Discharge patient per MD order.   Return to floor  Family or Friend   Permission to share finding with family or friend yes

## 2022-04-13 NOTE — PERIOP NOTES
0420  Timeout performed. Anesthesia staff at patient's bedside administering anesthesia and monitoring patients vital signs throughout procedure. See anesthesia note. Post procedure, report received from Abdelrahman RODRIGUEZ. [id number]  Endoscope was pre-cleaned at bedside immediately following procedure by endo Johnny hall RN.    9933  Patient tolerated procedure. Abdomen soft and patient arousable and voices no complaints. Patient transported to endoscopy recovery area. Report given to post procedure RNAdele.

## 2022-04-13 NOTE — PROGRESS NOTES
HISTORY OF PRESENT ILLNESS  Yomaira Garza is a 66 y.o. male. HPI  Seen for Medicare wellness and follow up on some issues:  1. He has had esophageal stricture, requiring dilation. Works with Dr. Ivette Dillard. 2. Does have a history of cardiomyopathy, followed by cardiology. Stable currently. He is on Flecainide and Metoprolol. No recent chest pain, palpitations or syncope spells. Enjoys playing golf and energy is good. 3. He has had hearing loss right ear following shingles. It has stabilized, but not improved. 4. Dyslipidemia, on Simvastatin 20. No diffuse myalgias. Again, energy is good. 5. Wife notes he snores a lot and wonders about sleep apnea, also has apparently witnessed what sounds like some apneic spells. Review of Systems   Constitutional: Negative for chills, fever and weight loss. HENT: Positive for hearing loss. Eyes: Negative for blurred vision. Respiratory: Negative for cough, shortness of breath and wheezing. Cardiovascular: Negative for chest pain, palpitations, orthopnea, leg swelling and PND. Gastrointestinal: Negative for abdominal pain, constipation, diarrhea, heartburn, nausea and vomiting. Genitourinary: Positive for frequency. Negative for dysuria. Musculoskeletal: Negative for falls and myalgias. Neurological: Negative for dizziness, seizures, loss of consciousness and headaches. Psychiatric/Behavioral: Negative for depression and memory loss. The patient is not nervous/anxious and does not have insomnia. Physical Exam  Vitals and nursing note reviewed. Constitutional:       Appearance: He is well-developed. HENT:      Head: Normocephalic and atraumatic. Right Ear: Tympanic membrane normal.      Left Ear: Tympanic membrane normal.   Neck:      Thyroid: No thyromegaly. Vascular: No carotid bruit. Cardiovascular:      Rate and Rhythm: Normal rate and regular rhythm. Heart sounds: Normal heart sounds.    Pulmonary: Effort: Pulmonary effort is normal. No respiratory distress. Breath sounds: Normal breath sounds. No wheezing or rales. Abdominal:      General: There is no distension. Palpations: Abdomen is soft. There is no mass. Tenderness: There is no abdominal tenderness. Musculoskeletal:      Cervical back: Normal range of motion and neck supple. Right lower leg: No edema. Left lower leg: No edema. Lymphadenopathy:      Cervical: No cervical adenopathy. Skin:     Findings: No rash. Neurological:      Mental Status: He is alert and oriented to person, place, and time. Psychiatric:         Mood and Affect: Mood normal.         Behavior: Behavior normal.         ASSESSMENT and PLAN  Diagnoses and all orders for this visit:    1. Medicare annual wellness visit, subsequent  -     PSA, DIAGNOSTIC (PROSTATE SPECIFIC AG); Future    2. Cardiomyopathy, unspecified type (HCC)-stable on meds    3. Essential hypertension  -     METABOLIC PANEL, COMPREHENSIVE; Future    4. Esophageal stricture-follow up with dr Julianne Payton DIFF; Future    5. Sensorineural hearing loss (SNHL) of right ear with unrestricted hearing of left ear    6. H/O sleep apnea-witnessed events by wife so advise sleep study  -     SLEEP MEDICINE REFERRAL    7. Dyslipidemia, goal LDL below 100-cont zocor    This is the Subsequent Medicare Annual Wellness Exam, performed 12 months or more after the Initial AWV or the last Subsequent AWV    I have reviewed the patient's medical history in detail and updated the computerized patient record. Assessment/Plan   Education and counseling provided:  Are appropriate based on today's review and evaluation  End-of-Life planning (with patient's consent)  Pneumococcal Vaccine  Influenza Vaccine  Screening for glaucoma    1. Medicare annual wellness visit, subsequent  -     PSA, DIAGNOSTIC (PROSTATE SPECIFIC AG); Future  2. Cardiomyopathy, unspecified type ([address]  3. Essential hypertension  -     METABOLIC PANEL, COMPREHENSIVE; Future  4. Esophageal stricture  -     CBC WITH AUTOMATED DIFF; Future  5. Sensorineural hearing loss (SNHL) of right ear with unrestricted hearing of left ear  6. H/O sleep apnea  -     SLEEP MEDICINE REFERRAL  7. Dyslipidemia, goal LDL below 100       Depression Risk Factor Screening     3 most recent PHQ Screens 4/12/2022   Little interest or pleasure in doing things Not at all   Feeling down, depressed, irritable, or hopeless Not at all   Total Score PHQ 2 0       Alcohol & Drug Abuse Risk Screen   Do you average more than 1 drink per night or more than 7 drinks a week?: (P) No  In the past three months have you had more than 4 drinks containing alcohol on one occasion?: (P) No            Functional Ability and Level of Safety   Hearing:  Hearing: (P) additional comments below  Hearing comments: (P) Loss of some hearing in right ear due to shingles in January 2021      Activities of Daily Living: The home contains: (P) grab bars  Functional ADLs: (P) Patient does total self care     Ambulation:  Patient ambulates: (P) with no difficulty     Fall Risk:  Fall Risk Assessment, last 12 mths 4/12/2022   Able to walk? Yes   Fall in past 12 months? 0   Do you feel unsteady? 0   Are you worried about falling 0   Number of falls in past 12 months -   Fall with injury?  -     Abuse Screen:  Do you ever feel afraid of your partner?: (P) No  Are you in a relationship with someone who physically or mentally threatens you?: (P) No  Is it safe for you to go home?: (P) Yes        Cognitive Screening   Has your family/caregiver stated any concerns about your memory?: (P) No     Cognitive Screening: Normal - Verbal Fluency Test    Health Maintenance Due     Health Maintenance Due   Topic Date Due    Shingrix Vaccine Age 49> (1 of 2) Never done    Medicare Yearly Exam  10/13/2021       Patient Care Team   Patient Care Team:  Alyssa Mcneil MD as PCP - General (Internal Medicine)  Stan Buchanan MD as PCP - [facility] Provider  Barbara Quevedo MD (General Surgery)  Ramana Zamorano MD (Orthopedic Surgery)  Sandra Saucedo MD (Orthopedic Surgery)    History     Patient Active Problem List   Diagnosis Code    Colon polyps K63.5    Essential hypertension I10    Mitral valve prolapse I34.1    Seasonal allergic rhinitis due to pollen J30.1    Primary osteoarthritis of left hip M16.12    Primary osteoarthritis of left knee M17.12    Esophageal stricture K22.2    Sensorineural hearing loss (SNHL) of right ear H90.5     Past Medical History:   Diagnosis Date    Arthritis     Dyslipidemia     Esophageal stricture 11/4/2021    egd 8/21 dr Sandra Mittal dilated    Essential hypertension     Frequent PVCs     GERD (gastroesophageal reflux disease)     H/O seasonal allergies     Mitral valve prolapse     pt states Dr. John Moyer is not sure if this is the correct diagnosis    Obesity 05/02/2018    Obesity  BMI= 32.6      Past Surgical History:   Procedure Laterality Date    COLONOSCOPY N/A 6/2/2017    COLONOSCOPY performed by Sam Scales MD at [facility]  6/2/2017         EGD  6/2/2017         HX HERNIA REPAIR      times 2. 1980's inguinal bilateral    HX HIP REPLACEMENT Left 2018    HX ORTHOPAEDIC Left 2018    hip replacement    HX OTHER SURGICAL  1977    Repair of the right shoulder.  HX ROTATOR CUFF REPAIR Left 01/14/2021    HX TONSILLECTOMY      childhood    UPPER GI ENDOSCOPY,DILATN W GUIDE  7/14/2017         UPPER GI ENDOSCOPY,DILATN W GUIDE  9/26/2018          Current Outpatient Medications   Medication Sig Dispense Refill    simvastatin (ZOCOR) 10 mg tablet Take 1 Tablet by mouth nightly. TAKE 1 TABLET EVERY NIGHT 90 Tablet 5    flecainide (TAMBOCOR) 50 mg tablet Take 1 Tablet by mouth two (2) times a day. 24 Tablet 0    pantoprazole (PROTONIX) 20 mg tablet Take 1 Tablet by mouth two (2) times a day.  [address] [address] Tablet 0    metoprolol succinate (TOPROL-XL) 25 mg XL tablet TAKE 1/2 TABLET EVERY DAY 45 Tab 5    aspirin 81 mg chewable tablet Take 81 mg by mouth daily.  multivitamin (ONE A DAY) tablet Take 1 Tab by mouth daily.  VITAMIN B COMPLEX (B COMPLEX PO) Take  by mouth daily.  DOCOSAHEXANOIC ACID/EPA (FISH OIL PO) Take 1,200 mg by mouth two (2) times a day.  GLUCOSAMINE SULFATE (GLUCOSAMINE PO) Take 1,500 mg by mouth two (2) times a day.  CHOLECALCIFEROL, VITAMIN D3, (VITAMIN D3 PO) Take 1 Tab by mouth daily.        Facility-Administered Medications Ordered in Other Visits   Medication Dose Route Frequency Provider Last Rate Last Admin    0.9% sodium chloride infusion  50 mL/hr IntraVENous CONTINUOUS Maverick Ashraf MD 50 mL/hr at 22 0645 50 mL/hr at 22 0645    midazolam (VERSED) injection 0.25-5 mg  0.25-5 mg IntraVENous Nathanael Oconnor MD        naloxone [facility]) injection 0.4 mg  0.4 mg IntraVENous Nathanael Oconnor MD        flumazeniL (ROMAZICON) 0.1 mg/mL injection 0.2 mg  0.2 mg IntraVENous Nathanael Oconnor MD        simethicone (MYLICON) 31OM/7.2SA oral drops 80 mg  1.2 mL Oral Nathanael Oconnor MD        atropine injection 0.5 mg  0.5 mg IntraVENous ONCE PRN Maverick Ashraf MD         No Known Allergies    Family History   Problem Relation Age of Onset    Sudden Death Mother         Car accident    Heart Attack Father 48    Heart Disease Father     Thyroid Disease Sister     No Known Problems Brother     No Known Problems Sister     No Known Problems Sister     No Known Problems Brother     No Known Problems Brother     Cancer Maternal Aunt [de-identified]        multiple myloma     Social History     Tobacco Use    Smoking status: Former Smoker     Packs/day: 1.00     Years: 30.00     Pack years: 30.00     Quit date: 1997     Years since quittin.2    Smokeless tobacco: Never Used   Substance Use Topics    Alcohol use:  Yes     Alcohol/week: 6.0 standard drinks     Types: 3 Glasses of wine, 3 Cans of beer per week     Comment: 6 drinks per week         Won Byers MD

## 2022-04-13 NOTE — PERIOP NOTES
CRE balloon dilatation of the esophagus   18 mm Balloon inflated to 3 ATMs and held for 2 seconds. 19 mm Balloon inflated to 4.5 ATMs and held for 2 seconds. 20 mm Balloon inflated to 6 ATMs and held for 30 seconds. No subcutaneous crepitus of the chest or cervical region was noted post dilatation.

## 2022-04-13 NOTE — ANESTHESIA POSTPROCEDURE EVALUATION
Procedure(s):  ESOPHAGOGASTRODUODENOSCOPY (EGD) WITH DILATION  ESOPHAGEAL DILATION.     MAC    Anesthesia Post Evaluation      Multimodal analgesia: multimodal analgesia not used between 6 hours prior to anesthesia start to PACU discharge  Patient location during evaluation: PACU  Patient participation: complete - patient participated  Level of consciousness: awake  Pain management: adequate  Airway patency: patent  Anesthetic complications: no  Cardiovascular status: acceptable, blood pressure returned to baseline and hemodynamically stable  Respiratory status: acceptable  Hydration status: acceptable  Post anesthesia nausea and vomiting:  controlled  Final Post Anesthesia Temperature Assessment:  Normothermia (36.0-37.5 degrees C)      INITIAL Post-op Vital signs:   Vitals Value Taken Time   /77 04/13/22 0815   Temp 36.2 °C (97.2 °F) 04/13/22 0755   Pulse 60 04/13/22 0815   Resp 17 04/13/22 0815   SpO2 95 % 04/13/22 0815

## 2022-04-13 NOTE — PROGRESS NOTES
Endoscopy discharge instructions have been reviewed and given to patient. The patient verbalized understanding and acceptance of instructions. Dr. Melida Gupta discussed with patient procedure findings and next steps.

## 2022-04-13 NOTE — ANESTHESIA PREPROCEDURE EVALUATION
Anesthetic History   No history of anesthetic complications            Review of Systems / Medical History  Patient summary reviewed and nursing notes reviewed    Pulmonary  Within defined limits                 Neuro/Psych   Within defined limits           Cardiovascular    Hypertension: well controlled        Dysrhythmias (pvcs) : PVC  Hyperlipidemia    Exercise tolerance: >4 METS  Comments: TTE on 1/4/21 demonstrates EF of 50%, mild MR and AR with mild diastolic dysfunction, PA pressures estimated at 28 mmHg   GI/Hepatic/Renal     GERD: poorly controlled          Comments: Esophageal stricture; h/o food bolus; has had dilation in the past Endo/Other        Obesity and arthritis     Other Findings              Physical Exam    Airway  Mallampati: I    Neck ROM: normal range of motion   Mouth opening: Normal     Cardiovascular  Regular rate and rhythm,  S1 and S2 normal,  no murmur, click, rub, or gallop  Rhythm: regular  Rate: normal         Dental    Dentition: Caps/crowns     Pulmonary  Breath sounds clear to auscultation               Abdominal  GI exam deferred       Other Findings            Anesthetic Plan    ASA: 2  Anesthesia type: MAC            Anesthetic plan and risks discussed with: Patient

## 2022-04-13 NOTE — PROCEDURES
[address]  [address] [facility], [address] [facility]       [phone]                   2022                EGD Operative Report  Toby Cisneros  :  1950  [employer] Medical Record Number:  [id number]      Indication:  Dysphagia/odynophagia, GERD     : Joel Willams MD    Assistants: None    Referring Provider:  Cristobal Alvarenga MD      Anesthesia/Sedation:  MAC anesthesia Propofol    Airway assessment: No airway problems anticipated    Pre-Procedural Exam:      Airway: clear, no airway problems anticipated  Heart: RRR, without gallops or rubs  Lungs: clear bilaterally without wheezes, crackles, or rhonchi  Abdomen: soft, nontender, nondistended, bowel sounds present  Mental Status: awake, alert and oriented to person, place and time       Procedure Details     After infomed consent was obtained for the procedure, with all risks and benefits of procedure explained the patient was taken to the endoscopy suite and placed in the left lateral decubitus position. Following sequential administration of sedation as per above, the endoscope was inserted into the mouth and advanced under direct vision to second portion of the duodenum. A careful inspection was made as the gastroscope was withdrawn, including a retroflexed view of the proximal stomach; findings and interventions are described below. Findings:   Esophagus: Tortuous distal esophagus ( presby-esopahgus ) with distal esophageal stricture( benign). Regular Z line noted   Stomach: Large sliding hiatal hernia with asiya's erosions ( non bleeding), otherwise normal rest of the examined stomach                   Some retained food seen in the stomach body  Duodenum/jejunum: normal mucosa    Therapies:  esophageal dilation TTS with 18-20 mm sized balloon    Specimens: none    Implants: none           Complications:   None; patient tolerated the procedure well. EBL:  None.            Impression: esophageal stricture dilated                           Large sliding  hiatal hernia                           Nathanael's erosions    Recommendations:    -Acid suppression with a proton pump inhibitor. -GERD diet: avoid fried and fatty foods.  peppermint, chocolate, alcohol, coffee, citrus fruits and juices, tomoato products; avoid lying down for 2 to 3 hours after eating., -No NSAIDS,   -Repeat EGD dilation PRN ( call office to schedule )    -Call office for any questions/concerns    Allan Arce MD

## 2022-04-13 NOTE — PATIENT INSTRUCTIONS
Medicare Wellness Visit, Male    The best way to live healthy is to have a lifestyle where you eat a well-balanced diet, exercise regularly, limit alcohol use, and quit all forms of tobacco/nicotine, if applicable. Regular preventive services are another way to keep healthy. Preventive services (vaccines, screening tests, monitoring & exams) can help personalize your care plan, which helps you manage your own care. Screening tests can find health problems at the earliest stages, when they are easiest to treat. [facility] follows the current, evidence-based guidelines published by the [facility] ([facility]) when recommending preventive services for our patients. Because we follow these guidelines, sometimes recommendations change over time as research supports it. (For example, a prostate screening blood test is no longer routinely recommended for men with no symptoms). Of course, you and your doctor may decide to screen more often for some diseases, based on your risk and co-morbidities (chronic disease you are already diagnosed with). Preventive services for you include:  - Medicare offers their members a free annual wellness visit, which is time for you and your primary care provider to discuss and plan for your preventive service needs. Take advantage of this benefit every year!  -All adults over age 72 should receive the recommended pneumonia vaccines. Current USPSTF guidelines recommend a series of two vaccines for the best pneumonia protection.   -All adults should have a flu vaccine yearly and tetanus vaccine every 10 years.  -All adults age 48 and older should receive the shingles vaccines (series of two vaccines).        -All adults age 38-68 who are overweight should have a diabetes screening test once every three years.   -Other screening tests & preventive services for persons with diabetes include: an eye exam to screen for diabetic retinopathy, a kidney function test, a foot exam, and stricter control over your cholesterol.   -Cardiovascular screening for adults with routine risk involves an electrocardiogram (ECG) at intervals determined by the provider.   -Colorectal cancer screening should be done for adults age 54-65 with no increased risk factors for colorectal cancer. There are a number of acceptable methods of screening for this type of cancer. Each test has its own benefits and drawbacks. Discuss with your provider what is most appropriate for you during your annual wellness visit. The different tests include: colonoscopy (considered the best screening method), a fecal occult blood test, a fecal DNA test, and sigmoidoscopy.  -All adults born between [facility] should be screened once for Hepatitis C.  -An Abdominal Aortic Aneurysm (AAA) Screening is recommended for men age 73-68 who has ever smoked in their lifetime.      Here is a list of your current Health Maintenance items (your personalized list of preventive services) with a due date:  Health Maintenance Due   Topic Date Due    Shingles Vaccine (1 of 2) Never done

## 2022-04-25 ENCOUNTER — OFFICE VISIT (OUTPATIENT)
Dept: SLEEP MEDICINE | Age: 72
End: 2022-04-25
Payer: MEDICARE

## 2022-04-25 VITALS
BODY MASS INDEX: 31.42 KG/M2 | HEIGHT: 72 IN | OXYGEN SATURATION: 97 % | HEART RATE: 80 BPM | SYSTOLIC BLOOD PRESSURE: 112 MMHG | WEIGHT: 232 LBS | DIASTOLIC BLOOD PRESSURE: 76 MMHG

## 2022-04-25 DIAGNOSIS — G47.33 OSA (OBSTRUCTIVE SLEEP APNEA): Primary | ICD-10-CM

## 2022-04-25 DIAGNOSIS — I10 ESSENTIAL HYPERTENSION: ICD-10-CM

## 2022-04-25 PROCEDURE — 99204 OFFICE O/P NEW MOD 45 MIN: CPT | Performed by: INTERNAL MEDICINE

## 2022-04-25 PROCEDURE — 1101F PT FALLS ASSESS-DOCD LE1/YR: CPT | Performed by: INTERNAL MEDICINE

## 2022-04-25 PROCEDURE — G8417 CALC BMI ABV UP PARAM F/U: HCPCS | Performed by: INTERNAL MEDICINE

## 2022-04-25 PROCEDURE — 3017F COLORECTAL CA SCREEN DOC REV: CPT | Performed by: INTERNAL MEDICINE

## 2022-04-25 PROCEDURE — G8427 DOCREV CUR MEDS BY ELIG CLIN: HCPCS | Performed by: INTERNAL MEDICINE

## 2022-04-25 PROCEDURE — G8752 SYS BP LESS 140: HCPCS | Performed by: INTERNAL MEDICINE

## 2022-04-25 PROCEDURE — G8754 DIAS BP LESS 90: HCPCS | Performed by: INTERNAL MEDICINE

## 2022-04-25 PROCEDURE — G8536 NO DOC ELDER MAL SCRN: HCPCS | Performed by: INTERNAL MEDICINE

## 2022-04-25 PROCEDURE — G8432 DEP SCR NOT DOC, RNG: HCPCS | Performed by: INTERNAL MEDICINE

## 2022-04-25 NOTE — PROGRESS NOTES
217 [facility] [place]. [place], [address]  Tel.  [phone]  Fax. 100 [facility] 60  [place], [address] [facility]  Tel.  [phone]  Fax. [phone] [address]  Tel.  [phone]  Fax. [phone] [facility] Pulido is a 66y.o. year old male seen for evaluation of a sleep disorder. ASSESSMENT/PLAN:      ICD-10-CM ICD-9-CM    1. MARIELLA (obstructive sleep apnea)  G47.33 327.23 POLYSOMNOGRAPHY 1 NIGHT      POLYSOMNOGRAPHY 1 NIGHT   2. Essential hypertension  I10 401.9    3. BMI 31.0-31.9,adult  Z68.31 V85.31        Patient has a history and examination consistent with the diagnosis of sleep apnea. Follow-up and Dispositions    · Return for telephone follow-up after testing is completed. * The patient currently has a High Risk for having sleep apnea. STOP-BANG score 6.    * Sleep testing was ordered for initial evaluation. Orders Placed This Encounter    POLYSOMNOGRAPHY 1 NIGHT     Standing Status:   Future     Standing Expiration Date:   10/25/2022     Order Specific Question:   Reason for Exam     Answer:   MARIELLA    POLYSOMNOGRAPHY 1 NIGHT     Standing Status:   Future     Standing Expiration Date:   7/25/2022     Order Specific Question:   Reason for Exam     Answer:   MARIELLA       * He was provided information on sleep apnea including corresponding risk factors and the importance of proper treatment. * Treatment options were reviewed in detail, he would like to proceed with OAT / PAP therapy depending on severity of disorder. * The patient was counseled regarding proper sleep hygiene, with emphasis on ensuring sufficient total sleep time; safe driving and the benefits of exercise and weight loss. * All of his questions were addressed.     2. Hypertension -  continue on current regimen, he will continue to monitor his BP and follow up with his primary care provider for reevaluation/adjustment of medications if warranted. I have reviewed the relationship between hypertension as it relates to sleep-disordered breathing. 3.Recommended a dedicated weight loss program through appropriate diet and exercise regimen as significant weight reduction has been shown to reduce severity of obstructive sleep apnea. SUBJECTIVE/OBJECTIVE:     Juwan Shah is an 66 y.o. male referred for evaluation for a sleep disorder. He complains of snoring associated with periods of not breathing, awakening in the middle of the night because of urination. Symptoms began several years ago, unchanged since that time. He usually can fall asleep in <5 minutes. Family or house members note snoring, periods of not breathing. He denies of symptoms indicative of cataplexy, sleep paralysis or sleep related hallucinations. He denies of a history of unusual movements occurring during sleep. Fronie [place] (P) does wake up frequently at night. He (P) is not bothered by waking up too early and left unable to get back to sleep. He actually sleeps about (P) 8 hours at night and wakes up about (P) 3 times during the night. He (P) does not work shifts: Ray Smith indicates he (P) does not get too little sleep at night. His bedtime is (P) 2300. He awakens at (P) 0800. He (P) does not take naps. He has the following observed behaviors: (P) Loud snoring,Pauses in breathing,Sleep talking;  . Other remarks: The patient has not undergone diagnostic testing for the current problems.      Review of Systems:  Constitutional:  No significant weight loss or weight gain  Eyes:  No blurred vision  CVS:  No significant chest pain  Pulm:  No significant shortness of breath  GI:  No significant nausea or vomiting  :  No significant nocturia  Musculoskeletal:  No significant joint pain at night  Skin:  No significant rashes  Neuro:  No significant dizziness   Psych:  No active mood issues    Sleep Review of Systems: notable for Negative difficulty falling asleep; Positive awakenings at night; Negative perceived regular dreaming; Negative nightmares; Negative  early morning headaches; Negative  memory problems; Negative  concentration issues; Negative caffeine;  Negative alcohol;   Negative history of any automobile or occupational accidents due to daytime drowsiness. [place] Sleepiness Score: (P) 6   and Modified F.O.S.Q. Score Total / 2: (P) 19.5    Visit Vitals  /76 (BP 1 Location: Left arm, BP Patient Position: Sitting, BP Cuff Size: Adult)   Pulse 80   Ht 6' (1.829 m)   Wt 232 lb (105.2 kg)   SpO2 97%   BMI 31.46 kg/m²           General:   Alert, oriented, not in acute distress   Eyes:  Anicteric Sclerae; intact EOM's   Nose:  No obvious nasal septum deviation    Oropharynx:   Mallampati score 3, thick tongue base, uvula not seen due to low-lying soft palate, narrow tonsilo-pharyngeal pilars, tongue scalloped   Neck:   midline trachea,  no JVD   Chest/Lungs:  symmetrical lung expansion ,clear lung fields on auscultation    CVS:  Normal rate, regular rhythm    Extremities:  No obvious rashes, absent edema    Neuro:  No focal deficits; No obvious tremor    Psych:  Normal eye contact; normal  affect, normal countenance          Patricia Trujillo MD, FAASM  Diplomate American Board of Sleep Medicine  Diplomate in Sleep Medicine - ABP    Electronically signed.  04/25/22

## 2022-04-25 NOTE — PATIENT INSTRUCTIONS
[place]. [place], [address]  Tel.  [phone]  Fax. 100 [facility] 60  [place], [address]  Tel.  [phone]  Fax. [phone] [address]  Tel.  [phone]  Fax. [phone]     Sleep Apnea: After Your Visit  Your Care Instructions  Sleep apnea occurs when you frequently stop breathing for 10 seconds or longer during sleep. It can be mild to severe, based on the number of times per hour that you stop breathing or have slowed breathing. Blocked or narrowed airways in your nose, mouth, or throat can cause sleep apnea. Your airway can become blocked when your throat muscles and tongue relax during sleep. Sleep apnea is common, occurring in 1 out of 20 individuals. Individuals having any of the following characteristics should be evaluated and treated right away due to high risk and detrimental consequences from untreated sleep apnea:  1. Obesity  2. Congestive Heart failure  3. Atrial Fibrillation  4. Uncontrolled Hypertension  5. Type II Diabetes  6. Night-time Arrhythmias  7. Stroke  8. Pulmonary Hypertension  9. High-risk Driving Populations (pilots, truck drivers, etc.)  10. Patients Considering Weight-loss Surgery    How do you know you have sleep apnea? You probably have sleep apnea if you answer 'yes' to 3 or more of the following questions:  S - Have you been told that you Snore? T - Are you often Tired during the day? O - Has anyone Observed you stop breathing while sleeping? P- Do you have (or are being treated for) high blood Pressure? B - Are you obese (Body Mass Index > 35)? A - Is your Age 48years old or older? N - Is your Neck size greater than 16 inches? G - Are you male Gender? A sleep physician can prescribe a breathing device that prevents tissues in the throat from blocking your airway.  Or your doctor may recommend using a dental device (oral breathing device) to help keep your airway open. In some cases, surgery may be needed to remove enlarged tissues in the throat. Follow-up care is a key part of your treatment and safety. Be sure to make and go to all appointments, and call your doctor if you are having problems. It's also a good idea to know your test results and keep a list of the medicines you take. How can you care for yourself at home? · Lose weight, if needed. It may reduce the number of times you stop breathing or have slowed breathing. · Go to bed at the same time every night. · Sleep on your side. It may stop mild apnea. If you tend to roll onto your back, sew a pocket in the back of your pajama top. Put a tennis ball into the pocket, and stitch the pocket shut. This will help keep you from sleeping on your back. · Avoid alcohol and medicines such as sleeping pills and sedatives before bed. · Do not smoke. Smoking can make sleep apnea worse. If you need help quitting, talk to your doctor about stop-smoking programs and medicines. These can increase your chances of quitting for good. · Prop up the head of your bed 4 to 6 inches by putting bricks under the legs of the bed. · Treat breathing problems, such as a stuffy nose, caused by a cold or allergies. · Use a continuous positive airway pressure (CPAP) breathing machine if lifestyle changes do not help your apnea and your doctor recommends it. The machine keeps your airway from closing when you sleep. · If CPAP does not help you, ask your doctor whether you should try other breathing machines. A bilevel positive airway pressure machine has two types of air pressureâone for breathing in and one for breathing out. Another device raises or lowers air pressure as needed while you breathe. · If your nose feels dry or bleeds when using one of these machines, talk with your doctor about increasing moisture in the air. A humidifier may help.   · If your nose is runny or stuffy from using a breathing machine, talk with your doctor about using decongestants or a corticosteroid nasal spray. When should you call for help? Watch closely for changes in your health, and be sure to contact your doctor if:  · You still have sleep apnea even though you have made lifestyle changes. · You are thinking of trying a device such as CPAP. · You are having problems using a CPAP or similar machine. Where can you learn more? Go to [address]. Enter E606 in the search box to learn more about \"Sleep Apnea: After Your Visit. \"   © 7869-0009 Healthwise, Incorporated. Care instructions adapted under license by [facility] (which disclaims liability or warranty for this information). This care instruction is for use with your licensed healthcare professional. If you have questions about a medical condition or this instruction, always ask your healthcare professional. Josee Linton any warranty or liability for your use of this information. PROPER SLEEP HYGIENE    What to avoid  · Do not have drinks with caffeine, such as coffee or black tea, for 8 hours before bed. · Do not smoke or use other types of tobacco near bedtime. Nicotine is a stimulant and can keep you awake. · Avoid drinking alcohol late in the evening, because it can cause you to wake in the middle of the night. · Do not eat a big meal close to bedtime. If you are hungry, eat a light snack. · Do not drink a lot of water close to bedtime, because the need to urinate may wake you up during the night. · Do not read or watch TV in bed. Use the bed only for sleeping and sexual activity. What to try  · Go to bed at the same time every night, and wake up at the same time every morning. Do not take naps during the day. · Keep your bedroom quiet, dark, and cool. · Get regular exercise, but not within 3 to 4 hours of your bedtime. .  · Sleep on a comfortable pillow and mattress.   · If watching the clock makes you anxious, turn it facing away from you so you cannot see the time. · If you worry when you lie down, start a worry book. Well before bedtime, write down your worries, and then set the book and your concerns aside. · Try meditation or other relaxation techniques before you go to bed. · If you cannot fall asleep, get up and go to another room until you feel sleepy. Do something relaxing. Repeat your bedtime routine before you go to bed again. · Make your house quiet and calm about an hour before bedtime. Turn down the lights, turn off the TV, log off the computer, and turn down the volume on music. This can help you relax after a busy day. Drowsy Driving  The [address] [place] [address] Road Traffic Safety Administration cites drowsiness as a causing factor in more than 247,047 police reported crashes annually, resulting in 76,000 injuries and 1,500 deaths. Other surveys suggest 55% of people polled have driven while drowsy in the past year, 23% had fallen asleep but not crashed, 3% crashed, and 2% had and accident due to drowsy driving. Who is at risk? Young Drivers: One study of drowsy driving accidents states that 55% of the drivers were under 25 years. Of those, 75% were male. Shift Workers and Travelers: People who work overnight or travel across time zones frequently are at higher risk of experiencing Circadian Rhythm Disorders. They are trying to work and function when their body is programed to sleep. Sleep Deprived: Lack of sleep has a serious impact on your ability to pay attention or focus on a task. Consistently getting less than the average of 8 hours your body needs creates partial or cumulative sleep deprivation. Untreated Sleep Disorders: Sleep Apnea, Narcolepsy, R.L.S., and other sleep disorders (untreated) prevent a person from getting enough restful sleep. This leads to excessive daytime sleepiness and increases the risk for drowsy driving accidents by up to 7 times.   Medications / Alcohol: Even over the counter medications can cause drowsiness. Medications that impair a drivers attention should have a warning label. Alcohol naturally makes you sleepy and on its own can cause accidents. Combined with excessive drowsiness its effects are amplified. Signs of Drowsy Driving:   * You don't remember driving the last few miles   * You may drift out of your chloe   * You are unable to focus and your thoughts wander   * You may yawn more often than normal   * You have difficulty keeping your eyes open / nodding off   * Missing traffic signs, speeding, or tailgating  Prevention-   Good sleep hygiene, lifestyle and behavioral choices have the most impact on drowsy driving. There is no substitute for sleep and the average person requires 8 hours nightly. If you find yourself driving drowsy, stop and sleep. Consider the sleep hygiene tips provided during your visit as well. Medication Refill Policy: Refills for all medications require 1 week advance notice. Please have your pharmacy fax a refill request. We are unable to fax, or call in \"controled substance\" medications and you will need to pick these prescriptions up from our office. Loteda Activation    Thank you for requesting access to Loteda. Please follow the instructions below to securely access and download your online medical record. Loteda allows you to send messages to your doctor, view your test results, renew your prescriptions, schedule appointments, and more. How Do I Sign Up? 1. In your internet browser, go to [email or web address]. 2. Click on the First Time User? Click Here link in the Sign In box. You will see the New Member Sign Up page. 3. Enter your Loteda Access Code exactly as it appears below. You will not need to use this code after youve completed the sign-up process. If you do not sign up before the expiration date, you must request a new code. Loteda Access Code:  Activation code not generated  Current Loteda Status: Active (This is the date your Cobase access code will )    4. Enter the last four digits of your Social Security Number (xxxx) and Date of Birth (mm/dd/yyyy) as indicated and click Submit. You will be taken to the next sign-up page. 5. Create a Cobase ID. This will be your Cobase login ID and cannot be changed, so think of one that is secure and easy to remember. 6. Create a Cobase password. You can change your password at any time. 7. Enter your Password Reset Question and Answer. This can be used at a later time if you forget your password. 8. Enter your e-mail address. You will receive e-mail notification when new information is available in [address] 9. Click Sign Up. You can now view and download portions of your medical record. 10. Click the Download Summary menu link to download a portable copy of your medical information. Additional Information    If you have questions, please call [phone]. Remember, Cobase is NOT to be used for urgent needs. For medical emergencies, dial 911.

## 2022-04-27 ENCOUNTER — HOSPITAL ENCOUNTER (OUTPATIENT)
Dept: SLEEP MEDICINE | Age: 72
Discharge: HOME | End: 2022-04-27
Payer: MEDICARE

## 2022-04-27 VITALS
WEIGHT: 232 LBS | DIASTOLIC BLOOD PRESSURE: 90 MMHG | HEIGHT: 72 IN | BODY MASS INDEX: 31.42 KG/M2 | OXYGEN SATURATION: 97 % | HEART RATE: 82 BPM | SYSTOLIC BLOOD PRESSURE: 137 MMHG | TEMPERATURE: 98.2 F

## 2022-04-27 DIAGNOSIS — G47.33 OSA (OBSTRUCTIVE SLEEP APNEA): ICD-10-CM

## 2022-04-27 PROCEDURE — 95810 POLYSOM 6/> YRS 4/> PARAM: CPT | Performed by: INTERNAL MEDICINE

## 2022-04-28 ENCOUNTER — TELEPHONE (OUTPATIENT)
Dept: SLEEP MEDICINE | Age: 72
End: 2022-04-28

## 2022-04-28 DIAGNOSIS — G47.33 OSA (OBSTRUCTIVE SLEEP APNEA): Primary | ICD-10-CM

## 2022-04-28 NOTE — PROGRESS NOTES
217 [facility]. [place], [address]  Tel.  [phone]  Fax. 100 [facility] 60  [place], [address] [facility]  Tel.  [phone]  Fax. [phone] 9250 [facility] Mauricio Bocanegra   Tel.  [phone]  Fax. [phone]     Sleep Study Technical Notes        PRE-Test:  Lashaun Stubbs (: 1950) arrived in the lobby. Patient was greeted and screening questions asked. The patient was taken directly to his room.  Vitals:  o Temperature (97)   o BP (137/90)   o SaO2 (97)   o Weight per patient (232)   Procedure explained to the patient and questions were answered. The patient expressed understanding of the procedure. Electrodes were applied without incident. The patient was placed in bed and the study was started.  Sleep aid taken:  N      Acquisition Notes:   Lights off: 10:14pm     Respiratory events: Hypopneas, OA, CA   ECG:  NSR   Snoring: Moderate   Positional therapy with: Other comments: No  o Patient had caregiver in attendance:  No  o Patient watched TV or on phone after lights out for 30 minutes  o Patient slept with positional therapy:  No  o Patient slept with head of bed elevated:  No  o Patient wore an oral appliance:  No  o Patient to bathroom 0 times  -       POST Test:   Patient was awakened. Electrodes were removed. The patient was discharged after answering the Post Questionnaire. Patient stated that he was alert and ok to drive.  Equipment and room cleaned per infection control policy.

## 2022-05-03 NOTE — TELEPHONE ENCOUNTER
Results of Sleep Testing reviewed with patient who indicated that he would like to discuss his therapy options with his spouse before making a final decision. He is aware that we have placed a referral for him to get an oral appliance. He is also aware that if he wanted to purse PAP therapy, he will need to call back and schedule a PAP titration. Patient encouraged to call if there were any further questions regarding sleep symptoms. Encounter Diagnosis   Name Primary?     MARIELLA (obstructive sleep apnea) Yes       Orders Placed This Encounter    REFERRAL TO DENTISTRY     Referral Priority:   Routine     Referral Type:   Consultation     Referral Reason:   Specialty Services Required     Referred to Provider:   Garrett Johnston DDS     Number of Visits Requested:   1

## 2022-05-10 ENCOUNTER — TELEPHONE (OUTPATIENT)
Dept: SLEEP MEDICINE | Age: 72
End: 2022-05-10

## 2022-05-10 NOTE — TELEPHONE ENCOUNTER
Patient needs sleep study paperwork and order sent to Dr Loving Gave  Can be faxed to [phone] Patient also needs sleep study.

## 2022-05-28 LAB
ALBUMIN SERPL-MCNC: 4.4 G/DL (ref 3.7–4.7)
ALBUMIN SERPL-MCNC: 4.5 G/DL (ref 3.7–4.7)
ALBUMIN/GLOB SERPL: 2 {RATIO} (ref 1.2–2.2)
ALP SERPL-CCNC: 79 IU/L (ref 44–121)
ALP SERPL-CCNC: 79 IU/L (ref 44–121)
ALT SERPL-CCNC: 18 IU/L (ref 0–44)
ALT SERPL-CCNC: 18 IU/L (ref 0–44)
AST SERPL-CCNC: 18 IU/L (ref 0–40)
AST SERPL-CCNC: 19 IU/L (ref 0–40)
BASOPHILS # BLD AUTO: 0 X10E3/UL (ref 0–0.2)
BASOPHILS NFR BLD AUTO: 1 %
BILIRUB DIRECT SERPL-MCNC: <0.1 MG/DL (ref 0–0.4)
BILIRUB SERPL-MCNC: 0.5 MG/DL (ref 0–1.2)
BILIRUB SERPL-MCNC: 0.5 MG/DL (ref 0–1.2)
BUN SERPL-MCNC: 27 MG/DL (ref 8–27)
BUN/CREAT SERPL: 21 (ref 10–24)
CALCIUM SERPL-MCNC: 9.4 MG/DL (ref 8.6–10.2)
CHLORIDE SERPL-SCNC: 106 MMOL/L (ref 96–106)
CHOLEST SERPL-MCNC: 149 MG/DL (ref 100–199)
CO2 SERPL-SCNC: 19 MMOL/L (ref 20–29)
CREAT SERPL-MCNC: 1.26 MG/DL (ref 0.76–1.27)
EGFR: 61 ML/MIN/1.73
EOSINOPHIL # BLD AUTO: 0.2 X10E3/UL (ref 0–0.4)
EOSINOPHIL NFR BLD AUTO: 4 %
ERYTHROCYTE [DISTWIDTH] IN BLOOD BY AUTOMATED COUNT: 13.9 % (ref 11.6–15.4)
GLOBULIN SER CALC-MCNC: 2.2 G/DL (ref 1.5–4.5)
GLUCOSE SERPL-MCNC: 109 MG/DL (ref 65–99)
HCT VFR BLD AUTO: 39.9 % (ref 37.5–51)
HDLC SERPL-MCNC: 45 MG/DL
HGB BLD-MCNC: 13.2 G/DL (ref 13–17.7)
IMM GRANULOCYTES # BLD AUTO: 0 X10E3/UL (ref 0–0.1)
IMM GRANULOCYTES NFR BLD AUTO: 0 %
IMP & REVIEW OF LAB RESULTS: NORMAL
LDLC SERPL CALC-MCNC: 89 MG/DL (ref 0–99)
LYMPHOCYTES # BLD AUTO: 1.4 X10E3/UL (ref 0.7–3.1)
LYMPHOCYTES NFR BLD AUTO: 25 %
MCH RBC QN AUTO: 28.1 PG (ref 26.6–33)
MCHC RBC AUTO-ENTMCNC: 33.1 G/DL (ref 31.5–35.7)
MCV RBC AUTO: 85 FL (ref 79–97)
MONOCYTES # BLD AUTO: 0.6 X10E3/UL (ref 0.1–0.9)
MONOCYTES NFR BLD AUTO: 11 %
NEUTROPHILS # BLD AUTO: 3.1 X10E3/UL (ref 1.4–7)
NEUTROPHILS NFR BLD AUTO: 59 %
PLATELET # BLD AUTO: 217 X10E3/UL (ref 150–450)
POTASSIUM SERPL-SCNC: 4.6 MMOL/L (ref 3.5–5.2)
PROT SERPL-MCNC: 6.6 G/DL (ref 6–8.5)
PROT SERPL-MCNC: 6.7 G/DL (ref 6–8.5)
PSA SERPL-MCNC: 1.7 NG/ML (ref 0–4)
RBC # BLD AUTO: 4.69 X10E6/UL (ref 4.14–5.8)
SODIUM SERPL-SCNC: 139 MMOL/L (ref 134–144)
TRIGL SERPL-MCNC: 74 MG/DL (ref 0–149)
VLDLC SERPL CALC-MCNC: 15 MG/DL (ref 5–40)
WBC # BLD AUTO: 5.4 X10E3/UL (ref 3.4–10.8)

## 2022-05-31 DIAGNOSIS — E78.5 DYSLIPIDEMIA: Primary | ICD-10-CM

## 2022-06-09 RX ORDER — METOPROLOL SUCCINATE 25 MG/1
TABLET, EXTENDED RELEASE ORAL
Qty: 45 TABLET | Refills: 2 | Status: SHIPPED | OUTPATIENT
Start: 2022-06-09

## 2022-08-02 NOTE — PERIOP NOTES
[address]  Endoscopy Preprocedure Instructions      1. On the day of your surgery, please report to registration located on the 2nd floor of the  [facility]. yes    2. You must have a responsible adult to drive you to the hospital, stay at the hospital during your procedure and drive you home. If they leave your procedure will not be started (no exceptions). yes    3. Do not have anything to eat or drink (including water, gum, mints, coffee, and juice) after midnight. This does not apply to the medications you were instructed to take by your physician. yes  If you are currently taking Plavix, Coumadin, Aspirin, or other blood-thinning agents, contact your physician for special instructions. yes,    4. If you are having a procedure that requires bowel prep: We recommend that you have only clear liquids the day before your procedure and begin your bowel prep by 5:00 pm.  You may continue to drink clear liquids until midnight. If for any reason you are not able to complete your prep please notify your physician immediately. yes    5. Have a list of all current medications, including vitamins, herbal supplements and any other over the counter medications. yes    6. If you wear glasses, contacts, dentures and/or hearing aids, they may be removed prior to procedure, please bring a case to store them in. yes    7. You should understand that if you do not follow these instructions your procedure may be cancelled. If your physical condition changes (I.e. fever, cold or flu) please contact your doctor as soon as possible. 8. It is important that you be on time.   If for any reason you are unable to keep your appointment please call [phone] the day of or your physicians office prior to your scheduled procedure

## 2022-08-03 ENCOUNTER — ANESTHESIA EVENT (OUTPATIENT)
Dept: ENDOSCOPY | Age: 72
End: 2022-08-03
Payer: MEDICARE

## 2022-08-03 ENCOUNTER — ANESTHESIA (OUTPATIENT)
Dept: ENDOSCOPY | Age: 72
End: 2022-08-03
Payer: MEDICARE

## 2022-08-03 ENCOUNTER — HOSPITAL ENCOUNTER (OUTPATIENT)
Age: 72
Setting detail: OUTPATIENT SURGERY
Discharge: HOME | End: 2022-08-03
Attending: INTERNAL MEDICINE | Admitting: INTERNAL MEDICINE
Payer: MEDICARE

## 2022-08-03 VITALS
SYSTOLIC BLOOD PRESSURE: 131 MMHG | OXYGEN SATURATION: 99 % | RESPIRATION RATE: 10 BRPM | HEIGHT: 72 IN | HEART RATE: 53 BPM | WEIGHT: 227.51 LBS | DIASTOLIC BLOOD PRESSURE: 79 MMHG | TEMPERATURE: 98 F | BODY MASS INDEX: 30.82 KG/M2

## 2022-08-03 PROCEDURE — 88305 TISSUE EXAM BY PATHOLOGIST: CPT

## 2022-08-03 PROCEDURE — 2709999900 HC NON-CHARGEABLE SUPPLY: Performed by: INTERNAL MEDICINE

## 2022-08-03 PROCEDURE — 76040000019: Performed by: INTERNAL MEDICINE

## 2022-08-03 PROCEDURE — 74011250636 HC RX REV CODE- 250/636: Performed by: NURSE ANESTHETIST, CERTIFIED REGISTERED

## 2022-08-03 PROCEDURE — 76060000031 HC ANESTHESIA FIRST 0.5 HR: Performed by: INTERNAL MEDICINE

## 2022-08-03 PROCEDURE — 77030013992 HC SNR POLYP ENDOSC BSC -B: Performed by: INTERNAL MEDICINE

## 2022-08-03 RX ORDER — SODIUM CHLORIDE 9 MG/ML
50 INJECTION, SOLUTION INTRAVENOUS CONTINUOUS
Status: DISCONTINUED | OUTPATIENT
Start: 2022-08-03 | End: 2022-08-03 | Stop reason: HOSPADM

## 2022-08-03 RX ORDER — MIDAZOLAM HYDROCHLORIDE 1 MG/ML
.25-5 INJECTION, SOLUTION INTRAMUSCULAR; INTRAVENOUS
Status: DISCONTINUED | OUTPATIENT
Start: 2022-08-03 | End: 2022-08-03 | Stop reason: HOSPADM

## 2022-08-03 RX ORDER — PROPOFOL 10 MG/ML
INJECTION, EMULSION INTRAVENOUS AS NEEDED
Status: DISCONTINUED | OUTPATIENT
Start: 2022-08-03 | End: 2022-08-03 | Stop reason: HOSPADM

## 2022-08-03 RX ORDER — FLUMAZENIL 0.1 MG/ML
0.2 INJECTION INTRAVENOUS
Status: DISCONTINUED | OUTPATIENT
Start: 2022-08-03 | End: 2022-08-03 | Stop reason: HOSPADM

## 2022-08-03 RX ORDER — DEXTROMETHORPHAN/PSEUDOEPHED 2.5-7.5/.8
1.2 DROPS ORAL
Status: DISCONTINUED | OUTPATIENT
Start: 2022-08-03 | End: 2022-08-03 | Stop reason: HOSPADM

## 2022-08-03 RX ORDER — SODIUM CHLORIDE 9 MG/ML
INJECTION, SOLUTION INTRAVENOUS
Status: DISCONTINUED | OUTPATIENT
Start: 2022-08-03 | End: 2022-08-03 | Stop reason: HOSPADM

## 2022-08-03 RX ORDER — ATROPINE SULFATE 0.1 MG/ML
0.5 INJECTION INTRAVENOUS
Status: DISCONTINUED | OUTPATIENT
Start: 2022-08-03 | End: 2022-08-03 | Stop reason: HOSPADM

## 2022-08-03 RX ORDER — NALOXONE HYDROCHLORIDE 0.4 MG/ML
0.4 INJECTION, SOLUTION INTRAMUSCULAR; INTRAVENOUS; SUBCUTANEOUS
Status: DISCONTINUED | OUTPATIENT
Start: 2022-08-03 | End: 2022-08-03 | Stop reason: HOSPADM

## 2022-08-03 RX ADMIN — PROPOFOL 30 MG: 10 INJECTION, EMULSION INTRAVENOUS at 07:38

## 2022-08-03 RX ADMIN — PROPOFOL 20 MG: 10 INJECTION, EMULSION INTRAVENOUS at 07:32

## 2022-08-03 RX ADMIN — PROPOFOL 20 MG: 10 INJECTION, EMULSION INTRAVENOUS at 07:45

## 2022-08-03 RX ADMIN — PROPOFOL 80 MG: 10 INJECTION, EMULSION INTRAVENOUS at 07:25

## 2022-08-03 RX ADMIN — PROPOFOL 30 MG: 10 INJECTION, EMULSION INTRAVENOUS at 07:42

## 2022-08-03 RX ADMIN — PROPOFOL 50 MG: 10 INJECTION, EMULSION INTRAVENOUS at 07:35

## 2022-08-03 RX ADMIN — PROPOFOL 50 MG: 10 INJECTION, EMULSION INTRAVENOUS at 07:30

## 2022-08-03 RX ADMIN — SODIUM CHLORIDE: 900 INJECTION, SOLUTION INTRAVENOUS at 07:22

## 2022-08-03 NOTE — ANESTHESIA POSTPROCEDURE EVALUATION
Procedure(s):  COLONOSCOPY  ENDOSCOPIC POLYPECTOMY. MAC    Anesthesia Post Evaluation      Multimodal analgesia: multimodal analgesia not used between 6 hours prior to anesthesia start to PACU discharge  Patient location during evaluation: PACU  Patient participation: complete - patient participated  Level of consciousness: awake and alert  Pain score: 0  Pain management: satisfactory to patient  Airway patency: patent  Anesthetic complications: no  Cardiovascular status: acceptable  Respiratory status: acceptable  Hydration status: acceptable  Post anesthesia nausea and vomiting:  none  Final Post Anesthesia Temperature Assessment:  Normothermia (36.0-37.5 degrees C)      INITIAL Post-op Vital signs:   Vitals Value Taken Time   /79 08/03/22 0815   Temp 36.7 °C (98 °F) 08/03/22 0800   Pulse 53 08/03/22 0815   Resp 14 08/03/22 0815   SpO2 99 % 08/03/22 0815   Vitals shown include unvalidated device data.

## 2022-08-03 NOTE — DISCHARGE INSTRUCTIONS
[address] [facility] [address]  [address] [facility], [address] [facility]  [phone]                   Aubrie Mcgraw  [id number]  1950    COLON DISCHARGE INSTRUCTIONS    DISCOMFORT:  Redness at IV site- apply warm compress to area; if redness or soreness persist- contact your physician  There may be a slight amount of blood passed from the rectum  Gaseous discomfort- walking, belching will help relieve any discomfort  You may not operate a vehicle for 12 hours  You may not  engage in an occupation involving machinery or appliances for rest of today  You may not  drink alcoholic beverages for at least 12 hours  Avoid making any critical decisions for at least 24 hour    DIET:   High fiber diet. - however -  remember your colon is empty and a heavy meal will produce gas. Avoid these foods:  vegetables, fried / greasy foods, carbonated drinks for today     ACTIVITY:  It is recommended that you spend the remainder of the day resting -  avoid any strenuous activity. CALL M.D. ANY SIGN OF:   Increasing pain, nausea, vomiting  Abdominal distension (swelling)  New increased bleeding (oral or rectal)  Fever (chills)  Pain in chest area  Bloody discharge from nose or mouth  Shortness of breath    You may resume medications    Post procedure diagnosis:  polyps    Follow-up Instructions:    If a specimen was collected, you will receive a letter with the result by mail within two  weeks. Depending on the result this letter will specify your follow up colonoscopy date.       Please call us for any questions or concerns                     Aubrie Mcgraw  [id number]  1950        DISCHARGE SUMMARY from Nurse    The following personal items collected during your admission are returned to you:   Dental Appliance: Dental Appliances: None  Vision: Visual Aid: Glasses, At bedside  Hearing Aid:    Jewelry:    Clothing:    Other Valuables:    Valuables sent to safe:

## 2022-08-03 NOTE — H&P
[address]  [address] [facility], [address] [facility]  [phone]                     History and Physical     NAME: Bernardo Zambrano   :  1950   MRN:  [id number]     HPI:  Pt presents for surveillance colonoscopy. He has personal hx of colon polyps. Past Surgical History:   Procedure Laterality Date    COLONOSCOPY N/A 2017    COLONOSCOPY performed by Lidia Munoz MD at [address]  2017         EGD  2017         HX HERNIA REPAIR      times 2. 's inguinal bilateral    HX HIP REPLACEMENT Left 2018    HX ORTHOPAEDIC Left 2018    hip replacement    HX OTHER SURGICAL  1977    Repair of the right shoulder. HX ROTATOR CUFF REPAIR Left 2021    HX TONSILLECTOMY      childhood    UPPER GI ENDOSCOPY,DILATN W GUIDE  2017         UPPER GI ENDOSCOPY,DILATN W GUIDE  2018          Past Medical History:   Diagnosis Date    Arthritis     Dyslipidemia     Esophageal stricture 2021    egd  dr Stevenson Ip dilated    Essential hypertension     Frequent PVCs     GERD (gastroesophageal reflux disease)     H/O seasonal allergies     Mitral valve prolapse     pt states Dr. Troncoso Sample is not sure if this is the correct diagnosis    Obesity 2018    Obesity  BMI= 32.6    Sleep apnea     uses mouth appliance     Social History     Tobacco Use    Smoking status: Former     Packs/day: 1.00     Years: 30.00     Pack years: 30.00     Types: Cigarettes     Quit date: 1997     Years since quittin.5    Smokeless tobacco: Never   Vaping Use    Vaping Use: Never used   Substance Use Topics    Alcohol use:  Yes     Alcohol/week: 6.0 standard drinks     Types: 3 Glasses of wine, 3 Cans of beer per week     Comment: 6 drinks per week    Drug use: No     No Known Allergies  Family History   Problem Relation Age of Onset    Sudden Death Mother         Car accident    Heart Attack Father 48    Heart Disease Father Thyroid Disease Sister     No Known Problems Brother     No Known Problems Sister     No Known Problems Sister     No Known Problems Brother     No Known Problems Brother     Cancer Maternal Aunt [de-identified]        multiple myloma     Current Facility-Administered Medications   Medication Dose Route Frequency    0.9% sodium chloride infusion  50 mL/hr IntraVENous CONTINUOUS    midazolam (VERSED) injection 0.25-5 mg  0.25-5 mg IntraVENous Multiple    naloxone (NARCAN) injection 0.4 mg  0.4 mg IntraVENous Multiple    flumazeniL (ROMAZICON) 0.1 mg/mL injection 0.2 mg  0.2 mg IntraVENous Multiple    simethicone (MYLICON) 12UI/8.9UW oral drops 80 mg  1.2 mL Oral Multiple    atropine injection 0.5 mg  0.5 mg IntraVENous ONCE PRN         PHYSICAL EXAM:  General: WD, WN. Alert, cooperative, no acute distress    HEENT: NC, Atraumatic. PERRLA, EOMI. Anicteric sclerae. Lungs:  CTA Bilaterally. No Wheezing/Rhonchi/Rales. Heart:  Regular  rhythm,  No murmur, No Rubs, No Gallops  Abdomen: Soft, Non distended, Non tender. +Bowel sounds, no HSM  Extremities: No c/c/e  Neurologic:  CN 2-12 gi, Alert and oriented X 3. No acute neurological distress   Psych:   Good insight. Not anxious nor agitated. Assessment:   I have reviewed with the patient +/- family alternatives,benefits and risks for the procedure, as well as potential complications(with emphasis on, but not limited to, bleeding, perforation, cardiovascular/cerebrovascular/pulmonary events, reactions to the medications, infection, risk of missing a lesions/a cancer, and the imponderables including death), alternative options, and patient/family voices understanding.       Plan:   Endoscopic procedure  Conscious sedation or MAC

## 2022-08-03 NOTE — PROCEDURES
[address] [facility] [address]  [address] [facility], [address] [facility]  [phone]                   Colonoscopy Operative Report      Indications:    Personal history of colon polyps (screening only)     :  Norma Haque MD    Assistants: None    Referring Provider: Lizy Pretty MD    Sedation:  MAC anesthesia Propofol    Procedure Details:  After informed consent was obtained with all risks and benefits of procedure explained and preoperative exam completed, the patient was taken to the endoscopy suite and placed in the left lateral decubitus position. Upon sequential sedation as per above, a digital rectal exam was performed  And was normal.  The Olympus videocolonoscope  was inserted in the rectum and carefully advanced to the cecum, which was identified by the ileocecal valve and appendiceal orifice, terminal ileum. The quality of preparation was good. The colonoscope was slowly withdrawn with careful evaluation between folds. Retroflexion in the rectum was performed and was normal..     Findings:   Rectum: Grade 1 internal hemorrhoid(s); Sigmoid: 3  Sessile polyp(s), the largest 7 mm in size; Descending Colon:     - Diverticulosis  Transverse Colon: normal  Ascending Colon: normal  Cecum: 1  Sessile polyp(s), the largest 4 mm in size; Terminal Ileum: normal    Interventions:  4 complete polypectomy were performed using cold snare  and the polyps were  retrieved    Specimen Removed:  specimen #1, 4 mm in size, located in the cecum removed by cold biopsy and sent for pathology  #2, 7 mm in size, located in the sigmoid removed by cold snare and retrieved for pathology    Implants: none    Complications: None. EBL:  None. Impression: colon polyps                       Diverticulosis                       Internal hemorrhoids    Recommendations:   -Await pathology.   -Repeat colonoscopy in 3 years.  -High fiber diet.     -Resume normal medication(s)  -Call office for pathology results  -Call for any questions/concerns       Discharge Disposition:  Home in the company of a  when able to ambulate.     Keyanna Coker MD  8/3/2022  7:48 AM

## 2022-08-03 NOTE — PERIOP NOTES
0236  Timeout performed. Anesthesia staff at patient's bedside administering anesthesia and monitoring patients vital signs throughout procedure. See anesthesia note. Post procedure, report received from Ana M RODRIGUEZ. [id number]  Endoscope was pre-cleaned at bedside immediately following procedure by endo techIrene. [id number]  Patient tolerated procedure. Abdomen soft and patient arousable and voices no complaints. Patient transported to endoscopy recovery area. Report given to post procedure RNPatricia.

## 2022-08-04 DIAGNOSIS — K21.9 GERD WITHOUT ESOPHAGITIS: ICD-10-CM

## 2022-08-04 RX ORDER — PANTOPRAZOLE SODIUM 20 MG/1
TABLET, DELAYED RELEASE ORAL
Qty: 180 TABLET | Refills: 0 | Status: SHIPPED | OUTPATIENT
Start: 2022-08-04

## 2022-10-11 ENCOUNTER — OFFICE VISIT (OUTPATIENT)
Dept: CARDIOLOGY CLINIC | Age: 72
End: 2022-10-11
Payer: MEDICARE

## 2022-10-11 VITALS
HEIGHT: 72 IN | SYSTOLIC BLOOD PRESSURE: 122 MMHG | DIASTOLIC BLOOD PRESSURE: 81 MMHG | BODY MASS INDEX: 31.15 KG/M2 | OXYGEN SATURATION: 97 % | WEIGHT: 230 LBS | HEART RATE: 81 BPM

## 2022-10-11 DIAGNOSIS — I49.3 PVC (PREMATURE VENTRICULAR CONTRACTION): ICD-10-CM

## 2022-10-11 DIAGNOSIS — I10 ESSENTIAL HYPERTENSION: Primary | ICD-10-CM

## 2022-10-11 DIAGNOSIS — E78.5 DYSLIPIDEMIA: ICD-10-CM

## 2022-10-11 DIAGNOSIS — I25.10 CORONARY ARTERY DISEASE INVOLVING NATIVE CORONARY ARTERY OF NATIVE HEART WITHOUT ANGINA PECTORIS: ICD-10-CM

## 2022-10-11 DIAGNOSIS — I42.9 CARDIOMYOPATHY, UNSPECIFIED TYPE (HCC): ICD-10-CM

## 2022-10-11 PROBLEM — N18.30 CHRONIC RENAL DISEASE, STAGE III (HCC): Status: ACTIVE | Noted: 2022-10-11

## 2022-10-11 PROCEDURE — G8432 DEP SCR NOT DOC, RNG: HCPCS | Performed by: SPECIALIST

## 2022-10-11 PROCEDURE — G8427 DOCREV CUR MEDS BY ELIG CLIN: HCPCS | Performed by: SPECIALIST

## 2022-10-11 PROCEDURE — 1101F PT FALLS ASSESS-DOCD LE1/YR: CPT | Performed by: SPECIALIST

## 2022-10-11 PROCEDURE — 99214 OFFICE O/P EST MOD 30 MIN: CPT | Performed by: SPECIALIST

## 2022-10-11 PROCEDURE — G8752 SYS BP LESS 140: HCPCS | Performed by: SPECIALIST

## 2022-10-11 PROCEDURE — G8536 NO DOC ELDER MAL SCRN: HCPCS | Performed by: SPECIALIST

## 2022-10-11 PROCEDURE — G8417 CALC BMI ABV UP PARAM F/U: HCPCS | Performed by: SPECIALIST

## 2022-10-11 PROCEDURE — 1123F ACP DISCUSS/DSCN MKR DOCD: CPT | Performed by: SPECIALIST

## 2022-10-11 PROCEDURE — G0463 HOSPITAL OUTPT CLINIC VISIT: HCPCS | Performed by: SPECIALIST

## 2022-10-11 PROCEDURE — 3017F COLORECTAL CA SCREEN DOC REV: CPT | Performed by: SPECIALIST

## 2022-10-11 PROCEDURE — G8754 DIAS BP LESS 90: HCPCS | Performed by: SPECIALIST

## 2022-10-11 NOTE — PATIENT INSTRUCTIONS

## 2022-10-11 NOTE — PROGRESS NOTES
Chief Complaint   Patient presents with    Hypertension    Cardiomyopathy    Cholesterol Problem    Follow-up     6 months     Visit Vitals  /81 (BP 1 Location: Left upper arm, BP Patient Position: Sitting)   Pulse 81   Ht 6' (1.829 m)   Wt 230 lb (104.3 kg)   SpO2 97%   BMI 31.19 kg/m²     Chest pain denied     Palpations denied    SOB denied    Dizziness denied    Swelling in hands/feet denied     Recent hospital stays denied

## 2022-10-11 NOTE — PROGRESS NOTES
CARDIOLOGY OFFICE NOTE    Rick Angel MD, [address], [place], [address] [facility]  Phone [phone]; Fax [phone]  Mobile [phone]   Voice Mail [phone]    Bryce Barney MD       ATTENTION:   This medical record was transcribed using an electronic medical records/speech recognition system. Although proofread, it may and can contain electronic, spelling and other errors. Corrections may be executed at a later time. Please feel free to contact us for any clarifications as needed. ICD-10-CM ICD-9-CM   1. Essential hypertension  I10 401.9   2. Dyslipidemia  E78.5 272.4   3. PVC (premature ventricular contraction)  I49.3 427.69   4. Cardiomyopathy, unspecified type ([address]  I42.9 425.4   5. Coronary artery disease involving native coronary artery of native heart without angina pectoris  I25.10 414.01            Zoya Brown is a 67 y.o. male with  referred for   hypertension dyslipidemia and mitral valve prolapse. Cardiac risk factors: family history, male gender, hypertension  I have personally obtained the history from the patient. HISTORY OF PRESENTING ILLNESS      Zoya Brown is a 67 y.o. male  with HTN and mitral valve prolapse . He is doing well with no interval cardiac issues. His last EF by echo was 50% in January 2021. In 2016 for some reason he had EF of 30-40%. He has no symptoms of heart failure no PND orthopnea. He remains on flecainide for PVCs and is scheduled to see the NP from .          ACTIVE PROBLEM LIST     Patient Active Problem List    Diagnosis Date Noted    Chronic renal disease, stage III 10/11/2022    Sensorineural hearing loss (SNHL) of right ear 04/12/2022    Esophageal stricture 11/04/2021    Primary osteoarthritis of left hip 05/06/2018    Primary osteoarthritis of left knee 04/25/2018    Seasonal allergic rhinitis due to pollen 05/19/2017    Mitral valve prolapse 02/16/2017    Colon polyps 01/19/2017    Essential hypertension 01/19/2017           PAST MEDICAL HISTORY     Past Medical History:   Diagnosis Date    Arthritis     Dyslipidemia     Esophageal stricture 11/04/2021    egd 8/21 dr Cinthya Dooley dilated    Essential hypertension     Frequent PVCs     GERD (gastroesophageal reflux disease)     H/O seasonal allergies     Mitral valve prolapse     pt states Dr. Luna Estes is not sure if this is the correct diagnosis    Obesity 05/02/2018    Obesity  BMI= 32.6    Sleep apnea     uses mouth appliance           PAST SURGICAL HISTORY     Past Surgical History:   Procedure Laterality Date    COLONOSCOPY N/A 6/2/2017    COLONOSCOPY performed by Chase Greenfield MD at [address] N/A 8/3/2022    COLONOSCOPY performed by Kristin Lima MD at [address]  6/2/2017         EGD  6/2/2017         HX HERNIA REPAIR      times 2. 1980's inguinal bilateral    HX HIP REPLACEMENT Left 2018    HX ORTHOPAEDIC Left 2018    hip replacement    HX OTHER SURGICAL  1977    Repair of the right shoulder.      HX ROTATOR CUFF REPAIR Left 01/14/2021    HX TONSILLECTOMY      childhood    UPPER GI ENDOSCOPY,DILATN W GUIDE  7/14/2017         UPPER GI ENDOSCOPY,DILATN W GUIDE  9/26/2018               ALLERGIES     No Known Allergies       FAMILY HISTORY     Family History   Problem Relation Age of Onset    Sudden Death Mother         Car accident    Heart Attack Father 48    Heart Disease Father     Thyroid Disease Sister     No Known Problems Brother     No Known Problems Sister     No Known Problems Sister     No Known Problems Brother     No Known Problems Brother     Cancer Maternal Aunt [de-identified]        multiple myloma    negative for cardiac disease       SOCIAL HISTORY     Social History     Socioeconomic History    Marital status:    Tobacco Use    Smoking status: Former     Packs/day: 1.00     Years: 30.00     Pack years: 30.00     Types: Cigarettes     Quit date: 2/1/1997 Years since quittin.7    Smokeless tobacco: Never   Vaping Use    Vaping Use: Never used   Substance and Sexual Activity    Alcohol use: Yes     Alcohol/week: 6.0 standard drinks     Types: 3 Glasses of wine, 3 Cans of beer per week     Comment: 6 drinks per week    Drug use: No         MEDICATIONS     Current Outpatient Medications   Medication Sig    pantoprazole (PROTONIX) 20 mg tablet TAKE 1 TABLET TWICE DAILY (Patient taking differently: 20 mg daily.)    metoprolol succinate (TOPROL-XL) 25 mg XL tablet TAKE 1/2 TABLET EVERY DAY    simvastatin (ZOCOR) 10 mg tablet Take 1 Tablet by mouth nightly. TAKE 1 TABLET EVERY NIGHT    flecainide (TAMBOCOR) 50 mg tablet Take 1 Tablet by mouth two (2) times a day. aspirin 81 mg chewable tablet Take 81 mg by mouth daily. multivitamin (ONE A DAY) tablet Take 1 Tab by mouth daily. VITAMIN B COMPLEX (B COMPLEX PO) Take  by mouth daily. DOCOSAHEXANOIC ACID/EPA (FISH OIL PO) Take 1,200 mg by mouth two (2) times a day. GLUCOSAMINE SULFATE (GLUCOSAMINE PO) Take 1,500 mg by mouth two (2) times a day. CHOLECALCIFEROL, VITAMIN D3, (VITAMIN D3 PO) Take 1 Tab by mouth daily. No current facility-administered medications for this visit. I have reviewed the nurses notes, vitals, problem list, allergy list, medical history, family, social history and medications. REVIEW OF SYMPTOMS   Pertinent positive per HPI  General: Pt denies excessive weight gain or loss. Pt is able to conduct ADL's  HEENT: Denies blurred vision, headaches, hearing loss, epistaxis and difficulty swallowing. Respiratory: Denies cough, congestion, shortness of breath, OCONNOR, wheezing or stridor.   Cardiovascular: Denies precordial pain, palpitations, edema or PND  Gastrointestinal: Denies poor appetite, indigestion, abdominal pain or blood in stool  Genitourinary: Denies hematuria, dysuria, increased urinary frequency  Musculoskeletal: Denies joint pain or swelling from muscles or joints  Neurologic: Denies tremor, paresthesias, headache, or sensory motor disturbance  Psychiatric: Denies confusion, insomnia, depression  Integumentray: Denies rash, itching or ulcers. Hematologic: Denies easy bruising, bleeding     PHYSICAL EXAMINATION      Vitals:    10/11/22 1458   BP: 122/81   Pulse: 81   SpO2: 97%   Weight: 230 lb (104.3 kg)   Height: 6' (1.829 m)       General: Well developed, in no acute distress. HEENT: No jaundice, oral mucosa moist, no oral ulcers  Neck: Supple, no stiffness, no lymphadenopathy, supple  Heart:  Normal S1/S2 negative S3 or S4. Regular, no murmur, gallop or rub, no jugular venous distention  Respiratory: Clear bilaterally x 4, no wheezing or rales  Extremities:  No edema, normal cap refill, no cyanosis. Musculoskeletal: No clubbing, no deformities  Neuro: A&Ox3, speech clear, gait stable, cooperative, no focal neurologic deficits  Skin: Skin color is normal. No rashes or lesions. Non diaphoretic, moist.         DIAGNOSTIC DATA     1. Lipids   12/18/19- , HDL 42, LDL 83, TG 64   10/12/20- , HDL 46, LDL 86.4,    4/12/21- , HDL 41, LDL 88,    11/4/21- , HDL 42, LDL 78.2, TG 89  5/27/22- , HDL 45, LDL 89, TG 74     2. Holter   (1/19/17): sinus rhtyhm with PVCs there were frequent PVCs, rare couplets, triplets and bigeminy with rare episodes of non-sustained VT with rates up to 134.   9/28/17- SB with PVCs, HR 43-91, PVC burden 30.48%   11/8/17- SR HR , PVC burden 5 %   1/8/20- SR average HR 85, min 47, max 133, 1 VT 3 beats , PVC burden 0.7%   1/26/21- SR average HR 77, min 47, max 106,  times, longest 6 beats , fastest 3 beats , PVC burden 20.2%   5/5/21- PVC burden 1/7%    3. Echo   4/19/16- EF 30-44%, MR mild/mod, AI mild, LAE   9/28/17- EF 50-55%, PI mild/mod   1/4/21-EF  50%, RVE, CHELSIE, mild AI/MR/TR, PAP 28 mmHg     4.  Stress Test   Lexiscan- 6/15/17-no ischemia, EF 39%  Cardiolite-2/23/22- no ischemia, 10.1 mets, 6 min, EF 52%         LABORATORY DATA            Lab Results   Component Value Date/Time    WBC 5.4 05/27/2022 08:32 AM    HGB 13.2 05/27/2022 08:32 AM    HCT 39.9 05/27/2022 08:32 AM    PLATELET 463 35/33/7534 08:32 AM    MCV 85 05/27/2022 08:32 AM      Lab Results   Component Value Date/Time    Sodium 139 05/27/2022 08:32 AM    Potassium 4.6 05/27/2022 08:32 AM    Chloride 106 05/27/2022 08:32 AM    CO2 19 (L) 05/27/2022 08:32 AM    Anion gap 4 (L) 11/04/2021 09:55 AM    Glucose 109 (H) 05/27/2022 08:32 AM    BUN 27 05/27/2022 08:32 AM    Creatinine 1.26 05/27/2022 08:32 AM    BUN/Creatinine ratio 21 05/27/2022 08:32 AM    GFR est AA >60 11/04/2021 09:55 AM    GFR est non-AA 53 (L) 11/04/2021 09:55 AM    Calcium 9.4 05/27/2022 08:32 AM    Bilirubin, total 0.5 05/27/2022 08:37 AM    Alk. phosphatase 79 05/27/2022 08:37 AM    Protein, total 6.6 05/27/2022 08:37 AM    Albumin 4.4 05/27/2022 08:37 AM    Globulin 3.2 11/04/2021 09:55 AM    A-G Ratio 2.0 05/27/2022 08:32 AM    ALT (SGPT) 18 05/27/2022 08:37 AM           ASSESSMENT/RECOMMENDATIONS:.      1. HTN  - Blood pressure is is at goal at 122/81 and is currently taking metoprolol 25 mg but only half a tablet a day  -This is essential that he take well on flecainide     2. Cardiomyopathy  -Last EF by echo was 50% in 2021  -Repeat echo in 6 months       3. PVC's  -Flecainide has brought down his PVCs to 1%; tolerating with no issues  -Scheduled to see EP soon     4. Dyslipidemia   -LDL was 89 at goal.  His LDL should be under 100    5. History of mitral valve prolapse        return in 6 months    We discussed the expected course, resolution and complications of the diagnosis(es) in detail. Medication risks, benefits, costs, interactions, and alternatives were discussed as indicated. I advised him to contact the office if his condition worsens, changes or fails to improve as anticipated.  He expressed understanding with the diagnosis(es) and plan    No orders of the defined types were placed in this encounter. We discussed the expected course, resolution and complications of the diagnosis(es) in detail. Medication risks, benefits, costs, interactions, and alternatives were discussed as indicated. I advised him to contact the office if his condition worsens, changes or fails to improve as anticipated. He expressed understanding with the diagnosis(es) and plan          Follow-up and Dispositions    Return in about 6 months (around 4/11/2023). I have discussed the diagnosis with  Elaine Alford and the intended plan as seen in the above orders. Questions were answered concerning future plans. I have discussed medication side effects and warnings with the patient as well. Thank you,  Rigo Whaley MD for involving me in the care of  Elaine Alford. Please do not hesitate to contact me for further questions/concerns. Rick Nina MD, [facility], [address] [place] [address]      [phone] / [phone] Fax

## 2022-10-11 NOTE — LETTER
10/11/2022    Patient: Olayinka Restrepo   YOB: 1950   Date of Visit: 10/11/2022     [address] [facility]  [address]  [address] [facility]  Via In Basket    Dear Gloria Rico MD,      Thank you for referring Mr. Jack Ion Thoen to [facility] for evaluation. My notes for this consultation are attached. If you have questions, please do not hesitate to call me. I look forward to following your patient along with you.       Sincerely,    Fabian Leonard MD

## 2022-11-10 NOTE — PROGRESS NOTES
Cardiac Electrophysiology Office Follow-up    NAME:  Jose Van   :  1950  MRM:  [id number]    Date:  2022     Primary Cardiologist: Dr. Corine Cr and Plan:     1. Frequent PVCs  -     AMB POC EKG ROUTINE W/ 12 LEADS, INTER & REP  2. Obstructive sleep apnea syndrome  -     AMB POC EKG ROUTINE W/ 12 LEADS, INTER & REP  3. Hyperlipidemia, unspecified hyperlipidemia type     PVCs. Asymptomatic. - EF 50% by echo 2021.   - 1.7% burden by Holter 2021.   - Nuclear stress test 2022 showed no ischemia.   - Suppressed on flecainide and toprol. - EKG today shows sinus darío, HR 53, ZOË 192 ms,  ms  - Repeat 24 hour Holter in the next few months to reassess PVC burden and heart rate response given bradycardia. - Follow-up in the EP clinic in one year, or sooner for any concerns. Hyperlipidemia.  - Continue statin therapy. Subjective: Jose Van, a 67y.o. year-old who presents for followup of PVCs. Previous Holter showed PVC burden of 30%. Flecainide was started with good results. Last Holter was 2021. He denies chest pain, palpitations, dyspnea, dizziness or syncope. Review of Systems:   12 point review of systems was performed.  All negative except for HPI    Exam:     Physical Exam:  /62 (BP 1 Location: Left upper arm, BP Patient Position: Sitting, BP Cuff Size: Adult)   Pulse (!) 52   Resp 14   Ht 6' (1.829 m)   Wt 103.9 kg (229 lb)   SpO2 97%   BMI 31.06 kg/m²     PHYSICAL EXAM  General:  Alert and oriented, in no acute distress  Head:  Atraumatic, normocephalic  Eyes:  extraocular muscles intact  Neck:  Supple, normal range of motion  Lungs:  Clear to auscultation bilaterally, no wheezes/rales/rhonchi   Cardiovascular:  Regular rate and rhythm, normal S1-S2, no murmurs/rubs/gallops  Abdomen:  Soft, nontender, nondistended, normoactive bowel sounds  Skin:  Intact, no rash  Extremities:, no clubbing, cyanosis, or edema  Musculoskeletal: normal range of motion  Neurological:  Alert and oriented, no focal neurologic deficits  Psychiatric:  Normal mood and affect      Medications:     Current Outpatient Medications   Medication Sig    pantoprazole (PROTONIX) 20 mg tablet TAKE 1 TABLET TWICE DAILY (Patient taking differently: 20 mg daily.)    metoprolol succinate (TOPROL-XL) 25 mg XL tablet TAKE 1/2 TABLET EVERY DAY    simvastatin (ZOCOR) 10 mg tablet Take 1 Tablet by mouth nightly. TAKE 1 TABLET EVERY NIGHT    flecainide (TAMBOCOR) 50 mg tablet Take 1 Tablet by mouth two (2) times a day. aspirin 81 mg chewable tablet Take 81 mg by mouth daily. multivitamin (ONE A DAY) tablet Take 1 Tab by mouth daily. VITAMIN B COMPLEX (B COMPLEX PO) Take  by mouth daily. DOCOSAHEXANOIC ACID/EPA (FISH OIL PO) Take 1,200 mg by mouth two (2) times a day. GLUCOSAMINE SULFATE (GLUCOSAMINE PO) Take 1,500 mg by mouth two (2) times a day. CHOLECALCIFEROL, VITAMIN D3, (VITAMIN D3 PO) Take 1 Tab by mouth daily. No current facility-administered medications for this visit. Diagnostic Data Review:       Results for orders placed or performed during the hospital encounter of 02/01/21   EKG, 12 LEAD, INITIAL   Result Value Ref Range    Ventricular Rate 102 BPM    Atrial Rate 100 BPM    P-R Interval 172 ms    QRS Duration 98 ms    Q-T Interval 384 ms    QTC Calculation (Bezet) 500 ms    Calculated P Axis 35 degrees    Calculated R Axis -13 degrees    Calculated T Axis 22 degrees    Diagnosis       Normal sinus rhythm  premature ventricular complexes  Nonspecific ST segment changes  Otherwise normal ECG  No previous ECGs available  Confirmed by Cisco Hendricks M.D., Edelmiro Quill ([id number]) on 2/1/2021 5:28:50 PM        1.  Lipids   12/18/19- , HDL 42, LDL 83, TG 64   10/12/20- , HDL 46, LDL 86.4,    4/12/21- , HDL 41, LDL 88,    11/4/21- , HDL 42, LDL 78.2, TG 89  5/27/22- , HDL 45, LDL 89, TG 74 2. Holter   (1/19/17): sinus rhtyhm with PVCs there were frequent PVCs, rare couplets, triplets and bigeminy with rare episodes of non-sustained VT with rates up to 134.   9/28/17- SB with PVCs, HR 43-91, PVC burden 30.48%   11/8/17- SR HR , PVC burden 5 %   1/8/20- SR average HR 85, min 47, max 133, 1 VT 3 beats , PVC burden 0.7%   1/26/21- SR average HR 77, min 47, max 106,  times, longest 6 beats , fastest 3 beats , PVC burden 20.2%   5/5/21- PVC burden 1/7%    3. Echo   4/19/16- EF 30-44%, MR mild/mod, AI mild, LAE   9/28/17- EF 50-55%, PI mild/mod   1/4/21-EF  50%, RVE, CHELSIE, mild AI/MR/TR, PAP 28 mmHg     4. Stress Test   Lexiscan- 6/15/17-no ischemia, EF 39%  Cardiolite-2/23/22- no ischemia, 10.1 mets, 6 min, EF 52%      Lab Review:     Lab Results   Component Value Date/Time    Cholesterol, total 149 05/27/2022 08:37 AM    HDL Cholesterol 45 05/27/2022 08:37 AM    LDL, calculated 89 05/27/2022 08:37 AM    LDL, calculated 78.2 11/04/2021 09:55 AM    Triglyceride 74 05/27/2022 08:37 AM    CHOL/HDL Ratio 3.3 11/04/2021 09:55 AM     Lab Results   Component Value Date/Time    Creatinine 1.26 05/27/2022 08:32 AM     Lab Results   Component Value Date/Time    BUN 27 05/27/2022 08:32 AM     Lab Results   Component Value Date/Time    Potassium 4.6 05/27/2022 08:32 AM     Lab Results   Component Value Date/Time    Hemoglobin A1c 5.4 05/02/2018 09:27 AM     Lab Results   Component Value Date/Time    HGB 13.2 05/27/2022 08:32 AM     Lab Results   Component Value Date/Time    PLATELET 712 07/16/3002 08:32 AM     No results for input(s): CPK, CKMB, TROIQ in the last 72 hours. No lab exists for component: CKQMB, CPKMB             ___________________________________________________    Roopa Kenney NP    Cardiac Electrophysiology  [facility] [place]  [address] [facility].  [facility] [address] [facility], [address] [place] [id number]  [phone]                             [phone]

## 2022-12-02 ENCOUNTER — OFFICE VISIT (OUTPATIENT)
Dept: CARDIOLOGY CLINIC | Age: 72
End: 2022-12-02
Payer: MEDICARE

## 2022-12-02 VITALS
RESPIRATION RATE: 14 BRPM | HEART RATE: 52 BPM | DIASTOLIC BLOOD PRESSURE: 62 MMHG | OXYGEN SATURATION: 97 % | WEIGHT: 229 LBS | SYSTOLIC BLOOD PRESSURE: 114 MMHG | HEIGHT: 72 IN | BODY MASS INDEX: 31.02 KG/M2

## 2022-12-02 DIAGNOSIS — E78.5 HYPERLIPIDEMIA, UNSPECIFIED HYPERLIPIDEMIA TYPE: ICD-10-CM

## 2022-12-02 DIAGNOSIS — G47.33 OBSTRUCTIVE SLEEP APNEA SYNDROME: ICD-10-CM

## 2022-12-02 DIAGNOSIS — I49.3 FREQUENT PVCS: Primary | ICD-10-CM

## 2022-12-02 RX ORDER — FLECAINIDE ACETATE 50 MG/1
50 TABLET ORAL 2 TIMES DAILY
Qty: 180 TABLET | Refills: 3 | Status: SHIPPED | OUTPATIENT
Start: 2022-12-02

## 2022-12-02 NOTE — PROGRESS NOTES
Room EP 2    Chief Complaint   Patient presents with    Other     PVCS       Visit Vitals  /62 (BP 1 Location: Left upper arm, BP Patient Position: Sitting, BP Cuff Size: Adult)   Pulse (!) 52   Resp 14   Ht 6' (1.829 m)   Wt 229 lb (103.9 kg)   SpO2 97%   BMI 31.06 kg/m²       EKG done today    Chest pain: no    Shortness of breath: no    Edema: no    Palpitations, Skipped beats, Rapid heartbeat: no    Dizziness: no    Fatigue:no    New diagnosis/Surgeries: oral surgery 2 days ago     1. Have you been to the ER, urgent care clinic since your last visit? Hospitalized since your last visit? No      2. Have you seen or consulted any other health care providers outside of the [address] [place] [address] since your last visit? Include any pap smears or colon screening.  No     Refills: no

## 2022-12-17 LAB
ALBUMIN SERPL-MCNC: 4.7 G/DL (ref 3.7–4.7)
ALP SERPL-CCNC: 81 IU/L (ref 44–121)
ALT SERPL-CCNC: 23 IU/L (ref 0–44)
AST SERPL-CCNC: 24 IU/L (ref 0–40)
BILIRUB DIRECT SERPL-MCNC: 0.14 MG/DL (ref 0–0.4)
BILIRUB SERPL-MCNC: 0.5 MG/DL (ref 0–1.2)
CHOLEST SERPL-MCNC: 146 MG/DL (ref 100–199)
HDLC SERPL-MCNC: 42 MG/DL
IMP & REVIEW OF LAB RESULTS: NORMAL
LDLC SERPL CALC-MCNC: 89 MG/DL (ref 0–99)
PROT SERPL-MCNC: 6.8 G/DL (ref 6–8.5)
TRIGL SERPL-MCNC: 78 MG/DL (ref 0–149)
VLDLC SERPL CALC-MCNC: 15 MG/DL (ref 5–40)

## 2023-01-04 DIAGNOSIS — E78.5 HYPERLIPIDEMIA, UNSPECIFIED HYPERLIPIDEMIA TYPE: Primary | ICD-10-CM

## 2023-01-31 DIAGNOSIS — E78.5 DYSLIPIDEMIA, GOAL LDL BELOW 100: ICD-10-CM

## 2023-01-31 RX ORDER — SIMVASTATIN 10 MG/1
10 TABLET, FILM COATED ORAL
Qty: 90 TABLET | Refills: 1 | Status: SHIPPED | OUTPATIENT
Start: 2023-01-31

## 2023-01-31 RX ORDER — FLECAINIDE ACETATE 50 MG/1
50 TABLET ORAL 2 TIMES DAILY
Qty: 180 TABLET | Refills: 1 | Status: SHIPPED | OUTPATIENT
Start: 2023-01-31

## 2023-01-31 RX ORDER — METOPROLOL SUCCINATE 25 MG/1
TABLET, EXTENDED RELEASE ORAL
Qty: 45 TABLET | Refills: 1 | Status: SHIPPED | OUTPATIENT
Start: 2023-01-31

## 2023-03-20 ENCOUNTER — OFFICE VISIT (OUTPATIENT)
Dept: INTERNAL MEDICINE CLINIC | Age: 73
End: 2023-03-20
Payer: MEDICARE

## 2023-03-20 VITALS
TEMPERATURE: 97.5 F | OXYGEN SATURATION: 98 % | BODY MASS INDEX: 31.18 KG/M2 | HEART RATE: 75 BPM | RESPIRATION RATE: 16 BRPM | SYSTOLIC BLOOD PRESSURE: 110 MMHG | WEIGHT: 230.2 LBS | HEIGHT: 72 IN | DIASTOLIC BLOOD PRESSURE: 75 MMHG

## 2023-03-20 DIAGNOSIS — I42.9 CARDIOMYOPATHY, UNSPECIFIED TYPE (HCC): ICD-10-CM

## 2023-03-20 DIAGNOSIS — I10 ESSENTIAL HYPERTENSION: ICD-10-CM

## 2023-03-20 DIAGNOSIS — K21.9 GERD WITHOUT ESOPHAGITIS: ICD-10-CM

## 2023-03-20 DIAGNOSIS — N18.30 STAGE 3 CHRONIC KIDNEY DISEASE, UNSPECIFIED WHETHER STAGE 3A OR 3B CKD (HCC): ICD-10-CM

## 2023-03-20 DIAGNOSIS — K40.90 LEFT INGUINAL HERNIA: ICD-10-CM

## 2023-03-20 DIAGNOSIS — R73.01 IFG (IMPAIRED FASTING GLUCOSE): Primary | ICD-10-CM

## 2023-03-20 DIAGNOSIS — E78.5 DYSLIPIDEMIA, GOAL LDL BELOW 70: ICD-10-CM

## 2023-03-20 PROCEDURE — 1101F PT FALLS ASSESS-DOCD LE1/YR: CPT | Performed by: INTERNAL MEDICINE

## 2023-03-20 PROCEDURE — G0463 HOSPITAL OUTPT CLINIC VISIT: HCPCS | Performed by: INTERNAL MEDICINE

## 2023-03-20 PROCEDURE — G8427 DOCREV CUR MEDS BY ELIG CLIN: HCPCS | Performed by: INTERNAL MEDICINE

## 2023-03-20 PROCEDURE — G8536 NO DOC ELDER MAL SCRN: HCPCS | Performed by: INTERNAL MEDICINE

## 2023-03-20 PROCEDURE — 3017F COLORECTAL CA SCREEN DOC REV: CPT | Performed by: INTERNAL MEDICINE

## 2023-03-20 PROCEDURE — G8432 DEP SCR NOT DOC, RNG: HCPCS | Performed by: INTERNAL MEDICINE

## 2023-03-20 PROCEDURE — G8417 CALC BMI ABV UP PARAM F/U: HCPCS | Performed by: INTERNAL MEDICINE

## 2023-03-20 PROCEDURE — 99214 OFFICE O/P EST MOD 30 MIN: CPT | Performed by: INTERNAL MEDICINE

## 2023-03-20 RX ORDER — PANTOPRAZOLE SODIUM 20 MG/1
20 TABLET, DELAYED RELEASE ORAL DAILY
Qty: 90 TABLET | Refills: 3 | Status: SHIPPED | OUTPATIENT
Start: 2023-03-20

## 2023-03-20 NOTE — PROGRESS NOTES
HISTORY OF PRESENT ILLNESS  Tucker Rodriguez is a 67 y.o. male. VLADIMIR Jean Baptiste comes in for med check. He is feeling well. He has an EF of 50%. He has not had recent edema, dyspnea on exertion or palpitations. He plays golf regularly and feels his stamina is good. GERD, on Protonix, able to use just once a day 20 mg without breakthrough symptoms. No evidence of bleeding. Mild impaired fasting glucose. Sugars have been in the low 100s in the past.  Will check an A1c today. Weight is stable over time. Previous renal disease with last creatinine of 1.26. Will recheck. History of mitral valve prolapse, no significant palpitations recently. 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 Review of Systems   Constitutional:  Negative for chills, fever, malaise/fatigue and weight loss. Respiratory:  Negative for cough, shortness of breath and wheezing. Cardiovascular:  Negative for chest pain, palpitations, orthopnea, leg swelling and PND. Gastrointestinal:  Positive for constipation. Negative for abdominal pain, diarrhea, heartburn and nausea. Musculoskeletal:  Negative for falls and myalgias. Neurological:  Negative for dizziness and headaches. Endo/Heme/Allergies:  Does not bruise/bleed easily. Physical Exam  Vitals and nursing note reviewed. Constitutional:       Appearance: He is well-developed. HENT:      Head: Normocephalic and atraumatic. Neck:      Thyroid: No thyromegaly. Vascular: No carotid bruit. Cardiovascular:      Rate and Rhythm: Normal rate and regular rhythm. Heart sounds: Murmur heard. Pulmonary:      Effort: Pulmonary effort is normal. No respiratory distress. Breath sounds: Normal breath sounds. No wheezing or rales. Musculoskeletal:      Cervical back: Normal range of motion and neck supple. Right lower leg: No edema. Left lower leg: No edema.    Neurological:      Mental Status: He is alert and oriented to person, place, and time. Psychiatric:         Behavior: Behavior normal.       ASSESSMENT and PLAN  Diagnoses and all orders for this visit:    1. IFG (impaired fasting glucose)  -     HEMOGLOBIN A1C WITH EAG; Future    2. Cardiomyopathy, unspecified type (HCC)-stable on euvolemic on toprol cont with cardiology   -     METABOLIC PANEL, COMPREHENSIVE; Future    3. Stage 3 chronic kidney disease, unspecified whether stage 3a or 3b CKD ([facility] [address]  In past although recent levels better  Check cmp  4. Essential hypertension  -     METABOLIC PANEL, COMPREHENSIVE; Future    5. Dyslipidemia, goal LDL below 70    6. Left inguinal hernia-reports operated on many years ago and recurrent but no sxs of pain or difficulty with bowels  Wonders re robotic repair   Refer to surgery  -     REFERRAL TO GENERAL SURGERY    7. GERD without esophagitis  -     pantoprazole (PROTONIX) 20 mg tablet; Take 1 Tablet by mouth daily.

## 2023-04-03 NOTE — TELEPHONE ENCOUNTER
Refill is per verbal order of Dr. Martin Armas.    Requested Prescriptions     Pending Prescriptions Disp Refills    metoprolol succinate (TOPROL-XL) 25 mg XL tablet 45 Tab 0     Sig: Take 0.5 Tabs by mouth daily. 44 year old female with PMH Laparoscopic Gastric Sleeve (2018), Hernia repair with Gastric Bypass (2020), DMT2 (on insulin - D/C'ed after 100lb wt. loss), HTN (no meds - resolved after weight loss), Neuro-Sogren's (Ratuxin infusions Q 3 months), RA (Methotrexate weekly), Pericarditis (3 yrs ago - monitored by cardiologist), B/L CAP (8/2021 - hospitalized on NC O2 and treated with IV ABX), and Hypothyroidism reports c/o heavy menstrual cycles for 10-12 days at a time for a few years s/p pelvic ultrasound revealed a "calcification." Pt now presents to PST for scheduled D&C, Operative Hysteroscopy and Polypectomy with My[facility] on 4/20/23 [facility]    Fully vaccinated with Covid Series  No recent hospitalizations over the last 3 months  Denies recent travel, exposure or Covid symptoms  Covid + 1/12/2023 with symptoms of fever, headache and diarrhea x 1 week - No SOB/No Hospitalizations - Treated with Paxlovid 44 year old female with PMH Laparoscopic Gastric Sleeve (2018), Hiatal Hernia repair with Gastric Bypass (2020), DMT2 (on insulin - D/C'ed after 100lb wt. loss), HTN (no meds - resolved after weight loss), Sjogren's Disease (Rituxin infusions Q 3 months), RA (Methotrexate weekly PRN), Pericarditis (3 yrs ago - monitored by cardiologist), B/L PNA (8/2021 - hospitalized on [place] O2 x 2 days and treated with IV ABX), Abdominoplasty (10/2022) and Hypothyroidism reports c/o heavy and painful menstrual cycles for 10-12 days for a few years s/p pelvic ultrasound revealed a "calcification." Pt now presents to PST for scheduled D&C, Operative Hysteroscopy and Polypectomy with Myosure on 4/20/23 with Dr[facility].    Fully vaccinated with Covid Series  No recent hospitalizations over the last 3 months  Denies recent travel, exposure or Covid symptoms  Covid + 1/12/2023 with symptoms of fever, headache and diarrhea x 1 week - No SOB/No Hospitalizations - Treated with Paxlovid

## 2023-04-13 ENCOUNTER — CLINICAL SUPPORT (OUTPATIENT)
Dept: CARDIOLOGY CLINIC | Age: 73
End: 2023-04-13
Payer: MEDICARE

## 2023-04-13 DIAGNOSIS — I49.3 PVC (PREMATURE VENTRICULAR CONTRACTION): Primary | ICD-10-CM

## 2023-04-13 PROCEDURE — 93225 XTRNL ECG REC<48 HRS REC: CPT | Performed by: SPECIALIST

## 2023-05-30 ENCOUNTER — OFFICE VISIT (OUTPATIENT)
Age: 73
End: 2023-05-30

## 2023-05-30 VITALS
DIASTOLIC BLOOD PRESSURE: 70 MMHG | BODY MASS INDEX: 31.51 KG/M2 | OXYGEN SATURATION: 95 % | TEMPERATURE: 97.5 F | SYSTOLIC BLOOD PRESSURE: 108 MMHG | RESPIRATION RATE: 16 BRPM | WEIGHT: 232.6 LBS | HEART RATE: 66 BPM | HEIGHT: 72 IN

## 2023-05-30 DIAGNOSIS — E53.8 VITAMIN B 12 DEFICIENCY: ICD-10-CM

## 2023-05-30 DIAGNOSIS — D50.8 OTHER IRON DEFICIENCY ANEMIA: ICD-10-CM

## 2023-05-30 DIAGNOSIS — D12.6 ADENOMATOUS POLYP OF COLON, UNSPECIFIED PART OF COLON: ICD-10-CM

## 2023-05-30 DIAGNOSIS — D50.8 OTHER IRON DEFICIENCY ANEMIA: Primary | ICD-10-CM

## 2023-05-30 PROBLEM — K63.5 COLON POLYPS: Status: ACTIVE | Noted: 2017-01-19

## 2023-05-30 PROCEDURE — 3074F SYST BP LT 130 MM HG: CPT | Performed by: INTERNAL MEDICINE

## 2023-05-30 PROCEDURE — 3078F DIAST BP <80 MM HG: CPT | Performed by: INTERNAL MEDICINE

## 2023-05-30 PROCEDURE — 99214 OFFICE O/P EST MOD 30 MIN: CPT | Performed by: INTERNAL MEDICINE

## 2023-05-30 PROCEDURE — 1123F ACP DISCUSS/DSCN MKR DOCD: CPT | Performed by: INTERNAL MEDICINE

## 2023-05-30 ASSESSMENT — PATIENT HEALTH QUESTIONNAIRE - PHQ9
1. LITTLE INTEREST OR PLEASURE IN DOING THINGS: 0
SUM OF ALL RESPONSES TO PHQ QUESTIONS 1-9: 0
SUM OF ALL RESPONSES TO PHQ9 QUESTIONS 1 & 2: 0
2. FEELING DOWN, DEPRESSED OR HOPELESS: 0
SUM OF ALL RESPONSES TO PHQ QUESTIONS 1-9: 0

## 2023-05-30 NOTE — PROGRESS NOTES
Narayan Crum (:  1950) is a 67 y.o. male,Established patient, here for evaluation of the following chief complaint(s):  Anemia         ASSESSMENT/PLAN:  1. Other iron deficiency anemia-most likely iron def from regular blood donations  Discussed slow gi bleed from asa and encouraged stop the asa if ok with cardiology  If labs confirm would start ferrous gluconate qd and appt in 3mo  -     Ferritin; Future  -     Iron and TIBC; Future  -     CBC with Auto Differential; Future  2. Vitamin B 12 deficiency  -     Vitamin B12; Future  3. Adenomatous polyp of colon, unspecified part of colon-removed in       No follow-ups on file. Subjective   SUBJECTIVE/OBJECTIVE:  Anemia    Seen because of new diagnosis of anemia. He gives blood regularly notes that this year he gave in January and March historically has hemoglobin ranges 12-14 however most recently he was turned away from blood donation because of hemoglobin of 10.5. Has been giving blood he believes for at least 4 to 5 years. Denies any upper GI symptoms. Does take a baby aspirin regularly from cardiology. Did have a colonoscopy with Dr. Zack Shin a year ago adenomatous polyp resected. Has not seen any obvious bleeding. Does take B12 supplements regularly. Review of Systems       Objective   Physical Exam  Constitutional:       Appearance: Normal appearance. HENT:      Head: Normocephalic and atraumatic. Pulmonary:      Breath sounds: No wheezing or rhonchi. Abdominal:      General: There is no distension. Palpations: Abdomen is soft. Tenderness: There is no abdominal tenderness. Musculoskeletal:      Right lower leg: No edema. Left lower leg: No edema. Neurological:      General: No focal deficit present. Mental Status: He is alert and oriented to person, place, and time.    Psychiatric:         Behavior: Behavior normal.                An electronic signature was used to authenticate this

## 2023-05-31 LAB
BASOPHILS # BLD: 0.1 K/UL (ref 0–0.1)
BASOPHILS NFR BLD: 1 % (ref 0–1)
COMMENT:: NORMAL
DIFFERENTIAL METHOD BLD: ABNORMAL
EOSINOPHIL # BLD: 0.2 K/UL (ref 0–0.4)
EOSINOPHIL NFR BLD: 5 % (ref 0–7)
ERYTHROCYTE [DISTWIDTH] IN BLOOD BY AUTOMATED COUNT: 12.8 % (ref 11.5–14.5)
FERRITIN SERPL-MCNC: 11 NG/ML (ref 26–388)
HCT VFR BLD AUTO: 41.9 % (ref 36.6–50.3)
HGB BLD-MCNC: 13 G/DL (ref 12.1–17)
IMM GRANULOCYTES # BLD AUTO: 0 K/UL (ref 0–0.04)
IMM GRANULOCYTES NFR BLD AUTO: 0 % (ref 0–0.5)
IRON SATN MFR SERPL: 18 % (ref 20–50)
IRON SERPL-MCNC: 84 UG/DL (ref 35–150)
LYMPHOCYTES # BLD: 1.5 K/UL (ref 0.8–3.5)
LYMPHOCYTES NFR BLD: 27 % (ref 12–49)
MCH RBC QN AUTO: 27.7 PG (ref 26–34)
MCHC RBC AUTO-ENTMCNC: 31 G/DL (ref 30–36.5)
MCV RBC AUTO: 89.1 FL (ref 80–99)
MONOCYTES # BLD: 0.8 K/UL (ref 0–1)
MONOCYTES NFR BLD: 14 % (ref 5–13)
NEUTS SEG # BLD: 2.8 K/UL (ref 1.8–8)
NEUTS SEG NFR BLD: 53 % (ref 32–75)
NRBC # BLD: 0 K/UL (ref 0–0.01)
NRBC BLD-RTO: 0 PER 100 WBC
PLATELET # BLD AUTO: 212 K/UL (ref 150–400)
PMV BLD AUTO: 10.2 FL (ref 8.9–12.9)
RBC # BLD AUTO: 4.7 M/UL (ref 4.1–5.7)
SPECIMEN HOLD: NORMAL
TIBC SERPL-MCNC: 469 UG/DL (ref 250–450)
VIT B12 SERPL-MCNC: 478 PG/ML (ref 193–986)
WBC # BLD AUTO: 5.4 K/UL (ref 4.1–11.1)

## 2023-07-13 RX ORDER — METOPROLOL SUCCINATE 25 MG/1
TABLET, EXTENDED RELEASE ORAL
Qty: 45 TABLET | Refills: 3 | Status: SHIPPED | OUTPATIENT
Start: 2023-07-13

## 2023-08-28 SDOH — ECONOMIC STABILITY: FOOD INSECURITY: WITHIN THE PAST 12 MONTHS, YOU WORRIED THAT YOUR FOOD WOULD RUN OUT BEFORE YOU GOT MONEY TO BUY MORE.: NEVER TRUE

## 2023-08-28 SDOH — ECONOMIC STABILITY: TRANSPORTATION INSECURITY
IN THE PAST 12 MONTHS, HAS LACK OF TRANSPORTATION KEPT YOU FROM MEETINGS, WORK, OR FROM GETTING THINGS NEEDED FOR DAILY LIVING?: NO

## 2023-08-28 SDOH — ECONOMIC STABILITY: INCOME INSECURITY: HOW HARD IS IT FOR YOU TO PAY FOR THE VERY BASICS LIKE FOOD, HOUSING, MEDICAL CARE, AND HEATING?: NOT HARD AT ALL

## 2023-08-28 SDOH — ECONOMIC STABILITY: FOOD INSECURITY: WITHIN THE PAST 12 MONTHS, THE FOOD YOU BOUGHT JUST DIDN'T LAST AND YOU DIDN'T HAVE MONEY TO GET MORE.: NEVER TRUE

## 2023-08-28 SDOH — ECONOMIC STABILITY: HOUSING INSECURITY
IN THE LAST 12 MONTHS, WAS THERE A TIME WHEN YOU DID NOT HAVE A STEADY PLACE TO SLEEP OR SLEPT IN A SHELTER (INCLUDING NOW)?: NO

## 2023-08-30 ENCOUNTER — OFFICE VISIT (OUTPATIENT)
Age: 73
End: 2023-08-30
Payer: MEDICARE

## 2023-08-30 VITALS
SYSTOLIC BLOOD PRESSURE: 105 MMHG | WEIGHT: 232.2 LBS | TEMPERATURE: 97.8 F | OXYGEN SATURATION: 95 % | BODY MASS INDEX: 31.45 KG/M2 | RESPIRATION RATE: 16 BRPM | HEART RATE: 64 BPM | HEIGHT: 72 IN | DIASTOLIC BLOOD PRESSURE: 67 MMHG

## 2023-08-30 DIAGNOSIS — N18.30 STAGE 3 CHRONIC KIDNEY DISEASE, UNSPECIFIED WHETHER STAGE 3A OR 3B CKD (HCC): ICD-10-CM

## 2023-08-30 DIAGNOSIS — E61.1 IRON DEFICIENCY: Primary | ICD-10-CM

## 2023-08-30 LAB
BASOPHILS # BLD: 0.1 K/UL (ref 0–0.1)
BASOPHILS NFR BLD: 1 % (ref 0–1)
DIFFERENTIAL METHOD BLD: NORMAL
EOSINOPHIL # BLD: 0.2 K/UL (ref 0–0.4)
EOSINOPHIL NFR BLD: 4 % (ref 0–7)
ERYTHROCYTE [DISTWIDTH] IN BLOOD BY AUTOMATED COUNT: 13.3 % (ref 11.5–14.5)
FERRITIN SERPL-MCNC: 48 NG/ML (ref 26–388)
HCT VFR BLD AUTO: 41.9 % (ref 36.6–50.3)
HGB BLD-MCNC: 13.9 G/DL (ref 12.1–17)
IMM GRANULOCYTES # BLD AUTO: 0 K/UL (ref 0–0.04)
IMM GRANULOCYTES NFR BLD AUTO: 0 % (ref 0–0.5)
LYMPHOCYTES # BLD: 1.6 K/UL (ref 0.8–3.5)
LYMPHOCYTES NFR BLD: 24 % (ref 12–49)
MCH RBC QN AUTO: 29.6 PG (ref 26–34)
MCHC RBC AUTO-ENTMCNC: 33.2 G/DL (ref 30–36.5)
MCV RBC AUTO: 89.1 FL (ref 80–99)
MONOCYTES # BLD: 0.7 K/UL (ref 0–1)
MONOCYTES NFR BLD: 10 % (ref 5–13)
NEUTS SEG # BLD: 4.3 K/UL (ref 1.8–8)
NEUTS SEG NFR BLD: 61 % (ref 32–75)
NRBC # BLD: 0 K/UL (ref 0–0.01)
NRBC BLD-RTO: 0 PER 100 WBC
PLATELET # BLD AUTO: 204 K/UL (ref 150–400)
PMV BLD AUTO: 9.4 FL (ref 8.9–12.9)
RBC # BLD AUTO: 4.7 M/UL (ref 4.1–5.7)
WBC # BLD AUTO: 7 K/UL (ref 4.1–11.1)

## 2023-08-30 PROCEDURE — 1123F ACP DISCUSS/DSCN MKR DOCD: CPT | Performed by: INTERNAL MEDICINE

## 2023-08-30 PROCEDURE — 3078F DIAST BP <80 MM HG: CPT | Performed by: INTERNAL MEDICINE

## 2023-08-30 PROCEDURE — G8427 DOCREV CUR MEDS BY ELIG CLIN: HCPCS | Performed by: INTERNAL MEDICINE

## 2023-08-30 PROCEDURE — G8417 CALC BMI ABV UP PARAM F/U: HCPCS | Performed by: INTERNAL MEDICINE

## 2023-08-30 PROCEDURE — 1036F TOBACCO NON-USER: CPT | Performed by: INTERNAL MEDICINE

## 2023-08-30 PROCEDURE — 3074F SYST BP LT 130 MM HG: CPT | Performed by: INTERNAL MEDICINE

## 2023-08-30 PROCEDURE — 99213 OFFICE O/P EST LOW 20 MIN: CPT | Performed by: INTERNAL MEDICINE

## 2023-08-30 PROCEDURE — 3017F COLORECTAL CA SCREEN DOC REV: CPT | Performed by: INTERNAL MEDICINE

## 2023-08-30 NOTE — PROGRESS NOTES
Rell Chaney (:  1950) is a 75 y.o. male,Established patient, here for evaluation of the following chief complaint(s):  3 Month Follow-Up and Blood Work         ASSESSMENT/PLAN:  1. Iron deficiency-on iron daily check levels  -     Ferritin; Future  -     CBC with Auto Differential; Future  2. Stage 3 chronic kidney disease, unspecified whether stage 3a or 3b CKD (HCC)-discussion below  Appt in 6mo       No follow-ups on file. Subjective   SUBJECTIVE/OBJECTIVE:  HPI  Follow-up concerning iron deficiency he was not anemic but ferritin level was very low likely from serial blood donations. Also stopped aspirin because I was worried about occult bleeding and he has been taking an iron pill daily since May. Feels fine. Denies any constipation. Denies any sign of obvious GI bleeding. Discussed that if ferritin levels back to normal could stop the iron. He questions the diagnosis of stage III chronic chronic kidney disease. Reviewed labs together that since  his creatinine has been around 1.5 stable over time but in the range of stage III CKD discussed that this means he should avoid NSAIDs and stay well-hydrated discussed that if his renal disease worsens I would send him to nephrology but I do not think this is needed at this point. Review of Systems       Objective   Physical Exam  Vitals and nursing note reviewed. Constitutional:       Appearance: Normal appearance. Neurological:      Mental Status: He is alert. An electronic signature was used to authenticate this note.     --Teo Goldman MD

## 2023-09-25 RX ORDER — SIMVASTATIN 10 MG
10 TABLET ORAL NIGHTLY
Qty: 90 TABLET | Refills: 0 | Status: SHIPPED | OUTPATIENT
Start: 2023-09-25

## 2023-10-04 LAB
ALBUMIN SERPL-MCNC: 4.4 G/DL (ref 3.8–4.8)
ALP SERPL-CCNC: 72 IU/L (ref 44–121)
ALT SERPL-CCNC: 19 IU/L (ref 0–44)
AST SERPL-CCNC: 17 IU/L (ref 0–40)
BILIRUB DIRECT SERPL-MCNC: 0.16 MG/DL (ref 0–0.4)
BILIRUB SERPL-MCNC: 0.5 MG/DL (ref 0–1.2)
CHOLEST SERPL-MCNC: 163 MG/DL (ref 100–199)
HDLC SERPL-MCNC: 39 MG/DL
IMP & REVIEW OF LAB RESULTS: NORMAL
LDLC SERPL CALC-MCNC: 108 MG/DL (ref 0–99)
PROT SERPL-MCNC: 6.5 G/DL (ref 6–8.5)
TRIGL SERPL-MCNC: 87 MG/DL (ref 0–149)
VLDLC SERPL CALC-MCNC: 16 MG/DL (ref 5–40)

## 2023-10-11 ENCOUNTER — OFFICE VISIT (OUTPATIENT)
Age: 73
End: 2023-10-11
Payer: MEDICARE

## 2023-10-11 VITALS
BODY MASS INDEX: 31.56 KG/M2 | SYSTOLIC BLOOD PRESSURE: 110 MMHG | WEIGHT: 233 LBS | OXYGEN SATURATION: 95 % | HEART RATE: 74 BPM | DIASTOLIC BLOOD PRESSURE: 60 MMHG | HEIGHT: 72 IN

## 2023-10-11 DIAGNOSIS — I34.1 NONRHEUMATIC MITRAL (VALVE) PROLAPSE: ICD-10-CM

## 2023-10-11 DIAGNOSIS — E78.5 HYPERLIPIDEMIA, UNSPECIFIED: Primary | ICD-10-CM

## 2023-10-11 DIAGNOSIS — G47.33 OBSTRUCTIVE SLEEP APNEA (ADULT) (PEDIATRIC): ICD-10-CM

## 2023-10-11 DIAGNOSIS — I25.10 ATHEROSCLEROSIS OF NATIVE CORONARY ARTERY OF NATIVE HEART WITHOUT ANGINA PECTORIS: ICD-10-CM

## 2023-10-11 DIAGNOSIS — I10 ESSENTIAL (PRIMARY) HYPERTENSION: ICD-10-CM

## 2023-10-11 DIAGNOSIS — I42.8 OTHER CARDIOMYOPATHY (HCC): ICD-10-CM

## 2023-10-11 DIAGNOSIS — E78.5 HYPERLIPIDEMIA, UNSPECIFIED HYPERLIPIDEMIA TYPE: Primary | ICD-10-CM

## 2023-10-11 PROCEDURE — 3017F COLORECTAL CA SCREEN DOC REV: CPT | Performed by: SPECIALIST

## 2023-10-11 PROCEDURE — 99214 OFFICE O/P EST MOD 30 MIN: CPT | Performed by: SPECIALIST

## 2023-10-11 PROCEDURE — G8484 FLU IMMUNIZE NO ADMIN: HCPCS | Performed by: SPECIALIST

## 2023-10-11 PROCEDURE — 3078F DIAST BP <80 MM HG: CPT | Performed by: SPECIALIST

## 2023-10-11 PROCEDURE — 1123F ACP DISCUSS/DSCN MKR DOCD: CPT | Performed by: SPECIALIST

## 2023-10-11 PROCEDURE — 1036F TOBACCO NON-USER: CPT | Performed by: SPECIALIST

## 2023-10-11 PROCEDURE — G8417 CALC BMI ABV UP PARAM F/U: HCPCS | Performed by: SPECIALIST

## 2023-10-11 PROCEDURE — 3074F SYST BP LT 130 MM HG: CPT | Performed by: SPECIALIST

## 2023-10-11 PROCEDURE — G8427 DOCREV CUR MEDS BY ELIG CLIN: HCPCS | Performed by: SPECIALIST

## 2023-10-11 RX ORDER — SIMVASTATIN 10 MG
10 TABLET ORAL NIGHTLY
Qty: 90 TABLET | Refills: 4 | Status: SHIPPED | OUTPATIENT
Start: 2023-10-11

## 2023-10-11 ASSESSMENT — PATIENT HEALTH QUESTIONNAIRE - PHQ9
SUM OF ALL RESPONSES TO PHQ QUESTIONS 1-9: 0
SUM OF ALL RESPONSES TO PHQ QUESTIONS 1-9: 0
1. LITTLE INTEREST OR PLEASURE IN DOING THINGS: 0
2. FEELING DOWN, DEPRESSED OR HOPELESS: 0
SUM OF ALL RESPONSES TO PHQ9 QUESTIONS 1 & 2: 0
SUM OF ALL RESPONSES TO PHQ QUESTIONS 1-9: 0
SUM OF ALL RESPONSES TO PHQ QUESTIONS 1-9: 0

## 2023-10-11 NOTE — PROGRESS NOTES
CARDIOLOGY OFFICE NOTE    Tyrone Marroquin MD, [facility] [address], [facility]  Phone [phone]; Fax [phone]  Mobile [phone]   Voice Mail [phone]    Primary care: Shirley Berrios MD       ATTENTION:   This medical record was transcribed using an electronic medical records/speech recognition system. Although proofread, it may and can contain electronic, spelling and other errors. Corrections may be executed at a later time. Please feel free to contact us for any clarifications as needed. Soledad Payan is a 75 y.o. male with  referred for  hypertension, dyslipidemia and mitral valve prolapse. Cardiac risk factors: family history, male gender, hypertension   I have personally obtained the history from the patient. Soledad Payan is a 67 y.o. male  with HTN , mitral valve prolapse and frequent PVCs. Doing well overall. His PVC burden came down significantly with the flecainide. We will go ahead and place another 24-hour Holter monitor on him and we discussed this today. He has no chest pain palpitations or shortness of breath. HISTORY OF PRESENTING ILLNESS    Ms./Mr. Sherlean Ku Thoen  75 y.o. is doing well since I last saw him. He has a history of hypertension and mitral valve prolapse. He had high PVC burden  Improved with the flecainide and metoprolol. I did put a 24-hour Holter monitor on him he had some in the frequent episodes of SVT. He is asymptomatic.            ACTIVE PROBLEM LIST     Patient Active Problem List    Diagnosis Date Noted    Cardiomyopathy, unspecified type ([address]) 03/20/2023    Frequent PVCs     Sleep apnea     Chronic renal disease, stage III ([address]) 10/11/2022    Sensorineural hearing loss (SNHL) of right ear 04/12/2022    Esophageal stricture 11/04/2021    Primary osteoarthritis of left hip 05/06/2018    Primary osteoarthritis of left knee 04/25/2018    Seasonal allergic

## 2023-10-11 NOTE — PATIENT INSTRUCTIONS

## 2023-10-11 NOTE — PROGRESS NOTES
Maria Victoria Hodges is a 75 y.o. male    had concerns including Cholesterol Problem, Cardiomyopathy, and Hypertension. Vitals:    10/11/23 1515   BP: 110/60   Site: Left Upper Arm   Position: Sitting   Pulse: 74   SpO2: 95%   Weight: 233 lb (105.7 kg)   Height: 6' (1.829 m)        Chest pain No    SOB No    Dizziness No    Swelling No    Refills simvastatin 10 mg    Covid Vaccinated yes      1. Have you been to the ER, urgent care clinic since your last visit? Hospitalized since your last visit? no    2. Have you seen or consulted any other health care providers outside of the [address] [place] [address] Avenue since your last visit? Include any pap smears or colon screening. No    NAME Maria Victoria Hodges         1950      MRN    [id number]      LAST OFFICE APPOINTMENT: 2023     DIAGNOSIS    ICD-10-CM    1. Hyperlipidemia, unspecified hyperlipidemia type  E78.5       2. Obstructive sleep apnea (adult) (pediatric)  G47.33       3. Essential (primary) hypertension  I10       4. Nonrheumatic mitral (valve) prolapse  I34.1       5. Other cardiomyopathy ([address])  I42.8       6.  Atherosclerosis of native coronary artery of native heart without angina pectoris  I25.10           HOME MEDICATION  Current Outpatient Medications   Medication Sig    simvastatin (ZOCOR) 10 MG tablet Take 1 tablet by mouth nightly    metoprolol succinate (TOPROL XL) 25 MG extended release tablet TAKE ONE-HALF (1/2) TABLET DAILY    pantoprazole (PROTONIX) 20 MG tablet     hydrocortisone 2.5 % cream APPLY EXTERNALLY TO THE AFFECTED AREA TWICE DAILY    flecainide (TAMBOCOR) 50 MG tablet Take 1 tablet by mouth 2 times daily    Cholecalciferol (VITAMIN D-3 PO) Take 1,000 Units by mouth daily    B Complex Vitamins (B COMPLEX PO) Take by mouth daily    Multiple Vitamin (MULTIVITAMIN ADULT PO) Take 1 tablet by mouth daily    Omega-3 Fatty Acids (FISH OIL) 1200 MG CAPS Take 1,200 mg by mouth 2 times daily     No current facility-administered

## 2023-12-08 ENCOUNTER — HOSPITAL ENCOUNTER (OUTPATIENT)
Facility: HOSPITAL | Age: 73
End: 2023-12-08
Payer: MEDICARE

## 2023-12-08 VITALS
TEMPERATURE: 97.1 F | WEIGHT: 233.69 LBS | OXYGEN SATURATION: 98 % | DIASTOLIC BLOOD PRESSURE: 78 MMHG | HEIGHT: 73 IN | RESPIRATION RATE: 14 BRPM | BODY MASS INDEX: 30.97 KG/M2 | HEART RATE: 67 BPM | SYSTOLIC BLOOD PRESSURE: 117 MMHG

## 2023-12-08 LAB
ANION GAP SERPL CALC-SCNC: 3 MMOL/L (ref 5–15)
BUN SERPL-MCNC: 26 MG/DL (ref 6–20)
BUN/CREAT SERPL: 18 (ref 12–20)
CALCIUM SERPL-MCNC: 9.4 MG/DL (ref 8.5–10.1)
CHLORIDE SERPL-SCNC: 110 MMOL/L (ref 97–108)
CO2 SERPL-SCNC: 27 MMOL/L (ref 21–32)
CREAT SERPL-MCNC: 1.42 MG/DL (ref 0.7–1.3)
EKG ATRIAL RATE: 57 BPM
EKG DIAGNOSIS: NORMAL
EKG P AXIS: 45 DEGREES
EKG P-R INTERVAL: 212 MS
EKG Q-T INTERVAL: 450 MS
EKG QRS DURATION: 112 MS
EKG QTC CALCULATION (BAZETT): 438 MS
EKG R AXIS: 40 DEGREES
EKG T AXIS: 26 DEGREES
EKG VENTRICULAR RATE: 57 BPM
GLUCOSE SERPL-MCNC: 93 MG/DL (ref 65–100)
POTASSIUM SERPL-SCNC: 4.7 MMOL/L (ref 3.5–5.1)
SODIUM SERPL-SCNC: 140 MMOL/L (ref 136–145)

## 2023-12-08 PROCEDURE — 80048 BASIC METABOLIC PNL TOTAL CA: CPT

## 2023-12-08 PROCEDURE — 93005 ELECTROCARDIOGRAM TRACING: CPT | Performed by: SURGERY

## 2023-12-08 PROCEDURE — 36415 COLL VENOUS BLD VENIPUNCTURE: CPT

## 2023-12-08 PROCEDURE — 93010 ELECTROCARDIOGRAM REPORT: CPT | Performed by: SPECIALIST

## 2023-12-08 RX ORDER — ASPIRIN 81 MG/1
81 TABLET, CHEWABLE ORAL DAILY
COMMUNITY

## 2023-12-12 ENCOUNTER — ANESTHESIA EVENT (OUTPATIENT)
Facility: HOSPITAL | Age: 73
End: 2023-12-12
Payer: MEDICARE

## 2023-12-12 NOTE — PERIOP NOTE
Spoke to Mimi at Dr. Christopher Hall office requesting the H&P for surgery. Mimi will fax the H&P to the Main pre-op once Dr. Joyce Santiago has completed it.   DOS: 12/13/2023

## 2023-12-13 ENCOUNTER — ANESTHESIA (OUTPATIENT)
Facility: HOSPITAL | Age: 73
End: 2023-12-13
Payer: MEDICARE

## 2023-12-13 ENCOUNTER — HOSPITAL ENCOUNTER (OUTPATIENT)
Facility: HOSPITAL | Age: 73
Setting detail: OUTPATIENT SURGERY
Discharge: HOME | End: 2023-12-13
Attending: SURGERY | Admitting: SURGERY
Payer: MEDICARE

## 2023-12-13 VITALS
SYSTOLIC BLOOD PRESSURE: 108 MMHG | TEMPERATURE: 97.8 F | WEIGHT: 233.69 LBS | DIASTOLIC BLOOD PRESSURE: 67 MMHG | BODY MASS INDEX: 30.97 KG/M2 | HEIGHT: 73 IN | RESPIRATION RATE: 16 BRPM | HEART RATE: 53 BPM | OXYGEN SATURATION: 98 %

## 2023-12-13 DIAGNOSIS — K40.31 RECURRENT UNILATERAL INGUINAL HERNIA WITH OBSTRUCTION AND WITHOUT GANGRENE: Primary | ICD-10-CM

## 2023-12-13 PROCEDURE — 6360000002 HC RX W HCPCS: Performed by: SURGERY

## 2023-12-13 PROCEDURE — C1781 MESH (IMPLANTABLE): HCPCS | Performed by: SURGERY

## 2023-12-13 PROCEDURE — S2900 ROBOTIC SURGICAL SYSTEM: HCPCS | Performed by: SURGERY

## 2023-12-13 PROCEDURE — 3600000019 HC SURGERY ROBOT ADDTL 15MIN: Performed by: SURGERY

## 2023-12-13 PROCEDURE — 2500000003 HC RX 250 WO HCPCS

## 2023-12-13 PROCEDURE — 7100000000 HC PACU RECOVERY - FIRST 15 MIN: Performed by: SURGERY

## 2023-12-13 PROCEDURE — 2709999900 HC NON-CHARGEABLE SUPPLY: Performed by: SURGERY

## 2023-12-13 PROCEDURE — 6360000002 HC RX W HCPCS

## 2023-12-13 PROCEDURE — 2580000003 HC RX 258: Performed by: ANESTHESIOLOGY

## 2023-12-13 PROCEDURE — 3700000000 HC ANESTHESIA ATTENDED CARE: Performed by: SURGERY

## 2023-12-13 PROCEDURE — 3600000009 HC SURGERY ROBOT BASE: Performed by: SURGERY

## 2023-12-13 PROCEDURE — 7100000001 HC PACU RECOVERY - ADDTL 15 MIN: Performed by: SURGERY

## 2023-12-13 PROCEDURE — 2580000003 HC RX 258: Performed by: SURGERY

## 2023-12-13 PROCEDURE — 7100000010 HC PHASE II RECOVERY - FIRST 15 MIN: Performed by: SURGERY

## 2023-12-13 PROCEDURE — 3700000001 HC ADD 15 MINUTES (ANESTHESIA): Performed by: SURGERY

## 2023-12-13 PROCEDURE — 7100000011 HC PHASE II RECOVERY - ADDTL 15 MIN: Performed by: SURGERY

## 2023-12-13 PROCEDURE — 6370000000 HC RX 637 (ALT 250 FOR IP): Performed by: SURGERY

## 2023-12-13 DEVICE — MESH HERN L W10.8XL16CM L INGUINAL WHT POLYPR MFIL: Type: IMPLANTABLE DEVICE | Site: GROIN | Status: FUNCTIONAL

## 2023-12-13 RX ORDER — ONDANSETRON 2 MG/ML
INJECTION INTRAMUSCULAR; INTRAVENOUS PRN
Status: DISCONTINUED | OUTPATIENT
Start: 2023-12-13 | End: 2023-12-13 | Stop reason: SDUPTHER

## 2023-12-13 RX ORDER — HYDROMORPHONE HYDROCHLORIDE 2 MG/ML
INJECTION, SOLUTION INTRAMUSCULAR; INTRAVENOUS; SUBCUTANEOUS PRN
Status: DISCONTINUED | OUTPATIENT
Start: 2023-12-13 | End: 2023-12-13 | Stop reason: SDUPTHER

## 2023-12-13 RX ORDER — MIDAZOLAM HYDROCHLORIDE 2 MG/2ML
2 INJECTION, SOLUTION INTRAMUSCULAR; INTRAVENOUS
Status: DISCONTINUED | OUTPATIENT
Start: 2023-12-13 | End: 2023-12-13 | Stop reason: HOSPADM

## 2023-12-13 RX ORDER — DEXAMETHASONE SODIUM PHOSPHATE 4 MG/ML
INJECTION, SOLUTION INTRA-ARTICULAR; INTRALESIONAL; INTRAMUSCULAR; INTRAVENOUS; SOFT TISSUE PRN
Status: DISCONTINUED | OUTPATIENT
Start: 2023-12-13 | End: 2023-12-13 | Stop reason: SDUPTHER

## 2023-12-13 RX ORDER — EPHEDRINE SULFATE/0.9% NACL/PF 50 MG/5 ML
SYRINGE (ML) INTRAVENOUS PRN
Status: DISCONTINUED | OUTPATIENT
Start: 2023-12-13 | End: 2023-12-13 | Stop reason: SDUPTHER

## 2023-12-13 RX ORDER — OXYCODONE HYDROCHLORIDE 5 MG/1
5 TABLET ORAL EVERY 6 HOURS PRN
Qty: 12 TABLET | Refills: 0 | Status: SHIPPED | OUTPATIENT
Start: 2023-12-13 | End: 2023-12-16

## 2023-12-13 RX ORDER — ONDANSETRON 2 MG/ML
4 INJECTION INTRAMUSCULAR; INTRAVENOUS
Status: DISCONTINUED | OUTPATIENT
Start: 2023-12-13 | End: 2023-12-13 | Stop reason: HOSPADM

## 2023-12-13 RX ORDER — BUPIVACAINE HYDROCHLORIDE 5 MG/ML
INJECTION, SOLUTION EPIDURAL; INTRACAUDAL PRN
Status: DISCONTINUED | OUTPATIENT
Start: 2023-12-13 | End: 2023-12-13 | Stop reason: ALTCHOICE

## 2023-12-13 RX ORDER — LIDOCAINE HYDROCHLORIDE 20 MG/ML
INJECTION, SOLUTION EPIDURAL; INFILTRATION; INTRACAUDAL; PERINEURAL PRN
Status: DISCONTINUED | OUTPATIENT
Start: 2023-12-13 | End: 2023-12-13 | Stop reason: SDUPTHER

## 2023-12-13 RX ORDER — NEOSTIGMINE METHYLSULFATE 1 MG/ML
INJECTION, SOLUTION INTRAVENOUS PRN
Status: DISCONTINUED | OUTPATIENT
Start: 2023-12-13 | End: 2023-12-13 | Stop reason: SDUPTHER

## 2023-12-13 RX ORDER — OXYCODONE HYDROCHLORIDE 5 MG/1
5 TABLET ORAL
Status: DISCONTINUED | OUTPATIENT
Start: 2023-12-13 | End: 2023-12-13 | Stop reason: HOSPADM

## 2023-12-13 RX ORDER — ROCURONIUM BROMIDE 10 MG/ML
INJECTION, SOLUTION INTRAVENOUS PRN
Status: DISCONTINUED | OUTPATIENT
Start: 2023-12-13 | End: 2023-12-13 | Stop reason: SDUPTHER

## 2023-12-13 RX ORDER — PROPOFOL 10 MG/ML
INJECTION, EMULSION INTRAVENOUS PRN
Status: DISCONTINUED | OUTPATIENT
Start: 2023-12-13 | End: 2023-12-13 | Stop reason: SDUPTHER

## 2023-12-13 RX ORDER — MIDAZOLAM HYDROCHLORIDE 1 MG/ML
INJECTION INTRAMUSCULAR; INTRAVENOUS PRN
Status: DISCONTINUED | OUTPATIENT
Start: 2023-12-13 | End: 2023-12-13 | Stop reason: SDUPTHER

## 2023-12-13 RX ORDER — DIPHENHYDRAMINE HYDROCHLORIDE 50 MG/ML
12.5 INJECTION INTRAMUSCULAR; INTRAVENOUS
Status: DISCONTINUED | OUTPATIENT
Start: 2023-12-13 | End: 2023-12-13 | Stop reason: HOSPADM

## 2023-12-13 RX ORDER — SODIUM CHLORIDE, SODIUM LACTATE, POTASSIUM CHLORIDE, CALCIUM CHLORIDE 600; 310; 30; 20 MG/100ML; MG/100ML; MG/100ML; MG/100ML
INJECTION, SOLUTION INTRAVENOUS CONTINUOUS
Status: DISCONTINUED | OUTPATIENT
Start: 2023-12-13 | End: 2023-12-13 | Stop reason: HOSPADM

## 2023-12-13 RX ORDER — GLYCOPYRROLATE 0.2 MG/ML
INJECTION INTRAMUSCULAR; INTRAVENOUS PRN
Status: DISCONTINUED | OUTPATIENT
Start: 2023-12-13 | End: 2023-12-13 | Stop reason: SDUPTHER

## 2023-12-13 RX ORDER — LIDOCAINE HYDROCHLORIDE 10 MG/ML
1 INJECTION, SOLUTION EPIDURAL; INFILTRATION; INTRACAUDAL; PERINEURAL
Status: DISCONTINUED | OUTPATIENT
Start: 2023-12-13 | End: 2023-12-13 | Stop reason: HOSPADM

## 2023-12-13 RX ORDER — FENTANYL CITRATE 50 UG/ML
100 INJECTION, SOLUTION INTRAMUSCULAR; INTRAVENOUS
Status: DISCONTINUED | OUTPATIENT
Start: 2023-12-13 | End: 2023-12-13 | Stop reason: HOSPADM

## 2023-12-13 RX ORDER — ACETAMINOPHEN 500 MG
1000 TABLET ORAL
Status: COMPLETED | OUTPATIENT
Start: 2023-12-13 | End: 2023-12-13

## 2023-12-13 RX ADMIN — ACETAMINOPHEN 1000 MG: 500 TABLET ORAL at 13:02

## 2023-12-13 RX ADMIN — SODIUM CHLORIDE, POTASSIUM CHLORIDE, SODIUM LACTATE AND CALCIUM CHLORIDE: 600; 310; 30; 20 INJECTION, SOLUTION INTRAVENOUS at 09:18

## 2023-12-13 RX ADMIN — GLYCOPYRROLATE 0.4 MG: 0.2 INJECTION INTRAMUSCULAR; INTRAVENOUS at 11:43

## 2023-12-13 RX ADMIN — SODIUM CHLORIDE, POTASSIUM CHLORIDE, SODIUM LACTATE AND CALCIUM CHLORIDE: 600; 310; 30; 20 INJECTION, SOLUTION INTRAVENOUS at 10:37

## 2023-12-13 RX ADMIN — MIDAZOLAM HYDROCHLORIDE 1 MG: 1 INJECTION, SOLUTION INTRAMUSCULAR; INTRAVENOUS at 10:36

## 2023-12-13 RX ADMIN — NEOSTIGMINE METHYLSULFATE 3 MG: 1 INJECTION, SOLUTION INTRAVENOUS at 11:43

## 2023-12-13 RX ADMIN — HYDROMORPHONE HYDROCHLORIDE 0.5 MG: 2 INJECTION, SOLUTION INTRAMUSCULAR; INTRAVENOUS; SUBCUTANEOUS at 10:36

## 2023-12-13 RX ADMIN — ONDANSETRON HYDROCHLORIDE 4 MG: 2 SOLUTION INTRAMUSCULAR; INTRAVENOUS at 11:00

## 2023-12-13 RX ADMIN — ROCURONIUM BROMIDE 50 MG: 10 INJECTION INTRAVENOUS at 10:39

## 2023-12-13 RX ADMIN — PROPOFOL 50 MG: 10 INJECTION, EMULSION INTRAVENOUS at 10:40

## 2023-12-13 RX ADMIN — Medication 10 MG: at 10:59

## 2023-12-13 RX ADMIN — WATER 2000 MG: 1 INJECTION INTRAMUSCULAR; INTRAVENOUS; SUBCUTANEOUS at 10:50

## 2023-12-13 RX ADMIN — PROPOFOL 150 MG: 10 INJECTION, EMULSION INTRAVENOUS at 10:39

## 2023-12-13 RX ADMIN — SODIUM CHLORIDE, POTASSIUM CHLORIDE, SODIUM LACTATE AND CALCIUM CHLORIDE: 600; 310; 30; 20 INJECTION, SOLUTION INTRAVENOUS at 09:13

## 2023-12-13 RX ADMIN — DEXAMETHASONE SODIUM PHOSPHATE 4 MG: 4 INJECTION, SOLUTION INTRAMUSCULAR; INTRAVENOUS at 11:00

## 2023-12-13 RX ADMIN — LIDOCAINE HYDROCHLORIDE 100 MG: 20 INJECTION, SOLUTION EPIDURAL; INFILTRATION; INTRACAUDAL; PERINEURAL at 10:36

## 2023-12-13 ASSESSMENT — PAIN DESCRIPTION - PAIN TYPE
TYPE: SURGICAL PAIN
TYPE: SURGICAL PAIN

## 2023-12-13 ASSESSMENT — PAIN DESCRIPTION - LOCATION
LOCATION: ABDOMEN

## 2023-12-13 ASSESSMENT — PAIN DESCRIPTION - ORIENTATION
ORIENTATION: MID

## 2023-12-13 ASSESSMENT — PAIN SCALES - GENERAL
PAINLEVEL_OUTOF10: 3
PAINLEVEL_OUTOF10: 4
PAINLEVEL_OUTOF10: 3
PAINLEVEL_OUTOF10: 5

## 2023-12-13 ASSESSMENT — PAIN DESCRIPTION - DESCRIPTORS
DESCRIPTORS: ACHING
DESCRIPTORS: SORE
DESCRIPTORS: ACHING

## 2023-12-13 ASSESSMENT — PAIN DESCRIPTION - ONSET: ONSET: GRADUAL

## 2023-12-13 ASSESSMENT — PAIN - FUNCTIONAL ASSESSMENT: PAIN_FUNCTIONAL_ASSESSMENT: 0-10

## 2023-12-13 NOTE — ANESTHESIA POSTPROCEDURE EVALUATION
Department of Anesthesiology  Postprocedure Note    Patient: Evie Rodriguez  MRN: [id number]  YOB: 1950  Date of evaluation: 12/13/2023    Procedure Summary       Date: 12/13/23 Room / Location: [facility] MAIN OR F6 / [facility] MAIN OR    Anesthesia Start: 1033 Anesthesia Stop: 4691    Procedure: ROBOTIC RECURRENT LEFT INGUINAL HERNIA REPAIR WITH MESH (Left: Groin) Diagnosis:       Recurrent inguinal hernia without obstruction or gangrene, unspecified laterality      (Recurrent inguinal hernia without obstruction or gangrene, unspecified laterality [K40.91])    Surgeons: Bo Callaway MD Responsible Provider: Hien Castellano MD    Anesthesia Type: general ASA Status: 2            Anesthesia Type: No value filed. Antony Phase I: Antony Score: 10    Antony Phase II: Antony Score: 10    Anesthesia Post Evaluation    No notable events documented.

## 2023-12-13 NOTE — H&P
Assessment:   74 yo man with recurrent inguinal hernia  Plan:     OR today for repair of recurrent hernia    Signed By: Babatunde Fountain MD  [address]  [phone]    December 13, 2023          General Surgery History and Physical    Subjective: Celia Delarosa is a 75 y.o.  male who presents with recurrent LIH. See office notes for details    Past Medical History:   Diagnosis Date    Arthritis     Dyslipidemia     Esophageal stricture 11/04/2021    egd 8/21 dr Favian Tate dilated    Essential hypertension     Frequent PVCs     GERD (gastroesophageal reflux disease)     H/O seasonal allergies     Mitral valve prolapse     pt states Dr. Carlos Walters is not sure if this is the correct diagnosis    Obesity 05/02/2018    Obesity  BMI= 32.6    Sleep apnea     uses mouth appliance     Past Surgical History:   Procedure Laterality Date    COLONOSCOPY N/A 6/2/2017    COLONOSCOPY performed by Pallavi Bush MD at [facility] N/A 8/3/2022    COLONOSCOPY performed by Pallavi Bush MD at [address] [place] [address]  6/2/2017         HERNIA REPAIR      times 2. 1980's inguinal bilateral    ORTHOPEDIC SURGERY Left 2018    hip replacement    OTHER SURGICAL HISTORY  1977    Repair of the right shoulder.      ROTATOR CUFF REPAIR Left 01/14/2021    TONSILLECTOMY      childhood    TOTAL HIP ARTHROPLASTY Left 2018    UPPER GASTROINTESTINAL ENDOSCOPY  6/2/2017         UPPER GI ENDOSCOPY,DILATN W GUIDE  9/26/2018         UPPER GI ENDOSCOPY,DILATN W GUIDE  7/14/2017           Family History   Problem Relation Age of Onset    Sudden Death Mother         Car accident    Heart Attack Father 48    Heart Disease Father     Thyroid Disease Sister     No Known Problems Brother     No Known Problems Sister     No Known Problems Sister     No Known Problems Brother     No Known Problems Brother     Cancer Maternal Aunt 80        multiple myloma     Social History     Socioeconomic History

## 2023-12-13 NOTE — OP NOTE
OPERATIVE REPORT     PREOPERATIVE DIAGNOSIS: Recurrent left inguinal hernia. POSTOPERATIVE DIAGNOSIS: Recurrent left inguinal hernia. PROCEDURES PERFORMED: Laparoscopic robotic assisted repair of left inguinal hernia (transabdominal pre-peritoneal) . SURGEON: Marjan Wheeler MD     ANESTHESIA: General, ET    INDICATION: As documented in history and physical.     FINDINGS: recurrent left inguinal hernia       DESCRIPTION OF PROCEDURE:   The patient was taken to the operating room and   placed in supine position. Following general anesthetic induction and   endotracheal intubation, his abdomen was prepped and draped in the   usual sterile fashion. A supraumbilical incision was made. A Veress needle was passed without difficulty. The initial intraabdominal pressures were low, and the abdomen was insufflated   with carbon dioxide gas to a pressure of 15 mmHg. An 8.5 mm robotic trocar   was inserted. An 8.5 mm robotic laparoscope was introduced. The   abdomen was then surveyed. There was no evidence of inadvertent   intraabdominal injury. We then placed an 8 mm robotic trocar in the right upper quadrant and another 8 mm robotic trocar in the left upper quadrant under direct visulaization. The patient was placed in Trendelenburg. The robot was then brought in and docked. Once the robot was docked, laparoscope was mounted. For my right hand, we placed the monopolar scissors. For my left hand, the   JeNa[employer]elle grasper was used. I then went to the surgeon's console. Inspection   of the left side identified hernia. The peritoneal flap was then created with the monopolar   scissors. This was done from approximately the level of the    anterior-superior iliac spine on the left side, extending the incision   medially beyond the midline. The peritoneal flap was then   developed using a combination of blunt dissection and the monopolar scissors.    First, the usual landmarks were identified, including the

## 2024-01-23 RX ORDER — FLECAINIDE ACETATE 50 MG/1
50 TABLET ORAL 2 TIMES DAILY
Qty: 60 TABLET | Refills: 5 | Status: SHIPPED | OUTPATIENT
Start: 2024-01-23 | End: 2024-01-24 | Stop reason: SDUPTHER

## 2024-01-24 RX ORDER — FLECAINIDE ACETATE 50 MG/1
50 TABLET ORAL 2 TIMES DAILY
Qty: 180 TABLET | Refills: 2 | Status: SHIPPED | OUTPATIENT
Start: 2024-01-24

## 2024-04-11 ENCOUNTER — OFFICE VISIT (OUTPATIENT)
Age: 74
End: 2024-04-11
Payer: MEDICARE

## 2024-04-11 ENCOUNTER — ANCILLARY PROCEDURE (OUTPATIENT)
Age: 74
End: 2024-04-11
Payer: MEDICARE

## 2024-04-11 VITALS
DIASTOLIC BLOOD PRESSURE: 78 MMHG | HEART RATE: 60 BPM | HEIGHT: 73 IN | WEIGHT: 233 LBS | BODY MASS INDEX: 30.88 KG/M2 | SYSTOLIC BLOOD PRESSURE: 116 MMHG

## 2024-04-11 VITALS — SYSTOLIC BLOOD PRESSURE: 116 MMHG | WEIGHT: 233 LBS | DIASTOLIC BLOOD PRESSURE: 78 MMHG | BODY MASS INDEX: 30.74 KG/M2

## 2024-04-11 DIAGNOSIS — I25.10 ATHEROSCLEROSIS OF NATIVE CORONARY ARTERY OF NATIVE HEART WITHOUT ANGINA PECTORIS: ICD-10-CM

## 2024-04-11 DIAGNOSIS — I42.8 OTHER CARDIOMYOPATHY (HCC): ICD-10-CM

## 2024-04-11 DIAGNOSIS — E78.5 HYPERLIPIDEMIA, UNSPECIFIED HYPERLIPIDEMIA TYPE: ICD-10-CM

## 2024-04-11 DIAGNOSIS — G47.33 OBSTRUCTIVE SLEEP APNEA (ADULT) (PEDIATRIC): ICD-10-CM

## 2024-04-11 DIAGNOSIS — I10 ESSENTIAL (PRIMARY) HYPERTENSION: ICD-10-CM

## 2024-04-11 DIAGNOSIS — E78.5 HYPERLIPIDEMIA, UNSPECIFIED HYPERLIPIDEMIA TYPE: Primary | ICD-10-CM

## 2024-04-11 DIAGNOSIS — I34.1 NONRHEUMATIC MITRAL (VALVE) PROLAPSE: ICD-10-CM

## 2024-04-11 LAB
ECHO AO ASC DIAM: 3.5 CM
ECHO AO ASCENDING AORTA INDEX: 1.52 CM/M2
ECHO AO ROOT DIAM: 3.9 CM
ECHO AO ROOT INDEX: 1.7 CM/M2
ECHO AV AREA PEAK VELOCITY: 3.2 CM2
ECHO AV AREA VTI: 3.3 CM2
ECHO AV AREA/BSA PEAK VELOCITY: 1.4 CM2/M2
ECHO AV AREA/BSA VTI: 1.4 CM2/M2
ECHO AV MEAN GRADIENT: 2 MMHG
ECHO AV MEAN VELOCITY: 0.7 M/S
ECHO AV PEAK GRADIENT: 4 MMHG
ECHO AV PEAK VELOCITY: 1.1 M/S
ECHO AV VELOCITY RATIO: 0.64
ECHO AV VTI: 21.6 CM
ECHO BSA: 2.33 M2
ECHO EST RA PRESSURE: 3 MMHG
ECHO LA DIAMETER INDEX: 1.65 CM/M2
ECHO LA DIAMETER: 3.8 CM
ECHO LA TO AORTIC ROOT RATIO: 0.97
ECHO LA VOL A-L A2C: 49 ML (ref 18–58)
ECHO LA VOL A-L A4C: 52 ML (ref 18–58)
ECHO LA VOL BP: 47 ML (ref 18–58)
ECHO LA VOL MOD A2C: 46 ML (ref 18–58)
ECHO LA VOL MOD A4C: 47 ML (ref 18–58)
ECHO LA VOL/BSA BIPLANE: 20 ML/M2 (ref 16–34)
ECHO LA VOLUME AREA LENGTH: 51 ML
ECHO LA VOLUME INDEX A-L A2C: 21 ML/M2 (ref 16–34)
ECHO LA VOLUME INDEX A-L A4C: 23 ML/M2 (ref 16–34)
ECHO LA VOLUME INDEX AREA LENGTH: 22 ML/M2 (ref 16–34)
ECHO LA VOLUME INDEX MOD A2C: 20 ML/M2 (ref 16–34)
ECHO LA VOLUME INDEX MOD A4C: 20 ML/M2 (ref 16–34)
ECHO LV E' LATERAL VELOCITY: 5 CM/S
ECHO LV E' SEPTAL VELOCITY: 5 CM/S
ECHO LV EDV A2C: 129 ML
ECHO LV EDV A4C: 143 ML
ECHO LV EDV BP: 138 ML (ref 67–155)
ECHO LV EDV INDEX A4C: 62 ML/M2
ECHO LV EDV INDEX BP: 60 ML/M2
ECHO LV EDV NDEX A2C: 56 ML/M2
ECHO LV EJECTION FRACTION A2C: 56 %
ECHO LV EJECTION FRACTION A4C: 55 %
ECHO LV EJECTION FRACTION BIPLANE: 55 % (ref 55–100)
ECHO LV ESV A2C: 56 ML
ECHO LV ESV A4C: 64 ML
ECHO LV ESV BP: 60 ML (ref 22–58)
ECHO LV ESV INDEX A2C: 24 ML/M2
ECHO LV ESV INDEX A4C: 28 ML/M2
ECHO LV ESV INDEX BP: 26 ML/M2
ECHO LV FRACTIONAL SHORTENING: 28 % (ref 28–44)
ECHO LV INTERNAL DIMENSION DIASTOLE INDEX: 2.61 CM/M2
ECHO LV INTERNAL DIMENSION DIASTOLIC: 6 CM (ref 4.2–5.9)
ECHO LV INTERNAL DIMENSION SYSTOLIC INDEX: 1.87 CM/M2
ECHO LV INTERNAL DIMENSION SYSTOLIC: 4.3 CM
ECHO LV IVSD: 1 CM (ref 0.6–1)
ECHO LV MASS 2D: 231.1 G (ref 88–224)
ECHO LV MASS INDEX 2D: 100.5 G/M2 (ref 49–115)
ECHO LV POSTERIOR WALL DIASTOLIC: 0.9 CM (ref 0.6–1)
ECHO LV RELATIVE WALL THICKNESS RATIO: 0.3
ECHO LVOT AREA: 4.9 CM2
ECHO LVOT AV VTI INDEX: 0.68
ECHO LVOT DIAM: 2.5 CM
ECHO LVOT MEAN GRADIENT: 1 MMHG
ECHO LVOT PEAK GRADIENT: 2 MMHG
ECHO LVOT PEAK VELOCITY: 0.7 M/S
ECHO LVOT STROKE VOLUME INDEX: 31.1 ML/M2
ECHO LVOT SV: 71.6 ML
ECHO LVOT VTI: 14.6 CM
ECHO MV A VELOCITY: 0.7 M/S
ECHO MV AREA PHT: 1.9 CM2
ECHO MV E DECELERATION TIME (DT): 389.8 MS
ECHO MV E VELOCITY: 0.42 M/S
ECHO MV E/A RATIO: 0.6
ECHO MV E/E' LATERAL: 8.4
ECHO MV E/E' RATIO (AVERAGED): 8.4
ECHO MV PRESSURE HALF TIME (PHT): 113 MS
ECHO RIGHT VENTRICULAR SYSTOLIC PRESSURE (RVSP): 27 MMHG
ECHO RV FREE WALL PEAK S': 11 CM/S
ECHO RV INTERNAL DIMENSION: 3.9 CM
ECHO RV TAPSE: 2 CM (ref 1.7–?)
ECHO TV REGURGITANT MAX VELOCITY: 2.45 M/S
ECHO TV REGURGITANT PEAK GRADIENT: 24 MMHG

## 2024-04-11 PROCEDURE — 99214 OFFICE O/P EST MOD 30 MIN: CPT | Performed by: SPECIALIST

## 2024-04-11 PROCEDURE — G8427 DOCREV CUR MEDS BY ELIG CLIN: HCPCS | Performed by: SPECIALIST

## 2024-04-11 PROCEDURE — 3017F COLORECTAL CA SCREEN DOC REV: CPT | Performed by: SPECIALIST

## 2024-04-11 PROCEDURE — 1036F TOBACCO NON-USER: CPT | Performed by: SPECIALIST

## 2024-04-11 PROCEDURE — 93306 TTE W/DOPPLER COMPLETE: CPT | Performed by: SPECIALIST

## 2024-04-11 PROCEDURE — G8417 CALC BMI ABV UP PARAM F/U: HCPCS | Performed by: SPECIALIST

## 2024-04-11 PROCEDURE — 1123F ACP DISCUSS/DSCN MKR DOCD: CPT | Performed by: SPECIALIST

## 2024-04-11 PROCEDURE — 3078F DIAST BP <80 MM HG: CPT | Performed by: SPECIALIST

## 2024-04-11 PROCEDURE — 3074F SYST BP LT 130 MM HG: CPT | Performed by: SPECIALIST

## 2024-04-11 RX ORDER — ROSUVASTATIN CALCIUM 10 MG/1
10 TABLET, COATED ORAL DAILY
COMMUNITY
End: 2024-04-11 | Stop reason: SDUPTHER

## 2024-04-11 RX ORDER — ROSUVASTATIN CALCIUM 10 MG/1
10 TABLET, COATED ORAL DAILY
Qty: 90 TABLET | Refills: 3 | Status: SHIPPED | OUTPATIENT
Start: 2024-04-11

## 2024-04-11 NOTE — PROGRESS NOTES
Chief Complaint   Patient presents with    Hyperlipidemia    Hypertension     Vitals:    24 1523   BP: 116/78   Site: Left Upper Arm   Position: Sitting   Weight: 105.7 kg (233 lb)       Chest pain: DENIED     Recent hospital stays: DENIED     Refills: DENIED     NAME Anthony N Van Thoen DOB    1950      MRN    [id number]      LAST OFFICE APPOINTMENT: 10/11/2023     DIAGNOSIS    ICD-10-CM    1. Hyperlipidemia, unspecified hyperlipidemia type  E78.5       2. Obstructive sleep apnea (adult) (pediatric)  G47.33       3. Essential (primary) hypertension  I10       4. Other cardiomyopathy (HCC)  I42.8       5. Atherosclerosis of native coronary artery of native heart without angina pectoris  I25.10           HOME MEDICATION  Current Outpatient Medications   Medication Sig    flecainide (TAMBOCOR) 50 MG tablet Take 1 tablet by mouth 2 times daily    Glucosamine HCl (GLUCOSAMINE PO) Take by mouth 2 times daily    simvastatin (ZOCOR) 10 MG tablet Take 1 tablet by mouth nightly    metoprolol succinate (TOPROL XL) 25 MG extended release tablet TAKE ONE-HALF (1/2) TABLET DAILY    pantoprazole (PROTONIX) 20 MG tablet 1 tablet every morning    hydrocortisone 2.5 % cream APPLY EXTERNALLY TO THE AFFECTED AREA TWICE DAILY    Cholecalciferol (VITAMIN D-3 PO) Take 1,000 Units by mouth daily    B Complex Vitamins (B COMPLEX PO) Take by mouth every morning    Multiple Vitamin (MULTIVITAMIN ADULT PO) Take 1 tablet by mouth daily    Omega-3 Fatty Acids (FISH OIL) 1200 MG CAPS Take 1,200 mg by mouth 2 times daily     No current facility-administered medications for this visit.       VITAL SIGNS  Wt Readings from Last 3 Encounters:   24 105.7 kg (233 lb)   24 105.7 kg (233 lb)   23 106 kg (233 lb 11 oz)     BP Readings from Last 3 Encounters:   24 116/78   24 116/78   23 108/67     Pulse Readings from Last 3 Encounters:   24 60   23 53   23 67         SPECIALTY

## 2024-04-11 NOTE — PROGRESS NOTES
CARDIOLOGY OFFICE NOTE    Tyrone Mustafa MD, [facility]    [address], [place] [address]  Phone [phone]; Fax [phone]  Mobile [phone]   Voice Mail [phone]    Primary care: Maggy Franks MD       ATTENTION:   This medical record was transcribed using an electronic medical records/speech recognition system.  Although proofread, it may and can contain electronic, spelling and other errors.  Corrections may be executed at a later time.  Please feel free to contact us for any clarifications as needed.             Anthony N Van Thoen is a 73 y.o. male with  referred for  hypertension, dyslipidemia and mitral valve prolapse.   Cardiac risk factors: family history, male gender, hypertension   I have personally obtained the history from the patient.         HISTORY OF PRESENTING ILLNESS    Ms./Mr. Anthony N Van Thoen  73 y.o. is doing well since I last saw him.  He had echo today and there is no evidence of mitral valve prolapse and his mild leaky valves are all now trace leaky.  He has no irregularity in his rhythm is doing well on the flecainide with no palpitations.  Reviewed his cholesterol profile and it was elevated and I favor switching him from Zocor to Crestor.         ACTIVE PROBLEM LIST     Patient Active Problem List    Diagnosis Date Noted    Cardiomyopathy, unspecified type (HCC) 03/20/2023    Frequent PVCs     Sleep apnea     Chronic renal disease, stage III (HCC) 10/11/2022    Sensorineural hearing loss (SNHL) of right ear 04/12/2022    Esophageal stricture 11/04/2021    Primary osteoarthritis of left hip 05/06/2018    Primary osteoarthritis of left knee 04/25/2018    Seasonal allergic rhinitis due to pollen 05/19/2017    Mitral valve prolapse 02/16/2017    Essential hypertension 01/19/2017    Colon polyps 01/19/2017           PAST MEDICAL HISTORY     Past Medical History:   Diagnosis Date    Arthritis     Dyslipidemia     Esophageal stricture

## 2024-04-11 NOTE — PATIENT INSTRUCTIONS

## 2024-06-03 RX ORDER — PANTOPRAZOLE SODIUM 20 MG/1
20 TABLET, DELAYED RELEASE ORAL DAILY
Qty: 90 TABLET | Refills: 0 | Status: SHIPPED | OUTPATIENT
Start: 2024-06-03

## 2024-08-05 NOTE — PROGRESS NOTES
Visit Vitals    /74 (BP 1 Location: Right arm, BP Patient Position: Sitting)    Pulse 93    Resp 18    Ht 6' (1.829 m)    Wt 231 lb 12.8 oz (105.1 kg)    SpO2 98%    BMI 31.44 kg/m2 [Time Spent: ___ minutes] : I have spent [unfilled] minutes of time on the encounter.

## 2024-08-26 RX ORDER — METOPROLOL SUCCINATE 25 MG/1
TABLET, EXTENDED RELEASE ORAL
Qty: 45 TABLET | Refills: 1 | Status: SHIPPED | OUTPATIENT
Start: 2024-08-26

## 2024-08-26 RX ORDER — METOPROLOL SUCCINATE 25 MG/1
12.5 TABLET, EXTENDED RELEASE ORAL DAILY
Qty: 45 TABLET | Refills: 1 | Status: SHIPPED | OUTPATIENT
Start: 2024-08-26

## 2024-09-06 LAB
ALBUMIN SERPL-MCNC: 4.4 G/DL (ref 3.8–4.8)
ALP SERPL-CCNC: 73 IU/L (ref 44–121)
ALT SERPL-CCNC: 30 IU/L (ref 0–44)
AST SERPL-CCNC: 22 IU/L (ref 0–40)
BILIRUB DIRECT SERPL-MCNC: 0.18 MG/DL (ref 0–0.4)
BILIRUB SERPL-MCNC: 0.7 MG/DL (ref 0–1.2)
CHOLEST SERPL-MCNC: 130 MG/DL (ref 100–199)
HDLC SERPL-MCNC: 40 MG/DL
IMP & REVIEW OF LAB RESULTS: NORMAL
LDLC SERPL CALC-MCNC: 74 MG/DL (ref 0–99)
PROT SERPL-MCNC: 6.5 G/DL (ref 6–8.5)
TRIGL SERPL-MCNC: 83 MG/DL (ref 0–149)
VLDLC SERPL CALC-MCNC: 16 MG/DL (ref 5–40)

## 2024-09-06 NOTE — RESULT ENCOUNTER NOTE
Dear Mr. Van Thoen,  Good News!  Your test results are stable.   Please continue the current treatment plan.  We can discuss more at your scheduled follow up if you have questions.  Best Regards,  WINNIE Lange NP  
Detail Level: Zone
Patient Specific Counseling (Will Not Stick From Patient To Patient): Patient declined systemic treatment at this time

## 2024-09-16 RX ORDER — PANTOPRAZOLE SODIUM 20 MG/1
TABLET, DELAYED RELEASE ORAL
Qty: 90 TABLET | Refills: 3 | OUTPATIENT
Start: 2024-09-16

## 2024-09-17 RX ORDER — PANTOPRAZOLE SODIUM 20 MG/1
20 TABLET, DELAYED RELEASE ORAL DAILY
Qty: 90 TABLET | Refills: 0 | OUTPATIENT
Start: 2024-09-17

## 2024-09-20 ENCOUNTER — TELEPHONE (OUTPATIENT)
Age: 74
End: 2024-09-20

## 2024-09-22 RX ORDER — PANTOPRAZOLE SODIUM 20 MG/1
20 TABLET, DELAYED RELEASE ORAL DAILY
Qty: 30 TABLET | Refills: 0 | Status: SHIPPED | OUTPATIENT
Start: 2024-09-22

## 2024-09-23 RX ORDER — PANTOPRAZOLE SODIUM 20 MG/1
20 TABLET, DELAYED RELEASE ORAL DAILY
Qty: 90 TABLET | OUTPATIENT
Start: 2024-09-23

## 2024-09-29 SDOH — ECONOMIC STABILITY: FOOD INSECURITY: WITHIN THE PAST 12 MONTHS, YOU WORRIED THAT YOUR FOOD WOULD RUN OUT BEFORE YOU GOT MONEY TO BUY MORE.: NEVER TRUE

## 2024-09-29 SDOH — ECONOMIC STABILITY: FOOD INSECURITY: WITHIN THE PAST 12 MONTHS, THE FOOD YOU BOUGHT JUST DIDN'T LAST AND YOU DIDN'T HAVE MONEY TO GET MORE.: NEVER TRUE

## 2024-09-29 SDOH — ECONOMIC STABILITY: INCOME INSECURITY: HOW HARD IS IT FOR YOU TO PAY FOR THE VERY BASICS LIKE FOOD, HOUSING, MEDICAL CARE, AND HEATING?: NOT HARD AT ALL

## 2024-10-02 ENCOUNTER — OFFICE VISIT (OUTPATIENT)
Age: 74
End: 2024-10-02
Payer: MEDICARE

## 2024-10-02 VITALS
HEART RATE: 68 BPM | RESPIRATION RATE: 16 BRPM | TEMPERATURE: 97.7 F | SYSTOLIC BLOOD PRESSURE: 128 MMHG | BODY MASS INDEX: 30.48 KG/M2 | WEIGHT: 230 LBS | DIASTOLIC BLOOD PRESSURE: 86 MMHG | OXYGEN SATURATION: 94 % | HEIGHT: 73 IN

## 2024-10-02 DIAGNOSIS — K22.2 ESOPHAGEAL STRICTURE: ICD-10-CM

## 2024-10-02 DIAGNOSIS — N18.30 STAGE 3 CHRONIC KIDNEY DISEASE, UNSPECIFIED WHETHER STAGE 3A OR 3B CKD (HCC): ICD-10-CM

## 2024-10-02 DIAGNOSIS — Z00.00 MEDICARE ANNUAL WELLNESS VISIT, SUBSEQUENT: Primary | ICD-10-CM

## 2024-10-02 DIAGNOSIS — K44.9 HIATAL HERNIA: ICD-10-CM

## 2024-10-02 PROCEDURE — 99214 OFFICE O/P EST MOD 30 MIN: CPT | Performed by: INTERNAL MEDICINE

## 2024-10-02 RX ORDER — FAMOTIDINE 20 MG/1
20 TABLET, FILM COATED ORAL
Qty: 30 TABLET | Refills: 0
Start: 2024-10-02

## 2024-10-02 RX ORDER — PANTOPRAZOLE SODIUM 20 MG/1
20 TABLET, DELAYED RELEASE ORAL DAILY
Qty: 90 TABLET | Refills: 3 | Status: SHIPPED | OUTPATIENT
Start: 2024-10-02

## 2024-10-02 ASSESSMENT — PATIENT HEALTH QUESTIONNAIRE - PHQ9
SUM OF ALL RESPONSES TO PHQ9 QUESTIONS 1 & 2: 0
2. FEELING DOWN, DEPRESSED OR HOPELESS: NOT AT ALL
SUM OF ALL RESPONSES TO PHQ QUESTIONS 1-9: 0
1. LITTLE INTEREST OR PLEASURE IN DOING THINGS: NOT AT ALL
SUM OF ALL RESPONSES TO PHQ QUESTIONS 1-9: 0

## 2024-10-02 ASSESSMENT — LIFESTYLE VARIABLES
HOW MANY STANDARD DRINKS CONTAINING ALCOHOL DO YOU HAVE ON A TYPICAL DAY: 1 OR 2
HOW OFTEN DO YOU HAVE A DRINK CONTAINING ALCOHOL: 2-3 TIMES A WEEK

## 2024-10-02 NOTE — PROGRESS NOTES
Anthony N Van Thoen (:  1950) is a 74 y.o. male,Established patient, here for evaluation of the following chief complaint(s):  Medicare AWV (AWV; medication refills )         Assessment & Plan  Medicare annual wellness visit, subsequent   Colonoscopy with dr curran  Up to date on flu and covid  Active plays golf         Stage 3 chronic kidney disease, unspecified whether stage 3a or 3b CKD (HCC)   No sxs   Recent cr 1.4 gfr in the 50s  Avoid nsaids    Orders:  •  CBC; Future  •  Comprehensive Metabolic Panel; Future    Hiatal hernia   Has a ho needing stricture dilated  Has gi follow up but not until  dec  On protonix in am  Discussed lifestyle modification -avoid coffee at night and late night snacks  Add pepcid qhs    Orders:  •  pantoprazole (PROTONIX) 20 MG tablet; Take 1 tablet by mouth daily  •  famotidine (PEPCID) 20 MG tablet; Take 1 tablet by mouth nightly    Esophageal stricture   Dilated by dr curran in past  No dysphagia but some gerd sxs           No follow-ups on file.       Subjective   HPI  Seen for Medicare wellness visit but also to discuss upper GI symptoms.  He has had an esophageal stricture in the past dilated by Dr. Denise Ha several years ago.  He is chronically on Protonix 20 mg daily.  Has been reluctant to increase the dose because of concern about long-term risks.  Currently not having dysphagia but notes some GERD symptoms.  On discussion does have a cup of coffee with cookies at night.  Drinks about 4 glasses of alcohol a week.  Discussed lifestyle modification.  Discussed augmenting Protonix with Pepcid at night.  He has a GI follow-up in December.    Heart disease will be seeing Dr. Kruse has been changed from simvastatin to rosuvastatin and seems to be tolerating it and recent lipids looked good.    Endorses some fatigue.  Able to play golf and active with his wife going to concerts.  No tobacco use.  4 glasses of alcohol a week or less.  Review of Systems

## 2024-10-02 NOTE — ASSESSMENT & PLAN NOTE
No sxs   Recent cr 1.4 gfr in the 50s  Avoid nsaids    Orders:    CBC; Future    Comprehensive Metabolic Panel; Future

## 2024-10-03 LAB
ALBUMIN SERPL-MCNC: 4.2 G/DL (ref 3.5–5)
ALBUMIN/GLOB SERPL: 1.4 (ref 1.1–2.2)
ALP SERPL-CCNC: 81 U/L (ref 45–117)
ALT SERPL-CCNC: 29 U/L (ref 12–78)
ANION GAP SERPL CALC-SCNC: 3 MMOL/L (ref 2–12)
AST SERPL-CCNC: 17 U/L (ref 15–37)
BILIRUB SERPL-MCNC: 0.4 MG/DL (ref 0.2–1)
BUN SERPL-MCNC: 26 MG/DL (ref 6–20)
BUN/CREAT SERPL: 16 (ref 12–20)
CALCIUM SERPL-MCNC: 9.8 MG/DL (ref 8.5–10.1)
CHLORIDE SERPL-SCNC: 109 MMOL/L (ref 97–108)
CO2 SERPL-SCNC: 27 MMOL/L (ref 21–32)
CREAT SERPL-MCNC: 1.58 MG/DL (ref 0.7–1.3)
ERYTHROCYTE [DISTWIDTH] IN BLOOD BY AUTOMATED COUNT: 12.3 % (ref 11.5–14.5)
GLOBULIN SER CALC-MCNC: 3.1 G/DL (ref 2–4)
GLUCOSE SERPL-MCNC: 98 MG/DL (ref 65–100)
HCT VFR BLD AUTO: 42.1 % (ref 36.6–50.3)
HGB BLD-MCNC: 14 G/DL (ref 12.1–17)
MCH RBC QN AUTO: 30.2 PG (ref 26–34)
MCHC RBC AUTO-ENTMCNC: 33.3 G/DL (ref 30–36.5)
MCV RBC AUTO: 90.7 FL (ref 80–99)
NRBC # BLD: 0 K/UL (ref 0–0.01)
NRBC BLD-RTO: 0 PER 100 WBC
PLATELET # BLD AUTO: 222 K/UL (ref 150–400)
PMV BLD AUTO: 10 FL (ref 8.9–12.9)
POTASSIUM SERPL-SCNC: 4.7 MMOL/L (ref 3.5–5.1)
PROT SERPL-MCNC: 7.3 G/DL (ref 6.4–8.2)
RBC # BLD AUTO: 4.64 M/UL (ref 4.1–5.7)
SODIUM SERPL-SCNC: 139 MMOL/L (ref 136–145)
WBC # BLD AUTO: 8 K/UL (ref 4.1–11.1)

## 2024-11-11 RX ORDER — FLECAINIDE ACETATE 50 MG/1
50 TABLET ORAL 2 TIMES DAILY
Qty: 180 TABLET | Refills: 2 | Status: SHIPPED | OUTPATIENT
Start: 2024-11-11

## 2024-11-11 NOTE — TELEPHONE ENCOUNTER
Refill per VO of Dr. Bronson Cherry:    4/11/2024    Future Appointments   Date Time Provider Department Center   11/15/2024  4:20 PM Bronson Cherry MD [facility]       Requested Prescriptions     Pending Prescriptions Disp Refills    flecainide (TAMBOCOR) 50 MG tablet 180 tablet 2     Sig: Take 1 tablet by mouth 2 times daily

## 2024-11-15 ENCOUNTER — OFFICE VISIT (OUTPATIENT)
Age: 74
End: 2024-11-15
Payer: MEDICARE

## 2024-11-15 VITALS
HEART RATE: 81 BPM | DIASTOLIC BLOOD PRESSURE: 82 MMHG | HEIGHT: 73 IN | SYSTOLIC BLOOD PRESSURE: 118 MMHG | WEIGHT: 223.2 LBS | BODY MASS INDEX: 29.58 KG/M2 | OXYGEN SATURATION: 94 %

## 2024-11-15 DIAGNOSIS — I10 ESSENTIAL (PRIMARY) HYPERTENSION: Primary | ICD-10-CM

## 2024-11-15 DIAGNOSIS — E78.5 HYPERLIPIDEMIA, UNSPECIFIED HYPERLIPIDEMIA TYPE: ICD-10-CM

## 2024-11-15 DIAGNOSIS — I49.3 FREQUENT PVCS: ICD-10-CM

## 2024-11-15 DIAGNOSIS — R07.9 CHEST PAIN: ICD-10-CM

## 2024-11-15 PROCEDURE — 99214 OFFICE O/P EST MOD 30 MIN: CPT | Performed by: INTERNAL MEDICINE

## 2024-11-15 NOTE — PROGRESS NOTES
KAMILLE. received from Dr. KALI Cherry MD:    Exercise EKG stress test.      See Dr. Cherry in 1 yr.

## 2024-11-15 NOTE — PROGRESS NOTES
Chief Complaint   Patient presents with    Hypertension    CM    Coronary Artery Disease     Vitals:    11/15/24 1614   BP: 118/82   Site: Left Upper Arm   Position: Sitting   Pulse: 81   SpO2: 94%   Weight: 101.2 kg (223 lb 3.2 oz)   Height: 1.854 m (6' 1\")       Chest pain NO     ER, urgent care, or hospitalized outside of [facility] since your last visit?  NO     Refills NO

## 2024-11-15 NOTE — PROGRESS NOTES
Office Follow-up    NAME: Anthony N Van Thoen   :  1950  MRM:  [id number]    Date:  11/15/2024         Assessment and Plan:        1. HTN   - Blood pressure is at goal at 116/78   -No adjustments antihypertensives       2. Cardiomyopathy   -Echo in 2024 EF is 55% and is mild leaky heart valves including aortic mitral and tricuspid are all now trace      3. PVC's   -Flecainide has brought down his PVCs to 0.72% from 30%. Tolerating Flecainide.   -He did see EP and they kept him on the flecainide   -We will occasionally do screening stress test to see QT duration- Will do EKG treadmill stress test.   -Does not describe any irregularity in his rhythm       4. Dyslipidemia    -Last LDL was 74  -Crestor 10 mg a day.       5.  History of mitral valve prolapse but no prolapse seen on Echo of 2024.    6.  Overweight BMI 31 but I do not necessarily believe he is overweight.        7. See Dr. Cherry in 1 yr.                 ATTENTION:   This medical record was transcribed using an electronic medical records/speech recognition system.  Although proofread, it may and can contain electronic, spelling and other errors.  Corrections may be executed at a later time.  Please feel free to contact us for any clarifications as needed.      Subjective:     Anthony N Van Thoen, a 74 y.o. year-old who presents for followup.    Exam:     /82 (Site: Left Upper Arm, Position: Sitting)   Pulse 81   Ht 1.854 m (6' 1\")   Wt 101.2 kg (223 lb 3.2 oz)   SpO2 94%   BMI 29.45 kg/m²      General appearance - alert, well appearing, and in no distress  Mental status - affect appropriate to mood  Eyes - sclera anicteric, moist mucous membranes  Neck - supple, no significant adenopathy    Medications:     Current Outpatient Medications   Medication Sig    flecainide (TAMBOCOR) 50 MG tablet Take 1 tablet by mouth 2 times daily    pantoprazole (PROTONIX) 20 MG tablet Take 1 tablet by mouth daily    famotidine (PEPCID) 20 MG  Reviewed with Jon Shearer NP who recommended no changes at this time and will evaluate in clinic tomorrow. [address] Hodges notified. She verbalized understanding and had no further questions. Thea Santiago RN.

## 2024-11-25 DIAGNOSIS — R07.9 CHEST PAIN, UNSPECIFIED TYPE: Primary | ICD-10-CM

## 2024-11-25 DIAGNOSIS — I49.3 VENTRICULAR PREMATURE DEPOLARIZATION: ICD-10-CM

## 2024-11-25 DIAGNOSIS — I42.9 CARDIOMYOPATHY, UNSPECIFIED TYPE (HCC): ICD-10-CM

## 2024-11-25 RX ORDER — METOPROLOL SUCCINATE 25 MG/1
12.5 TABLET, EXTENDED RELEASE ORAL DAILY
Qty: 45 TABLET | Refills: 3 | Status: SHIPPED | OUTPATIENT
Start: 2024-11-25

## 2024-12-09 ENCOUNTER — ANCILLARY PROCEDURE (OUTPATIENT)
Age: 74
End: 2024-12-09
Payer: MEDICARE

## 2024-12-09 VITALS — WEIGHT: 223 LBS | HEIGHT: 73 IN | BODY MASS INDEX: 29.55 KG/M2

## 2024-12-09 DIAGNOSIS — R07.9 CHEST PAIN, UNSPECIFIED TYPE: ICD-10-CM

## 2024-12-09 DIAGNOSIS — I42.9 CARDIOMYOPATHY, UNSPECIFIED TYPE (HCC): ICD-10-CM

## 2024-12-09 PROCEDURE — 93018 CV STRESS TEST I&R ONLY: CPT | Performed by: INTERNAL MEDICINE

## 2024-12-09 PROCEDURE — 93017 CV STRESS TEST TRACING ONLY: CPT | Performed by: INTERNAL MEDICINE

## 2024-12-09 PROCEDURE — 93016 CV STRESS TEST SUPVJ ONLY: CPT | Performed by: INTERNAL MEDICINE

## 2024-12-11 LAB
ECHO BSA: 2.28 M2
STRESS ANGINA INDEX: 0
STRESS BASELINE DIAS BP: 84 MMHG
STRESS BASELINE HR: 75 BPM
STRESS BASELINE SYS BP: 130 MMHG
STRESS ESTIMATED WORKLOAD: 9.6 METS
STRESS EXERCISE DUR MIN: 6 MIN
STRESS EXERCISE DUR SEC: 56 SEC
STRESS O2 SAT PEAK: 96 %
STRESS O2 SAT REST: 98 %
STRESS PEAK DIAS BP: 82 MMHG
STRESS PEAK SYS BP: 176 MMHG
STRESS PERCENT HR ACHIEVED: 92 %
STRESS POST PEAK HR: 133 BPM
STRESS RATE PRESSURE PRODUCT: NORMAL BPM*MMHG
STRESS SR DUKE TREADMILL SCORE: 7
STRESS ST DEPRESSION: 0 MM
STRESS TARGET HR: 145 BPM

## 2025-04-28 RX ORDER — ROSUVASTATIN CALCIUM 10 MG/1
10 TABLET, COATED ORAL DAILY
Qty: 90 TABLET | Refills: 3 | Status: SHIPPED | OUTPATIENT
Start: 2025-04-28 | End: 2025-04-29 | Stop reason: SDUPTHER

## 2025-04-28 NOTE — TELEPHONE ENCOUNTER
Refill per VO of Dr. Bronson Cherry:    11/15/2024    Future Appointments   Date Time Provider Department Center   5/12/2025  1:00 PM Maggy Franks MD [facility]   11/20/2025  9:20 AM Bronson Cherry MD [facility]       Requested Prescriptions     Pending Prescriptions Disp Refills    rosuvastatin (CRESTOR) 10 MG tablet 90 tablet 3     Sig: Take 1 tablet by mouth daily

## 2025-04-29 RX ORDER — ROSUVASTATIN CALCIUM 10 MG/1
10 TABLET, COATED ORAL DAILY
Qty: 90 TABLET | Refills: 3 | Status: SHIPPED | OUTPATIENT
Start: 2025-04-29

## 2025-04-29 NOTE — TELEPHONE ENCOUNTER
Refill per VO of Dr. Bronson Cherry:    11/15/2024    Future Appointments   Date Time Provider Department Center   5/12/2025  1:00 PM Maggy Franks MD [facility]   11/20/2025  9:20 AM Bronson Cherry MD [facility]       Requested Prescriptions     Pending Prescriptions Disp Refills    rosuvastatin (CRESTOR) 10 MG tablet 90 tablet 3     Sig: Take 1 tablet by mouth daily

## 2025-05-01 DIAGNOSIS — K44.9 HIATAL HERNIA: ICD-10-CM

## 2025-05-01 RX ORDER — FAMOTIDINE 20 MG/1
20 TABLET, FILM COATED ORAL NIGHTLY
Qty: 90 TABLET | Refills: 0 | Status: SHIPPED | OUTPATIENT
Start: 2025-05-01

## 2025-05-12 ENCOUNTER — OFFICE VISIT (OUTPATIENT)
Facility: CLINIC | Age: 75
End: 2025-05-12
Payer: MEDICARE

## 2025-05-12 VITALS
HEIGHT: 73 IN | TEMPERATURE: 97.6 F | RESPIRATION RATE: 16 BRPM | OXYGEN SATURATION: 97 % | WEIGHT: 229 LBS | SYSTOLIC BLOOD PRESSURE: 128 MMHG | BODY MASS INDEX: 30.35 KG/M2 | HEART RATE: 73 BPM | DIASTOLIC BLOOD PRESSURE: 80 MMHG

## 2025-05-12 DIAGNOSIS — E78.5 DYSLIPIDEMIA, GOAL LDL BELOW 100: ICD-10-CM

## 2025-05-12 DIAGNOSIS — R53.83 OTHER FATIGUE: ICD-10-CM

## 2025-05-12 DIAGNOSIS — I42.8 OTHER CARDIOMYOPATHY (HCC): ICD-10-CM

## 2025-05-12 DIAGNOSIS — N18.30 STAGE 3 CHRONIC KIDNEY DISEASE, UNSPECIFIED WHETHER STAGE 3A OR 3B CKD (HCC): ICD-10-CM

## 2025-05-12 DIAGNOSIS — K22.2 ESOPHAGEAL STRICTURE: ICD-10-CM

## 2025-05-12 DIAGNOSIS — R53.83 OTHER FATIGUE: Primary | ICD-10-CM

## 2025-05-12 LAB
ALBUMIN SERPL-MCNC: 3.8 G/DL (ref 3.5–5)
ALBUMIN/GLOB SERPL: 1.3 (ref 1.1–2.2)
ALP SERPL-CCNC: 84 U/L (ref 45–117)
ALT SERPL-CCNC: 35 U/L (ref 12–78)
ANION GAP SERPL CALC-SCNC: 4 MMOL/L (ref 2–12)
AST SERPL-CCNC: 26 U/L (ref 15–37)
BASOPHILS # BLD: 0.03 K/UL (ref 0–0.1)
BASOPHILS NFR BLD: 0.4 % (ref 0–1)
BILIRUB SERPL-MCNC: 0.6 MG/DL (ref 0.2–1)
BUN SERPL-MCNC: 23 MG/DL (ref 6–20)
BUN/CREAT SERPL: 16 (ref 12–20)
CALCIUM SERPL-MCNC: 9.6 MG/DL (ref 8.5–10.1)
CHLORIDE SERPL-SCNC: 106 MMOL/L (ref 97–108)
CHOLEST SERPL-MCNC: 124 MG/DL
CO2 SERPL-SCNC: 27 MMOL/L (ref 21–32)
CREAT SERPL-MCNC: 1.43 MG/DL (ref 0.7–1.3)
DIFFERENTIAL METHOD BLD: NORMAL
EOSINOPHIL # BLD: 0.19 K/UL (ref 0–0.4)
EOSINOPHIL NFR BLD: 2.7 % (ref 0–7)
ERYTHROCYTE [DISTWIDTH] IN BLOOD BY AUTOMATED COUNT: 12.2 % (ref 11.5–14.5)
GLOBULIN SER CALC-MCNC: 3 G/DL (ref 2–4)
GLUCOSE SERPL-MCNC: 160 MG/DL (ref 65–100)
HCT VFR BLD AUTO: 40.8 % (ref 36.6–50.3)
HDLC SERPL-MCNC: 45 MG/DL
HDLC SERPL: 2.8 (ref 0–5)
HGB BLD-MCNC: 13.8 G/DL (ref 12.1–17)
IMM GRANULOCYTES # BLD AUTO: 0.02 K/UL (ref 0–0.04)
IMM GRANULOCYTES NFR BLD AUTO: 0.3 % (ref 0–0.5)
LDLC SERPL CALC-MCNC: 59.2 MG/DL (ref 0–100)
LYMPHOCYTES # BLD: 1.36 K/UL (ref 0.8–3.5)
LYMPHOCYTES NFR BLD: 19.5 % (ref 12–49)
MCH RBC QN AUTO: 30.5 PG (ref 26–34)
MCHC RBC AUTO-ENTMCNC: 33.8 G/DL (ref 30–36.5)
MCV RBC AUTO: 90.1 FL (ref 80–99)
MONOCYTES # BLD: 0.6 K/UL (ref 0–1)
MONOCYTES NFR BLD: 8.6 % (ref 5–13)
NEUTS SEG # BLD: 4.76 K/UL (ref 1.8–8)
NEUTS SEG NFR BLD: 68.5 % (ref 32–75)
NRBC # BLD: 0 K/UL (ref 0–0.01)
NRBC BLD-RTO: 0 PER 100 WBC
PLATELET # BLD AUTO: 197 K/UL (ref 150–400)
PMV BLD AUTO: 10 FL (ref 8.9–12.9)
POTASSIUM SERPL-SCNC: 4.5 MMOL/L (ref 3.5–5.1)
PROT SERPL-MCNC: 6.8 G/DL (ref 6.4–8.2)
RBC # BLD AUTO: 4.53 M/UL (ref 4.1–5.7)
SODIUM SERPL-SCNC: 137 MMOL/L (ref 136–145)
TRIGL SERPL-MCNC: 99 MG/DL
TSH SERPL DL<=0.05 MIU/L-ACNC: 1.1 UIU/ML (ref 0.36–3.74)
VLDLC SERPL CALC-MCNC: 19.8 MG/DL
WBC # BLD AUTO: 7 K/UL (ref 4.1–11.1)

## 2025-05-12 PROCEDURE — 1036F TOBACCO NON-USER: CPT | Performed by: INTERNAL MEDICINE

## 2025-05-12 PROCEDURE — 3017F COLORECTAL CA SCREEN DOC REV: CPT | Performed by: INTERNAL MEDICINE

## 2025-05-12 PROCEDURE — G8427 DOCREV CUR MEDS BY ELIG CLIN: HCPCS | Performed by: INTERNAL MEDICINE

## 2025-05-12 PROCEDURE — 1123F ACP DISCUSS/DSCN MKR DOCD: CPT | Performed by: INTERNAL MEDICINE

## 2025-05-12 PROCEDURE — 3079F DIAST BP 80-89 MM HG: CPT | Performed by: INTERNAL MEDICINE

## 2025-05-12 PROCEDURE — 1126F AMNT PAIN NOTED NONE PRSNT: CPT | Performed by: INTERNAL MEDICINE

## 2025-05-12 PROCEDURE — 99214 OFFICE O/P EST MOD 30 MIN: CPT | Performed by: INTERNAL MEDICINE

## 2025-05-12 PROCEDURE — 1159F MED LIST DOCD IN RCRD: CPT | Performed by: INTERNAL MEDICINE

## 2025-05-12 PROCEDURE — 1160F RVW MEDS BY RX/DR IN RCRD: CPT | Performed by: INTERNAL MEDICINE

## 2025-05-12 PROCEDURE — G8417 CALC BMI ABV UP PARAM F/U: HCPCS | Performed by: INTERNAL MEDICINE

## 2025-05-12 PROCEDURE — 3074F SYST BP LT 130 MM HG: CPT | Performed by: INTERNAL MEDICINE

## 2025-05-12 SDOH — ECONOMIC STABILITY: FOOD INSECURITY: WITHIN THE PAST 12 MONTHS, YOU WORRIED THAT YOUR FOOD WOULD RUN OUT BEFORE YOU GOT MONEY TO BUY MORE.: NEVER TRUE

## 2025-05-12 SDOH — ECONOMIC STABILITY: FOOD INSECURITY: WITHIN THE PAST 12 MONTHS, THE FOOD YOU BOUGHT JUST DIDN'T LAST AND YOU DIDN'T HAVE MONEY TO GET MORE.: NEVER TRUE

## 2025-05-12 ASSESSMENT — PATIENT HEALTH QUESTIONNAIRE - PHQ9
SUM OF ALL RESPONSES TO PHQ QUESTIONS 1-9: 0
SUM OF ALL RESPONSES TO PHQ QUESTIONS 1-9: 0
1. LITTLE INTEREST OR PLEASURE IN DOING THINGS: NOT AT ALL
2. FEELING DOWN, DEPRESSED OR HOPELESS: NOT AT ALL
SUM OF ALL RESPONSES TO PHQ QUESTIONS 1-9: 0
SUM OF ALL RESPONSES TO PHQ QUESTIONS 1-9: 0

## 2025-05-12 NOTE — PROGRESS NOTES
Anthony N Van Thoen (:  1950) is a 74 y.o. male,Established patient, here for evaluation of the following chief complaint(s):  Fatigue (Fatigue and light headed; patient states he was walking his dog and felt tired; a couple of months ago; patient states that only happened that one time; patient states he had rectal bleeding twice a couple of months ago; only lasted a day per patient; just FYI )         Assessment & Plan  Other fatigue  Reports better since using a new snore guard that he bought over-the-counter.  No cardiac symptoms.  Rule out anemia rule out hypothyroidism    Orders:  •  CBC with Auto Differential; Future  •  TSH; Future  •  Comprehensive Metabolic Panel; Future    Other cardiomyopathy (HCC)  Overall stable follow-up with cardiology continue flecainide and Toprol         Stage 3 chronic kidney disease, unspecified whether stage 3a or 3b CKD (HCC)  No significant edema no recurrent abdominal symptoms check levels         Esophageal stricture  Reports dysphagia improved with adding famotidine to pantoprazole.  He is encouraged to follow-up with GI as due for colonoscopy         Dyslipidemia, goal LDL below 100  Continue Crestor 10    Orders:  •  Lipid Panel; Future      No follow-ups on file.       Subjective   Fatigue  Associated symptoms include fatigue.   6-month follow-up.  He reports 2 discrete episodes of bright red blood per rectum.  No melena.  No persistent bleeding.  No hematuria or recurrent abdominal symptoms.  He has had a recent episode of vomiting after eating salmon.  Believes he had food poisoning.  Previous dysphagia has improved and he is not having any recurrent symptoms.  He is 5 years since his last colonoscopy and he does plan to follow-up with GI and is encouraged to do so.  Discussed if melena or recurrent rectal bleeding he should go more urgently.    Was feeling tired.  Feels that this is actually improved since he is sleeping better with the new snore guard

## 2025-05-12 NOTE — ASSESSMENT & PLAN NOTE
Reports dysphagia improved with adding famotidine to pantoprazole.  He is encouraged to follow-up with GI as due for colonoscopy

## 2025-05-13 ENCOUNTER — RESULTS FOLLOW-UP (OUTPATIENT)
Facility: CLINIC | Age: 75
End: 2025-05-13

## 2025-07-13 DIAGNOSIS — K44.9 HIATAL HERNIA: ICD-10-CM

## 2025-07-14 RX ORDER — FAMOTIDINE 20 MG/1
20 TABLET, FILM COATED ORAL NIGHTLY
Qty: 90 TABLET | Refills: 3 | Status: SHIPPED | OUTPATIENT
Start: 2025-07-14

## 2025-08-11 RX ORDER — FLECAINIDE ACETATE 50 MG/1
50 TABLET ORAL 2 TIMES DAILY
Qty: 180 TABLET | Refills: 1 | Status: SHIPPED | OUTPATIENT
Start: 2025-08-11

## (undated) DEVICE — INSUFFLATION NEEDLE TO ESTABLISH PNEUMOPERITONEUM.: Brand: INSUFFLATION NEEDLE

## (undated) DEVICE — BAG BELONG PT PERS CLEAR HANDL

## (undated) DEVICE — TRAP SUC MUCOUS 70ML -- MEDICHOICE MEDLINE

## (undated) DEVICE — ARM DRAPE

## (undated) DEVICE — AIRSEAL BIFURCATED SMOKE EVAC FILTERED TUBE SET: Brand: AIRSEAL

## (undated) DEVICE — 3M™ TEGADERM™ TRANSPARENT FILM DRESSING FRAME STYLE, 1626W, 4 IN X 4-3/4 IN (10 CM X 12 CM), 50/CT 4CT/CASE: Brand: 3M™ TEGADERM™

## (undated) DEVICE — SOLUTION IRRIG 3000ML 0.9% SOD CHL FLX CONT 0797208] ICU MEDICAL INC]

## (undated) DEVICE — SUTURE STRATAFIX SYMMETRIC SZ 1 L18IN ABSRB VLT CT1 L36CM SXPP1A404

## (undated) DEVICE — SOLIDIFIER MEDC 1200ML -- CONVERT TO 356117

## (undated) DEVICE — ESOPHAGEAL/PYLORIC/COLONIC/BILIARY WIREGUIDED BALLOON DILATATION CATHETER: Brand: CRE™ PRO

## (undated) DEVICE — KENDALL RADIOLUCENT FOAM MONITORING ELECTRODE -RECTANGULAR SHAPE: Brand: KENDALL

## (undated) DEVICE — ESOPHAGEAL BALLOON DILATATION CATHETER: Brand: CRE FIXED WIRE

## (undated) DEVICE — KIT COLON W/ 1.1OZ LUB AND 2 END

## (undated) DEVICE — 3M™ CUROS™ DISINFECTING CAP FOR NEEDLELESS CONNECTORS 270/CARTON 20 CARTONS/CASE CFF1-270: Brand: CUROS™

## (undated) DEVICE — 3M™ IOBAN™ 2 ANTIMICROBIAL INCISE DRAPE 6650EZ: Brand: IOBAN™ 2

## (undated) DEVICE — CATH IV AUTOGRD BC PNK 20GA 25 -- INSYTE

## (undated) DEVICE — Device

## (undated) DEVICE — CANN NASAL O2 CAPNOGRAPHY AD -- FILTERLINE

## (undated) DEVICE — NEEDLE HYPO 21GA L1.5IN INTRAMUSCULAR S STL LATCH BVL UP

## (undated) DEVICE — BLADELESS OBTURATOR: Brand: WECK VISTA

## (undated) DEVICE — SYR ASSEMB INFL BLLN 60ML --

## (undated) DEVICE — CANNULA CUSH AD W/ 14FT TBG

## (undated) DEVICE — SNARE ENDOSCP M L240CM W27MM SHTH DIA2.4MM CHN 2.8MM OVL

## (undated) DEVICE — SIMPLICITY FLUFF UNDERPAD 23X36, MODERATE: Brand: SIMPLICITY

## (undated) DEVICE — BITEBLOCK ENDOSCP 60FR MAXI WHT POLYETH STURDY W/ VELC WVN

## (undated) DEVICE — CUFF RMFG BP INF SZ 11 DISP -- LAWSON OEM ITEM 238915

## (undated) DEVICE — APPLICATOR BNDG 1MM ADH PREMIERPRO EXOFIN

## (undated) DEVICE — SOLUTION IRRIG 500ML 0.9% SOD CHLO USP POUR PLAS BTL

## (undated) DEVICE — STRYKER PERFORMANCE SERIES SAGITTAL BLADE: Brand: STRYKER PERFORMANCE SERIES

## (undated) DEVICE — FORCEPS BX L240CM JAW DIA2.8MM L CAP W/ NDL MIC MESH TOOTH

## (undated) DEVICE — CLICKLINE SCISSORS INSERT: Brand: CLICKLINE

## (undated) DEVICE — DUAL IRRIGATION ADAPTOR

## (undated) DEVICE — FCPS BX HOT LG 2.2MMX240CM

## (undated) DEVICE — (D)SENSOR RMFG 02 PULS OXMTR -- DISC BY MFR USE ITEM 133445

## (undated) DEVICE — INFECTION CONTROL KIT SYS

## (undated) DEVICE — GAUZE SPONGES,12 PLY: Brand: CURITY

## (undated) DEVICE — PREP KIT PEEL PTCH POVIDONE IOD

## (undated) DEVICE — STERILE POLYISOPRENE POWDER-FREE SURGICAL GLOVES: Brand: PROTEXIS

## (undated) DEVICE — SUTURE DEV SZ 2-0 WND CLSR ABSRB GS-22 VLOC COVIDIEN VLOCM2145

## (undated) DEVICE — Z DISCONTINUED LINER BOOT TRACTION NS LINDY LF

## (undated) DEVICE — SKIN MARKER,REGULAR TIP WITH RULER AND LABELS: Brand: DEVON

## (undated) DEVICE — TIP COVER ACCESSORY

## (undated) DEVICE — ADULT SPO2 SENSOR,REMANUFACTURED,REPROCESSED DEVICE FOR SINGLE USE; REPROCESSED BY COVIDIEN LLC: Brand: NELLCOR

## (undated) DEVICE — GLOVE SURG SZ 65 THK91MIL LTX FREE SYN POLYISOPRENE

## (undated) DEVICE — SYRINGE 50ML E/T

## (undated) DEVICE — REM POLYHESIVE ADULT PATIENT RETURN ELECTRODE: Brand: VALLEYLAB

## (undated) DEVICE — 1200 GUARD II KIT W/5MM TUBE W/O VAC TUBE: Brand: GUARDIAN

## (undated) DEVICE — TRANSFER SET 3": Brand: MEDLINE INDUSTRIES, INC.

## (undated) DEVICE — LIGHT HANDLE: Brand: DEVON

## (undated) DEVICE — SET GRAV CK VLV NEEDLESS ST 3 GANGED 4WAY STPCOCK HI FLO 10

## (undated) DEVICE — BANDAGE ADH W2XL4IN NITRL FAB STRP CURAD

## (undated) DEVICE — ELECTRODE,RADIOTRANSLUCENT,FOAM,3PK: Brand: MEDLINE

## (undated) DEVICE — SYRINGE MED 20ML STD CLR PLAS LUERLOCK TIP N CTRL DISP

## (undated) DEVICE — GOWN,SIRUS,NONRNF,SETINSLV,2XL,18/CS: Brand: MEDLINE

## (undated) DEVICE — SUTURE VCRL SZ 2-0 L36IN ABSRB UD L36MM CT-1 1/2 CIR J945H

## (undated) DEVICE — 3M™ STERI-DRAPE™ U-DRAPE 1015: Brand: STERI-DRAPE™

## (undated) DEVICE — BAG SPEC BIOHZRD 10 X 10 IN --

## (undated) DEVICE — 3M™ TEGADERM™ TRANSPARENT FILM DRESSING FRAME STYLE, 1624W, 2-3/8 IN X 2-3/4 IN (6 CM X 7 CM), 100/CT 4CT/CASE: Brand: 3M™ TEGADERM™

## (undated) DEVICE — 4-PORT MANIFOLD: Brand: NEPTUNE 2

## (undated) DEVICE — STERILE POLYISOPRENE POWDER-FREE SURGICAL GLOVES WITH EMOLLIENT COATING: Brand: PROTEXIS

## (undated) DEVICE — PIN FIX 4X170MM STRL -- 2/PK MAKO

## (undated) DEVICE — MARKER RAD KNEE TIB CKPT STEREOTAXIC IMAG LESION LOC

## (undated) DEVICE — HANDPIECE SET WITH COAXIAL HIGH FLOW TIP AND SUCTION TUBE: Brand: INTERPULSE

## (undated) DEVICE — SYR LR LCK 1ML GRAD NSAF 30ML --

## (undated) DEVICE — CANNULA SEAL

## (undated) DEVICE — PAD BD MATTRESS 73X32 IN STD CONVOLUTED FOAM LTX FREE

## (undated) DEVICE — Z DISCONTINUED USE (MFG CAT 7984-37) SOLUTION IV SODIUM CHL 0.9% 100 ML INJ

## (undated) DEVICE — ELECTRO LUBE IS A SINGLE PATIENT USE DEVICE THAT IS INTENDED TO BE USED ON ELECTROSURGICAL ELECTRODES TO REDUCE STICKING.: Brand: KEY SURGICAL ELECTRO LUBE

## (undated) DEVICE — TELFA NON-ADHERENT PADS PREPAK: Brand: TELFA

## (undated) DEVICE — SUTURE VCRL + SZ 1-0 L36IN ABSRB UD CTX 1/2 CIR TAPR PNT VCP977H

## (undated) DEVICE — TIBURON SPLIT SHEET: Brand: CONVERTORS

## (undated) DEVICE — KIT DRP FOR RIO ROBOTIC ARM ASST SYS

## (undated) DEVICE — KIT TRK HIP PROC VIZADISC

## (undated) DEVICE — SET ADMIN 16ML TBNG L100IN 2 Y INJ SITE IV PIGGY BK DISP

## (undated) DEVICE — DEVON™ KNEE AND BODY STRAP 60" X 3" (1.5 M X 7.6 CM): Brand: DEVON

## (undated) DEVICE — POLYP TRAP: Brand: TRAPEASE®

## (undated) DEVICE — 6619 2 PTNT ISO SYS INCISE AREA&LT;(&GT;&&LT;)&GT;P: Brand: STERI-DRAPE™ IOBAN™ 2

## (undated) DEVICE — ADULT SPO2 SENSOR: Brand: NELLCOR

## (undated) DEVICE — BASIN EMESIS 500CC ROSE 250/CS 60/PLT: Brand: MEDEGEN MEDICAL PRODUCTS, LLC

## (undated) DEVICE — SUTURE MCRYL SZ 3-0 L27IN ABSRB UD L24MM PS-1 3/8 CIR PRIM Y936H

## (undated) DEVICE — (D)PREP SKN CHLRAPRP APPL 26ML -- CONVERT TO ITEM 371833

## (undated) DEVICE — NDL PRT INJ NSAF BLNT 18GX1.5 --

## (undated) DEVICE — CATH IV AUTOGRD BC BLU 22GA 25 -- INSYTE

## (undated) DEVICE — CONTAINER SPEC 20 ML LID NEUT BUFF FORMALIN 10 % POLYPR STS

## (undated) DEVICE — CONTAINER,SPECIMEN,3OZ,OR STRL: Brand: MEDLINE

## (undated) DEVICE — NEEDLE HYPO 18GA L1.5IN PNK S STL HUB POLYPR SHLD REG BVL

## (undated) DEVICE — NDL FLTR TIP 5 MIC 18GX1.5IN --

## (undated) DEVICE — SOLUTION IRRIG 1000ML STRL H2O USP PLAS POUR BTL

## (undated) DEVICE — SUTURE VCRL SZ 4-0 L27IN ABSRB UD L19MM PS-2 3/8 CIR PRIM J426H

## (undated) DEVICE — T4 HOOD

## (undated) DEVICE — DRSG AQUACEL SURG 3.5 X 10IN -- CONVERT TO ITEM 370183

## (undated) DEVICE — ROBOTIC GENERAL-SFMC: Brand: MEDLINE INDUSTRIES, INC.

## (undated) DEVICE — STRIP,CLOSURE,WOUND,MEDI-STRIP,1/2X4: Brand: MEDLINE